# Patient Record
Sex: FEMALE | Race: WHITE | NOT HISPANIC OR LATINO | Employment: OTHER | URBAN - METROPOLITAN AREA
[De-identification: names, ages, dates, MRNs, and addresses within clinical notes are randomized per-mention and may not be internally consistent; named-entity substitution may affect disease eponyms.]

---

## 2017-01-03 ENCOUNTER — GENERIC CONVERSION - ENCOUNTER (OUTPATIENT)
Dept: OTHER | Facility: OTHER | Age: 82
End: 2017-01-03

## 2017-01-04 ENCOUNTER — LAB (OUTPATIENT)
Dept: LAB | Facility: CLINIC | Age: 82
End: 2017-01-04
Payer: MEDICARE

## 2017-01-04 DIAGNOSIS — D50.9 IRON DEFICIENCY ANEMIA, UNSPECIFIED IRON DEFICIENCY ANEMIA TYPE: Primary | ICD-10-CM

## 2017-01-04 LAB
HCT VFR BLD AUTO: 29.4 % (ref 34.8–46.1)
HGB BLD-MCNC: 8.8 G/DL (ref 11.5–15.4)

## 2017-01-04 PROCEDURE — 85018 HEMOGLOBIN: CPT

## 2017-01-04 PROCEDURE — 36415 COLL VENOUS BLD VENIPUNCTURE: CPT

## 2017-01-04 PROCEDURE — 85014 HEMATOCRIT: CPT

## 2017-01-05 ENCOUNTER — GENERIC CONVERSION - ENCOUNTER (OUTPATIENT)
Dept: OTHER | Facility: OTHER | Age: 82
End: 2017-01-05

## 2017-01-10 ENCOUNTER — GENERIC CONVERSION - ENCOUNTER (OUTPATIENT)
Dept: OTHER | Facility: OTHER | Age: 82
End: 2017-01-10

## 2017-01-16 ENCOUNTER — ALLSCRIPTS OFFICE VISIT (OUTPATIENT)
Dept: OTHER | Facility: OTHER | Age: 82
End: 2017-01-16

## 2017-01-16 DIAGNOSIS — I49.5 SICK SINUS SYNDROME (HCC): ICD-10-CM

## 2017-01-16 DIAGNOSIS — R06.09 OTHER FORMS OF DYSPNEA: ICD-10-CM

## 2017-01-16 DIAGNOSIS — D64.9 ANEMIA: ICD-10-CM

## 2017-01-16 DIAGNOSIS — I48.92 ATRIAL FLUTTER (HCC): ICD-10-CM

## 2017-01-16 DIAGNOSIS — K92.2 GASTROINTESTINAL HEMORRHAGE: ICD-10-CM

## 2017-01-16 DIAGNOSIS — I50.32 CHRONIC DIASTOLIC HEART FAILURE (HCC): ICD-10-CM

## 2017-01-16 DIAGNOSIS — I10 ESSENTIAL (PRIMARY) HYPERTENSION: ICD-10-CM

## 2017-01-18 ENCOUNTER — TRANSCRIBE ORDERS (OUTPATIENT)
Dept: ADMINISTRATIVE | Facility: HOSPITAL | Age: 82
End: 2017-01-18

## 2017-01-18 ENCOUNTER — APPOINTMENT (OUTPATIENT)
Dept: LAB | Facility: HOSPITAL | Age: 82
End: 2017-01-18
Payer: MEDICARE

## 2017-01-18 DIAGNOSIS — I49.5 SICK SINUS SYNDROME (HCC): ICD-10-CM

## 2017-01-18 LAB
ALBUMIN SERPL BCP-MCNC: 3.1 G/DL (ref 3.5–5)
ALP SERPL-CCNC: 67 U/L (ref 46–116)
ALT SERPL W P-5'-P-CCNC: 23 U/L (ref 12–78)
ANION GAP SERPL CALCULATED.3IONS-SCNC: 11 MMOL/L (ref 4–13)
AST SERPL W P-5'-P-CCNC: 20 U/L (ref 5–45)
BASOPHILS # BLD AUTO: 0.04 THOUSANDS/ΜL (ref 0–0.1)
BASOPHILS NFR BLD AUTO: 1 % (ref 0–1)
BILIRUB SERPL-MCNC: 0.7 MG/DL (ref 0.2–1)
BUN SERPL-MCNC: 16 MG/DL (ref 5–25)
CALCIUM SERPL-MCNC: 8.5 MG/DL (ref 8.3–10.1)
CHLORIDE SERPL-SCNC: 105 MMOL/L (ref 100–108)
CO2 SERPL-SCNC: 25 MMOL/L (ref 21–32)
CREAT SERPL-MCNC: 1.37 MG/DL (ref 0.6–1.3)
EOSINOPHIL # BLD AUTO: 0.41 THOUSAND/ΜL (ref 0–0.61)
EOSINOPHIL NFR BLD AUTO: 6 % (ref 0–6)
ERYTHROCYTE [DISTWIDTH] IN BLOOD BY AUTOMATED COUNT: 16 % (ref 11.6–15.1)
GFR SERPL CREATININE-BSD FRML MDRD: 36.7 ML/MIN/1.73SQ M
GLUCOSE SERPL-MCNC: 134 MG/DL (ref 65–140)
HCT VFR BLD AUTO: 28.6 % (ref 34.8–46.1)
HGB BLD-MCNC: 8.1 G/DL (ref 11.5–15.4)
LYMPHOCYTES # BLD AUTO: 1.63 THOUSANDS/ΜL (ref 0.6–4.47)
LYMPHOCYTES NFR BLD AUTO: 23 % (ref 14–44)
MCH RBC QN AUTO: 23.5 PG (ref 26.8–34.3)
MCHC RBC AUTO-ENTMCNC: 28.3 G/DL (ref 31.4–37.4)
MCV RBC AUTO: 83 FL (ref 82–98)
MONOCYTES # BLD AUTO: 0.6 THOUSAND/ΜL (ref 0.17–1.22)
MONOCYTES NFR BLD AUTO: 8 % (ref 4–12)
NEUTROPHILS # BLD AUTO: 4.53 THOUSANDS/ΜL (ref 1.85–7.62)
NEUTS SEG NFR BLD AUTO: 62 % (ref 43–75)
PLATELET # BLD AUTO: 295 THOUSANDS/UL (ref 149–390)
PMV BLD AUTO: 9.7 FL (ref 8.9–12.7)
POTASSIUM SERPL-SCNC: 3.9 MMOL/L (ref 3.5–5.3)
PROT SERPL-MCNC: 6.2 G/DL (ref 6.4–8.2)
RBC # BLD AUTO: 3.44 MILLION/UL (ref 3.81–5.12)
SODIUM SERPL-SCNC: 141 MMOL/L (ref 136–145)
WBC # BLD AUTO: 7.21 THOUSAND/UL (ref 4.31–10.16)

## 2017-01-18 PROCEDURE — 36415 COLL VENOUS BLD VENIPUNCTURE: CPT

## 2017-01-18 PROCEDURE — 85025 COMPLETE CBC W/AUTO DIFF WBC: CPT

## 2017-01-18 PROCEDURE — 80053 COMPREHEN METABOLIC PANEL: CPT

## 2017-01-23 ENCOUNTER — HOSPITAL ENCOUNTER (OUTPATIENT)
Dept: NON INVASIVE DIAGNOSTICS | Facility: HOSPITAL | Age: 82
Discharge: HOME | End: 2017-01-23
Payer: MEDICARE

## 2017-01-23 ENCOUNTER — TRANSCRIBE ORDERS (OUTPATIENT)
Dept: ADMINISTRATIVE | Facility: HOSPITAL | Age: 82
End: 2017-01-23

## 2017-01-23 ENCOUNTER — APPOINTMENT (OUTPATIENT)
Dept: LAB | Facility: HOSPITAL | Age: 82
End: 2017-01-23
Payer: MEDICARE

## 2017-01-23 DIAGNOSIS — D64.9 ANEMIA: ICD-10-CM

## 2017-01-23 DIAGNOSIS — I49.5 SICK SINUS SYNDROME (HCC): ICD-10-CM

## 2017-01-23 LAB
ERYTHROCYTE [DISTWIDTH] IN BLOOD BY AUTOMATED COUNT: 16.5 % (ref 11.6–15.1)
HCT VFR BLD AUTO: 30.1 % (ref 34.8–46.1)
HGB BLD-MCNC: 8.5 G/DL (ref 11.5–15.4)
MCH RBC QN AUTO: 22.8 PG (ref 26.8–34.3)
MCHC RBC AUTO-ENTMCNC: 28.2 G/DL (ref 31.4–37.4)
MCV RBC AUTO: 81 FL (ref 82–98)
PLATELET # BLD AUTO: 280 THOUSANDS/UL (ref 149–390)
PMV BLD AUTO: 8.8 FL (ref 8.9–12.7)
RBC # BLD AUTO: 3.72 MILLION/UL (ref 3.81–5.12)
WBC # BLD AUTO: 6.49 THOUSAND/UL (ref 4.31–10.16)

## 2017-01-23 PROCEDURE — 93306 TTE W/DOPPLER COMPLETE: CPT

## 2017-01-23 PROCEDURE — 85027 COMPLETE CBC AUTOMATED: CPT

## 2017-01-23 PROCEDURE — 36415 COLL VENOUS BLD VENIPUNCTURE: CPT

## 2017-01-30 ENCOUNTER — ALLSCRIPTS OFFICE VISIT (OUTPATIENT)
Dept: OTHER | Facility: OTHER | Age: 82
End: 2017-01-30

## 2017-01-31 ENCOUNTER — TRANSCRIBE ORDERS (OUTPATIENT)
Dept: ADMINISTRATIVE | Facility: HOSPITAL | Age: 82
End: 2017-01-31

## 2017-01-31 ENCOUNTER — APPOINTMENT (OUTPATIENT)
Dept: LAB | Facility: HOSPITAL | Age: 82
End: 2017-01-31
Payer: MEDICARE

## 2017-01-31 DIAGNOSIS — D64.9 ANEMIA: ICD-10-CM

## 2017-01-31 DIAGNOSIS — I10 ESSENTIAL (PRIMARY) HYPERTENSION: ICD-10-CM

## 2017-01-31 DIAGNOSIS — R06.09 OTHER FORMS OF DYSPNEA: ICD-10-CM

## 2017-01-31 DIAGNOSIS — I48.92 ATRIAL FLUTTER (HCC): ICD-10-CM

## 2017-01-31 DIAGNOSIS — K92.2 GASTROINTESTINAL HEMORRHAGE: ICD-10-CM

## 2017-01-31 LAB
BASOPHILS # BLD AUTO: 0.07 THOUSANDS/ΜL (ref 0–0.1)
BASOPHILS NFR BLD AUTO: 1 % (ref 0–1)
EOSINOPHIL # BLD AUTO: 0.45 THOUSAND/ΜL (ref 0–0.61)
EOSINOPHIL NFR BLD AUTO: 5 % (ref 0–6)
ERYTHROCYTE [DISTWIDTH] IN BLOOD BY AUTOMATED COUNT: 17.2 % (ref 11.6–15.1)
FERRITIN SERPL-MCNC: 15 NG/ML (ref 8–388)
HCT VFR BLD AUTO: 31.1 % (ref 34.8–46.1)
HGB BLD-MCNC: 8.8 G/DL (ref 11.5–15.4)
IRON SATN MFR SERPL: 4 %
IRON SERPL-MCNC: 20 UG/DL (ref 50–170)
LYMPHOCYTES # BLD AUTO: 2.11 THOUSANDS/ΜL (ref 0.6–4.47)
LYMPHOCYTES NFR BLD AUTO: 24 % (ref 14–44)
MCH RBC QN AUTO: 22.2 PG (ref 26.8–34.3)
MCHC RBC AUTO-ENTMCNC: 28.3 G/DL (ref 31.4–37.4)
MCV RBC AUTO: 79 FL (ref 82–98)
MONOCYTES # BLD AUTO: 0.7 THOUSAND/ΜL (ref 0.17–1.22)
MONOCYTES NFR BLD AUTO: 8 % (ref 4–12)
NEUTROPHILS # BLD AUTO: 5.37 THOUSANDS/ΜL (ref 1.85–7.62)
NEUTS SEG NFR BLD AUTO: 62 % (ref 43–75)
NT-PROBNP SERPL-MCNC: 2695 PG/ML
PLATELET # BLD AUTO: 281 THOUSANDS/UL (ref 149–390)
PMV BLD AUTO: 9.3 FL (ref 8.9–12.7)
RBC # BLD AUTO: 3.96 MILLION/UL (ref 3.81–5.12)
TIBC SERPL-MCNC: 454 UG/DL (ref 250–450)
WBC # BLD AUTO: 8.7 THOUSAND/UL (ref 4.31–10.16)

## 2017-01-31 PROCEDURE — 36415 COLL VENOUS BLD VENIPUNCTURE: CPT

## 2017-01-31 PROCEDURE — 85025 COMPLETE CBC W/AUTO DIFF WBC: CPT

## 2017-01-31 PROCEDURE — 82728 ASSAY OF FERRITIN: CPT

## 2017-01-31 PROCEDURE — 83540 ASSAY OF IRON: CPT

## 2017-01-31 PROCEDURE — 83550 IRON BINDING TEST: CPT

## 2017-01-31 PROCEDURE — 83880 ASSAY OF NATRIURETIC PEPTIDE: CPT

## 2017-02-02 ENCOUNTER — GENERIC CONVERSION - ENCOUNTER (OUTPATIENT)
Dept: OTHER | Facility: OTHER | Age: 82
End: 2017-02-02

## 2017-02-06 ENCOUNTER — TRANSCRIBE ORDERS (OUTPATIENT)
Dept: ADMISSIONS | Facility: HOSPITAL | Age: 82
End: 2017-02-06

## 2017-02-06 ENCOUNTER — APPOINTMENT (OUTPATIENT)
Dept: LAB | Facility: HOSPITAL | Age: 82
End: 2017-02-06
Payer: MEDICARE

## 2017-02-06 DIAGNOSIS — D64.9 ANEMIA: ICD-10-CM

## 2017-02-06 DIAGNOSIS — I50.32 CHRONIC DIASTOLIC HEART FAILURE (HCC): ICD-10-CM

## 2017-02-06 DIAGNOSIS — I10 ESSENTIAL (PRIMARY) HYPERTENSION: ICD-10-CM

## 2017-02-06 LAB
ANION GAP SERPL CALCULATED.3IONS-SCNC: 11 MMOL/L (ref 4–13)
BASOPHILS # BLD AUTO: 0.05 THOUSANDS/ΜL (ref 0–0.1)
BASOPHILS NFR BLD AUTO: 1 % (ref 0–1)
BUN SERPL-MCNC: 13 MG/DL (ref 5–25)
CALCIUM SERPL-MCNC: 8.6 MG/DL (ref 8.3–10.1)
CHLORIDE SERPL-SCNC: 105 MMOL/L (ref 100–108)
CO2 SERPL-SCNC: 28 MMOL/L (ref 21–32)
CREAT SERPL-MCNC: 1.35 MG/DL (ref 0.6–1.3)
EOSINOPHIL # BLD AUTO: 0.35 THOUSAND/ΜL (ref 0–0.61)
EOSINOPHIL NFR BLD AUTO: 5 % (ref 0–6)
ERYTHROCYTE [DISTWIDTH] IN BLOOD BY AUTOMATED COUNT: 20.9 % (ref 11.6–15.1)
GFR SERPL CREATININE-BSD FRML MDRD: 37.4 ML/MIN/1.73SQ M
GLUCOSE SERPL-MCNC: 136 MG/DL (ref 65–140)
HCT VFR BLD AUTO: 32.7 % (ref 34.8–46.1)
HGB BLD-MCNC: 9 G/DL (ref 11.5–15.4)
LYMPHOCYTES # BLD AUTO: 1.46 THOUSANDS/ΜL (ref 0.6–4.47)
LYMPHOCYTES NFR BLD AUTO: 20 % (ref 14–44)
MCH RBC QN AUTO: 22.4 PG (ref 26.8–34.3)
MCHC RBC AUTO-ENTMCNC: 27.5 G/DL (ref 31.4–37.4)
MCV RBC AUTO: 81 FL (ref 82–98)
MONOCYTES # BLD AUTO: 0.55 THOUSAND/ΜL (ref 0.17–1.22)
MONOCYTES NFR BLD AUTO: 8 % (ref 4–12)
NEUTROPHILS # BLD AUTO: 4.94 THOUSANDS/ΜL (ref 1.85–7.62)
NEUTS SEG NFR BLD AUTO: 66 % (ref 43–75)
PLATELET # BLD AUTO: 244 THOUSANDS/UL (ref 149–390)
PMV BLD AUTO: 9.7 FL (ref 8.9–12.7)
POTASSIUM SERPL-SCNC: 3.9 MMOL/L (ref 3.5–5.3)
RBC # BLD AUTO: 4.02 MILLION/UL (ref 3.81–5.12)
SODIUM SERPL-SCNC: 144 MMOL/L (ref 136–145)
WBC # BLD AUTO: 7.35 THOUSAND/UL (ref 4.31–10.16)

## 2017-02-06 PROCEDURE — 85025 COMPLETE CBC W/AUTO DIFF WBC: CPT

## 2017-02-06 PROCEDURE — 36415 COLL VENOUS BLD VENIPUNCTURE: CPT

## 2017-02-06 PROCEDURE — 80048 BASIC METABOLIC PNL TOTAL CA: CPT

## 2017-02-08 ENCOUNTER — ALLSCRIPTS OFFICE VISIT (OUTPATIENT)
Dept: OTHER | Facility: OTHER | Age: 82
End: 2017-02-08

## 2017-02-15 ENCOUNTER — ALLSCRIPTS OFFICE VISIT (OUTPATIENT)
Dept: OTHER | Facility: OTHER | Age: 82
End: 2017-02-15

## 2017-02-16 ENCOUNTER — HOSPITAL ENCOUNTER (INPATIENT)
Facility: HOSPITAL | Age: 82
LOS: 1 days | Discharge: HOME | DRG: 682 | End: 2017-02-18
Attending: EMERGENCY MEDICINE | Admitting: FAMILY MEDICINE
Payer: MEDICARE

## 2017-02-16 ENCOUNTER — ALLSCRIPTS OFFICE VISIT (OUTPATIENT)
Dept: OTHER | Facility: OTHER | Age: 82
End: 2017-02-16

## 2017-02-16 ENCOUNTER — APPOINTMENT (EMERGENCY)
Dept: RADIOLOGY | Facility: HOSPITAL | Age: 82
DRG: 682 | End: 2017-02-16
Payer: MEDICARE

## 2017-02-16 DIAGNOSIS — I50.9 ACUTE EXACERBATION OF CHF (CONGESTIVE HEART FAILURE) (HCC): Primary | ICD-10-CM

## 2017-02-16 DIAGNOSIS — E78.5 HYPERLIPIDEMIA: ICD-10-CM

## 2017-02-16 DIAGNOSIS — N17.9 AKI (ACUTE KIDNEY INJURY) (HCC): ICD-10-CM

## 2017-02-16 DIAGNOSIS — I10 ESSENTIAL (PRIMARY) HYPERTENSION: ICD-10-CM

## 2017-02-16 DIAGNOSIS — E55.9 VITAMIN D DEFICIENCY: ICD-10-CM

## 2017-02-16 DIAGNOSIS — R06.09 OTHER FORMS OF DYSPNEA: ICD-10-CM

## 2017-02-16 DIAGNOSIS — J90 PLEURAL EFFUSION: ICD-10-CM

## 2017-02-16 DIAGNOSIS — R53.83 OTHER FATIGUE: ICD-10-CM

## 2017-02-16 DIAGNOSIS — I50.32 CHRONIC DIASTOLIC HEART FAILURE (HCC): ICD-10-CM

## 2017-02-16 DIAGNOSIS — R09.02 HYPOXIA: ICD-10-CM

## 2017-02-16 DIAGNOSIS — D64.9 ANEMIA: ICD-10-CM

## 2017-02-16 DIAGNOSIS — E05.90 THYROTOXICOSIS WITHOUT THYROID STORM: ICD-10-CM

## 2017-02-16 LAB
ALBUMIN SERPL BCP-MCNC: 3.4 G/DL (ref 3.5–5)
ALP SERPL-CCNC: 68 U/L (ref 46–116)
ALT SERPL W P-5'-P-CCNC: 20 U/L (ref 12–78)
ANION GAP SERPL CALCULATED.3IONS-SCNC: 10 MMOL/L (ref 4–13)
AST SERPL W P-5'-P-CCNC: 20 U/L (ref 5–45)
ATRIAL RATE: 60 BPM
BASOPHILS # BLD AUTO: 0.05 THOUSANDS/ΜL (ref 0–0.1)
BASOPHILS NFR BLD AUTO: 1 % (ref 0–1)
BILIRUB SERPL-MCNC: 0.9 MG/DL (ref 0.2–1)
BUN SERPL-MCNC: 12 MG/DL (ref 5–25)
CALCIUM SERPL-MCNC: 8.8 MG/DL (ref 8.3–10.1)
CHLORIDE SERPL-SCNC: 107 MMOL/L (ref 100–108)
CO2 SERPL-SCNC: 28 MMOL/L (ref 21–32)
CREAT SERPL-MCNC: 1.42 MG/DL (ref 0.6–1.3)
EOSINOPHIL # BLD AUTO: 0.14 THOUSAND/ΜL (ref 0–0.61)
EOSINOPHIL NFR BLD AUTO: 2 % (ref 0–6)
ERYTHROCYTE [DISTWIDTH] IN BLOOD BY AUTOMATED COUNT: 21.6 % (ref 11.6–15.1)
GFR SERPL CREATININE-BSD FRML MDRD: 35.2 ML/MIN/1.73SQ M
GLUCOSE SERPL-MCNC: 137 MG/DL (ref 65–140)
HCT VFR BLD AUTO: 36.8 % (ref 34.8–46.1)
HGB BLD-MCNC: 10.2 G/DL (ref 11.5–15.4)
LYMPHOCYTES # BLD AUTO: 1.17 THOUSANDS/ΜL (ref 0.6–4.47)
LYMPHOCYTES NFR BLD AUTO: 16 % (ref 14–44)
MCH RBC QN AUTO: 22.3 PG (ref 26.8–34.3)
MCHC RBC AUTO-ENTMCNC: 27.7 G/DL (ref 31.4–37.4)
MCV RBC AUTO: 80 FL (ref 82–98)
MONOCYTES # BLD AUTO: 0.76 THOUSAND/ΜL (ref 0.17–1.22)
MONOCYTES NFR BLD AUTO: 10 % (ref 4–12)
NEUTROPHILS # BLD AUTO: 5.36 THOUSANDS/ΜL (ref 1.85–7.62)
NEUTS SEG NFR BLD AUTO: 71 % (ref 43–75)
NT-PROBNP SERPL-MCNC: 4244 PG/ML
P AXIS: 102 DEGREES
PLATELET # BLD AUTO: 270 THOUSANDS/UL (ref 149–390)
PMV BLD AUTO: 9.5 FL (ref 8.9–12.7)
POTASSIUM SERPL-SCNC: 3.9 MMOL/L (ref 3.5–5.3)
PR INTERVAL: 196 MS
PROT SERPL-MCNC: 6.7 G/DL (ref 6.4–8.2)
QRS AXIS: 51 DEGREES
QRSD INTERVAL: 78 MS
QT INTERVAL: 434 MS
QTC INTERVAL: 434 MS
RBC # BLD AUTO: 4.58 MILLION/UL (ref 3.81–5.12)
SODIUM SERPL-SCNC: 145 MMOL/L (ref 136–145)
T WAVE AXIS: 58 DEGREES
TROPONIN I SERPL-MCNC: 0.02 NG/ML
TROPONIN I SERPL-MCNC: 0.02 NG/ML
TROPONIN I SERPL-MCNC: 0.03 NG/ML
VENTRICULAR RATE: 60 BPM
WBC # BLD AUTO: 7.48 THOUSAND/UL (ref 4.31–10.16)

## 2017-02-16 PROCEDURE — 85025 COMPLETE CBC W/AUTO DIFF WBC: CPT | Performed by: PHYSICIAN ASSISTANT

## 2017-02-16 PROCEDURE — 36415 COLL VENOUS BLD VENIPUNCTURE: CPT | Performed by: PHYSICIAN ASSISTANT

## 2017-02-16 PROCEDURE — 99285 EMERGENCY DEPT VISIT HI MDM: CPT

## 2017-02-16 PROCEDURE — 93005 ELECTROCARDIOGRAM TRACING: CPT | Performed by: PHYSICIAN ASSISTANT

## 2017-02-16 PROCEDURE — 84484 ASSAY OF TROPONIN QUANT: CPT | Performed by: PHYSICIAN ASSISTANT

## 2017-02-16 PROCEDURE — 84484 ASSAY OF TROPONIN QUANT: CPT | Performed by: FAMILY MEDICINE

## 2017-02-16 PROCEDURE — 71020 HB CHEST X-RAY 2VW FRONTAL&LATL: CPT

## 2017-02-16 PROCEDURE — 80053 COMPREHEN METABOLIC PANEL: CPT | Performed by: PHYSICIAN ASSISTANT

## 2017-02-16 PROCEDURE — 83880 ASSAY OF NATRIURETIC PEPTIDE: CPT | Performed by: PHYSICIAN ASSISTANT

## 2017-02-16 RX ORDER — ATORVASTATIN CALCIUM 40 MG/1
40 TABLET, FILM COATED ORAL
Status: DISCONTINUED | OUTPATIENT
Start: 2017-02-16 | End: 2017-02-18 | Stop reason: HOSPADM

## 2017-02-16 RX ORDER — PANTOPRAZOLE SODIUM 40 MG/1
40 TABLET, DELAYED RELEASE ORAL
Status: DISCONTINUED | OUTPATIENT
Start: 2017-02-16 | End: 2017-02-18 | Stop reason: HOSPADM

## 2017-02-16 RX ORDER — BRIMONIDINE TARTRATE, TIMOLOL MALEATE 2; 5 MG/ML; MG/ML
1 SOLUTION/ DROPS OPHTHALMIC EVERY 12 HOURS SCHEDULED
Status: DISCONTINUED | OUTPATIENT
Start: 2017-02-16 | End: 2017-02-16

## 2017-02-16 RX ORDER — BRIMONIDINE TARTRATE, TIMOLOL MALEATE 2; 5 MG/ML; MG/ML
1 SOLUTION/ DROPS OPHTHALMIC EVERY 12 HOURS SCHEDULED
Status: DISCONTINUED | OUTPATIENT
Start: 2017-02-17 | End: 2017-02-18 | Stop reason: HOSPADM

## 2017-02-16 RX ORDER — FUROSEMIDE 10 MG/ML
40 INJECTION INTRAMUSCULAR; INTRAVENOUS
Status: DISCONTINUED | OUTPATIENT
Start: 2017-02-16 | End: 2017-02-18 | Stop reason: HOSPADM

## 2017-02-16 RX ORDER — METHIMAZOLE 5 MG/1
5 TABLET ORAL 3 TIMES WEEKLY
Status: DISCONTINUED | OUTPATIENT
Start: 2017-02-17 | End: 2017-02-18 | Stop reason: HOSPADM

## 2017-02-16 RX ORDER — METOPROLOL TARTRATE 50 MG/1
50 TABLET, FILM COATED ORAL EVERY 12 HOURS SCHEDULED
Status: DISCONTINUED | OUTPATIENT
Start: 2017-02-16 | End: 2017-02-18 | Stop reason: HOSPADM

## 2017-02-16 RX ORDER — DILTIAZEM HYDROCHLORIDE 180 MG/1
180 CAPSULE, COATED, EXTENDED RELEASE ORAL DAILY
Status: DISCONTINUED | OUTPATIENT
Start: 2017-02-16 | End: 2017-02-18 | Stop reason: HOSPADM

## 2017-02-16 RX ORDER — HEPARIN SODIUM 5000 [USP'U]/ML
5000 INJECTION, SOLUTION INTRAVENOUS; SUBCUTANEOUS EVERY 8 HOURS SCHEDULED
Status: DISCONTINUED | OUTPATIENT
Start: 2017-02-16 | End: 2017-02-18 | Stop reason: HOSPADM

## 2017-02-16 RX ORDER — FUROSEMIDE 10 MG/ML
40 INJECTION INTRAMUSCULAR; INTRAVENOUS ONCE
Status: COMPLETED | OUTPATIENT
Start: 2017-02-16 | End: 2017-02-16

## 2017-02-16 RX ORDER — OLOPATADINE HYDROCHLORIDE 1 MG/ML
1 SOLUTION/ DROPS OPHTHALMIC 2 TIMES DAILY
Status: DISCONTINUED | OUTPATIENT
Start: 2017-02-16 | End: 2017-02-18 | Stop reason: HOSPADM

## 2017-02-16 RX ORDER — FUROSEMIDE 20 MG/1
20 TABLET ORAL EVERY OTHER DAY
Status: ON HOLD | COMMUNITY
End: 2017-02-18

## 2017-02-16 RX ADMIN — OLOPATADINE HYDROCHLORIDE 1 DROP: 1 SOLUTION/ DROPS OPHTHALMIC at 17:20

## 2017-02-16 RX ADMIN — ATORVASTATIN CALCIUM 40 MG: 40 TABLET, FILM COATED ORAL at 16:02

## 2017-02-16 RX ADMIN — FUROSEMIDE 40 MG: 10 INJECTION, SOLUTION INTRAMUSCULAR; INTRAVENOUS at 12:56

## 2017-02-16 RX ADMIN — METOPROLOL TARTRATE 50 MG: 50 TABLET ORAL at 21:43

## 2017-02-16 RX ADMIN — PANTOPRAZOLE SODIUM 40 MG: 40 TABLET, DELAYED RELEASE ORAL at 16:02

## 2017-02-16 RX ADMIN — FUROSEMIDE 40 MG: 10 INJECTION, SOLUTION INTRAMUSCULAR; INTRAVENOUS at 17:20

## 2017-02-17 ENCOUNTER — APPOINTMENT (INPATIENT)
Dept: ULTRASOUND IMAGING | Facility: HOSPITAL | Age: 82
DRG: 682 | End: 2017-02-17
Payer: MEDICARE

## 2017-02-17 ENCOUNTER — APPOINTMENT (OUTPATIENT)
Dept: RADIOLOGY | Facility: HOSPITAL | Age: 82
DRG: 682 | End: 2017-02-17
Payer: MEDICARE

## 2017-02-17 PROBLEM — I27.20 PULMONARY HYPERTENSION (HCC): Status: ACTIVE | Noted: 2017-02-17

## 2017-02-17 PROBLEM — E87.6 HYPOKALEMIA: Status: ACTIVE | Noted: 2017-02-17

## 2017-02-17 PROBLEM — N17.9 ACUTE KIDNEY INJURY (HCC): Status: ACTIVE | Noted: 2017-02-17

## 2017-02-17 LAB
ANION GAP SERPL CALCULATED.3IONS-SCNC: 9 MMOL/L (ref 4–13)
BUN SERPL-MCNC: 13 MG/DL (ref 5–25)
CALCIUM SERPL-MCNC: 8.5 MG/DL (ref 8.3–10.1)
CHLORIDE SERPL-SCNC: 105 MMOL/L (ref 100–108)
CO2 SERPL-SCNC: 31 MMOL/L (ref 21–32)
CREAT SERPL-MCNC: 1.43 MG/DL (ref 0.6–1.3)
CREAT UR-MCNC: 44.7 MG/DL
ERYTHROCYTE [DISTWIDTH] IN BLOOD BY AUTOMATED COUNT: 21.3 % (ref 11.6–15.1)
GFR SERPL CREATININE-BSD FRML MDRD: 35 ML/MIN/1.73SQ M
GLUCOSE SERPL-MCNC: 95 MG/DL (ref 65–140)
HCT VFR BLD AUTO: 33 % (ref 34.8–46.1)
HGB BLD-MCNC: 9.2 G/DL (ref 11.5–15.4)
MAGNESIUM SERPL-MCNC: 2 MG/DL (ref 1.6–2.6)
MCH RBC QN AUTO: 22.2 PG (ref 26.8–34.3)
MCHC RBC AUTO-ENTMCNC: 27.9 G/DL (ref 31.4–37.4)
MCV RBC AUTO: 80 FL (ref 82–98)
PLATELET # BLD AUTO: 228 THOUSANDS/UL (ref 149–390)
PMV BLD AUTO: 9.7 FL (ref 8.9–12.7)
POTASSIUM SERPL-SCNC: 3.1 MMOL/L (ref 3.5–5.3)
PROT UR-MCNC: 6 MG/DL
PROT/CREAT UR: 0.13 MG/G{CREAT} (ref 0–0.1)
RBC # BLD AUTO: 4.15 MILLION/UL (ref 3.81–5.12)
SODIUM 24H UR-SCNC: 108 MOL/L
SODIUM SERPL-SCNC: 145 MMOL/L (ref 136–145)
WBC # BLD AUTO: 6.59 THOUSAND/UL (ref 4.31–10.16)

## 2017-02-17 PROCEDURE — 97163 PT EVAL HIGH COMPLEX 45 MIN: CPT | Performed by: PHYSICAL THERAPIST

## 2017-02-17 PROCEDURE — 84156 ASSAY OF PROTEIN URINE: CPT | Performed by: INTERNAL MEDICINE

## 2017-02-17 PROCEDURE — 85027 COMPLETE CBC AUTOMATED: CPT | Performed by: FAMILY MEDICINE

## 2017-02-17 PROCEDURE — 97530 THERAPEUTIC ACTIVITIES: CPT

## 2017-02-17 PROCEDURE — G8979 MOBILITY GOAL STATUS: HCPCS | Performed by: PHYSICAL THERAPIST

## 2017-02-17 PROCEDURE — G8978 MOBILITY CURRENT STATUS: HCPCS | Performed by: PHYSICAL THERAPIST

## 2017-02-17 PROCEDURE — G8987 SELF CARE CURRENT STATUS: HCPCS

## 2017-02-17 PROCEDURE — 97166 OT EVAL MOD COMPLEX 45 MIN: CPT

## 2017-02-17 PROCEDURE — 83735 ASSAY OF MAGNESIUM: CPT | Performed by: FAMILY MEDICINE

## 2017-02-17 PROCEDURE — 93970 EXTREMITY STUDY: CPT

## 2017-02-17 PROCEDURE — 76770 US EXAM ABDO BACK WALL COMP: CPT

## 2017-02-17 PROCEDURE — 84300 ASSAY OF URINE SODIUM: CPT | Performed by: INTERNAL MEDICINE

## 2017-02-17 PROCEDURE — G8988 SELF CARE GOAL STATUS: HCPCS

## 2017-02-17 PROCEDURE — 80048 BASIC METABOLIC PNL TOTAL CA: CPT | Performed by: FAMILY MEDICINE

## 2017-02-17 PROCEDURE — 82570 ASSAY OF URINE CREATININE: CPT | Performed by: INTERNAL MEDICINE

## 2017-02-17 PROCEDURE — 71010 HB CHEST X-RAY 1 VIEW FRONTAL (PORTABLE): CPT

## 2017-02-17 PROCEDURE — 97116 GAIT TRAINING THERAPY: CPT | Performed by: PHYSICAL THERAPIST

## 2017-02-17 RX ORDER — POTASSIUM CHLORIDE 20 MEQ/1
40 TABLET, EXTENDED RELEASE ORAL 2 TIMES DAILY
Status: COMPLETED | OUTPATIENT
Start: 2017-02-17 | End: 2017-02-17

## 2017-02-17 RX ADMIN — FUROSEMIDE 40 MG: 10 INJECTION, SOLUTION INTRAMUSCULAR; INTRAVENOUS at 09:14

## 2017-02-17 RX ADMIN — PANTOPRAZOLE SODIUM 40 MG: 40 TABLET, DELAYED RELEASE ORAL at 09:16

## 2017-02-17 RX ADMIN — METOPROLOL TARTRATE 50 MG: 50 TABLET ORAL at 09:14

## 2017-02-17 RX ADMIN — POTASSIUM CHLORIDE 40 MEQ: 1500 TABLET, EXTENDED RELEASE ORAL at 17:14

## 2017-02-17 RX ADMIN — HEPARIN SODIUM 5000 UNITS: 5000 INJECTION, SOLUTION INTRAVENOUS; SUBCUTANEOUS at 14:33

## 2017-02-17 RX ADMIN — FUROSEMIDE 40 MG: 10 INJECTION, SOLUTION INTRAMUSCULAR; INTRAVENOUS at 17:14

## 2017-02-17 RX ADMIN — BRIMONIDINE TARTRATE, TIMOLOL MALEATE 1 DROP: 2; 5 SOLUTION/ DROPS OPHTHALMIC at 12:22

## 2017-02-17 RX ADMIN — PANTOPRAZOLE SODIUM 40 MG: 40 TABLET, DELAYED RELEASE ORAL at 17:14

## 2017-02-17 RX ADMIN — METHIMAZOLE 5 MG: 5 TABLET ORAL at 09:14

## 2017-02-17 RX ADMIN — BRIMONIDINE TARTRATE, TIMOLOL MALEATE 1 DROP: 2; 5 SOLUTION/ DROPS OPHTHALMIC at 21:39

## 2017-02-17 RX ADMIN — HEPARIN SODIUM 5000 UNITS: 5000 INJECTION, SOLUTION INTRAVENOUS; SUBCUTANEOUS at 21:37

## 2017-02-17 RX ADMIN — METOPROLOL TARTRATE 50 MG: 50 TABLET ORAL at 21:37

## 2017-02-17 RX ADMIN — OLOPATADINE HYDROCHLORIDE 1 DROP: 1 SOLUTION/ DROPS OPHTHALMIC at 17:14

## 2017-02-17 RX ADMIN — POTASSIUM CHLORIDE 40 MEQ: 1500 TABLET, EXTENDED RELEASE ORAL at 13:27

## 2017-02-17 RX ADMIN — SERTRALINE HYDROCHLORIDE 50 MG: 50 TABLET ORAL at 09:15

## 2017-02-17 RX ADMIN — DILTIAZEM HYDROCHLORIDE 180 MG: 180 CAPSULE, EXTENDED RELEASE ORAL at 09:15

## 2017-02-17 RX ADMIN — OLOPATADINE HYDROCHLORIDE 1 DROP: 1 SOLUTION/ DROPS OPHTHALMIC at 09:15

## 2017-02-17 RX ADMIN — ATORVASTATIN CALCIUM 40 MG: 40 TABLET, FILM COATED ORAL at 17:14

## 2017-02-18 VITALS
RESPIRATION RATE: 18 BRPM | HEIGHT: 66 IN | OXYGEN SATURATION: 96 % | SYSTOLIC BLOOD PRESSURE: 129 MMHG | BODY MASS INDEX: 30.15 KG/M2 | DIASTOLIC BLOOD PRESSURE: 62 MMHG | WEIGHT: 187.61 LBS | HEART RATE: 60 BPM | TEMPERATURE: 97.3 F

## 2017-02-18 PROBLEM — J90 PLEURAL EFFUSION: Status: ACTIVE | Noted: 2017-02-18

## 2017-02-18 PROBLEM — E83.39 HYPERPHOSPHATEMIA: Status: ACTIVE | Noted: 2017-02-18

## 2017-02-18 PROBLEM — N26.1 RENAL ATROPHY: Status: ACTIVE | Noted: 2017-02-18

## 2017-02-18 PROBLEM — D64.9 ANEMIA: Status: ACTIVE | Noted: 2017-02-18

## 2017-02-18 PROBLEM — E55.9 VITAMIN D INSUFFICIENCY: Status: ACTIVE | Noted: 2017-02-18

## 2017-02-18 LAB
25(OH)D3 SERPL-MCNC: 29.8 NG/ML (ref 30–100)
ALBUMIN SERPL BCP-MCNC: 2.9 G/DL (ref 3.5–5)
ALP SERPL-CCNC: 58 U/L (ref 46–116)
ALT SERPL W P-5'-P-CCNC: 16 U/L (ref 12–78)
ANION GAP SERPL CALCULATED.3IONS-SCNC: 5 MMOL/L (ref 4–13)
AST SERPL W P-5'-P-CCNC: 16 U/L (ref 5–45)
BASOPHILS # BLD AUTO: 0.05 THOUSANDS/ΜL (ref 0–0.1)
BASOPHILS NFR BLD AUTO: 1 % (ref 0–1)
BILIRUB SERPL-MCNC: 0.9 MG/DL (ref 0.2–1)
BUN SERPL-MCNC: 16 MG/DL (ref 5–25)
CALCIUM SERPL-MCNC: 8.6 MG/DL (ref 8.3–10.1)
CHLORIDE SERPL-SCNC: 106 MMOL/L (ref 100–108)
CO2 SERPL-SCNC: 33 MMOL/L (ref 21–32)
CREAT SERPL-MCNC: 1.43 MG/DL (ref 0.6–1.3)
EOSINOPHIL # BLD AUTO: 0.35 THOUSAND/ΜL (ref 0–0.61)
EOSINOPHIL NFR BLD AUTO: 5 % (ref 0–6)
ERYTHROCYTE [DISTWIDTH] IN BLOOD BY AUTOMATED COUNT: 21.4 % (ref 11.6–15.1)
GFR SERPL CREATININE-BSD FRML MDRD: 35 ML/MIN/1.73SQ M
GLUCOSE SERPL-MCNC: 102 MG/DL (ref 65–140)
HCT VFR BLD AUTO: 33.1 % (ref 34.8–46.1)
HGB BLD-MCNC: 9.3 G/DL (ref 11.5–15.4)
LACTATE SERPL-SCNC: 0.8 MMOL/L (ref 0.5–2)
LYMPHOCYTES # BLD AUTO: 1.71 THOUSANDS/ΜL (ref 0.6–4.47)
LYMPHOCYTES NFR BLD AUTO: 26 % (ref 14–44)
MAGNESIUM SERPL-MCNC: 1.9 MG/DL (ref 1.6–2.6)
MCH RBC QN AUTO: 22.4 PG (ref 26.8–34.3)
MCHC RBC AUTO-ENTMCNC: 28.1 G/DL (ref 31.4–37.4)
MCV RBC AUTO: 80 FL (ref 82–98)
MONOCYTES # BLD AUTO: 0.83 THOUSAND/ΜL (ref 0.17–1.22)
MONOCYTES NFR BLD AUTO: 13 % (ref 4–12)
NEUTROPHILS # BLD AUTO: 3.67 THOUSANDS/ΜL (ref 1.85–7.62)
NEUTS SEG NFR BLD AUTO: 55 % (ref 43–75)
PHOSPHATE SERPL-MCNC: 4.9 MG/DL (ref 2.3–4.1)
PLATELET # BLD AUTO: 216 THOUSANDS/UL (ref 149–390)
PMV BLD AUTO: 9.7 FL (ref 8.9–12.7)
POTASSIUM SERPL-SCNC: 3.8 MMOL/L (ref 3.5–5.3)
PROT SERPL-MCNC: 6 G/DL (ref 6.4–8.2)
RBC # BLD AUTO: 4.15 MILLION/UL (ref 3.81–5.12)
SODIUM SERPL-SCNC: 144 MMOL/L (ref 136–145)
TSH SERPL DL<=0.05 MIU/L-ACNC: 0.77 UIU/ML (ref 0.36–3.74)
WBC # BLD AUTO: 6.61 THOUSAND/UL (ref 4.31–10.16)

## 2017-02-18 PROCEDURE — 84100 ASSAY OF PHOSPHORUS: CPT | Performed by: INTERNAL MEDICINE

## 2017-02-18 PROCEDURE — 82306 VITAMIN D 25 HYDROXY: CPT | Performed by: INTERNAL MEDICINE

## 2017-02-18 PROCEDURE — 84443 ASSAY THYROID STIM HORMONE: CPT | Performed by: INTERNAL MEDICINE

## 2017-02-18 PROCEDURE — 83605 ASSAY OF LACTIC ACID: CPT | Performed by: INTERNAL MEDICINE

## 2017-02-18 PROCEDURE — 85025 COMPLETE CBC W/AUTO DIFF WBC: CPT | Performed by: INTERNAL MEDICINE

## 2017-02-18 PROCEDURE — 80053 COMPREHEN METABOLIC PANEL: CPT | Performed by: INTERNAL MEDICINE

## 2017-02-18 PROCEDURE — 83735 ASSAY OF MAGNESIUM: CPT | Performed by: INTERNAL MEDICINE

## 2017-02-18 RX ORDER — POTASSIUM CHLORIDE 20 MEQ/1
20 TABLET, EXTENDED RELEASE ORAL ONCE
Status: COMPLETED | OUTPATIENT
Start: 2017-02-18 | End: 2017-02-18

## 2017-02-18 RX ORDER — FUROSEMIDE 40 MG/1
40 TABLET ORAL DAILY
Qty: 30 TABLET | Refills: 0 | Status: SHIPPED | OUTPATIENT
Start: 2017-02-19 | End: 2017-09-18

## 2017-02-18 RX ORDER — ATORVASTATIN CALCIUM 20 MG/1
20 TABLET, FILM COATED ORAL
Qty: 30 TABLET | Refills: 0 | Status: SHIPPED | OUTPATIENT
Start: 2017-02-18 | End: 2018-01-05 | Stop reason: CLARIF

## 2017-02-18 RX ORDER — MELATONIN
1000 DAILY
Qty: 30 TABLET | Refills: 0 | Status: SHIPPED | OUTPATIENT
Start: 2017-02-18

## 2017-02-18 RX ADMIN — METOPROLOL TARTRATE 50 MG: 50 TABLET ORAL at 09:38

## 2017-02-18 RX ADMIN — DILTIAZEM HYDROCHLORIDE 180 MG: 180 CAPSULE, EXTENDED RELEASE ORAL at 09:38

## 2017-02-18 RX ADMIN — Medication 400 MG: at 09:40

## 2017-02-18 RX ADMIN — PANTOPRAZOLE SODIUM 40 MG: 40 TABLET, DELAYED RELEASE ORAL at 06:35

## 2017-02-18 RX ADMIN — FUROSEMIDE 40 MG: 10 INJECTION, SOLUTION INTRAMUSCULAR; INTRAVENOUS at 09:37

## 2017-02-18 RX ADMIN — HEPARIN SODIUM 5000 UNITS: 5000 INJECTION, SOLUTION INTRAVENOUS; SUBCUTANEOUS at 06:35

## 2017-02-18 RX ADMIN — POTASSIUM CHLORIDE 20 MEQ: 1500 TABLET, EXTENDED RELEASE ORAL at 09:38

## 2017-02-18 RX ADMIN — OLOPATADINE HYDROCHLORIDE 1 DROP: 1 SOLUTION/ DROPS OPHTHALMIC at 09:38

## 2017-02-18 RX ADMIN — BRIMONIDINE TARTRATE, TIMOLOL MALEATE 1 DROP: 2; 5 SOLUTION/ DROPS OPHTHALMIC at 09:40

## 2017-02-18 RX ADMIN — SERTRALINE HYDROCHLORIDE 50 MG: 50 TABLET ORAL at 09:38

## 2017-02-21 ENCOUNTER — APPOINTMENT (OUTPATIENT)
Dept: LAB | Facility: HOSPITAL | Age: 82
End: 2017-02-21
Attending: INTERNAL MEDICINE
Payer: MEDICARE

## 2017-02-21 ENCOUNTER — GENERIC CONVERSION - ENCOUNTER (OUTPATIENT)
Dept: OTHER | Facility: OTHER | Age: 82
End: 2017-02-21

## 2017-02-21 ENCOUNTER — HOSPITAL ENCOUNTER (OUTPATIENT)
Dept: RADIOLOGY | Facility: HOSPITAL | Age: 82
Discharge: HOME | End: 2017-02-21
Attending: INTERNAL MEDICINE
Payer: MEDICARE

## 2017-02-21 ENCOUNTER — TRANSCRIBE ORDERS (OUTPATIENT)
Dept: ADMINISTRATIVE | Facility: HOSPITAL | Age: 82
End: 2017-02-21

## 2017-02-21 DIAGNOSIS — E88.09 HYPOALBUMINEMIA: ICD-10-CM

## 2017-02-21 DIAGNOSIS — E83.39 HYPERPHOSPHATEMIA: ICD-10-CM

## 2017-02-21 DIAGNOSIS — E87.6 HYPOKALEMIA: ICD-10-CM

## 2017-02-21 DIAGNOSIS — D64.9 ANEMIA, UNSPECIFIED TYPE: ICD-10-CM

## 2017-02-21 DIAGNOSIS — I50.32 CHRONIC DIASTOLIC CONGESTIVE HEART FAILURE (HCC): ICD-10-CM

## 2017-02-21 DIAGNOSIS — J90 PLEURAL EFFUSION: ICD-10-CM

## 2017-02-21 DIAGNOSIS — N17.9 ACUTE KIDNEY INJURY (HCC): ICD-10-CM

## 2017-02-21 DIAGNOSIS — I50.32 CHRONIC DIASTOLIC CONGESTIVE HEART FAILURE (HCC): Primary | ICD-10-CM

## 2017-02-21 LAB
ALBUMIN SERPL BCP-MCNC: 3.6 G/DL (ref 3.5–5)
ALP SERPL-CCNC: 73 U/L (ref 46–116)
ALT SERPL W P-5'-P-CCNC: 28 U/L (ref 12–78)
ANION GAP SERPL CALCULATED.3IONS-SCNC: 8 MMOL/L (ref 4–13)
AST SERPL W P-5'-P-CCNC: 27 U/L (ref 5–45)
BASOPHILS # BLD AUTO: 0.07 THOUSANDS/ΜL (ref 0–0.1)
BASOPHILS NFR BLD AUTO: 1 % (ref 0–1)
BILIRUB SERPL-MCNC: 0.7 MG/DL (ref 0.2–1)
BUN SERPL-MCNC: 15 MG/DL (ref 5–25)
CALCIUM SERPL-MCNC: 9 MG/DL (ref 8.3–10.1)
CHLORIDE SERPL-SCNC: 104 MMOL/L (ref 100–108)
CO2 SERPL-SCNC: 31 MMOL/L (ref 21–32)
CREAT SERPL-MCNC: 1.31 MG/DL (ref 0.6–1.3)
EOSINOPHIL # BLD AUTO: 0.35 THOUSAND/ΜL (ref 0–0.61)
EOSINOPHIL NFR BLD AUTO: 5 % (ref 0–6)
ERYTHROCYTE [DISTWIDTH] IN BLOOD BY AUTOMATED COUNT: 21.3 % (ref 11.6–15.1)
GFR SERPL CREATININE-BSD FRML MDRD: 38.7 ML/MIN/1.73SQ M
GLUCOSE SERPL-MCNC: 132 MG/DL (ref 65–140)
HCT VFR BLD AUTO: 40 % (ref 34.8–46.1)
HGB BLD-MCNC: 11 G/DL (ref 11.5–15.4)
LYMPHOCYTES # BLD AUTO: 1.47 THOUSANDS/ΜL (ref 0.6–4.47)
LYMPHOCYTES NFR BLD AUTO: 22 % (ref 14–44)
MAGNESIUM SERPL-MCNC: 2.2 MG/DL (ref 1.6–2.6)
MCH RBC QN AUTO: 21.8 PG (ref 26.8–34.3)
MCHC RBC AUTO-ENTMCNC: 27.5 G/DL (ref 31.4–37.4)
MCV RBC AUTO: 79 FL (ref 82–98)
MONOCYTES # BLD AUTO: 0.55 THOUSAND/ΜL (ref 0.17–1.22)
MONOCYTES NFR BLD AUTO: 8 % (ref 4–12)
NEUTROPHILS # BLD AUTO: 4.21 THOUSANDS/ΜL (ref 1.85–7.62)
NEUTS SEG NFR BLD AUTO: 64 % (ref 43–75)
PHOSPHATE SERPL-MCNC: 3.7 MG/DL (ref 2.3–4.1)
PLATELET # BLD AUTO: 257 THOUSANDS/UL (ref 149–390)
PMV BLD AUTO: 9.7 FL (ref 8.9–12.7)
POTASSIUM SERPL-SCNC: 4.2 MMOL/L (ref 3.5–5.3)
PROT SERPL-MCNC: 6.9 G/DL (ref 6.4–8.2)
RBC # BLD AUTO: 5.04 MILLION/UL (ref 3.81–5.12)
SODIUM SERPL-SCNC: 143 MMOL/L (ref 136–145)
WBC # BLD AUTO: 6.65 THOUSAND/UL (ref 4.31–10.16)

## 2017-02-21 PROCEDURE — 85025 COMPLETE CBC W/AUTO DIFF WBC: CPT

## 2017-02-21 PROCEDURE — 84100 ASSAY OF PHOSPHORUS: CPT

## 2017-02-21 PROCEDURE — 71020 HB CHEST X-RAY 2VW FRONTAL&LATL: CPT

## 2017-02-21 PROCEDURE — 83735 ASSAY OF MAGNESIUM: CPT

## 2017-02-21 PROCEDURE — 80053 COMPREHEN METABOLIC PANEL: CPT

## 2017-02-21 PROCEDURE — 36415 COLL VENOUS BLD VENIPUNCTURE: CPT

## 2017-02-22 ENCOUNTER — GENERIC CONVERSION - ENCOUNTER (OUTPATIENT)
Dept: OTHER | Facility: OTHER | Age: 82
End: 2017-02-22

## 2017-03-08 ENCOUNTER — ALLSCRIPTS OFFICE VISIT (OUTPATIENT)
Dept: OTHER | Facility: OTHER | Age: 82
End: 2017-03-08

## 2017-03-09 ENCOUNTER — ALLSCRIPTS OFFICE VISIT (OUTPATIENT)
Dept: OTHER | Facility: OTHER | Age: 82
End: 2017-03-09

## 2017-04-04 ENCOUNTER — ALLSCRIPTS OFFICE VISIT (OUTPATIENT)
Dept: OTHER | Facility: OTHER | Age: 82
End: 2017-04-04

## 2017-04-23 ENCOUNTER — ANESTHESIA EVENT (OUTPATIENT)
Dept: GASTROENTEROLOGY | Facility: HOSPITAL | Age: 82
End: 2017-04-23
Payer: MEDICARE

## 2017-04-24 ENCOUNTER — HOSPITAL ENCOUNTER (OUTPATIENT)
Facility: HOSPITAL | Age: 82
Setting detail: OUTPATIENT SURGERY
Discharge: HOME | End: 2017-04-24
Attending: INTERNAL MEDICINE | Admitting: INTERNAL MEDICINE
Payer: MEDICARE

## 2017-04-24 ENCOUNTER — ANESTHESIA (OUTPATIENT)
Dept: GASTROENTEROLOGY | Facility: HOSPITAL | Age: 82
End: 2017-04-24
Payer: MEDICARE

## 2017-04-24 VITALS
OXYGEN SATURATION: 100 % | BODY MASS INDEX: 30.22 KG/M2 | WEIGHT: 188 LBS | TEMPERATURE: 97.7 F | HEIGHT: 66 IN | DIASTOLIC BLOOD PRESSURE: 58 MMHG | SYSTOLIC BLOOD PRESSURE: 127 MMHG | RESPIRATION RATE: 12 BRPM | HEART RATE: 60 BPM

## 2017-04-24 DIAGNOSIS — K92.2 GASTROINTESTINAL HEMORRHAGE: ICD-10-CM

## 2017-04-24 DIAGNOSIS — D64.9 ANEMIA: ICD-10-CM

## 2017-04-24 PROCEDURE — 88305 TISSUE EXAM BY PATHOLOGIST: CPT | Performed by: INTERNAL MEDICINE

## 2017-04-24 RX ORDER — SODIUM CHLORIDE 9 MG/ML
125 INJECTION, SOLUTION INTRAVENOUS CONTINUOUS
Status: DISCONTINUED | OUTPATIENT
Start: 2017-04-24 | End: 2017-04-24 | Stop reason: HOSPADM

## 2017-04-24 RX ORDER — PROPOFOL 10 MG/ML
INJECTION, EMULSION INTRAVENOUS AS NEEDED
Status: DISCONTINUED | OUTPATIENT
Start: 2017-04-24 | End: 2017-04-24 | Stop reason: SURG

## 2017-04-24 RX ADMIN — SODIUM CHLORIDE 125 ML/HR: 0.9 INJECTION, SOLUTION INTRAVENOUS at 10:00

## 2017-04-24 RX ADMIN — PROPOFOL 50 MG: 10 INJECTION, EMULSION INTRAVENOUS at 10:07

## 2017-04-24 RX ADMIN — PROPOFOL 20 MG: 10 INJECTION, EMULSION INTRAVENOUS at 10:17

## 2017-04-24 RX ADMIN — METOPROLOL TARTRATE 2 MG: 5 INJECTION, SOLUTION INTRAVENOUS at 10:06

## 2017-04-24 RX ADMIN — PROPOFOL 20 MG: 10 INJECTION, EMULSION INTRAVENOUS at 10:09

## 2017-04-24 RX ADMIN — PROPOFOL 20 MG: 10 INJECTION, EMULSION INTRAVENOUS at 10:19

## 2017-04-24 RX ADMIN — LIDOCAINE HYDROCHLORIDE 50 MG: 20 INJECTION, SOLUTION INTRAVENOUS at 10:07

## 2017-04-24 RX ADMIN — PROPOFOL 20 MG: 10 INJECTION, EMULSION INTRAVENOUS at 10:10

## 2017-04-24 RX ADMIN — PROPOFOL 20 MG: 10 INJECTION, EMULSION INTRAVENOUS at 10:14

## 2017-05-09 ENCOUNTER — GENERIC CONVERSION - ENCOUNTER (OUTPATIENT)
Dept: OTHER | Facility: OTHER | Age: 82
End: 2017-05-09

## 2017-05-12 ENCOUNTER — APPOINTMENT (OUTPATIENT)
Dept: LAB | Facility: HOSPITAL | Age: 82
End: 2017-05-12
Payer: MEDICARE

## 2017-05-12 ENCOUNTER — TRANSCRIBE ORDERS (OUTPATIENT)
Dept: ADMINISTRATIVE | Facility: HOSPITAL | Age: 82
End: 2017-05-12

## 2017-05-12 ENCOUNTER — ALLSCRIPTS OFFICE VISIT (OUTPATIENT)
Dept: OTHER | Facility: OTHER | Age: 82
End: 2017-05-12

## 2017-05-12 DIAGNOSIS — D64.9 ANEMIA, UNSPECIFIED: Primary | ICD-10-CM

## 2017-05-12 DIAGNOSIS — D64.9 ANEMIA, UNSPECIFIED: ICD-10-CM

## 2017-05-12 DIAGNOSIS — K92.2 HEMORRHAGE OF GASTROINTESTINAL TRACT, UNSPECIFIED: ICD-10-CM

## 2017-05-12 DIAGNOSIS — Z86.010 PERSONAL HISTORY OF COLONIC POLYPS: ICD-10-CM

## 2017-05-12 LAB
BASOPHILS # BLD AUTO: 0.03 THOUSANDS/ΜL (ref 0–0.1)
BASOPHILS NFR BLD AUTO: 0 % (ref 0–1)
EOSINOPHIL # BLD AUTO: 0.36 THOUSAND/ΜL (ref 0–0.61)
EOSINOPHIL NFR BLD AUTO: 5 % (ref 0–6)
ERYTHROCYTE [DISTWIDTH] IN BLOOD BY AUTOMATED COUNT: 22.1 % (ref 11.6–15.1)
FERRITIN SERPL-MCNC: 30 NG/ML (ref 8–388)
HCT VFR BLD AUTO: 43.2 % (ref 34.8–46.1)
HGB BLD-MCNC: 13.1 G/DL (ref 11.5–15.4)
IRON SATN MFR SERPL: 10 %
IRON SERPL-MCNC: 38 UG/DL (ref 50–170)
LYMPHOCYTES # BLD AUTO: 1.77 THOUSANDS/ΜL (ref 0.6–4.47)
LYMPHOCYTES NFR BLD AUTO: 26 % (ref 14–44)
MCH RBC QN AUTO: 25.2 PG (ref 26.8–34.3)
MCHC RBC AUTO-ENTMCNC: 30.3 G/DL (ref 31.4–37.4)
MCV RBC AUTO: 83 FL (ref 82–98)
MONOCYTES # BLD AUTO: 0.48 THOUSAND/ΜL (ref 0.17–1.22)
MONOCYTES NFR BLD AUTO: 7 % (ref 4–12)
NEUTROPHILS # BLD AUTO: 4.29 THOUSANDS/ΜL (ref 1.85–7.62)
NEUTS SEG NFR BLD AUTO: 62 % (ref 43–75)
PLATELET # BLD AUTO: 235 THOUSANDS/UL (ref 149–390)
PMV BLD AUTO: 9 FL (ref 8.9–12.7)
RBC # BLD AUTO: 5.2 MILLION/UL (ref 3.81–5.12)
TIBC SERPL-MCNC: 364 UG/DL (ref 250–450)
WBC # BLD AUTO: 6.93 THOUSAND/UL (ref 4.31–10.16)

## 2017-05-12 PROCEDURE — 85025 COMPLETE CBC W/AUTO DIFF WBC: CPT

## 2017-05-12 PROCEDURE — 36415 COLL VENOUS BLD VENIPUNCTURE: CPT

## 2017-05-12 PROCEDURE — 83550 IRON BINDING TEST: CPT

## 2017-05-12 PROCEDURE — 82728 ASSAY OF FERRITIN: CPT

## 2017-05-12 PROCEDURE — 83540 ASSAY OF IRON: CPT

## 2017-06-26 ENCOUNTER — APPOINTMENT (OUTPATIENT)
Dept: LAB | Facility: HOSPITAL | Age: 82
End: 2017-06-26
Payer: MEDICARE

## 2017-06-26 ENCOUNTER — TRANSCRIBE ORDERS (OUTPATIENT)
Dept: ADMINISTRATIVE | Facility: HOSPITAL | Age: 82
End: 2017-06-26

## 2017-06-26 DIAGNOSIS — E78.5 HYPERLIPIDEMIA: ICD-10-CM

## 2017-06-26 DIAGNOSIS — I50.32 CHRONIC DIASTOLIC HEART FAILURE (HCC): ICD-10-CM

## 2017-06-26 DIAGNOSIS — E55.9 VITAMIN D DEFICIENCY: ICD-10-CM

## 2017-06-26 DIAGNOSIS — I10 ESSENTIAL (PRIMARY) HYPERTENSION: ICD-10-CM

## 2017-06-26 DIAGNOSIS — E05.90 THYROTOXICOSIS WITHOUT THYROID STORM: ICD-10-CM

## 2017-06-26 DIAGNOSIS — D64.9 ANEMIA: ICD-10-CM

## 2017-06-26 LAB
25(OH)D3 SERPL-MCNC: 36.9 NG/ML (ref 30–100)
ALBUMIN SERPL BCP-MCNC: 3.6 G/DL (ref 3.5–5)
ALP SERPL-CCNC: 125 U/L (ref 46–116)
ALT SERPL W P-5'-P-CCNC: 28 U/L (ref 12–78)
ANION GAP SERPL CALCULATED.3IONS-SCNC: 12 MMOL/L (ref 4–13)
AST SERPL W P-5'-P-CCNC: 24 U/L (ref 5–45)
BASOPHILS # BLD AUTO: 0.03 THOUSANDS/ΜL (ref 0–0.1)
BASOPHILS NFR BLD AUTO: 0 % (ref 0–1)
BILIRUB SERPL-MCNC: 0.8 MG/DL (ref 0.2–1)
BUN SERPL-MCNC: 18 MG/DL (ref 5–25)
CALCIUM SERPL-MCNC: 8.8 MG/DL (ref 8.3–10.1)
CHLORIDE SERPL-SCNC: 104 MMOL/L (ref 100–108)
CHOLEST SERPL-MCNC: 144 MG/DL (ref 50–200)
CO2 SERPL-SCNC: 28 MMOL/L (ref 21–32)
CREAT SERPL-MCNC: 1.28 MG/DL (ref 0.6–1.3)
EOSINOPHIL # BLD AUTO: 0.36 THOUSAND/ΜL (ref 0–0.61)
EOSINOPHIL NFR BLD AUTO: 5 % (ref 0–6)
ERYTHROCYTE [DISTWIDTH] IN BLOOD BY AUTOMATED COUNT: 18.8 % (ref 11.6–15.1)
FERRITIN SERPL-MCNC: 30 NG/ML (ref 8–388)
GFR SERPL CREATININE-BSD FRML MDRD: 39.6 ML/MIN/1.73SQ M
GLUCOSE P FAST SERPL-MCNC: 102 MG/DL (ref 65–99)
HCT VFR BLD AUTO: 46.1 % (ref 34.8–46.1)
HDLC SERPL-MCNC: 69 MG/DL (ref 40–60)
HGB BLD-MCNC: 14.7 G/DL (ref 11.5–15.4)
IRON SATN MFR SERPL: 17 %
IRON SERPL-MCNC: 59 UG/DL (ref 50–170)
LDLC SERPL CALC-MCNC: 63 MG/DL (ref 0–100)
LYMPHOCYTES # BLD AUTO: 2.07 THOUSANDS/ΜL (ref 0.6–4.47)
LYMPHOCYTES NFR BLD AUTO: 27 % (ref 14–44)
MCH RBC QN AUTO: 27.2 PG (ref 26.8–34.3)
MCHC RBC AUTO-ENTMCNC: 31.9 G/DL (ref 31.4–37.4)
MCV RBC AUTO: 85 FL (ref 82–98)
MONOCYTES # BLD AUTO: 0.53 THOUSAND/ΜL (ref 0.17–1.22)
MONOCYTES NFR BLD AUTO: 7 % (ref 4–12)
NEUTROPHILS # BLD AUTO: 4.71 THOUSANDS/ΜL (ref 1.85–7.62)
NEUTS SEG NFR BLD AUTO: 61 % (ref 43–75)
PLATELET # BLD AUTO: 175 THOUSANDS/UL (ref 149–390)
PMV BLD AUTO: 9.4 FL (ref 8.9–12.7)
POTASSIUM SERPL-SCNC: 3.7 MMOL/L (ref 3.5–5.3)
PROT SERPL-MCNC: 7 G/DL (ref 6.4–8.2)
RBC # BLD AUTO: 5.41 MILLION/UL (ref 3.81–5.12)
SODIUM SERPL-SCNC: 144 MMOL/L (ref 136–145)
TIBC SERPL-MCNC: 342 UG/DL (ref 250–450)
TRIGL SERPL-MCNC: 62 MG/DL
TSH SERPL DL<=0.05 MIU/L-ACNC: 0.27 UIU/ML (ref 0.36–3.74)
WBC # BLD AUTO: 7.7 THOUSAND/UL (ref 4.31–10.16)

## 2017-06-26 PROCEDURE — 83540 ASSAY OF IRON: CPT

## 2017-06-26 PROCEDURE — 80061 LIPID PANEL: CPT

## 2017-06-26 PROCEDURE — 84443 ASSAY THYROID STIM HORMONE: CPT

## 2017-06-26 PROCEDURE — 80053 COMPREHEN METABOLIC PANEL: CPT

## 2017-06-26 PROCEDURE — 82728 ASSAY OF FERRITIN: CPT

## 2017-06-26 PROCEDURE — 83550 IRON BINDING TEST: CPT

## 2017-06-26 PROCEDURE — 82306 VITAMIN D 25 HYDROXY: CPT

## 2017-06-26 PROCEDURE — 36415 COLL VENOUS BLD VENIPUNCTURE: CPT

## 2017-06-26 PROCEDURE — 85025 COMPLETE CBC W/AUTO DIFF WBC: CPT

## 2017-06-27 ENCOUNTER — GENERIC CONVERSION - ENCOUNTER (OUTPATIENT)
Dept: OTHER | Facility: OTHER | Age: 82
End: 2017-06-27

## 2017-06-27 DIAGNOSIS — Z13.820 ENCOUNTER FOR SCREENING FOR OSTEOPOROSIS: ICD-10-CM

## 2017-06-27 DIAGNOSIS — Z12.31 ENCOUNTER FOR SCREENING MAMMOGRAM FOR MALIGNANT NEOPLASM OF BREAST: ICD-10-CM

## 2017-06-29 ENCOUNTER — ALLSCRIPTS OFFICE VISIT (OUTPATIENT)
Dept: OTHER | Facility: OTHER | Age: 82
End: 2017-06-29

## 2017-07-06 ENCOUNTER — ALLSCRIPTS OFFICE VISIT (OUTPATIENT)
Dept: OTHER | Facility: OTHER | Age: 82
End: 2017-07-06

## 2017-08-14 ENCOUNTER — ALLSCRIPTS OFFICE VISIT (OUTPATIENT)
Dept: OTHER | Facility: OTHER | Age: 82
End: 2017-08-14

## 2017-08-17 ENCOUNTER — APPOINTMENT (OUTPATIENT)
Dept: LAB | Facility: HOSPITAL | Age: 82
End: 2017-08-17
Payer: MEDICARE

## 2017-08-17 ENCOUNTER — TRANSCRIBE ORDERS (OUTPATIENT)
Dept: ADMINISTRATIVE | Facility: HOSPITAL | Age: 82
End: 2017-08-17

## 2017-08-17 DIAGNOSIS — I48.0 PAROXYSMAL ATRIAL FIBRILLATION (HCC): ICD-10-CM

## 2017-08-17 DIAGNOSIS — I10 ESSENTIAL (PRIMARY) HYPERTENSION: ICD-10-CM

## 2017-08-17 DIAGNOSIS — E78.5 HYPERLIPIDEMIA: ICD-10-CM

## 2017-08-17 DIAGNOSIS — E05.90 THYROTOXICOSIS WITHOUT THYROID STORM: ICD-10-CM

## 2017-08-17 DIAGNOSIS — I50.32 CHRONIC DIASTOLIC HEART FAILURE (HCC): ICD-10-CM

## 2017-08-17 LAB — TSH SERPL DL<=0.05 MIU/L-ACNC: 0.56 UIU/ML (ref 0.36–3.74)

## 2017-08-17 PROCEDURE — 36415 COLL VENOUS BLD VENIPUNCTURE: CPT

## 2017-08-17 PROCEDURE — 84443 ASSAY THYROID STIM HORMONE: CPT

## 2017-08-20 ENCOUNTER — GENERIC CONVERSION - ENCOUNTER (OUTPATIENT)
Dept: OTHER | Facility: OTHER | Age: 82
End: 2017-08-20

## 2017-09-18 ENCOUNTER — APPOINTMENT (EMERGENCY)
Dept: RADIOLOGY | Facility: HOSPITAL | Age: 82
End: 2017-09-18
Payer: MEDICARE

## 2017-09-18 ENCOUNTER — GENERIC CONVERSION - ENCOUNTER (OUTPATIENT)
Dept: OTHER | Facility: OTHER | Age: 82
End: 2017-09-18

## 2017-09-18 ENCOUNTER — HOSPITAL ENCOUNTER (EMERGENCY)
Facility: HOSPITAL | Age: 82
Discharge: HOME | End: 2017-09-18
Payer: MEDICARE

## 2017-09-18 VITALS
DIASTOLIC BLOOD PRESSURE: 72 MMHG | OXYGEN SATURATION: 93 % | TEMPERATURE: 99.6 F | HEIGHT: 67 IN | BODY MASS INDEX: 30.29 KG/M2 | WEIGHT: 193 LBS | HEART RATE: 78 BPM | SYSTOLIC BLOOD PRESSURE: 126 MMHG | RESPIRATION RATE: 18 BRPM

## 2017-09-18 DIAGNOSIS — R06.00 DYSPNEA ON EXERTION: Primary | ICD-10-CM

## 2017-09-18 LAB
ALBUMIN SERPL BCP-MCNC: 3.8 G/DL (ref 3.5–5)
ALP SERPL-CCNC: 99 U/L (ref 46–116)
ALT SERPL W P-5'-P-CCNC: 33 U/L (ref 12–78)
ANION GAP SERPL CALCULATED.3IONS-SCNC: 9 MMOL/L (ref 4–13)
AST SERPL W P-5'-P-CCNC: 39 U/L (ref 5–45)
BASOPHILS # BLD AUTO: 0.02 THOUSANDS/ΜL (ref 0–0.1)
BASOPHILS NFR BLD AUTO: 0 % (ref 0–1)
BILIRUB SERPL-MCNC: 1.1 MG/DL (ref 0.2–1)
BUN SERPL-MCNC: 18 MG/DL (ref 5–25)
CALCIUM SERPL-MCNC: 9.2 MG/DL (ref 8.3–10.1)
CHLORIDE SERPL-SCNC: 103 MMOL/L (ref 100–108)
CO2 SERPL-SCNC: 31 MMOL/L (ref 21–32)
CREAT SERPL-MCNC: 1.26 MG/DL (ref 0.6–1.3)
EOSINOPHIL # BLD AUTO: 0.27 THOUSAND/ΜL (ref 0–0.61)
EOSINOPHIL NFR BLD AUTO: 4 % (ref 0–6)
ERYTHROCYTE [DISTWIDTH] IN BLOOD BY AUTOMATED COUNT: 16 % (ref 11.6–15.1)
GFR SERPL CREATININE-BSD FRML MDRD: 39 ML/MIN/1.73SQ M
GLUCOSE SERPL-MCNC: 132 MG/DL (ref 65–140)
HCT VFR BLD AUTO: 43.6 % (ref 34.8–46.1)
HGB BLD-MCNC: 14.3 G/DL (ref 11.5–15.4)
HOLD SPECIMEN: YES
LYMPHOCYTES # BLD AUTO: 1.44 THOUSANDS/ΜL (ref 0.6–4.47)
LYMPHOCYTES NFR BLD AUTO: 21 % (ref 14–44)
MCH RBC QN AUTO: 29.8 PG (ref 26.8–34.3)
MCHC RBC AUTO-ENTMCNC: 32.8 G/DL (ref 31.4–37.4)
MCV RBC AUTO: 91 FL (ref 82–98)
MONOCYTES # BLD AUTO: 0.46 THOUSAND/ΜL (ref 0.17–1.22)
MONOCYTES NFR BLD AUTO: 7 % (ref 4–12)
NEUTROPHILS # BLD AUTO: 4.58 THOUSANDS/ΜL (ref 1.85–7.62)
NEUTS SEG NFR BLD AUTO: 68 % (ref 43–75)
NT-PROBNP SERPL-MCNC: 1771 PG/ML
PLATELET # BLD AUTO: 220 THOUSANDS/UL (ref 149–390)
PMV BLD AUTO: 9.7 FL (ref 8.9–12.7)
POTASSIUM SERPL-SCNC: 3.3 MMOL/L (ref 3.5–5.3)
PROT SERPL-MCNC: 7.7 G/DL (ref 6.4–8.2)
RBC # BLD AUTO: 4.8 MILLION/UL (ref 3.81–5.12)
SODIUM SERPL-SCNC: 143 MMOL/L (ref 136–145)
TROPONIN I SERPL-MCNC: <0.02 NG/ML
WBC # BLD AUTO: 6.77 THOUSAND/UL (ref 4.31–10.16)

## 2017-09-18 PROCEDURE — 36415 COLL VENOUS BLD VENIPUNCTURE: CPT | Performed by: EMERGENCY MEDICINE

## 2017-09-18 PROCEDURE — 83880 ASSAY OF NATRIURETIC PEPTIDE: CPT | Performed by: EMERGENCY MEDICINE

## 2017-09-18 PROCEDURE — 85025 COMPLETE CBC W/AUTO DIFF WBC: CPT | Performed by: EMERGENCY MEDICINE

## 2017-09-18 PROCEDURE — 71020 HB CHEST X-RAY 2VW FRONTAL&LATL: CPT

## 2017-09-18 PROCEDURE — 84484 ASSAY OF TROPONIN QUANT: CPT | Performed by: EMERGENCY MEDICINE

## 2017-09-18 PROCEDURE — 80053 COMPREHEN METABOLIC PANEL: CPT | Performed by: EMERGENCY MEDICINE

## 2017-09-18 PROCEDURE — 93005 ELECTROCARDIOGRAM TRACING: CPT | Performed by: EMERGENCY MEDICINE

## 2017-09-18 PROCEDURE — 99285 EMERGENCY DEPT VISIT HI MDM: CPT

## 2017-09-18 RX ORDER — POTASSIUM CHLORIDE 20 MEQ/1
20 TABLET, EXTENDED RELEASE ORAL ONCE
Status: COMPLETED | OUTPATIENT
Start: 2017-09-18 | End: 2017-09-18

## 2017-09-18 RX ORDER — FUROSEMIDE 40 MG/1
40 TABLET ORAL 2 TIMES DAILY
Qty: 14 TABLET | Refills: 0 | Status: SHIPPED | OUTPATIENT
Start: 2017-09-18 | End: 2018-08-22 | Stop reason: SDUPTHER

## 2017-09-18 RX ORDER — PANTOPRAZOLE SODIUM 40 MG/1
40 TABLET, DELAYED RELEASE ORAL DAILY
COMMUNITY
End: 2018-11-05 | Stop reason: CLARIF

## 2017-09-18 RX ORDER — FUROSEMIDE 40 MG/1
40 TABLET ORAL ONCE
Status: COMPLETED | OUTPATIENT
Start: 2017-09-18 | End: 2017-09-18

## 2017-09-18 RX ADMIN — POTASSIUM CHLORIDE 20 MEQ: 1500 TABLET, EXTENDED RELEASE ORAL at 15:44

## 2017-09-18 RX ADMIN — FUROSEMIDE 40 MG: 40 TABLET ORAL at 15:44

## 2017-09-19 LAB
ATRIAL RATE: 60 BPM
P AXIS: 97 DEGREES
PR INTERVAL: 188 MS
QRS AXIS: 24 DEGREES
QRSD INTERVAL: 82 MS
QT INTERVAL: 424 MS
QTC INTERVAL: 424 MS
T WAVE AXIS: 41 DEGREES
VENTRICULAR RATE: 60 BPM

## 2017-09-20 ENCOUNTER — ALLSCRIPTS OFFICE VISIT (OUTPATIENT)
Dept: OTHER | Facility: OTHER | Age: 82
End: 2017-09-20

## 2017-09-20 DIAGNOSIS — I50.32 CHRONIC DIASTOLIC HEART FAILURE (HCC): ICD-10-CM

## 2017-09-27 ENCOUNTER — TRANSCRIBE ORDERS (OUTPATIENT)
Dept: ADMINISTRATIVE | Facility: HOSPITAL | Age: 82
End: 2017-09-27

## 2017-09-27 ENCOUNTER — APPOINTMENT (OUTPATIENT)
Dept: LAB | Facility: HOSPITAL | Age: 82
End: 2017-09-27
Attending: INTERNAL MEDICINE
Payer: MEDICARE

## 2017-09-27 DIAGNOSIS — I50.32 CHRONIC DIASTOLIC HEART FAILURE (HCC): ICD-10-CM

## 2017-09-27 LAB
ANION GAP SERPL CALCULATED.3IONS-SCNC: 11 MMOL/L (ref 4–13)
BUN SERPL-MCNC: 19 MG/DL (ref 5–25)
CALCIUM SERPL-MCNC: 9.1 MG/DL (ref 8.3–10.1)
CHLORIDE SERPL-SCNC: 104 MMOL/L (ref 100–108)
CO2 SERPL-SCNC: 29 MMOL/L (ref 21–32)
CREAT SERPL-MCNC: 1.11 MG/DL (ref 0.6–1.3)
GFR SERPL CREATININE-BSD FRML MDRD: 45 ML/MIN/1.73SQ M
GLUCOSE P FAST SERPL-MCNC: 103 MG/DL (ref 65–99)
POTASSIUM SERPL-SCNC: 3.8 MMOL/L (ref 3.5–5.3)
SODIUM SERPL-SCNC: 144 MMOL/L (ref 136–145)

## 2017-09-27 PROCEDURE — 36415 COLL VENOUS BLD VENIPUNCTURE: CPT

## 2017-09-27 PROCEDURE — 80048 BASIC METABOLIC PNL TOTAL CA: CPT

## 2017-10-05 DIAGNOSIS — I50.32 CHRONIC DIASTOLIC HEART FAILURE (HCC): ICD-10-CM

## 2017-10-05 DIAGNOSIS — E55.9 VITAMIN D DEFICIENCY: ICD-10-CM

## 2017-10-05 DIAGNOSIS — I10 ESSENTIAL (PRIMARY) HYPERTENSION: ICD-10-CM

## 2017-10-05 DIAGNOSIS — E78.5 HYPERLIPIDEMIA: ICD-10-CM

## 2017-10-05 DIAGNOSIS — E05.90 THYROTOXICOSIS WITHOUT THYROID STORM: ICD-10-CM

## 2017-10-05 DIAGNOSIS — I48.0 PAROXYSMAL ATRIAL FIBRILLATION (HCC): ICD-10-CM

## 2017-11-06 ENCOUNTER — GENERIC CONVERSION - ENCOUNTER (OUTPATIENT)
Dept: OTHER | Facility: OTHER | Age: 82
End: 2017-11-06

## 2017-11-06 ENCOUNTER — TRANSCRIBE ORDERS (OUTPATIENT)
Dept: ADMINISTRATIVE | Facility: HOSPITAL | Age: 82
End: 2017-11-06

## 2017-11-06 ENCOUNTER — APPOINTMENT (OUTPATIENT)
Dept: LAB | Facility: HOSPITAL | Age: 82
End: 2017-11-06
Payer: MEDICARE

## 2017-11-06 DIAGNOSIS — E78.5 HYPERLIPIDEMIA: ICD-10-CM

## 2017-11-06 DIAGNOSIS — I48.0 PAROXYSMAL ATRIAL FIBRILLATION (HCC): ICD-10-CM

## 2017-11-06 DIAGNOSIS — E05.90 THYROTOXICOSIS WITHOUT THYROID STORM: ICD-10-CM

## 2017-11-06 DIAGNOSIS — E55.9 VITAMIN D DEFICIENCY: ICD-10-CM

## 2017-11-06 DIAGNOSIS — I10 ESSENTIAL (PRIMARY) HYPERTENSION: ICD-10-CM

## 2017-11-06 DIAGNOSIS — I50.32 CHRONIC DIASTOLIC HEART FAILURE (HCC): ICD-10-CM

## 2017-11-06 LAB
25(OH)D3 SERPL-MCNC: 33.8 NG/ML (ref 30–100)
ALBUMIN SERPL BCP-MCNC: 3.6 G/DL (ref 3.5–5)
ALP SERPL-CCNC: 104 U/L (ref 46–116)
ALT SERPL W P-5'-P-CCNC: 30 U/L (ref 12–78)
ANION GAP SERPL CALCULATED.3IONS-SCNC: 9 MMOL/L (ref 4–13)
AST SERPL W P-5'-P-CCNC: 28 U/L (ref 5–45)
BASOPHILS # BLD AUTO: 0.03 THOUSANDS/ΜL (ref 0–0.1)
BASOPHILS NFR BLD AUTO: 0 % (ref 0–1)
BILIRUB SERPL-MCNC: 0.8 MG/DL (ref 0.2–1)
BUN SERPL-MCNC: 15 MG/DL (ref 5–25)
CALCIUM SERPL-MCNC: 8.9 MG/DL (ref 8.3–10.1)
CHLORIDE SERPL-SCNC: 103 MMOL/L (ref 100–108)
CHOLEST SERPL-MCNC: 166 MG/DL (ref 50–200)
CO2 SERPL-SCNC: 30 MMOL/L (ref 21–32)
CREAT SERPL-MCNC: 1.2 MG/DL (ref 0.6–1.3)
EOSINOPHIL # BLD AUTO: 0.34 THOUSAND/ΜL (ref 0–0.61)
EOSINOPHIL NFR BLD AUTO: 4 % (ref 0–6)
ERYTHROCYTE [DISTWIDTH] IN BLOOD BY AUTOMATED COUNT: 15 % (ref 11.6–15.1)
GFR SERPL CREATININE-BSD FRML MDRD: 41 ML/MIN/1.73SQ M
GLUCOSE P FAST SERPL-MCNC: 105 MG/DL (ref 65–99)
HCT VFR BLD AUTO: 46.4 % (ref 34.8–46.1)
HDLC SERPL-MCNC: 75 MG/DL (ref 40–60)
HGB BLD-MCNC: 15.3 G/DL (ref 11.5–15.4)
LDLC SERPL CALC-MCNC: 72 MG/DL (ref 0–100)
LYMPHOCYTES # BLD AUTO: 1.99 THOUSANDS/ΜL (ref 0.6–4.47)
LYMPHOCYTES NFR BLD AUTO: 24 % (ref 14–44)
MCH RBC QN AUTO: 30.1 PG (ref 26.8–34.3)
MCHC RBC AUTO-ENTMCNC: 33 G/DL (ref 31.4–37.4)
MCV RBC AUTO: 91 FL (ref 82–98)
MONOCYTES # BLD AUTO: 0.51 THOUSAND/ΜL (ref 0.17–1.22)
MONOCYTES NFR BLD AUTO: 6 % (ref 4–12)
NEUTROPHILS # BLD AUTO: 5.32 THOUSANDS/ΜL (ref 1.85–7.62)
NEUTS SEG NFR BLD AUTO: 66 % (ref 43–75)
PLATELET # BLD AUTO: 197 THOUSANDS/UL (ref 149–390)
PMV BLD AUTO: 9.1 FL (ref 8.9–12.7)
POTASSIUM SERPL-SCNC: 3.7 MMOL/L (ref 3.5–5.3)
PROT SERPL-MCNC: 7 G/DL (ref 6.4–8.2)
RBC # BLD AUTO: 5.08 MILLION/UL (ref 3.81–5.12)
SODIUM SERPL-SCNC: 142 MMOL/L (ref 136–145)
TRIGL SERPL-MCNC: 97 MG/DL
WBC # BLD AUTO: 8.19 THOUSAND/UL (ref 4.31–10.16)

## 2017-11-06 PROCEDURE — 82306 VITAMIN D 25 HYDROXY: CPT

## 2017-11-06 PROCEDURE — 85025 COMPLETE CBC W/AUTO DIFF WBC: CPT

## 2017-11-06 PROCEDURE — 80061 LIPID PANEL: CPT

## 2017-11-06 PROCEDURE — 80053 COMPREHEN METABOLIC PANEL: CPT

## 2017-11-06 PROCEDURE — 36415 COLL VENOUS BLD VENIPUNCTURE: CPT

## 2017-11-09 ENCOUNTER — ALLSCRIPTS OFFICE VISIT (OUTPATIENT)
Dept: OTHER | Facility: OTHER | Age: 82
End: 2017-11-09

## 2017-11-09 DIAGNOSIS — E55.9 VITAMIN D DEFICIENCY: ICD-10-CM

## 2017-11-09 DIAGNOSIS — I50.32 CHRONIC DIASTOLIC HEART FAILURE (HCC): ICD-10-CM

## 2017-11-09 DIAGNOSIS — I10 ESSENTIAL (PRIMARY) HYPERTENSION: ICD-10-CM

## 2017-11-09 DIAGNOSIS — E78.5 HYPERLIPIDEMIA: ICD-10-CM

## 2017-11-09 DIAGNOSIS — E05.90 THYROTOXICOSIS WITHOUT THYROID STORM: ICD-10-CM

## 2017-11-13 NOTE — PROGRESS NOTES
Assessment    1  Hypertension (401 9) (I10)   2  Hyperthyroidism (242 90) (E05 90)   3  Dyslipidemia (272 4) (E78 5)   4  Chronic diastolic congestive heart failure (428 32,428 0) (I50 32)    Plan   Chronic diastolic congestive heart failure    · Furosemide 40 MG Oral Tablet; TAKE 1 TABLET TWICE DAILY ON MONDAY,WEDNESDAY, AND FRIDAY AND REMAINDER OF DAYS TAKE ONCE DAILY   · Continue with our present treatment plan ; Status:Complete;   Done: 30XSQ3946   · Restrict the salt in your diet by avoiding highly salted foods ; Status:Complete;   Done:09Nov2017   · Weigh yourself every day ; Status:Complete;   Done: 24OEN9017   · You may continue or resume your normal level of activity ; Status:Complete;   Done:09Nov2017   · Call [phone] if: The swelling and puffiness is getting worse ; Status:Complete;  Done: 23DKE8312   · Call [phone] if: You have a dry, hacking cough ; Status:Complete;   Done:09Nov2017   · Call [phone] if: You have swelling and puffiness of your lower leg or ankles  ;Status:Complete;   Done: 90ORW2966   · Call [phone] if: Your breathing is not better in 2 days ; Status:Complete;   Done:09Nov2017   · Call 911 if: You feel short of breath even while resting ; Status:Complete;   Done:09Nov2017   · Seek Immediate Medical Attention if: You have difficulty breathing while lying down andyou are comfortable only when sitting up ; Status:Complete;   Done: 16VYO7300   · Seek Immediate Medical Attention if: You or your family members notice any confusion ordifficulty with memory ; Status:Complete;   Done: 80OLI3928   · Seek Immediate Medical Attention if: Your shortness of breath is getting worse  ;Status:Complete;   Done: 88YYQ1339   · (1) BASIC METABOLIC PROFILE; Status:Active; Requested for:16Nov2017;   Chronic diastolic congestive heart failure, Dyslipidemia, Hypertension, Hyperthyroidism,Vitamin D deficiency    · (1) CBC/PLT/DIFF; Status:Active;  Requested YYA:27FPS9012;    · (1) COMPREHENSIVE METABOLIC PANEL; Status:Active; Requested ZRU:10TQY4808;    · (1) LIPID PANEL, FASTING; Status:Active; Requested for:09Nov2017;    · (1) TSH; Status:Active; Requested for:09Nov2017;    · (1) VITAMIN D 25-HYDROXY; Status:Active; Requested JLZ:55QGS0749;   Dyslipidemia    · Begin or continue regular aerobic exercise  Gradually work up to at least 3 sessions of30 minutes of exercise a week ; Status:Complete;   Done: 18NOQ4998   · Eat no more than 30 grams of fat per day ; Status:Complete;   Done: 84QPQ0116   · There are many exercise options for seniors ; Status:Complete;   Done: 32SXU4574   · Call [phone] if: You have muscle cramps ; Status:Complete;   Done: 07BNP5398   · Call [phone] if: You have pain in the stomach area ; Status:Complete;   Done:09Nov2017   · Call [phone] if: You start vomiting ; Status:Complete;   Done: 88ONU5957  Hypertension    · A diet low in sodium and high in potassium, magnesium, and calcium can help yourblood pressure ; Status:Complete;   Done: 32HJM9754   · Begin a limited exercise program ; Status:Complete;   Done: 69VWN0776   · Eat a low fat and low cholesterol diet ; Status:Complete;   Done: 74BXJ6538   · We encourage you to begin to make lifestyle changes to help control your bloodpressure  These may include losing weight, increasing your activity level, limiting salt inyour diet, decreasing alcohol intake, and eating a diet low in fat and rich in fruitsand vegetables ; Status:Complete;   Done: 56QXL1108   · Call [phone] if: You become dizzy or lightheaded, especially when you stand upafter sitting for a while ; Status:Complete;   Done: 99SEL1706   · Call [phone] if: You develop double vision (see two of everything)  ;Status:Complete;   Done: 87WWY6846   · Call 911 if: You experience a new kind of chest pain (angina) or pressure  ;Status:Complete;   Done: 55PDT8913   · Call 911 if: You have any symptoms of a stroke ; Status:Complete; Done: 81FSG2471   · Seek Immediate Medical Attention if: You have a severe headache that will not go away  ;Status:Complete;   Done: 62LEG6991   · Seek Immediate Medical Attention if: Your blood pressure is greater than 250/120 for 2consecutive readings ; Status:Complete;   Done: 49RAV1064  Influenza vaccine needed    · Stop: Fluzone High-Dose 0 5 ML Intramuscular Suspension PrefilledSyringe  Screening for osteoporosis    · * DXA BONE DENSITY SPINE HIP AND PELVIS; Status:Temporary Deferral - Pt refuses;   11/9/2018  *VB-Depression Screening; Status:Resulted - Requires Verification,Retrospective Authorization;   Done: 22XWE6508 12:00AM Due:60Atu3189; Last Updated Nanette Mistry; 11/9/2017 11:35:12 AM;Ordered; For:Screening for depression; Ordered By:Theresa Becker;    Discussion/Summary    Reviewed recent laboratory testing with her  Blood work has been generally stable  Cholesterol is well controlled  Continue the atorvastatin  Continue the same dose of the methimazole  Will continue to follow her TSH regularly  Blood pressure is well controlled  Continue current medications  Continue follow-up with Cardiology on a regular basis  Continue to watch for signs or symptoms of congestive heart failure  Watch for increasing shortness of breath her peripheral edema  Discussed with her increasing her Lasix slightly but advised her that she could hold on the additional dosages if she has no shortness of breath or peripheral edema  Will continue to follow her electrolytes and renal function  She declines mammogram, bone density, and colorectal cancer screening  Discussed with her being very careful to avoid falls  She declines immunizations  Will have her follow up in about 3-4 months with laboratory testing prior to that visit  Follow up sooner if needed  Possible side effects of new medications were reviewed with the patient/guardian today  The treatment plan was reviewed with the patient/guardian   The patient/guardian understands and agrees with the treatment plan      Chief Complaint  CC patient here today for routine visit  no refills at this time  no complaints  should patient up lasix  History of Present Illness  She presents for routine follow-up  Has generally been doing well  She is concerned because she has not been urinating very much well taking the Lasix  Has not had any recent significant shortness of breath or peripheral edema  Denies any chest pain or palpitations  She notices that on her current dose of Lasix she urinates a few times extra but nothing significant  Appetite has been generally good  Denies any nausea, vomiting, or diarrhea  Has been tolerating her blood pressure medication without difficulty  Denies any headaches or localized weakness  Tolerating her atorvastatin without difficulty  Denies any significant muscle aches or weakness  Denies any chest pain or palpitations  Appetite has been generally good  Denies any nausea, vomiting, or diarrhea  Dizziness has been relatively well controlled  Does notice she gets slightly dizzy at times if she moves quickly but it has not been severe  Denies any significant bruising or bleeding on the Eliquis  Bowel movements have remained normal  Denies any dark or tarry bowel movements  Has not had anything like last year  Energy level has remained relatively good  She will be going to stay with her daughter for some time  Likely through Thanksgiving  Tolerating her methimazole without difficulty  Denies any significant changes in her energy level  Hearing has worsened somewhat despite using her hearing aids  She is considering getting new hearing aids but has not pursued this yet  Mood has been generally good on the Zoloft  Denies any significant urinary symptoms  Denies any abdominal pain  Continues to take the Protonix  Vision has been      Review of Systems   Constitutional: not feeling poorly-- and-- not feeling tired    Eyes: no dryness of the eyes  ENT: hearing loss, but-- no sore throat-- and-- no hoarseness  Cardiovascular: no chest pain-- and-- no palpitations  Respiratory: as noted in HPI  Gastrointestinal: as noted in HPI-- and-- no abdominal pain  Genitourinary: dysuria  Musculoskeletal: no joint stiffness  Neurological: no headache  no feelings of weakness  Hematologic/Lymphatic: a tendency for easy bruising, but-- No complaints of swollen glands, no swollen glands in the neck, does not bleed easily, does not bruise easily-- and-- no tendency for easy bleeding  ROS reviewed  Active Problems  1  Anemia (285 9) (D64 9)   2  Atrial flutter (427 32) (I48 92)   3  AV block (426 10) (I44 30)   4  Cardiac pacemaker (V45 01) (Z95 0)   5  Chronic diastolic congestive heart failure (428 32,428 0) (I50 32)   6  Depression with anxiety (300 4) (F41 8)   7  Dyslipidemia (272 4) (E78 5)   8  Dyspnea (786 09) (R06 00)   9  Fatigue (780 79) (R53 83)   10  Glaucoma (365 9) (H40 9)   11  Hypertension (401 9) (I10)   12  Hyperthyroidism (242 90) (E05 90)   13  Osteoporosis (733 00) (M81 0)   14  Paroxysmal atrial fibrillation (427 31) (I48 0)   15  Psoriasis (696 1) (L40 9)   16  Pulmonary hypertension (416 8) (I27 20)   17  Sick sinus syndrome (427 81) (I49 5)   18  Vertigo (780 4) (R42)   19  Vitamin D deficiency (268 9) (E55 9)    Past Medical History  1  History of Acute gastrointestinal bleeding (578 9) (K92 2)   2  History of Dyspnea on exertion (786 09) (R06 09)   3  History of impaired glucose tolerance (V12 29) (Z87 898)   4  History of shortness of breath (V13 89) (A50 942)    The active problems and past medical history were reviewed and updated today  Surgical History  1  History of Cataract Surgery   2  History of Hysterectomy   3  History of Pacemaker Permanent Placement Dual-Chamber   4  History of Total Knee Arthroplasty    The surgical history was reviewed and updated today  Family History  Father    1   Family history of alcoholism (V17 0) (Z81 1)   2  Family history of diabetes mellitus (V18 0) (Z83 3)  Brother    3  Family history of alcoholism (V17 0) (Z81 1)    The family history was reviewed and updated today  Social History     · Former smoker (K67 10) (N21 413)   · Marital History -    · No alcohol use  The social history was reviewed and updated today  Current Meds   1  Alfalfa TABS; 3 Tabs Daily; Therapy: (Recorded:13Jan2017) to Recorded   2  Atorvastatin Calcium 20 MG Oral Tablet; TAKE 1 TABLET DAILY AS     DIRECTED  Requested for: 62KBW2422; Last Rx:19Oct2017 Ordered   3  B Complex Oral Tablet; TAKE 1 TABLET DAILY; Therapy: (Sherrie Bottom) to Recorded   4  Cholecalciferol 1000 UNIT TABS; TAKE 1 TABLET DAILY; Therapy: (Recorded:22Feb2017) to Recorded   5  [facility]-3 CAPS; 2 tabs daily; Therapy: (Recorded:13Jan2017) to Recorded   6  DilTIAZem HCl ER Coated Beads 180 MG Oral Capsule Extended Release 24 Hour; take 1 capsule daily; Therapy: 02UYH1187 to (Evaluate:16Jun2018)  Requested for: 21Jun2017; Last KN:28GAR1329 Ordered   7  Eliquis 5 MG Oral Tablet; Take 1 tablet twice daily; Therapy: 29AQU8587 to 798 660 361)  Requested for: 31Oct2017; Last Rx:30Oct2017 Ordered   8  Furosemide 40 MG Oral Tablet; take 1 tablet by mouth once daily  Requested for: 17Aug2017; Last Rx:20Xrz0656 Ordered   9  Lecithin CAPS; TAKE 1 CAPSULE DAILY; Therapy: (Recorded:13Jan2017) to Recorded   10  MethIMAzole 5 MG Oral Tablet; TAKE 1 TABLET ON MONDAY,   WEDNESDAY, AND FRIDAY; Therapy: 42PRQ8010 to (Evaluate:18Jan2018)  Requested for: 00FUT9655; Last  Rx:28Aid1662 Ordered   11  Metoprolol Tartrate 50 MG Oral Tablet; TAKE 1 TABLET EVERY 12 HOURS DAILY; Therapy: 38Kiw6141 to (Evaluate:18Jan2018)  Requested for: 62DBX4218; Last  Rx:23Jan2017 Ordered   12  Protonix 40 MG Oral Tablet Delayed Release; TAKE 1 TABLET DAILY; Therapy: (Sherrie Bottom) to Recorded   13   Sertraline HCl - 50 MG Oral Tablet; TAKE 1 TABLET DAILY AS     DIRECTED; Therapy: 27Bxe2377 to (Evaluate:27Cjo6258)  Requested for: 28Aug2017; Last  Rx:28Aug2017 Ordered   14  Zinc 100 MG Oral Tablet; TAKE 1 TABLET DAILY; Therapy: (Recorded:13Jan2017) to Recorded    The medication list was reviewed and updated today  Allergies  1  No Known Drug Allergies    Vitals  Vital Signs    Recorded: 85NGI9713 08:57AM   Temperature 99 6 F, Temporal   Heart Rate 65   Respiration 16   Systolic 663, LUE, Sitting   Diastolic 74, LUE, Sitting   Height 5 ft 5 in   Weight 193 lb    BMI Calculated 32 12   BSA Calculated 1 95   O2 Saturation 98       Physical Exam   Constitutional  General appearance: No acute distress, well appearing and well nourished  Eyes  Conjunctiva and lids: No swelling, erythema or discharge  Pupils and irises: Abnormal  -- postsurgical changes bilaterally  Ears, Nose, Mouth, and Throat  External inspection of ears and nose: Abnormal  -- hearing aids bilaterally  Otoscopic examination: Tympanic membranes translucent with normal light reflex  Canals patent without erythema  Oropharynx: Normal with no erythema, edema, exudate or lesions  Pulmonary  Auscultation of lungs: Clear to auscultation  Cardiovascular  Auscultation of heart: Normal rate and rhythm, normal S1 and S2, without murmurs  Examination of extremities for edema and/or varicosities: Abnormal  -- trace peripheral edema bilaterally  Lymphatic  Palpation of lymph nodes in neck: No lymphadenopathy  Musculoskeletal  Gait and station: Normal    Psychiatric  Mood and affect: Normal          Results/Data  (1) CBC/PLT/DIFF 50MLL6681 09:53AM Kvng Deshpande Order Number: [id number]     Test Name Result Flag Reference   WBC COUNT 8 19 Thousand/uL  4 31-10 16   RBC COUNT 5 08 Million/uL  3 81-5 12   HEMOGLOBIN 15 3 g/dL  11 5-15 4   HEMATOCRIT 46 4 % H 34 8-46  1   MCV 91 fL  82-98   MCH 30 1 pg  26 8-34 3   MCHC 33 0 g/dL  31 4-37 4   RDW 15 0 %  11 6-15 1   MPV 9 1 fL  8 9-12 7   PLATELET COUNT 449 Thousands/uL  149-390   NEUTROPHILS RELATIVE PERCENT 66 %  43-75   LYMPHOCYTES RELATIVE PERCENT 24 %  14-44   MONOCYTES RELATIVE PERCENT 6 %  4-12   EOSINOPHILS RELATIVE PERCENT 4 %  0-6   BASOPHILS RELATIVE PERCENT 0 %  0-1   NEUTROPHILS ABSOLUTE COUNT 5 32 Thousands/? ??L  1 85-7 62   LYMPHOCYTES ABSOLUTE COUNT 1 99 Thousands/? ??L  0 60-4 47   MONOCYTES ABSOLUTE COUNT 0 51 Thousand/? ??L  0 17-1 22   EOSINOPHILS ABSOLUTE COUNT 0 34 Thousand/? ??L  0 00-0 61   BASOPHILS ABSOLUTE COUNT 0 03 Thousands/? ??L  0 00-0 10     (1) COMPREHENSIVE METABOLIC PANEL 65CYM0451 41:97IY Saint Bali Order Number: [id number]     Test Name Result Flag Reference   SODIUM 142 mmol/L  136-145   POTASSIUM 3 7 mmol/L  3 5-5 3   CHLORIDE 103 mmol/L  100-108   CARBON DIOXIDE 30 mmol/L  21-32   ANION GAP (CALC) 9 mmol/L  4-13   BLOOD UREA NITROGEN 15 mg/dL  5-25   CREATININE 1 20 mg/dL  0 60-1 30   Standardized to IDMS reference method   CALCIUM 8 9 mg/dL  8 3-10 1   BILI, TOTAL 0 80 mg/dL  0 20-1 00   ALK PHOSPHATAS 104 U/L     ALT (SGPT) 30 U/L  12-78   Specimen collection should occur prior to Sulfasalazine administration due to the potential for falsely depressed results  AST(SGOT) 28 U/L  5-45   Specimen collection should occur prior to Sulfasalazine administration due to the potential for falsely depressed results  ALBUMIN 3 6 g/dL  3 5-5 0   TOTAL PROTEIN 7 0 g/dL  6 4-8 2   eGFR 41 ml/min/1 73sq m       [facility] Disease Education Program recommendations are as follows: GFR calculation is accurate only with a steady state creatinine Chronic Kidney disease less than 60 ml/min/1 73 sq  meters Kidney failure less than 15 ml/min/1 73 sq  meters  GLUCOSE FASTING 105 mg/dL H 65-99   Specimen collection should occur prior to Sulfasalazine administration due to the potential for falsely depressed results   Specimen collection should occur prior to Sulfapyridine administration due to the potential for falsely elevated results  (1) LIPID PANEL, FASTING 09RXK6472 09:53AM Jared Lujanleon Order Number: [id number]     Test Name Result Flag Reference   CHOLESTEROL 166 mg/dL     HDL,DIRECT 75 mg/dL H 40-60   Specimen collection should occur prior to Metamizole administration due to the potential for falsley depressed results  LDL CHOLESTEROL CALCULATED 72 mg/dL  0-100     Triglyceride:       Normal <150 mg/dl  Borderline High 150-199 mg/dl  High 200-499 mg/dl  Very High >499 mg/dl   Cholesterol:      Desirable <200 mg/dl   Borderline High 200-239 mg/dl   High >239 mg/dl   HDL Cholesterol:      High>59 mg/dL   Low <41 mg/dL   This screening LDL is a calculated result  It does not have the accuracy of the Direct Measured LDL in the monitoring of patients with hyperlipidemia and/or statin therapy  Direct Measure LDL (MPM347) must be ordered separately in these patients  TRIGLYCERIDES 97 mg/dL  <=150   Specimen collection should occur prior to N-Acetylcysteine or Metamizole administration due to the potential for falsely depressed results  (1) VITAMIN D 25-HYDROXY 56QOO0483 09:52AM Jared Lujanleon Order Number: [id number]     Test Name Result Flag Reference   VIT D 25-HYDROX 33 8 ng/mL  30 0-100 0     This assay is a certified procedure of the CDC Vitamin D Standardization Certification Program (VDSCP)   Deficiency <20ng/ml  Insufficiency 20-30ng/ml  Sufficient  ng/ml   *Patients undergoing fluorescein dye angiography may retain small amounts of fluorescein in the body for 48-72 hours post procedure  Samples containing fluorescein can produce falsely elevated Vitamin D values  If the patient had this procedure, a specimen should be resubmitted post fluorescein clearance       Future Appointments    Date/Time Provider Specialty Site   11/14/2017 08:00 AM Cardiology, Device Remote  [address] 03/01/2018 08:00 AM MILLY Siddiqi   [address] [facility]       Signatures   Electronically signed by : MILLY Jacobs ; Nov 12 2017 10:28PM EST                       (Author)

## 2018-01-05 ENCOUNTER — HOSPITAL ENCOUNTER (EMERGENCY)
Facility: HOSPITAL | Age: 83
Discharge: HOME | End: 2018-01-05
Attending: EMERGENCY MEDICINE | Admitting: EMERGENCY MEDICINE
Payer: MEDICARE

## 2018-01-05 VITALS
DIASTOLIC BLOOD PRESSURE: 67 MMHG | OXYGEN SATURATION: 94 % | TEMPERATURE: 99.2 F | HEART RATE: 60 BPM | SYSTOLIC BLOOD PRESSURE: 155 MMHG | RESPIRATION RATE: 18 BRPM

## 2018-01-05 DIAGNOSIS — R04.0 RIGHT-SIDED EPISTAXIS: Primary | ICD-10-CM

## 2018-01-05 PROCEDURE — 99283 EMERGENCY DEPT VISIT LOW MDM: CPT

## 2018-01-05 RX ORDER — BACITRACIN, NEOMYCIN, POLYMYXIN B 400; 3.5; 5 [USP'U]/G; MG/G; [USP'U]/G
1 OINTMENT TOPICAL ONCE
Status: DISCONTINUED | OUTPATIENT
Start: 2018-01-05 | End: 2018-01-05 | Stop reason: HOSPADM

## 2018-01-05 RX ORDER — DORZOLAMIDE HCL 20 MG/ML
1 SOLUTION/ DROPS OPHTHALMIC 3 TIMES DAILY
COMMUNITY
End: 2018-03-15

## 2018-01-05 RX ORDER — OXYMETAZOLINE HYDROCHLORIDE 0.05 G/100ML
2 SPRAY NASAL ONCE
Status: COMPLETED | OUTPATIENT
Start: 2018-01-05 | End: 2018-01-05

## 2018-01-05 RX ORDER — OLOPATADINE HYDROCHLORIDE 1 MG/ML
1 SOLUTION/ DROPS OPHTHALMIC 2 TIMES DAILY
COMMUNITY
End: 2018-03-15

## 2018-01-05 RX ORDER — SIMVASTATIN 20 MG
20 TABLET ORAL
COMMUNITY
End: 2018-11-20 | Stop reason: ALTCHOICE

## 2018-01-05 RX ADMIN — OXYMETAZOLINE HYDROCHLORIDE 2 SPRAY: 5 SPRAY NASAL at 04:40

## 2018-01-05 NOTE — ED PROVIDER NOTES
History  Chief Complaint   Patient presents with    Nose Bleed     Pt who is on Eliquis c/o acute onset bleeding from right nares after blowing nose this AM     80-year-old female presents bleeding from the right nare  Patient states she woke this morning and felt like her nose was clogged   She blew her nose vigorously and it began to bleed from the right nare  History provided by:  Patient  Nose Bleed   Location:  R nare  Severity:  Mild  Timing:  Constant  Chronicity:  New  Context: anticoagulants    Relieved by:  Applying pressure  Worsened by: Movement  Associated symptoms: blood in oropharynx and congestion    Associated symptoms: no dizziness and no headaches        Prior to Admission Medications   Prescriptions Last Dose Informant Patient Reported? Taking?    ALFALFA PO 1/4/2018 at Unknown time  Yes Yes   Sig: Take 3 tablets by mouth     B Complex Vitamins (B COMPLEX 1 PO) 1/4/2018 at Unknown time  Yes Yes   Sig: Take by mouth   LECITHIN PO 1/4/2018 at Unknown time  Yes Yes   Sig: Take 1 tablet by mouth daily   Omega-3 Fatty Acids (OMEGA-3 PO) 1/4/2018 at Unknown time  Yes Yes   Sig: Take by mouth 2 (two) times a day     Zinc Sulfate (ZINC 15 PO) 1/4/2018 at Unknown time  Yes Yes   Sig: Take 100 mg by mouth     apixaban (ELIQUIS) 5 mg 1/4/2018 at Unknown time  Yes Yes   Sig: Take 5 mg by mouth 2 (two) times a day   cholecalciferol (VITAMIN D3) 1,000 units tablet 1/4/2018 at Unknown time  No Yes   Sig: Take 1 tablet by mouth daily   diltiazem (CARDIZEM CD) 180 mg 24 hr capsule 1/4/2018 at Unknown time  Yes Yes   Sig: Take 180 mg by mouth daily   dorzolamide (TRUSOPT) 2 % ophthalmic solution 1/4/2018 at Unknown time  Yes Yes   Sig: Administer 1 drop to both eyes 3 (three) times a day   furosemide (LASIX) 40 mg tablet 1/4/2018 at Unknown time  No Yes   Sig: Take 1 tablet by mouth 2 (two) times a day for 7 days   Patient taking differently: Take 20 mg by mouth daily     methimazole (TAPAZOLE) 5 mg tablet Past Week at Unknown time  No Yes   Sig: Take 1 tablet by mouth 3 (three) times a week for 30 days   Patient taking differently: Take 5 mg by mouth 3 (three) times a week     metoprolol tartrate (LOPRESSOR) 50 mg tablet 1/4/2018 at Unknown time  No Yes   Sig: Take 1 tablet by mouth every 12 (twelve) hours for 30 days   olopatadine (PATANOL) 0 1 % ophthalmic solution 1/4/2018 at Unknown time  Yes Yes   Sig: Administer 1 drop to both eyes 2 (two) times a day   pantoprazole (PROTONIX) 40 mg tablet 1/4/2018 at Unknown time  Yes Yes   Sig: Take 40 mg by mouth daily   sertraline (ZOLOFT) 50 mg tablet 1/4/2018 at Unknown time  Yes Yes   Sig: Take 50 mg by mouth daily   simvastatin (ZOCOR) 20 mg tablet 1/4/2018 at Unknown time  Yes Yes   Sig: Take 20 mg by mouth daily at bedtime      Facility-Administered Medications: None       Past Medical History:   Diagnosis Date    Anemia     Atrial fibrillation (HCC)     Atrial flutter (HCC)     Depression     Diastolic CHF ([facility] [address] )     Dyslipidemia     Glaucoma     Glaucoma     H/O: hysterectomy     Hypertension     Hyperthyroidism     Pacemaker     Psoriasis     S/P cataract surgery     S/P knee replacement     SSS (sick sinus syndrome) (HCC)     SSS (sick sinus syndrome) ([facility] [address] )        Past Surgical History:   Procedure Laterality Date    CARDIAC PACEMAKER PLACEMENT      CARDIAC PACEMAKER PLACEMENT      dual chamber    CATARACT EXTRACTION      ESOPHAGOGASTRODUODENOSCOPY N/A 12/30/2016    Procedure: ESOPHAGOGASTRODUODENOSCOPY (EGD); Surgeon: Alexandria Chavez MD;  Location: [facility]; Service:     EYE SURGERY      cataract    HYSTERECTOMY      JOINT REPLACEMENT      bilateral     VT COLONOSCOPY FLX DX W/COLLJ SPEC WHEN PFRMD N/A 4/24/2017    Procedure: COLONOSCOPY with polypectomy;  Surgeon: Christiane Alexis MD;  Location: [facility]; Service: Gastroenterology       History reviewed  No pertinent family history    I have reviewed and agree with the history as documented  Social History   Substance Use Topics    Smoking status: Former Smoker    Smokeless tobacco: Never Used    Alcohol use No        Review of Systems   Constitutional: Negative for activity change, appetite change and chills  HENT: Positive for congestion and nosebleeds  Eyes: Negative for pain, discharge and itching  Respiratory: Negative for apnea, choking and chest tightness  Cardiovascular: Negative for chest pain  Gastrointestinal: Negative for abdominal distention, abdominal pain and anal bleeding  Endocrine: Negative for cold intolerance and heat intolerance  Genitourinary: Negative for difficulty urinating and dysuria  Musculoskeletal: Negative for arthralgias, back pain and gait problem  Skin: Negative for color change, pallor and rash  Allergic/Immunologic: Negative for environmental allergies  Neurological: Negative for dizziness, facial asymmetry and headaches  Hematological: Negative for adenopathy  Psychiatric/Behavioral: Negative for agitation, behavioral problems and confusion  Physical Exam  ED Triage Vitals [01/05/18 0444]   Temperature Pulse Respirations Blood Pressure SpO2   99 2 °F (37 3 °C) 60 (!) 24 (!) 192/79 92 %      Temp Source Heart Rate Source Patient Position - Orthostatic VS BP Location FiO2 (%)   Temporal Monitor Lying Left arm --      Pain Score       --           Orthostatic Vital Signs  Vitals:    01/05/18 0444   BP: (!) 192/79   Pulse: 60   Patient Position - Orthostatic VS: Lying       Physical Exam   Constitutional: She appears well-developed and well-nourished  HENT:   Nose: No nasal septal hematoma  Epistaxis is observed  Anterior nose bleed identified a Kiesselbach's plexus on the right [place]if[place] Brighter   Eyes: Pupils are equal, round, and reactive to light  Neck: No JVD present  No tracheal deviation present  No thyromegaly present  Cardiovascular: Normal rate and regular rhythm      Pulmonary/Chest: Effort normal  No respiratory distress  She has no wheezes  She has no rales  Abdominal: Soft  She exhibits no distension  There is no tenderness  There is no guarding  Musculoskeletal: Normal range of motion  She exhibits no edema or deformity  Neurological: She is alert  No cranial nerve deficit  Coordination normal    Skin: Skin is warm  Capillary refill takes less than 2 seconds  Psychiatric: She has a normal mood and affect  Her behavior is normal  Thought content normal    Vitals reviewed  ED Medications  Medications   neomycin-bacitracin-polymyxin b (NEOSPORIN) ointment 1 small application (not administered)   oxymetazoline (AFRIN) 0 05 % nasal spray 2 spray (2 sprays Each Nare Given 1/5/18 0440)       Diagnostic Studies  Results Reviewed     None                 No orders to display              Procedures  Procedures       Phone Contacts  ED Phone Contact    ED Course  ED Course                                MDM  CritCare Time    Disposition  Final diagnoses:   Right-sided epistaxis     Time reflects when diagnosis was documented in both MDM as applicable and the Disposition within this note     Time User Action Codes Description Comment    1/5/2018  5:03 AM Gianni Yoon Add [R04 0] Right-sided epistaxis       ED Disposition     ED Disposition Condition Comment    Discharge  Gaston Crockett discharge to home/self care  Condition at discharge: Good        Follow-up Information    None       Patient's Medications   Discharge Prescriptions    No medications on file     No discharge procedures on file      ED Provider  Electronically Signed by           Rachel Diaz DO  01/05/18 3268

## 2018-01-05 NOTE — ED PROCEDURE NOTE
PROCEDURE  Epistaxis Mgmt  Date/Time: 1/5/2018 4:50 AM  Performed by: Anil Aguayo  Authorized by: Anil Aguayo     Patient location:  ED  Consent:     Consent obtained:  Emergent situation    Consent given by:  Patient    Risks discussed:  Bleeding and infection    Alternatives discussed:  No treatment  Universal protocol:     Patient identity confirmed:  Verbally with patient  Procedure details:     Treatment site:  R anterior    Repair method: Packed with cotton balls impregnated with Afrin  Approach:  External    Spec Headlamp used: No      Treatment complexity:  Simple    Treatment episode: initial    Post-procedure details:     Assessment:  Bleeding stopped    Patient tolerance of procedure: Tolerated well, no immediate complications    Complication (if applicable): No complications    Patient ultimately could not tolerate rapid rhino due to irritation and uncontrollable sneezing so hemostasis was achieved after manual pressure and Afrin soaked cotton balls

## 2018-01-05 NOTE — DISCHARGE INSTRUCTIONS
Epistaxis   WHAT YOU SHOULD KNOW:   Epistaxis is a nosebleed  A nosebleed occurs when the blood vessels near the surface of the nasal cavity are injured or damaged  AFTER YOU LEAVE:   Medicines:   · Nasal sprays:  Vasoconstrictor nasal spray is a medicine that helps make nasal blood vessels narrower  This limits the blood flow and stops the bleeding  This medicine also decreases the swelling inside your nose and helps you breathe easier  You may also be directed to use saline or other nasal sprays to add moisture to your nose  · Antibiotics: This medicine is given to help treat or prevent an infection caused by bacteria  · Take your medicine as directed  Call your healthcare provider if you think your medicine is not helping or if you have side effects  Tell him if you are allergic to any medicine  Keep a list of the medicines, vitamins, and herbs you take  Include the amounts, and when and why you take them  Bring the list or the pill bottles to follow-up visits  Carry your medicine list with you in case of an emergency  Follow up with your primary healthcare provider or otolaryngologist within 2 to 3 days or as directed: Any packing in your nose should be removed within 2 to 3 days  Write down your questions so you remember to ask them during your visits  First aid:   · Sit up and lean forward: This will help prevent you from swallowing blood  Spit blood and saliva into a bowl  · Apply pressure to your nose:  Use 2 fingers to pinch your nose shut for 10 minutes  This will help stop the bleeding  Breathe through your mouth  · Apply ice:  Use a cold pack or put crushed ice in a bag, cover with a towel, and place on the bridge of your nose  · Nasal packing:  Pack your nose with a cotton ball, tissue, tampon, or gauze bandage to stop the bleeding  Prevent epistaxis:  · Avoid nose picking and blowing your nose too hard: You can irritate or damage your nose if you pick it   Blowing your nose too hard may cause the bleeding to start again  · Avoid irritants:  Substances that can irritate your nose should be avoided  These include tobacco smoke and chemical sprays such as  that contain ammonia  · Use a cool mist humidifier in your home: This will add the moisture to the air and help keep your nose moist      · Put a small amount of petroleum jelly inside your nostrils: You may apply a small amount of petroleum jelly if you do not have a nasal packing  This will help keep your nose from drying out or getting irritated  Do not put anything else inside your nose unless your primary healthcare provider tells you to do so  Contact your primary healthcare provider or otolaryngologist if:   · You have a fever and are vomiting  · You have pain in and around your nose that is getting worse even after you take pain medicines  · Your nasal pack is loose  · You have questions or concerns about your condition or care  Seek care immediately if:   · Your nasal packing is soaked with blood  · Your nose is still bleeding after 20 minutes, even after you pinch it  · You have a foul-smelling discharge coming out of your nose  · You feel so weak and dizzy that you have trouble standing up  · You have trouble breathing or talking  © 2014 [address] is for End User's use only and may not be sold, redistributed or otherwise used for commercial purposes  All illustrations and images included in CareNotes® are the copyrighted property of A D [employer] M [employer] Inc  or [employer]  The above information is an  only  It is not intended as medical advice for individual conditions or treatments  Talk to your doctor, nurse or pharmacist before following any medical regimen to see if it is safe and effective for you

## 2018-01-10 NOTE — RESULT NOTES
Verified Results  (1) CBC/PLT/DIFF [id number] 09:35AM Yanely Bonner     Test Name Result Flag Reference   WBC COUNT 8 70 Thousand/uL  4 31-10 16   RBC COUNT 3 96 Million/uL  3 81-5 12   HEMOGLOBIN 8 8 g/dL L 11 5-15 4   HEMATOCRIT 31 1 % L 34 8-46  1   MCV 79 fL L 82-98   MCH 22 2 pg L 26 8-34 3   MCHC 28 3 g/dL L 31 4-37 4   RDW 17 2 % H 11 6-15 1   MPV 9 3 fL  8 9-12 7   PLATELET COUNT 044 Thousands/uL  149-390   NEUTROPHILS RELATIVE PERCENT 62 %  43-75   LYMPHOCYTES RELATIVE PERCENT 24 %  14-44   MONOCYTES RELATIVE PERCENT 8 %  4-12   EOSINOPHILS RELATIVE PERCENT 5 %  0-6   BASOPHILS RELATIVE PERCENT 1 %  0-1   NEUTROPHILS ABSOLUTE COUNT 5 37 Thousands/?L  1 85-7 62   LYMPHOCYTES ABSOLUTE COUNT 2 11 Thousands/?L  0 60-4 47   MONOCYTES ABSOLUTE COUNT 0 70 Thousand/?L  0 17-1 22   EOSINOPHILS ABSOLUTE COUNT 0 45 Thousand/?L  0 00-0 61   BASOPHILS ABSOLUTE COUNT 0 07 Thousands/?L  0 00-0 10   - Patient Instructions: This bloodwork is non-fasting  Please drink two glasses of water morning of bloodwork  - Patient Instructions: This bloodwork is non-fasting  Please drink two glasses of water morning of bloodwork       (1) NT- BNP (PRO BRAIN NATRIURETIC PEPTIDE) [id number] 09:35AM Yanely Bonner     Test Name Result Flag Reference   NT-PRO BNP 2695 pg/mL H <450

## 2018-01-12 VITALS
DIASTOLIC BLOOD PRESSURE: 64 MMHG | WEIGHT: 190 LBS | HEIGHT: 65 IN | BODY MASS INDEX: 31.65 KG/M2 | HEART RATE: 64 BPM | SYSTOLIC BLOOD PRESSURE: 144 MMHG

## 2018-01-12 NOTE — RESULT NOTES
Verified Results  (1) CBC/PLT/DIFF [id number] 08:50AM Po Million Order Number: [id number]   Order Number: [id number]     Test Name Result Flag Reference   WBC COUNT 6 76 Thousand/uL  4 31-10 16   RBC COUNT 4 91 Million/uL  3 81-5 12   HEMOGLOBIN 14 6 g/dL  11 5-15 4   HEMATOCRIT 44 2 %  34 8-46  1   MCV 90 fL  82-98   MCH 29 7 pg  26 8-34 3   MCHC 33 0 g/dL  31 4-37 4   RDW 14 6 %  11 6-15 1   MPV 9 6 fL  8 9-12 7   PLATELET COUNT 000 Thousands/uL  149-390   NEUTROPHILS RELATIVE PERCENT 58 %  43-75   LYMPHOCYTES RELATIVE PERCENT 28 %  14-44   MONOCYTES RELATIVE PERCENT 9 %  4-12   EOSINOPHILS RELATIVE PERCENT 4 %  0-6   BASOPHILS RELATIVE PERCENT 1 %  0-1   NEUTROPHILS ABSOLUTE COUNT 3 93 Thousands/?L  1 85-7 62   LYMPHOCYTES ABSOLUTE COUNT 1 91 Thousands/?L  0 60-4 47   MONOCYTES ABSOLUTE COUNT 0 58 Thousand/?L  0 17-1 22   EOSINOPHILS ABSOLUTE COUNT 0 30 Thousand/?L  0 00-0 61   BASOPHILS ABSOLUTE COUNT 0 04 Thousands/?L  0 00-0 10     (1) COMPREHENSIVE METABOLIC PANEL [id number] 24:56HW Po Million Order Number: [id number]   Order Number: [id number] Order Number: [id number] Order Number: [id number]     Test Name Result Flag Reference   GLUCOSE,RANDM 107 mg/dL     If the patient is fasting, the ADA then defines impaired fasting glucose as > 100 mg/dL and diabetes as > or equal to 123 mg/dL     SODIUM 142 mmol/L  136-145   POTASSIUM 4 2 mmol/L  3 5-5 3   CHLORIDE 103 mmol/L  100-108   CARBON DIOXIDE 31 mmol/L  21-32   ANION GAP (CALC) 8 mmol/L  4-13   BLOOD UREA NITROGEN 17 mg/dL  5-25   CREATININE 1 00 mg/dL  0 60-1 30   Standardized to IDMS reference method   CALCIUM 8 8 mg/dL  8 3-10 1   BILI, TOTAL 0 70 mg/dL  0 20-1 00   ALK PHOSPHATAS 79 U/L     ALT (SGPT) 34 U/L  12-78   AST(SGOT) 27 U/L  5-45   ALBUMIN 3 6 g/dL  3 5-5 0   TOTAL PROTEIN 6 7 g/dL  6 4-8 2   eGFR Non-African American 52 8 ml/min/1 73sq omayra Doming[employer] Disease Education Program recommendations are as follows:  GFR calculation is accurate only with a steady state creatinine  Chronic Kidney disease less than 60 ml/min/1 73 sq  meters  Kidney failure less than 15 ml/min/1 73 sq  meters  (1) LIPID PANEL, FASTING [id number] 08:50AM Dave Vasquez Order Number: [id number]   Order Number: [id number] Order Number: [id number] Order Number: [id number]     Test Name Result Flag Reference   CHOLESTEROL 168 mg/dL     HDL,DIRECT 71 mg/dL H 40-60   Specimen collection should occur prior to Metamizole administration due to the potential for falsely depressed results  LDL CHOLESTEROL CALCULATED 82 mg/dL  0-100   Triglyceride:         Normal              <150 mg/dl       Borderline High    150-199 mg/dl       High               200-499 mg/dl       Very High          >499 mg/dl  Cholesterol:         Desirable        <200 mg/dl      Borderline High  200-239 mg/dl      High             >239 mg/dl  HDL Cholesterol:        High    >59 mg/dL      Low     <41 mg/dL  LDL CALCULATED:    This screening LDL is a calculated result  It does not have the accuracy of the Direct Measured LDL in the monitoring of patients with hyperlipidemia and/or statin therapy  Direct Measure LDL (TSY355) must be ordered separately in these patients  TRIGLYCERIDES 76 mg/dL  <=150   Specimen collection should occur prior to N-Acetylcysteine or Metamizole administration due to the potential for falsely depressed results       (1) TSH [id number] 08:50AM Dave Vasquez Order Number: [id number]   Order Number: [id number] Order Number: [id number] Order Number: [id number]     Test Name Result Flag Reference   TSH 2 616 uIU/mL  0 358-3 740   The recommended reference ranges for TSH during pregnancy are as follows:  First trimester 0 1 to 2 5 uIU/mL  Second trimester  0 2 to 3 0 uIU/mL  Third trimester 0 3 to 3 0 uIU/m

## 2018-01-12 NOTE — MISCELLANEOUS
Plan  Chronic diastolic congestive heart failure    · Furosemide 40 MG Oral Tablet; take 1 tablet by mouth once daily   Rx By: Carley Michel; Dispense: 90 Days ; #:90 Tablet; Refill: 3; For: Chronic diastolic congestive heart failure; ROBERT = N; Verified Transmission to [place] [address]    History of Present Illness  TCM Communication [facility]: The patient is being contacted for follow-up after hospitalization  She was hospitalized at [facility]  The dates of hospitalization: 2/16/17-2/18/17, date of admission: 2/16/17, date of discharge: 2/18/17  Diagnosis: shortness of breath  She was discharged to home, Unable to schedule for LAURA  Pt was left several messages and we did not receive a response  She scheduled a follow up appointment  She did not schedule a follow up appointment  Communication performed and completed by Dalia Castano      Active Problems     1  Acute gastrointestinal bleeding (578 9) (K92 2)   2  Allergic conjunctivitis of both eyes (372 14) (H10 13)   3  Allergic rhinitis (477 9) (J30 9)   4  Atrial flutter (427 32) (I48 92)   5  AV block (426 10) (I44 30)   6  Chronic diastolic congestive heart failure (428 32,428 0) (I50 32)   7  Depression with anxiety (300 4) (F41 8)   8  Dyslipidemia (272 4) (E78 5)   9  Dyspnea on exertion (786 09) (R06 09)   10  Fatigue (780 79) (R53 83)   11  Glaucoma (365 9) (H40 9)   12  Hypertension (401 9) (I10)   13  Hyperthyroidism (242 90) (E05 90)   14  Medicare annual wellness visit, subsequent (V70 0) (Z00 00)   15  Need for DTaP vaccine (V06 1) (Z23)   16  Psoriasis (696 1) (L40 9)   17  Screening for colon cancer (V76 51) (Z12 11)   18  Screening for genitourinary condition (V81 6) (Z13 89)   19  Screening for osteoporosis (V82 81) (Z13 820)   20  Shortness of breath (786 05) (R06 02)   21  Sick sinus syndrome (427 81) (I49 5)   22  Vertigo (780 4) (R42)   23   Visit for screening mammogram (V76 12) (Z12 31)    Anemia (285 9) (D64 9)          Past Medical History    1  History of impaired glucose tolerance (V12 29) (B37 869)    Surgical History    1  History of Cataract Surgery   2  History of Hysterectomy   3  History of Permanent Pacemaker Type Dual-Chamber   4  History of Total Knee Arthroplasty    Family History  Father    1  Family history of alcoholism (V17 0) (Z81 1)   2  Family history of diabetes mellitus (V18 0) (Z83 3)  Brother    3  Family history of alcoholism (V17 0) (Z81 1)    Social History    · Former smoker (V15 82) (Z59 020)   · Marital History -    · No alcohol use    Current Meds   1  Alfalfa TABS; 3 Tabs Daily; Therapy: (Recorded:13Jan2017) to Recorded   2  Calcium-Magnesium 500-250 MG Oral Tablet; TAKE 1 TABLET DAILY; Therapy: (Recorded:20Nov2015) to Recorded   3  Combigan 0 2-0 5 % Ophthalmic Solution; INSTILL 1 DROP Every twelve hours OU; Therapy: (Shannon Cronin) to Recorded   4  [facility] [place]-3 CAPS; 2 tabs daily; Therapy: (Recorded:13Jan2017) to Recorded   5  DiltiaZEM HCl ER Coated Beads 180 MG Oral Capsule Extended Release 24 Hour; take   1 capsule daily; Therapy: 15MXI1798 to (Evaluate:17Jun2017)  Requested for: 28DLJ1520; Last   Rx:22Jun2016 Ordered   6  Furosemide 40 MG Oral Tablet; take 1 tablet by mouth once daily; Therapy: (Recorded:21Feb2017) to Recorded   7  Lecithin CAPS; TAKE 1 CAPSULE DAILY; Therapy: (Recorded:13Jan2017) to Recorded   8  MethIMAzole 5 MG Oral Tablet; TAKE 1 TABLET ON MONDAY,   WEDNESDAY, AND FRIDAY; Therapy: 79VUV6426 to (Evaluate:18Jan2018)  Requested for: 62RJP3980; Last   Rx:08Feb2017 Ordered   9  Metoprolol Tartrate 50 MG Oral Tablet; TAKE 1 TABLET EVERY 12 HOURS DAILY; Therapy: 36Eor7461 to (Evaluate:18Jan2018)  Requested for: 45ZAH2142; Last   Rx:23Jan2017 Ordered   10  Olopatadine HCl - 0 1 % Ophthalmic Solution; INSTILL 1 DROP IN AFFECTED EYES    TWICE DAILY AS NEEDED; Therapy: 80AOH6358 to (Last Rx:03Oct2016)  Requested for: 05UJF7278 Ordered   11   Pantoprazole Sodium 40 MG Oral Tablet Delayed Release; Take 1 tablet twice daily     Requested for: 32LUA5029; Last Rx:64Ckc1403 Ordered   12  Sertraline HCl - 50 MG Oral Tablet; TAKE 1 TABLET DAILY AS DIRECTED; Therapy: 23Vog9429 to (Evaluate:60Rha1907)  Requested for: 09OBY5740; Last    Rx:03Oct2016 Ordered   13  Simvastatin 20 MG Oral Tablet; TAKE 1 TABLET AT BEDTIME; Therapy: 14HNR9560 to (Evaluate:84Apj4195)  Requested for: 09WUX1205; Last    Rx:08Feb2017 Ordered   14  Vitamin B Complex CAPS; take 1 capsule daily; Therapy: (Recorded:13Jan2017) to Recorded   15  Vitamin E 400 UNIT Oral Capsule; take 1 capsule daily; Therapy: (Recorded:13Jan2017) to Recorded   16  Zinc 100 MG Oral Tablet; TAKE 1 TABLET DAILY; Therapy: (Recorded:13Jan2017) to Recorded    Allergies    1   No Known Drug Allergies    Future Appointments    Date/Time Provider Specialty Site   05/12/2017 01:30 PM Cardiology, Device Remote   [address]   08/14/2017 08:00 AM Cardiology, Device Remote   [address]   11/14/2017 08:00 AM Cardiology, Device Remote   [address]   05/16/2017 01:20 PM Yanelis Olson MD Gastroenterology Adult [facility] GASTROENTEROLOGY [place]   04/25/2017 09:30 AM Ritika Clemons MD Gastroenterology Adult [facility] OUTPATIENT   04/04/2017 02:20 PM Da Conner DO Cardiology [address]     Signatures   Electronically signed by : MILLY Jacob ; Mar 11 2017 10:07PM EST                       (Author)

## 2018-01-12 NOTE — PROGRESS NOTES
Assessment    1  Hypertension (401 9) (I10)   2  Hyperlipidemia (272 4) (E78 5)   3  Chronic diastolic congestive heart failure (428 32,428 0) (I50 32)   4  Hyperthyroidism (242 90) (E05 90)   5  Anemia (285 9) (D64 9)    Plan  Anemia, Chronic diastolic congestive heart failure, Hyperlipidemia, Hypertension,  Hyperthyroidism, Vitamin D deficiency    · (1) CBC/PLT/DIFF; Status:Active; Requested for:09Mar2017;    · (1) COMPREHENSIVE METABOLIC PANEL; Status:Active; Requested for:09Mar2017;    · (1) IRON PANEL; Status:Active; Requested for:09Mar2017;    · (1) LIPID PANEL, FASTING; Status:Active; Requested for:09Mar2017;    · (1) TSH; Status:Active; Requested for:09Mar2017;    · (1) VITAMIN D 25-HYDROXY; Status:Active; Requested for:09Mar2017;   Chronic diastolic congestive heart failure    · Call [phone] if: The swelling and puffiness is getting worse ; Status:Complete;    Done: 00EEB8833   · Call [phone] if: You become dizzy or lightheaded, especially when you stand up  after sitting for a while ; Status:Complete;   Done: 55XKP4956   · Call [phone] if: You have a dry, hacking cough ; Status:Complete;   Done:  48UQV6636   · Call [phone] if: You have swelling and puffiness of your lower leg or ankles ;  Status:Complete;   Done: 30SYN1955   · Call [phone] if: Your breathing is not better in 2 days ; Status:Complete;   Done:  13SFE7107   · Call 911 if: You experience a new kind of chest pain (angina) or pressure ;  Status:Complete;   Done: 68PSA4445   · Call 911 if:  You feel short of breath even while resting ; Status:Complete;   Done:  28YRH0120   · Seek Immediate Medical Attention if: You have difficulty breathing while lying down and  you are comfortable only when sitting up ; Status:Complete;   Done: 07CQD7034   · Seek Immediate Medical Attention if: You or your family members notice any confusion or  difficulty with memory ; Status:Complete;   Done: 38GJE5501   · Seek Immediate Medical Attention if: Your shortness of breath is getting worse ;  Status:Complete;   Done: 07ECQ6122   · Continue with our present treatment plan ; Status:Complete;   Done: 24EXE2223   · Restrict the salt in your diet by avoiding highly salted foods ; Status:Complete;   Done:  44GQS9588   · Weigh yourself every day ; Status:Complete;   Done: 20HOP1076   · You may continue or resume your normal level of activity ; Status:Complete;   Done:  03PEB7017  Hyperlipidemia    · Atorvastatin Calcium 20 MG Oral Tablet; TAKE 1 TABLET DAILY AS     DIRECTED   · Call [phone] if: You have muscle cramps ; Status:Complete;   Done: 86BIE1274   · Call [phone] if: You have pain in the stomach area ; Status:Complete;   Done:  05ESB0119   · Call [phone] if: You start vomiting ; Status:Complete;   Done: 68HBW6204   · Call 911 if: You have any symptoms of a stroke ; Status:Complete;   Done: 43IUL4535   · Begin or continue regular aerobic exercise  Gradually work up to at least 3 sessions of  30 minutes of exercise a week ; Status:Complete;   Done: 82KGV9623   · Eat no more than 30 grams of fat per day ; Status:Complete;   Done: 29RQK7288   · There are many exercise options for seniors ; Status:Complete;   Done: 68VXN8772  Hypertension    · A diet low in sodium and high in potassium, magnesium, and calcium can help your  blood pressure ; Status:Complete;   Done: 14XFC6075   · Begin a limited exercise program ; Status:Complete;   Done: 78ZEP2410   · Eat a low fat and low cholesterol diet ; Status:Complete;   Done: 77AOP6626   · We encourage you to begin to make lifestyle changes to help control your blood  pressure    These may include losing weight, increasing your activity level, limiting salt in  your diet, decreasing alcohol intake, and eating a diet low in fat and rich in fruits  and vegetables ; Status:Complete;   Done: 66FYP6391   · Call [phone] if: You develop double vision (see two of everything) ;  Status:Complete;   Done: 91MEJ9587   · Seek Immediate Medical Attention if: You have a severe headache that will not go away ;  Status:Complete;   Done: 47RKX4091   · Seek Immediate Medical Attention if: Your blood pressure is greater than 250/120 for 2  consecutive readings ; Status:Complete;   Done: 69JDJ2510  Osteoporosis, PMH: Screening for osteoporosis    · * DXA BONE DENSITY SPINE HIP AND PELVIS; Status:Temporary Deferral - Pt refuses;     3/9/2018    Discussion/Summary    Reviewed hospital records with her  Explained to her that the simvastatin was changed to be atorvastatin because of her being on the calcium channel blocker  Prescription for the atorvastatin was sent in and advised her that when she completes her current simvastatin she should switch to the atorvastatin  Continue with the blood pressure medications as previously  Advised her to continue with the Lasix as previously  Discussed with her watching for any increasing shortness of breath or any other cardiac symptoms  He need to increase her Lasix intermittently in the future  Follow-up with cardiology as scheduled  Follow-up with GI as scheduled for colonoscopy  Advised her to watch for any changes in her bowel movements  Continue blood pressure medications as previously  Continue the methimazole as previously  We'll have her follow-up in about 3 months or sooner if needed  Slip for laboratory testing given for prior to that visit  The patient has the current Goals: Prevention of recurrence of the congestive heart failure  Completion of investigation of anemia/GI bleed  The patent has the current Barriers: None  Chief Complaint  CC patient here today for follow up from hospital  feeling much better  History of Present Illness  She presents for follow-up  Has been feeling significantly better  Has felt that her breathing is the best that it's been in months  Does not feel at all short of breath at present   Is back to doing her normal activities without any limitations  Denies any chest pain or palpitations  Responded well during her hospitalization to diuresis  Hemoglobin had remained stable  She did follow-up with GI locally and will be going for a colonoscopy for further investigation of the GI bleed  Bowel movements have remained normal recently  Denies any dark stools  Denies any abdominal pain  She was concerned why they changed her simvastatin to atorvastatin  She has not yet started it  She did not even take the prescription to the pharmacy  She wanted to understand why the change was taking place prior to starting it  Other medications have remained stable  She has now been taking the Lasix on a daily basis  Continues with the methimazole as previously  Denies any signs or symptoms of hyperthyroidism  Eyes any recent illnesses  Appetite is been generally good  Review of Systems    Constitutional: no fever, no chills and not feeling tired  Eyes: no eyesight problems  Cardiovascular: as noted in HPI  Respiratory: as noted in HPI  Gastrointestinal: no nausea and no diarrhea  Neurological: no headache and no dizziness  Psychiatric: as noted in HPI  Hematologic/Lymphatic: as noted in HPI  ROS reviewed  Active Problems    1  Allergic conjunctivitis of both eyes (372 14) (H10 13)   2  Allergic rhinitis (477 9) (J30 9)   3  Anemia (285 9) (D64 9)   4  Atrial fibrillation, chronic (427 31) (I48 2)   5  Atrial flutter (427 32) (I48 92)   6  AV block (426 10) (I44 30)   7  Cardiac pacemaker (V45 01) (Z95 0)   8  Chronic diastolic congestive heart failure (428 32,428 0) (I50 32)   9  Depression with anxiety (300 4) (F41 8)   10  Dyslipidemia (272 4) (E78 5)   11  Dyspnea on exertion (786 09) (R06 09)   12  Fatigue (780 79) (R53 83)   13  Glaucoma (365 9) (H40 9)   14  Hyperlipidemia (272 4) (E78 5)   15  Hypertension (401 9) (I10)   16  Hyperthyroidism (242 90) (E05 90)   17   Psoriasis (696 1) (L40 9) 18  Pulmonary hypertension (416 8) (I27 2)   19  Screening for colon cancer (V76 51) (Z12 11)   20  Sick sinus syndrome (427 81) (I49 5)   21  Vertigo (780 4) (R42)   22  Vitamin D deficiency (268 9) (E55 9)    Past Medical History    1  History of Acute gastrointestinal bleeding (578 9) (K92 2)   2  History of impaired glucose tolerance (V12 29) (Z87 898)   3  History of shortness of breath (V13 89) (E68 496)    The active problems and past medical history were reviewed and updated today  Surgical History    1  History of Cataract Surgery   2  History of Hysterectomy   3  History of Permanent Pacemaker Type Dual-Chamber   4  History of Total Knee Arthroplasty    The surgical history was reviewed and updated today  Family History  Father    1  Family history of alcoholism (V17 0) (Z81 1)   2  Family history of diabetes mellitus (V18 0) (Z83 3)  Brother    3  Family history of alcoholism (V17 0) (Z81 1)    The family history was reviewed and updated today  Social History    · Former smoker (D81 84) (J32 416)   · Marital History -    · No alcohol use  The social history was reviewed and updated today  Current Meds   1  Alfalfa TABS; 3 Tabs Daily; Therapy: (Recorded:13Jan2017) to Recorded   2  Atorvastatin Calcium 20 MG Oral Tablet; TAKE 1 TABLET DAILY AS DIRECTED; Therapy: (Recorded:78Myz8365) to Recorded   3  Cholecalciferol 1000 UNIT TABS; TAKE 1 TABLET DAILY; Therapy: (Recorded:22Feb2017) to Recorded   4  Combigan 0 2-0 5 % Ophthalmic Solution; INSTILL 1 DROP Every twelve hours OU; Therapy: (Alivia Rahman) to Recorded   5  [facility] [place]-3 CAPS; 2 tabs daily; Therapy: (Recorded:13Jan2017) to Recorded   6  DiltiaZEM HCl ER Coated Beads 180 MG Oral Capsule Extended Release 24 Hour; take   1 capsule daily; Therapy: 00ENI6660 to (Evaluate:17Jun2017)  Requested for: 94FLW9533; Last   Rx:22Jun2016 Ordered   7   Furosemide 40 MG Oral Tablet; take 1 tablet by mouth once daily Requested for:   69Lok3746; Last Rx:90Ozr3574 Ordered   8  Lecithin CAPS; TAKE 1 CAPSULE DAILY; Therapy: (Recorded:13Jan2017) to Recorded   9  MethIMAzole 5 MG Oral Tablet; TAKE 1 TABLET ON MONDAY,   WEDNESDAY, AND FRIDAY; Therapy: 41DTO9517 to (Evaluate:18Jan2018)  Requested for: 34BKT3657; Last   Rx:91Lgh7839 Ordered   10  Metoprolol Tartrate 50 MG Oral Tablet; TAKE 1 TABLET EVERY 12 HOURS DAILY; Therapy: 75Guk4231 to (Evaluate:18Jan2018)  Requested for: 68AIR4070; Last    Rx:23Jan2017 Ordered   11  Olopatadine HCl - 0 1 % Ophthalmic Solution; INSTILL 1 DROP IN AFFECTED EYES    TWICE DAILY AS NEEDED; Therapy: 10OGG5007 to (Last Rx:03Oct2016)  Requested for: 52WOA2031 Ordered   12  Omega 3 CAPS; Therapy: (Recorded:22Feb2017) to Recorded   13  Sertraline HCl - 50 MG Oral Tablet; TAKE 1 TABLET DAILY AS DIRECTED; Therapy: 57Gjq1232 to (Evaluate:28Sep2017)  Requested for: 13FOM9254; Last    Rx:03Oct2016 Ordered   14  Vitamin B Complex CAPS; take 1 capsule daily; Therapy: (Recorded:13Jan2017) to Recorded   15  Zinc 100 MG Oral Tablet; TAKE 1 TABLET DAILY; Therapy: (Recorded:13Jan2017) to Recorded    The medication list was reviewed and updated today  Allergies    1  No Known Drug Allergies    Vitals  Vital Signs    Recorded: 56ALQ4458 02:37PM   Temperature 97 3 F, Tympanic   Heart Rate 75   Respiration 16   Systolic 868, LUE, Sitting   Diastolic 98, LUE, Sitting   BP CUFF SIZE Large   Height 5 ft 5 in   Weight 188 lb    BMI Calculated 31 29   BSA Calculated 1 93   O2 Saturation 96     Physical Exam    Constitutional   General appearance: No acute distress, well appearing and well nourished  Eyes   Conjunctiva and lids: No swelling, erythema or discharge  Pupils and irises: Abnormal   Postsurgical changes bilaterally  Ears, Nose, Mouth, and Throat   External inspection of ears and nose: Abnormal   Hearing aids bilaterally     Oropharynx: Normal with no erythema, edema, exudate or lesions  Pulmonary   Auscultation of lungs: Clear to auscultation  No rales, rhonchi, or wheezes  Cardiovascular   Auscultation of heart: Normal rate and rhythm, normal S1 and S2, without murmurs  Currently sounds regular  Lymphatic   Palpation of lymph nodes in neck: No lymphadenopathy  Musculoskeletal   Gait and station: Normal     Skin   Skin and subcutaneous tissue: Abnormal   Slightly pale-appearing  Psychiatric   Mood and affect: Normal          Results/Data  PHQ-9 Adult Depression Screening 21HRE1557 02:38PM User, "[employer]"     Test Name Result Flag Reference   PHQ-9 Adult Depression Score 0     Over the last two weeks, how often have you been bothered by any of the following problems? Little interest or pleasure in doing things: Not at all - 0  Feeling down, depressed, or hopeless: Not at all - 0  Trouble falling or staying asleep, or sleeping too much: Not at all - 0  Feeling tired or having little energy: Not at all - 0  Poor appetite or over eating: Not at all - 0  Feeling bad about yourself - or that you are a failure or have let yourself or your family down: Not at all - 0  Trouble concentrating on things, such as reading the newspaper or watching television: Not at all - 0  Moving or speaking so slowly that other people could have noticed  Or the opposite -  being so fidgety or restless that you have been moving around a lot more than usual: Not at all - 0  Thoughts that you would be better off dead, or of hurting yourself in some way: Not at all - 0   PHQ-9 Adult Depression Screening Negative     PHQ-9 Difficulty Level Not difficult at all     PHQ-9 Severity No Depression       PHQ-9 Adult Depression Screening 95NOQ5056 02:38PM User, "[employer]"     Test Name Result Flag Reference   PHQ-9 Adult Depression Score 0     Over the last two weeks, how often have you been bothered by any of the following problems?   Little interest or pleasure in doing things: Not at all - 0  Feeling down, depressed, or hopeless: Not at all - 0  Trouble falling or staying asleep, or sleeping too much: Not at all - 0  Feeling tired or having little energy: Not at all - 0  Poor appetite or over eating: Not at all - 0  Feeling bad about yourself - or that you are a failure or have let yourself or your family down: Not at all - 0  Trouble concentrating on things, such as reading the newspaper or watching television: Not at all - 0  Moving or speaking so slowly that other people could have noticed  Or the opposite -  being so fidgety or restless that you have been moving around a lot more than usual: Not at all - 0  Thoughts that you would be better off dead, or of hurting yourself in some way: Not at all - 0   PHQ-9 Adult Depression Screening Negative     PHQ-9 Difficulty Level Not difficult at all     PHQ-9 Severity No Depression       (1) CBC/PLT/DIFF 65WHI3198 09:01AM [facility], Provider   Test ordered by: [employer]     Test Name Result Flag Reference   WBC COUNT 6 65 Thousand/uL  4 31-10 16   RBC COUNT 5 04 Million/uL  3 81-5 12   HEMOGLOBIN 11 0 g/dL L 11 5-15 4   HEMATOCRIT 40 0 %  34 8-46  1   MCV 79 fL L 82-98   MCH 21 8 pg L 26 8-34 3   MCHC 27 5 g/dL L 31 4-37 4   RDW 21 3 % H 11 6-15 1   MPV 9 7 fL  8 9-12 7   PLATELET COUNT 689 Thousands/uL  149-390   NEUTROPHILS RELATIVE PERCENT 64 %  43-75   LYMPHOCYTES RELATIVE PERCENT 22 %  14-44   MONOCYTES RELATIVE PERCENT 8 %  4-12   EOSINOPHILS RELATIVE PERCENT 5 %  0-6   BASOPHILS RELATIVE PERCENT 1 %  0-1   NEUTROPHILS ABSOLUTE COUNT 4 21 Thousands/? ??L  1 85-7 62   LYMPHOCYTES ABSOLUTE COUNT 1 47 Thousands/? ??L  0 60-4 47   MONOCYTES ABSOLUTE COUNT 0 55 Thousand/? ??L  0 17-1 22   EOSINOPHILS ABSOLUTE COUNT 0 35 Thousand/? ??L  0 00-0 61   BASOPHILS ABSOLUTE COUNT 0 07 Thousands/? ??L  0 00-0 10     (1) MAGNESIUM 90Vwz7589 09:01AM [facility], Provider   Test ordered by: [employer]     Test Name Result Flag Reference   MAGNESIUM 2 2 mg/dL  1 6-2 6     (1) PHOSPHORUS 84Uep9533 09: 01AM [facility], Provider   Test ordered by: Theresa Young     Test Name Result Flag Reference   PHOSPHORUS 3 7 mg/dL  2 3-4 1     (1) COMPREHENSIVE METABOLIC PANEL 18TWJ2471 56:67ES [facility], Provider   Test ordered by: Theresa Young     Test Name Result Flag Reference   GLUCOSE,RANDM 132 mg/dL     If the patient is fasting, the ADA then defines impaired fasting glucose as > 100 mg/dL and diabetes as > or equal to 123 mg/dL  SODIUM 143 mmol/L  136-145   POTASSIUM 4 2 mmol/L  3 5-5 3   CHLORIDE 104 mmol/L  100-108   CARBON DIOXIDE 31 mmol/L  21-32   ANION GAP (CALC) 8 mmol/L  4-13   BLOOD UREA NITROGEN 15 mg/dL  5-25   CREATININE 1 31 mg/dL H 0 60-1 30   Standardized to IDMS reference method   CALCIUM 9 0 mg/dL  8 3-10 1   BILI, TOTAL 0 70 mg/dL  0 20-1 00   ALK PHOSPHATAS 73 U/L     ALT (SGPT) 28 U/L  12-78   AST(SGOT) 27 U/L  5-45   ALBUMIN 3 6 g/dL  3 5-5 0   TOTAL PROTEIN 6 9 g/dL  6 4-8 2   eGFR Non-African American 38 7 ml/min/1 73sq [facility] Disease Education Program recommendations are as follows:  GFR calculation is accurate only with a steady state creatinine  Chronic Kidney disease less than 60 ml/min/1 73 sq  meters  Kidney failure less than 15 ml/min/1 73 sq  meters  * XR CHEST PA & LATERAL [id number] 08:58AM [facility], Provider   Test ordered by: Theresa Young     Test Name Result Flag Reference   XR CHEST PA & LATERAL (Report)     CHEST     INDICATION: Pleural effusion     COMPARISON: February 17, 2017     VIEWS: PA and lateral; 3 images     FINDINGS: The patient is slightly rotated to the left  The cardiac silhouette is enlarged (cardiothoracic ratio 17 6/30 1), similar to prior  Dual lead cardiac pacemaker overlies left chest wall, unchanged in position  Cephalization the pulmonary vasculature is noted  Minimal interstitial edema suggested at the lung bases  Resolved pleural effusions  No pneumothorax       Degenerative changes in dextroscoliosis, thoracic spine            IMPRESSION:     Mild congestive heart failure type changes with resolved pleural effusion  Cardiac pacemaker          Workstation performed: [id number]     Signed by:   Mariela Nelson MD   2/21/17     Future Appointments    Date/Time Provider Specialty Site   05/12/2017 01:30 PM Cardiology, Device Remote   [address]   08/14/2017 08:00 AM Cardiology, Device Remote   [address]   11/14/2017 08:00 AM Cardiology, Device Remote   [address]   05/16/2017 01:20 PM Emil Fernandes MD Gastroenterology Adult [facility] [place]   04/25/2017 09:30 AM William Burroughs MD Gastroenterology Adult [facility] [place] [facility]   04/04/2017 02:20 PM Rizwana Ford DO Cardiology [facility] CARDIOLOGY [place]   07/06/2017 09:20 AM MILLY Marshall   [address] [place] [address]     Signatures   Electronically signed by : MILLY Beck ; Mar 11 2017 10:07PM EST                       (Author)

## 2018-01-12 NOTE — RESULT NOTES
Verified Results  (1) TSH [id number] 10:07AM Carter Brand   Patients undergoing fluorescein dye angiography may retain small amounts of fluorescein in the body for 48-72 hours post procedure  Samples containing fluorescein can produce falsely depressed TSH values  If the patient had this procedure,a specimen should be resubmitted post fluorescein clearance  The recommended reference ranges for TSH during pregnancy are as follows:  First trimester 0 1 to 2 5 uIU/mL  Second trimester  0 2 to 3 0 uIU/mL  Third trimester 0 3 to 3 0 uIU/m     Test Name Result Flag Reference   TSH 2 410 uIU/mL  0 358-3 740     (1) CBC/PLT/DIFF [id number] 10:07AM Shawnee Brand     Test Name Result Flag Reference   WBC COUNT 8 75 Thousand/uL  4 31-10 16   RBC COUNT 4 87 Million/uL  3 81-5 12   HEMOGLOBIN 15 0 g/dL  11 5-15 4   HEMATOCRIT 45 1 %  34 8-46  1   MCV 92 6 fL  82 0-98 0   MCH 30 8 pg  26 8-34 3   MCHC 33 3 g/dL  31 4-37 4   RDW 14 5 %  11 6-15 1   MPV 9 9 fL  8 9-12 7   PLATELET COUNT 742 Thousands/uL  149-390   NEUTROPHILS RELATIVE PERCENT 69 %  43-75   LYMPHOCYTES RELATIVE PERCENT 18 %  14-44   MONOCYTES RELATIVE PERCENT 9 %  4-12   EOSINOPHILS RELATIVE PERCENT 3 %  0-6   BASOPHILS RELATIVE PERCENT 1 %  0-1   NEUTROPHILS ABSOLUTE COUNT 6 14 Thousands/µL  1 85-7 62   LYMPHOCYTES ABSOLUTE COUNT 1 55 Thousands/µL  0 60-4 47   MONOCYTES ABSOLUTE COUNT 0 74 Thousand/µL  0 17-1 22   EOSINOPHILS ABSOLUTE COUNT 0 28 Thousand/µL  0 00-0 61   BASOPHILS ABSOLUTE COUNT 0 04 Thousands/µL  0 00-0 10     (1) COMPREHENSIVE METABOLIC PANEL [id number] 02:88IL Carter Kahua   National Kidney Disease Education Program recommendations are as follows:  GFR calculation is accurate only with a steady state creatinine  Chronic Kidney disease less than 60 ml/min/1 73 sq  meters  Kidney failure less than 15 ml/min/1 73 sq  meters       Test Name Result Flag Reference   GLUCOSE,RANDM 109 mg/dL     If the patient is fasting, the ADA then defines impaired fasting glucose as > 100 mg/dL and diabetes as > or equal to 123 mg/dL  SODIUM 140 mmol/L  136-145   POTASSIUM 3 7 mmol/L  3 5-5 3   CHLORIDE 102 mmol/L  100-108   CARBON DIOXIDE 29 mmol/L  21-32   ANION GAP (CALC) 9 mmol/L  4-13   BLOOD UREA NITROGEN 13 mg/dL  5-25   CREATININE 1 07 mg/dL  0 60-1 30   Standardized to IDMS reference method   CALCIUM 8 6 mg/dL  8 3-10 1   BILI, TOTAL 0 83 mg/dL  0 20-1 00   ALK PHOSPHATAS 71 U/L     ALT (SGPT) 35 U/L  12-78   AST(SGOT) 24 U/L  5-45   ALBUMIN 3 6 g/dL  3 5-5 0   TOTAL PROTEIN 6 7 g/dL  6 4-8 2   eGFR Non-African American 49 0 ml/min/1 73sq m       (1) LIPID PANEL, FASTING [id number] 10:07AM Milagros Becker   Triglyceride:         Normal              <150 mg/dl       Borderline High    150-199 mg/dl       High               200-499 mg/dl       Very High          >499 mg/dl  Cholesterol:         Desirable        <200 mg/dl      Borderline High  200-239 mg/dl      High             >239 mg/dl  HDL Cholesterol:        High    >59 mg/dL      Low     <41 mg/dL  LDL CALCULATED:    This screening LDL is a calculated result  It does not have the accuracy of the Direct Measured LDL in the monitoring of patients with hyperlipidemia and/or statin therapy  Direct Measure LDL (MIB111) must be ordered separately in these patients       Test Name Result Flag Reference   CHOLESTEROL 148 mg/dL     HDL,DIRECT 64 mg/dL H 40-60   LDL CHOLESTEROL CALCULATED 69 mg/dL  0-100   TRIGLYCERIDES 76 mg/dL  <=150

## 2018-01-12 NOTE — RESULT NOTES
Verified Results  (1) CBC/PLT/DIFF [id number] 08:17AM Silvia Multani Order Number: [id number]     Test Name Result Flag Reference   WBC COUNT 7 28 Thousand/uL  4 31-10 16   RBC COUNT 4 34 Million/uL  3 81-5 12   HEMOGLOBIN 11 5 g/dL  11 5-15 4   HEMATOCRIT 38 2 %  34 8-46  1   MCV 88 fL  82-98   MCH 26 5 pg L 26 8-34 3   MCHC 30 1 g/dL L 31 4-37 4   RDW 14 9 %  11 6-15 1   MPV 9 6 fL  8 9-12 7   PLATELET COUNT 347 Thousands/uL  149-390   NEUTROPHILS RELATIVE PERCENT 65 %  43-75   LYMPHOCYTES RELATIVE PERCENT 20 %  14-44   MONOCYTES RELATIVE PERCENT 9 %  4-12   EOSINOPHILS RELATIVE PERCENT 5 %  0-6   BASOPHILS RELATIVE PERCENT 1 %  0-1   NEUTROPHILS ABSOLUTE COUNT 4 70 Thousands/?L  1 85-7 62   LYMPHOCYTES ABSOLUTE COUNT 1 48 Thousands/?L  0 60-4 47   MONOCYTES ABSOLUTE COUNT 0 66 Thousand/?L  0 17-1 22   EOSINOPHILS ABSOLUTE COUNT 0 39 Thousand/?L  0 00-0 61   BASOPHILS ABSOLUTE COUNT 0 05 Thousands/?L  0 00-0 10   - Patient Instructions: This bloodwork is non-fasting  Please drink two glasses of water morning of bloodwork  - Patient Instructions: This bloodwork is non-fasting  Please drink two glasses of water morning of bloodwork  (1) COMPREHENSIVE METABOLIC PANEL [id number] 78:51WI Silvia Multani Order Number: [id number]     Test Name Result Flag Reference   GLUCOSE,RANDM 116 mg/dL     If the patient is fasting, the ADA then defines impaired fasting glucose as > 100 mg/dL and diabetes as > or equal to 123 mg/dL     SODIUM 143 mmol/L  136-145   POTASSIUM 3 9 mmol/L  3 5-5 3   CHLORIDE 106 mmol/L  100-108   CARBON DIOXIDE 27 mmol/L  21-32   ANION GAP (CALC) 10 mmol/L  4-13   BLOOD UREA NITROGEN 8 mg/dL  5-25   CREATININE 1 12 mg/dL  0 60-1 30   Standardized to IDMS reference method   CALCIUM 8 4 mg/dL  8 3-10 1   BILI, TOTAL 0 80 mg/dL  0 20-1 00   ALK PHOSPHATAS 73 U/L     ALT (SGPT) 25 U/L  12-78   AST(SGOT) 21 U/L  5-45   ALBUMIN 3 3 g/dL L 3 5-5 0   TOTAL PROTEIN 6 3 g/dL L 6 4-8 2   eGFR Non-African American 46 3 ml/min/1 73sq m     - Patient Instructions: This is a fasting blood test  Water,black tea or black  coffee only after 9:00pm the night before test Drink 2 glasses of water the morning of test - Patient Instructions: This bloodwork is non-fasting  Please drink two glasses of   water morning of bloodwork  National Kidney Disease Education Program recommendations are as follows:  GFR calculation is accurate only with a steady state creatinine  Chronic Kidney disease less than 60 ml/min/1 73 sq  meters  Kidney failure less than 15 ml/min/1 73 sq  meters  (1) LIPID PANEL, FASTING [id number] 08:17AM Milena Fish Order Number: [id number]     Test Name Result Flag Reference   CHOLESTEROL 127 mg/dL     HDL,DIRECT 63 mg/dL H 40-60   Specimen collection should occur prior to Metamizole administration due to the potential for falsely depressed results  LDL CHOLESTEROL CALCULATED 50 mg/dL  0-100   - Patient Instructions: This is a fasting blood test  Water,black tea or black  coffee only after 9:00pm the night before test   Drink 2 glasses of water the morning of test     - Patient Instructions: This is a fasting blood test  Water,black tea or black  coffee only after 9:00pm the night before test Drink 2 glasses of water the morning of test - Patient Instructions: This bloodwork is non-fasting  Please drink two glasses of   water morning of bloodwork  Triglyceride:         Normal              <150 mg/dl       Borderline High    150-199 mg/dl       High               200-499 mg/dl       Very High          >499 mg/dl  Cholesterol:         Desirable        <200 mg/dl      Borderline High  200-239 mg/dl      High             >239 mg/dl  HDL Cholesterol:        High    >59 mg/dL      Low     <41 mg/dL  LDL CALCULATED:    This screening LDL is a calculated result    It does not have the accuracy of the Direct Measured LDL in the monitoring of patients with hyperlipidemia and/or statin therapy  Direct Measure LDL (RFN218) must be ordered separately in these patients  TRIGLYCERIDES 72 mg/dL  <=150   Specimen collection should occur prior to N-Acetylcysteine or Metamizole administration due to the potential for falsely depressed results  (1) TSH [id number] 08:17AM [facility] Order Number: [id number]     Test Name Result Flag Reference   TSH 0 862 uIU/mL  0 358-3 740   - Patient Instructions: This bloodwork is non-fasting  Please drink two glasses of water morning of bloodwork  - Patient Instructions: This is a fasting blood test  Water,black tea or black  coffee only after 9:00pm the night before test Drink 2 glasses of water the morning of test - Patient Instructions: This bloodwork is non-fasting  Please drink two glasses of   water morning of bloodwork  Patients undergoing fluorescein dye angiography may retain small amounts of fluorescein in the body for 48-72 hours post procedure  Samples containing fluorescein can produce falsely depressed TSH values  If the patient had this procedure,a specimen should be resubmitted post fluorescein clearance            The recommended reference ranges for TSH during pregnancy are as follows:  First trimester 0 1 to 2 5 uIU/mL  Second trimester  0 2 to 3 0 uIU/mL  Third trimester 0 3 to 3 0 uIU/m

## 2018-01-13 VITALS
BODY MASS INDEX: 31.49 KG/M2 | SYSTOLIC BLOOD PRESSURE: 122 MMHG | HEART RATE: 72 BPM | WEIGHT: 189 LBS | HEIGHT: 65 IN | DIASTOLIC BLOOD PRESSURE: 64 MMHG

## 2018-01-13 VITALS
RESPIRATION RATE: 16 BRPM | SYSTOLIC BLOOD PRESSURE: 122 MMHG | DIASTOLIC BLOOD PRESSURE: 74 MMHG | BODY MASS INDEX: 32.15 KG/M2 | HEART RATE: 65 BPM | OXYGEN SATURATION: 98 % | TEMPERATURE: 99.6 F | WEIGHT: 193 LBS | HEIGHT: 65 IN

## 2018-01-13 VITALS
DIASTOLIC BLOOD PRESSURE: 62 MMHG | RESPIRATION RATE: 16 BRPM | HEART RATE: 59 BPM | OXYGEN SATURATION: 94 % | HEIGHT: 65 IN | SYSTOLIC BLOOD PRESSURE: 138 MMHG | WEIGHT: 196 LBS | TEMPERATURE: 96.9 F | BODY MASS INDEX: 32.65 KG/M2

## 2018-01-13 VITALS
DIASTOLIC BLOOD PRESSURE: 98 MMHG | HEIGHT: 65 IN | WEIGHT: 188 LBS | RESPIRATION RATE: 16 BRPM | SYSTOLIC BLOOD PRESSURE: 118 MMHG | TEMPERATURE: 97.3 F | OXYGEN SATURATION: 96 % | BODY MASS INDEX: 31.32 KG/M2 | HEART RATE: 75 BPM

## 2018-01-13 VITALS
HEART RATE: 60 BPM | TEMPERATURE: 98 F | HEIGHT: 65 IN | WEIGHT: 191 LBS | SYSTOLIC BLOOD PRESSURE: 122 MMHG | RESPIRATION RATE: 16 BRPM | OXYGEN SATURATION: 96 % | BODY MASS INDEX: 31.82 KG/M2 | DIASTOLIC BLOOD PRESSURE: 60 MMHG

## 2018-01-13 NOTE — RESULT NOTES
Verified Results  (1) TSH [id number] 07:53AM Jaison Tye     Test Name Result Flag Reference   TSH 0 563 uIU/mL  0 358-3 740   Patients undergoing fluorescein dye angiography may retain small amounts of fluorescein in the body for 48-72 hours post procedure  Samples containing fluorescein can produce falsely depressed TSH values  If the patient had this procedure,a specimen should be resubmitted post fluorescein clearance            The recommended reference ranges for TSH during pregnancy are as follows:  First trimester 0 1 to 2 5 uIU/mL  Second trimester  0 2 to 3 0 uIU/mL  Third trimester 0 3 to 3 0 uIU/m

## 2018-01-14 VITALS
SYSTOLIC BLOOD PRESSURE: 152 MMHG | HEART RATE: 60 BPM | HEIGHT: 65 IN | DIASTOLIC BLOOD PRESSURE: 76 MMHG | BODY MASS INDEX: 31.49 KG/M2 | WEIGHT: 189 LBS

## 2018-01-14 VITALS
HEART RATE: 62 BPM | WEIGHT: 190 LBS | DIASTOLIC BLOOD PRESSURE: 62 MMHG | HEIGHT: 65 IN | TEMPERATURE: 97.2 F | BODY MASS INDEX: 31.65 KG/M2 | SYSTOLIC BLOOD PRESSURE: 142 MMHG | OXYGEN SATURATION: 93 %

## 2018-01-14 VITALS
OXYGEN SATURATION: 93 % | BODY MASS INDEX: 32.18 KG/M2 | HEIGHT: 65 IN | DIASTOLIC BLOOD PRESSURE: 50 MMHG | HEART RATE: 62 BPM | WEIGHT: 193.13 LBS | SYSTOLIC BLOOD PRESSURE: 120 MMHG

## 2018-01-14 VITALS
DIASTOLIC BLOOD PRESSURE: 63 MMHG | HEIGHT: 65 IN | WEIGHT: 191 LBS | SYSTOLIC BLOOD PRESSURE: 140 MMHG | BODY MASS INDEX: 31.82 KG/M2 | HEART RATE: 60 BPM

## 2018-01-14 VITALS
TEMPERATURE: 99.1 F | OXYGEN SATURATION: 86 % | WEIGHT: 191 LBS | BODY MASS INDEX: 31.82 KG/M2 | HEART RATE: 60 BPM | DIASTOLIC BLOOD PRESSURE: 63 MMHG | HEIGHT: 65 IN | SYSTOLIC BLOOD PRESSURE: 151 MMHG

## 2018-01-14 NOTE — RESULT NOTES
Verified Results  (1) IRON PANEL [id number] 09:23AM Berto [place] Order Number: [id number]     Test Name Result Flag Reference   IRON 59 ug/dL     Slightly Hemolyzed; Results May be Affected  Patients treated with metal-binding drugs (ie  Deferoxamine) may have depressed iron values  FERRITIN 30 ng/mL  8-388   TOTAL IRON BINDING CAPACITY 342 ug/dL  250-450   IRON SATURATION 17 %       (1) CBC/PLT/DIFF 26Jun2017 09:22AM Berto [place] Order Number: [id number]     Test Name Result Flag Reference   WBC COUNT 7 70 Thousand/uL  4 31-10 16   RBC COUNT 5 41 Million/uL H 3 81-5 12   HEMOGLOBIN 14 7 g/dL  11 5-15 4   HEMATOCRIT 46 1 %  34 8-46  1   MCV 85 fL  82-98   MCH 27 2 pg  26 8-34 3   MCHC 31 9 g/dL  31 4-37 4   RDW 18 8 % H 11 6-15 1   MPV 9 4 fL  8 9-12 7   PLATELET COUNT 040 Thousands/uL  149-390   NEUTROPHILS RELATIVE PERCENT 61 %  43-75   LYMPHOCYTES RELATIVE PERCENT 27 %  14-44   MONOCYTES RELATIVE PERCENT 7 %  4-12   EOSINOPHILS RELATIVE PERCENT 5 %  0-6   BASOPHILS RELATIVE PERCENT 0 %  0-1   NEUTROPHILS ABSOLUTE COUNT 4 71 Thousands/? ??L  1 85-7 62   LYMPHOCYTES ABSOLUTE COUNT 2 07 Thousands/? ??L  0 60-4 47   MONOCYTES ABSOLUTE COUNT 0 53 Thousand/? ??L  0 17-1 22   EOSINOPHILS ABSOLUTE COUNT 0 36 Thousand/? ??L  0 00-0 61   BASOPHILS ABSOLUTE COUNT 0 03 Thousands/? ??L  0 00-0 10   - Patient Instructions: This bloodwork is non-fasting  Please drink two glasses of water morning of bloodwork       (1) COMPREHENSIVE METABOLIC PANEL [id number] 91:69TW Berto [place] Order Number: [id number]     Test Name Result Flag Reference   SODIUM 144 mmol/L  136-145   POTASSIUM 3 7 mmol/L  3 5-5 3   CHLORIDE 104 mmol/L  100-108   CARBON DIOXIDE 28 mmol/L  21-32   ANION GAP (CALC) 12 mmol/L  4-13   BLOOD UREA NITROGEN 18 mg/dL  5-25   CREATININE 1 28 mg/dL  0 60-1 30   Standardized to IDMS reference method   CALCIUM 8 8 mg/dL  8 3-10 1   BILI, TOTAL 0 80 mg/dL 0  20-1 00   ALK PHOSPHATAS 125 U/L H    ALT (SGPT) 28 U/L  12-78   AST(SGOT) 24 U/L  5-45   ALBUMIN 3 6 g/dL  3 5-5 0   TOTAL PROTEIN 7 0 g/dL  6 4-8 2   eGFR Non-African American 39 6 ml/min/1 73sq m     [facility] Disease Education Program recommendations are as follows:  GFR calculation is accurate only with a steady state creatinine  Chronic Kidney disease less than 60 ml/min/1 73 sq  meters  Kidney failure less than 15 ml/min/1 73 sq  meters  GLUCOSE FASTING 102 mg/dL H 65-99     (1) LIPID PANEL, FASTING [id number] 09:22AM Ebony Sánchez Order Number: [id number]     Test Name Result Flag Reference   CHOLESTEROL 144 mg/dL     HDL,DIRECT 69 mg/dL H 40-60   Specimen collection should occur prior to Metamizole administration due to the potential for falsely depressed results  LDL CHOLESTEROL CALCULATED 63 mg/dL  0-100   - Patient Instructions: This is a fasting blood test  Water,black tea or black  coffee only after 9:00pm the night before test   Drink 2 glasses of water the morning of test       Triglyceride:         Normal              <150 mg/dl       Borderline High    150-199 mg/dl       High               200-499 mg/dl       Very High          >499 mg/dl  Cholesterol:         Desirable        <200 mg/dl      Borderline High  200-239 mg/dl      High             >239 mg/dl  HDL Cholesterol:        High    >59 mg/dL      Low     <41 mg/dL  LDL CALCULATED:    This screening LDL is a calculated result  It does not have the accuracy of the Direct Measured LDL in the monitoring of patients with hyperlipidemia and/or statin therapy  Direct Measure LDL (QUX407) must be ordered separately in these patients  TRIGLYCERIDES 62 mg/dL  <=150   Specimen collection should occur prior to N-Acetylcysteine or Metamizole administration due to the potential for falsely depressed results       (1) TSH [id number] 09:22AM Ebony Sánchez Order Number: [id number]     Test Name Result Flag Reference   TSH 0 268 uIU/mL L 0 358-3 740   - Patient Instructions: This bloodwork is non-fasting  Please drink two glasses of water morning of bloodwork  Patients undergoing fluorescein dye angiography may retain small amounts of fluorescein in the body for 48-72 hours post procedure  Samples containing fluorescein can produce falsely depressed TSH values  If the patient had this procedure,a specimen should be resubmitted post fluorescein clearance  The recommended reference ranges for TSH during pregnancy are as follows:  First trimester 0 1 to 2 5 uIU/mL  Second trimester  0 2 to 3 0 uIU/mL  Third trimester 0 3 to 3 0 uIU/m     (1) VITAMIN D 25-HYDROXY [id number] 09:22AM [facility] Order Number: [id number]     Test Name Result Flag Reference   VIT D 25-HYDROX 36 9 ng/mL  30 0-100 0   This assay is a certified procedure of the CDC Vitamin D Standardization Certification Program (VDSCP)     Deficiency <20ng/ml   Insufficiency 20-30ng/ml   Sufficient  ng/ml     *Patients undergoing fluorescein dye angiography may retain small amounts of fluorescein in the body for 48-72 hours post procedure  Samples containing fluorescein can produce falsely elevated Vitamin D values  If the patient had this procedure, a specimen should be resubmitted post fluorescein clearance

## 2018-01-15 NOTE — RESULT NOTES
Verified Results  (1) CBC/PLT/DIFF [id number] 09:53AM Jennifer Soto Order Number: [id number]     Test Name Result Flag Reference   WBC COUNT 8 19 Thousand/uL  4 31-10 16   RBC COUNT 5 08 Million/uL  3 81-5 12   HEMOGLOBIN 15 3 g/dL  11 5-15 4   HEMATOCRIT 46 4 % H 34 8-46  1   MCV 91 fL  82-98   MCH 30 1 pg  26 8-34 3   MCHC 33 0 g/dL  31 4-37 4   RDW 15 0 %  11 6-15 1   MPV 9 1 fL  8 9-12 7   PLATELET COUNT 726 Thousands/uL  149-390   NEUTROPHILS RELATIVE PERCENT 66 %  43-75   LYMPHOCYTES RELATIVE PERCENT 24 %  14-44   MONOCYTES RELATIVE PERCENT 6 %  4-12   EOSINOPHILS RELATIVE PERCENT 4 %  0-6   BASOPHILS RELATIVE PERCENT 0 %  0-1   NEUTROPHILS ABSOLUTE COUNT 5 32 Thousands/? ??L  1 85-7 62   LYMPHOCYTES ABSOLUTE COUNT 1 99 Thousands/? ??L  0 60-4 47   MONOCYTES ABSOLUTE COUNT 0 51 Thousand/? ??L  0 17-1 22   EOSINOPHILS ABSOLUTE COUNT 0 34 Thousand/? ??L  0 00-0 61   BASOPHILS ABSOLUTE COUNT 0 03 Thousands/? ??L  0 00-0 10     (1) COMPREHENSIVE METABOLIC PANEL [id number] 52:99SL Jennifer Soto Order Number: [id number]     Test Name Result Flag Reference   SODIUM 142 mmol/L  136-145   POTASSIUM 3 7 mmol/L  3 5-5 3   CHLORIDE 103 mmol/L  100-108   CARBON DIOXIDE 30 mmol/L  21-32   ANION GAP (CALC) 9 mmol/L  4-13   BLOOD UREA NITROGEN 15 mg/dL  5-25   CREATININE 1 20 mg/dL  0 60-1 30   Standardized to IDMS reference method   CALCIUM 8 9 mg/dL  8 3-10 1   BILI, TOTAL 0 80 mg/dL  0 20-1 00   ALK PHOSPHATAS 104 U/L     ALT (SGPT) 30 U/L  12-78   Specimen collection should occur prior to Sulfasalazine administration due to the potential for falsely depressed results  AST(SGOT) 28 U/L  5-45   Specimen collection should occur prior to Sulfasalazine administration due to the potential for falsely depressed results     ALBUMIN 3 6 g/dL  3 5-5 0   TOTAL PROTEIN 7 0 g/dL  6 4-8 2   eGFR 41 ml/min/1 73sq m     National Kidney Disease Education Program recommendations are as follows:  GFR calculation is accurate only with a steady state creatinine  Chronic Kidney disease less than 60 ml/min/1 73 sq  meters  Kidney failure less than 15 ml/min/1 73 sq  meters  GLUCOSE FASTING 105 mg/dL H 65-99   Specimen collection should occur prior to Sulfasalazine administration due to the potential for falsely depressed results  Specimen collection should occur prior to Sulfapyridine administration due to the potential for falsely elevated results  (1) LIPID PANEL, FASTING [id number] 09:53AM Clem Santos Order Number: [id number]     Test Name Result Flag Reference   CHOLESTEROL 166 mg/dL     HDL,DIRECT 75 mg/dL H 40-60   Specimen collection should occur prior to Metamizole administration due to the potential for falsley depressed results  LDL CHOLESTEROL CALCULATED 72 mg/dL  0-100   Triglyceride:        Normal <150 mg/dl   Borderline High 150-199 mg/dl   High 200-499 mg/dl   Very High >499 mg/dl      Cholesterol:       Desirable <200 mg/dl    Borderline High 200-239 mg/dl    High >239 mg/dl      HDL Cholesterol:       High>59 mg/dL    Low <41 mg/dL      This screening LDL is a calculated result  It does not have the accuracy of the Direct Measured LDL in the monitoring of patients with hyperlipidemia and/or statin therapy  Direct Measure LDL (RON580) must be ordered separately in these patients  TRIGLYCERIDES 97 mg/dL  <=150   Specimen collection should occur prior to N-Acetylcysteine or Metamizole administration due to the potential for falsely depressed results

## 2018-01-15 NOTE — MISCELLANEOUS
History of Present Illness    The patient is being contacted for follow-up after hospitalization  This was not a readmission  The date of discharge was 2/18/17  Spoke with the patient  She was discharged to home  declined [facility], states her daughter is an RN  The patient's discharge weight was 188 at hosptial   The patient's weight today is 180 and stable  Patient has follow-up appointment(s) on:   needs to make, but wants to wait and see cardiologist after her colonoscopy on 3/8  Patient is experiencing the following symptoms:  feeling, "much better"  The patient is currently asymptomatic  Knowledge Assessment/Teachback Questions: the patient is following diet, foods to limit/avoid, the patient is obtaining daily weights, she knows the name of her medications/water pill, the patient understands the activity/exercise plan and the patient knows when to report symptoms   Good knowledge; seems engaged and compliant  Counseling was provided to the patient  Topics counseled included diet, need for daily weights, medications, importance of compliance with treatment, symptoms to report and encouraged to make a f/u appt  with PCP sooner   [facility] Additional Notes: Had labs and xray done yesterday and all seem stable  Current Meds   1  Alfalfa TABS; 3 Tabs Daily; Therapy: (Recorded:13Jan2017) to Recorded   2  Atorvastatin Calcium 20 MG Oral Tablet; TAKE 1 TABLET DAILY AS DIRECTED; Therapy: (Recorded:40Uup7640) to Recorded   3  Calcium-Magnesium 500-250 MG Oral Tablet; TAKE 1 TABLET DAILY; Therapy: (Recorded:20Nov2015) to Recorded   4  Cholecalciferol 1000 UNIT TABS; TAKE 1 TABLET DAILY; Therapy: (Recorded:78Jhp6418) to Recorded   5  Combigan 0 2-0 5 % Ophthalmic Solution; INSTILL 1 DROP Every twelve hours OU; Therapy: (Beltran Jalloh) to Recorded   6  [employer] [place]-3 CAPS; 2 tabs daily; Therapy: (Recorded:13Jan2017) to Recorded   7   DiltiaZEM HCl ER Coated Beads 180 MG Oral Capsule Extended Release 24 Hour; take   1 capsule daily; Therapy: 85FYM2727 to (Evaluate:17Jun2017)  Requested for: 56XSP3611; Last   Rx:22Jun2016 Ordered   8  Furosemide 40 MG Oral Tablet; take 1 tablet by mouth once daily  Requested for:   86Bgb4903; Last Rx:09Hep0899 Ordered   9  Lecithin CAPS; TAKE 1 CAPSULE DAILY; Therapy: (Recorded:13Jan2017) to Recorded   10  MethIMAzole 5 MG Oral Tablet; TAKE 1 TABLET ON MONDAY,   WEDNESDAY, AND FRIDAY; Therapy: 68AXQ5026 to (Evaluate:18Jan2018)  Requested for: 51RKG5121; Last    Rx:08Feb2017 Ordered   11  Metoprolol Tartrate 50 MG Oral Tablet; TAKE 1 TABLET EVERY 12 HOURS DAILY; Therapy: 06Mfq5159 to (Evaluate:18Jan2018)  Requested for: 84VVC2031; Last    Rx:23Jan2017 Ordered   12  Olopatadine HCl - 0 1 % Ophthalmic Solution; INSTILL 1 DROP IN AFFECTED EYES    TWICE DAILY AS NEEDED; Therapy: 26NQP9731 to (Last Rx:03Oct2016)  Requested for: 36JTO1230 Ordered   13  Omega 3 CAPS; Therapy: (Recorded:34Hej8109) to Recorded   14  Sertraline HCl - 50 MG Oral Tablet; TAKE 1 TABLET DAILY AS DIRECTED; Therapy: 82Hdl1038 to (Evaluate:86Opa7951)  Requested for: 25XFT0032; Last    Rx:03Oct2016 Ordered   15  Vitamin B Complex CAPS; take 1 capsule daily; Therapy: (Recorded:13Jan2017) to Recorded   16  Vitamin E 400 UNIT Oral Capsule; take 1 capsule daily; Therapy: (Recorded:13Jan2017) to Recorded   17  Zinc 100 MG Oral Tablet; TAKE 1 TABLET DAILY;     Therapy: (Recorded:13Jan2017) to Recorded    Future Appointments    Date/Time Provider Specialty Site   03/10/2017 11:00 AM MILLY Artis DO, [facility]   05/12/2017 01:30 PM Cardiology, Device Remote   [address]   08/14/2017 08:00 AM Cardiology, Device Remote   [address]   11/14/2017 08:00 AM Cardiology, [address] [place] [address]   03/08/2017 01:10 PM Suresh Morales MD Gastroenterology Adult [address] [facility] GASTROENTEROLOGY [place]     Allergies    1   No Known Drug Allergies    Signatures   Electronically signed by : Ronald Goldberg, ; Feb 22 2017  3:22PM EST                       (Author)

## 2018-01-17 NOTE — MISCELLANEOUS
History of Present Illness  TCM Communication [facility]: The patient is being contacted for follow-up after hospitalization  She was hospitalized at [facility]  The dates of hospitalization: 12/29/16-12/31/16, date of admission: 12/29/16, date of discharge: 12/31/16  Diagnosis: acute GI bleed  She was discharged to home  Medications reviewed and updated today  She did not schedule a follow up appointment  Follow-up appointments with other specialists: Our office attempted to contact patient numerous times and was not able to contact to set up appt  Communication performed and completed by Juwan Sanchez      Active Problems     1  Allergic conjunctivitis of both eyes (372 14) (H10 13)   2  Allergic rhinitis (477 9) (J30 9)   3  AV block (426 10) (I44 30)   4  Depression with anxiety (300 4) (F41 8)   5  Dyslipidemia (272 4) (E78 5)   6  Dyspnea on exertion (786 09) (R06 09)   7  Fatigue (780 79) (R53 83)   8  Glaucoma (365 9) (H40 9)   9  Hypertension (401 9) (I10)   10  Hyperthyroidism (242 90) (E05 90)   11  Need for DTaP vaccine (V06 1) (Z23)   12  Psoriasis (696 1) (L40 9)   13  Screening for colon cancer (V76 51) (Z12 11)   14  Screening for osteoporosis (V82 81) (Z13 820)   15  Sick sinus syndrome (427 81) (I49 5)   16  Vertigo (780 4) (R42)   17  Visit for screening mammogram (V76 12) (Z12 31)    Atrial flutter (427 32) (I48 92)       Chronic diastolic congestive heart failure (428 32) (I50 32)          Past Medical History    1  History of impaired glucose tolerance (V12 29) (J05 836)    Surgical History    1  History of Cataract Surgery   2  History of Hysterectomy   3  History of Permanent Pacemaker Type Dual-Chamber   4  History of Total Knee Arthroplasty    Family History  Father    1  Family history of alcoholism (V17 0) (Z81 1)   2  Family history of diabetes mellitus (V18 0) (Z83 3)  Brother    3   Family history of alcoholism (V17 0) (Z81 1)    Social History    · Former smoker (O38 87) (H43 396)   · Marital History -    · No alcohol use    Current Meds   1  Alfalfa TABS; 3 Tabs Daily; Therapy: (Recorded:20Nov2015) to Recorded   2  Calcium-Magnesium 500-250 MG Oral Tablet; TAKE 1 TABLET DAILY; Therapy: (Recorded:20Nov2015) to Recorded   3  Combigan 0 2-0 5 % Ophthalmic Solution; INSTILL 1 DROP Every twelve hours OU; Therapy: (Velvet Min) to Recorded   4  [employer] [place] CAPS; One daily; Therapy: (Recorded:20Nov2015) to Recorded   5  [employer] [place]-3 CAPS; 2 tabs daily; Therapy: (Recorded:20Nov2015) to Recorded   6  DiltiaZEM HCl ER Coated Beads 180 MG Oral Capsule Extended Release 24 Hour; take   1 capsule daily; Therapy: 93YCD8250 to (Evaluate:17Jun2017)  Requested for: 46REM8390; Last   Rx:22Jun2016 Ordered   7  Eliquis 5 MG Oral Tablet; take 1 tablet twice a day; Therapy: 56XZN7583 to (Evaluate:02Oct2017)  Requested for: 07Oct2016; Last   Rx:07Oct2016 Ordered   8  Fluticasone Propionate 50 MCG/ACT Nasal Suspension; USE 2 SPRAYS IN EACH   NOSTRIL ONCE DAILY; Therapy: 26MBW2260 to (Last Rx:03Oct2016)  Requested for: 49EKO7636 Ordered   9  Furosemide 20 MG Oral Tablet; TAKE ONE TABLET BY 3 TIMES A WEEK; Therapy: 72VEG2153 to (Evaluate:27Luc9820)  Requested for: 71KPO5353; Last   Rx:01Mar2016 Ordered   10  Lecithin CAPS; TAKE 1 CAPSULE DAILY; Therapy: (Recorded:20Nov2015) to Recorded   11  MethIMAzole 5 MG Oral Tablet; take 1 tablet on monday, wednesday and friday; Therapy: 62DGK7997 to (Evaluate:00Wmq2536)  Requested for: 33FER9027; Last    Rx:60Dpg6371 Ordered   12  Metoprolol Tartrate 100 MG Oral Tablet; TAKE 1 TABLET TWICE DAILY  Requested for:    02SKR5800; Last Rx:23Zmw0039 Ordered   13  Metoprolol Tartrate 50 MG Oral Tablet; take 1 tablet by mouth every 12 hours; Therapy: 15Apr2016 to (Evaluate:12Oct2016)  Requested for: 26Wiv9782; Last    Rx:52Anw1380 Ordered   14   Olopatadine HCl - 0 1 % Ophthalmic Solution; INSTILL 1 DROP IN AFFECTED EYES    TWICE DAILY AS NEEDED; Therapy: 21OUL1495 to (Last Rx:03Oct2016)  Requested for: 13NID2706 Ordered   15  Sertraline HCl - 50 MG Oral Tablet; TAKE 1 TABLET DAILY AS DIRECTED; Therapy: 15Ywc5130 to (Evaluate:67Boz7567)  Requested for: 41XIQ4391; Last    Rx:03Oct2016 Ordered   16  Simvastatin 20 MG Oral Tablet; TAKE 1 TABLET DAILY AT BEDTIME; Therapy: 05GNQ9379 to (Evaluate:09Feb2017)  Requested for: 41BCS6446; Last    Rx:15Feb2016 Ordered   17  Systane 0 4-0 3 % Ophthalmic Gel; APPLY STRIP; Therapy: (Wilbert Bank) to Recorded   18  Valsartan 160 MG Oral Tablet; TAKE 1 TABLET DAILY FOR BLOOD PRESSURE; Therapy: 43ODS8085 to (Evaluate:09Feb2017)  Requested for: 14ALM7331; Last    Rx:15Feb2016 Ordered   19  Vitamin B Complex CAPS; Therapy: (Wilbert Bank) to Recorded   20  Vitamin E 400 UNIT Oral Capsule; Therapy: (Wilbert Bank) to Recorded   21  Zinc 100 MG Oral Tablet; Therapy: (Recorded:02Jan2015) to Recorded    Allergies    1   No Known Drug Allergies    Future Appointments    Date/Time Provider Specialty Site   02/09/2017 11:00 AM Cardiology, [address] [place] [address]   05/12/2017 01:30 PM Cardiology, Device Remote   [facility]   02/08/2017 02:20 PM Rozina Jimenez DO Cardiology [address]     Signatures   Electronically signed by : MILLY Arita ; Jan 17 2017  7:49AM EST                       (Author)

## 2018-01-19 ENCOUNTER — ALLSCRIPTS OFFICE VISIT (OUTPATIENT)
Dept: OTHER | Facility: OTHER | Age: 83
End: 2018-01-19

## 2018-01-19 ENCOUNTER — GENERIC CONVERSION - ENCOUNTER (OUTPATIENT)
Dept: OTHER | Facility: OTHER | Age: 83
End: 2018-01-19

## 2018-01-20 NOTE — PROGRESS NOTES
Assessment   Assessed    1  Chronic diastolic congestive heart failure (428 32,428 0) (I50 32)   2  Paroxysmal atrial fibrillation (427 31) (I48 0)   3  Atrial flutter (427 32) (I48 92)   4  Sick sinus syndrome (427 81) (I49 5)   5  History of Pacemaker Permanent Placement Dual-Chamber   6  Hypertension (401 9) (I10)   7  Dyslipidemia (272 4) (E78 5)    Plan   Atrial flutter, Paroxysmal atrial fibrillation    · Eliquis 5 MG Oral Tablet; Take 1 tablet twice daily   Rx By: Chastity Fraser; Dispense: 90 Days ; #:180 Tablet; Refill: 3;For: Atrial flutter, Paroxysmal atrial fibrillation; ROBERT = N; Verified Transmission to [facility]; Last Updated By: System, SureScripts; 1/19/2018 12:22:39 PM  Chronic diastolic congestive heart failure, Dyslipidemia, Hypertension    · Follow-up visit in 4 Months Evaluation and Treatment  Follow-up  Status: Hold For -    Scheduling  Requested for: 02SXS4907   Ordered; For: Chronic diastolic congestive heart failure, Dyslipidemia, Hypertension; Ordered By: Chastity Fraser Performed:  Due: 77SXM2415  PMH: Acute gastrointestinal bleeding    · Pantoprazole Sodium 40 MG Oral Tablet Delayed Release (Protonix); TAKE 1    TABLET DAILY   Rx By: Chastity Fraser; Dispense: 90 Days ; #:90 Tablet Delayed Release; Refill: 3;For: PMH: Acute gastrointestinal bleeding; ROBERT = N; Verified Transmission to [facility]; Last Updated By: System, SureScripts; 1/19/2018 12:22:39 PM    Discussion/Summary   Discussion Summary:    Mrs Karen Flores is a pleasant 71-year-old female who presents to the office today for routine follow-up    terms of her atrial fibrillation, she continues to maintain normal sinus rhythm  She remains anticoagulated with Eliquis without about any major bleeding consequences except a nose bleed which required packing  blood pressure is well-controlled in the office today  Her most recent lipids from November were reviewed and are acceptable on current therapy     appears euvolemic on exam on her current diuretic regimen on which she will remain  is due for a remote device check and I will have our office contact her  follow-up in the office in four months or sooner if deemed necessary  History of Present Illness   Cardiology HPI Free Text Note Form [facility]: Mrs Les Hunt is a pleasant 80-year-old female who presents to the office today for routine followup    early January she was seen in the ED due to a nosebleed  Her nose was packed  She has not had a recurrence or any other major bleeding consequences  she has been feeling well  She denies any exertional chest pain or exertional shortness of breath  She denies signs or symptoms of heart failure including increasing lower extremity edema, paroxysmal nocturnal dyspnea or orthopnea  She is weighing herself daily at about 185-189 pounds on her home scale  She denies any progressive lower extremity edema  She denies signs or symptoms of recurrent atrial fibrillation  She denies symptoms of claudication  Review of Systems   Cardiology Female ROS:         Cardiac: as noted in HPI  Skin: No complaints of nonhealing sores or skin rash  Genitourinary: No complaints of recurrent urinary tract infections, frequent urination at night, difficult urination, blood in urine, kidney stones, loss of bladder control, kidney problems, denies any birth control or hormone replacement, is not post menopausal, not currently pregnant  Psychological: No complaints of feeling depressed, anxiety, panic attacks, or difficulty concentrating  General: No complaints of trouble sleeping, lack of energy, fatigue, appetite changes, weight changes, fever, frequent infections, or night sweats  Respiratory: as noted in HPI  HEENT: No complaints of serious problems, hearing problems, nose problems, throat problems, or snoring        Gastrointestinal: No complaints of liver problems, nausea, vomiting, heartburn, constipation, bloody stools, diarrhea, problems swallowing, adbominal pain, or rectal bleeding  Hematologic: No complaints of bleeding disorders, anemia, blood clots, or excessive brusing  Neurological: No complaints of numbness, tingling, dizziness, weakness, seizures, headaches, syncope or fainting, AM fatigue, daytime sleepiness, no witnessed apnea episodes  Musculoskeletal: No complaints of arthritis, back pain, or painfull swelling  Active Problems   Problems    1  Anemia (285 9) (D64 9)   2  Atrial flutter (427 32) (I48 92)   3  AV block (426 10) (I44 30)   4  Cardiac pacemaker (V45 01) (Z95 0)   5  Chronic diastolic congestive heart failure (428 32,428 0) (I50 32)   6  Depression with anxiety (300 4) (F41 8)   7  Dyslipidemia (272 4) (E78 5)   8  Dyspnea (786 09) (R06 00)   9  Fatigue (780 79) (R53 83)   10  Glaucoma (365 9) (H40 9)   11  Hypertension (401 9) (I10)   12  Hyperthyroidism (242 90) (E05 90)   13  Influenza vaccine needed (V04 81) (Z23)   14  Osteoporosis (733 00) (M81 0)   15  Paroxysmal atrial fibrillation (427 31) (I48 0)   16  Psoriasis (696 1) (L40 9)   17  Pulmonary hypertension (416 8) (I27 20)   18  Screening for depression (V79 0) (Z13 89)   19  Screening for osteoporosis (V82 81) (Z13 820)   20  Sick sinus syndrome (427 81) (I49 5)   21  Vertigo (780 4) (R42)   22  Vitamin D deficiency (268 9) (E55 9)    Past Medical History   Problems    1  History of Acute gastrointestinal bleeding (578 9) (K92 2)   2  History of Dyspnea on exertion (786 09) (R06 09)   3  History of impaired glucose tolerance (V12 29) (Z87 898)   4  History of shortness of breath (V13 89) (L82 844)  Active Problems And Past Medical History Reviewed: The active problems and past medical history were reviewed and updated today  Surgical History   Problems    1  History of Cataract Surgery   2  History of Hysterectomy   3  History of Pacemaker Permanent Placement Dual-Chamber   4   History of Total Knee Arthroplasty  Surgical History Reviewed: The surgical history was reviewed and updated today  Family History   Father    1  Family history of alcoholism (V17 0) (Z81 1)   2  Family history of diabetes mellitus (V18 0) (Z83 3)  Brother    3  Family history of alcoholism (V17 0) (Z81 1)  Family History Reviewed: The family history was reviewed and updated today  Social History   Problems    · Former smoker (G47 27) (G08 221)   · Marital History -    · No alcohol use  Social History Reviewed: The social history was reviewed and updated today  Current Meds    1  Alfalfa TABS; 3 Tabs Daily; Therapy: (Recorded:13Jan2017) to Recorded   2  Atorvastatin Calcium 20 MG Oral Tablet; TAKE 1 TABLET DAILY AS     DIRECTED      Requested for: 98HDQ3148; Last Rx:19Oct2017 Ordered   3  B Complex Oral Tablet; TAKE 1 TABLET DAILY; Therapy: (Vitaliy Velazquez) to Recorded   4  Cholecalciferol 1000 UNIT TABS; TAKE 1 TABLET DAILY; Therapy: (Recorded:22Feb2017) to Recorded   5  [facility] [place]-3 CAPS; 2 tabs daily; Therapy: (Recorded:13Jan2017) to Recorded   6  DilTIAZem HCl ER Coated Beads 180 MG Oral Capsule Extended Release 24 Hour;     take 1 capsule daily; Therapy: 82PKQ9908 to (Evaluate:16Jun2018)  Requested for: 21Jun2017; Last     YS:53HMQ9349 Ordered   7  Eliquis 5 MG Oral Tablet; Take 1 tablet twice daily; Therapy: 16IZO0049 to (Mariely Hidalgo)  Requested for: 61DMY2998; Last     Rx:93Dme0862 Ordered   8  Furosemide 40 MG Oral Tablet; TAKE 1 TABLET TWICE DAILY ON MONDAY,     WEDNESDAY, AND FRIDAY AND REMAINDER OF DAYS TAKE ONCE DAILY  Requested     for: 12KNQ9965; Last Rx:09Nov2017 Ordered   9  Lecithin CAPS; TAKE 1 CAPSULE DAILY; Therapy: (Recorded:13Jan2017) to Recorded   10  MethIMAzole 5 MG Oral Tablet; TAKE 1 TABLET ON MONDAY,   WEDNESDAY, AND FRIDAY; Therapy: 57DMK3311 to (Evaluate:18Jan2018)  Requested for: 59JJJ9374; Last      Rx:85Shq9389 Ordered   11  Metoprolol Tartrate 50 MG Oral Tablet; TAKE 1 TABLET EVERY 12 HOURS DAILY; Therapy: 61Tdp4892 to (Evaluate:18Jan2018)  Requested for: 38DHG8678; Last      Rx:23Jan2017 Ordered   12  Protonix 40 MG Oral Tablet Delayed Release; TAKE 1 TABLET DAILY; Therapy: (Mansi Byers) to Recorded   13  Sertraline HCl - 50 MG Oral Tablet; TAKE 1 TABLET DAILY AS     DIRECTED; Therapy: 65Fyh7533 to (Evaluate:23Aug2018)  Requested for: 50Sdu8465; Last      Rx:28Aug2017 Ordered   14  Zinc 100 MG Oral Tablet; TAKE 1 TABLET DAILY; Therapy: (Recorded:13Jan2017) to Recorded  Medication List Reviewed: The medication list was reviewed and updated today  Medication List Reviewed: The medication list was reviewed and updated today  Allergies   Medication    1  No Known Drug Allergies    Physical Exam        Constitutional      General appearance: No acute distress, well appearing and well nourished  Eyes      Conjunctiva and Sclera examination: Conjunctiva pink, sclera anicteric  Ears, Nose, Mouth, and Throat - Oropharynx: Clear, nares are clear, mucous membranes are moist       Neck      Neck and thyroid: Normal, supple, trachea midline, no thyromegaly  Pulmonary      Respiratory effort: No increased work of breathing or signs of respiratory distress  Auscultation of lungs: Clear to auscultation, no rales, no rhonchi, no wheezing, good air movement  Cardiovascular      Auscultation of heart: Normal rate and rhythm, normal S1 and S2, no murmurs  The heart rate was normal  The rhythm was regular with premature beats  Heart sounds: normal S1,-- normal S2,-- no S3-- and-- no S4  no murmurs were heard  Carotid pulses: Normal, 2+ bilaterally  Peripheral vascular exam: Normal pulses throughout, no tenderness, erythema or swelling  Pedal pulses: Normal, 2+ bilaterally         Examination of extremities for edema and/or varicosities: Normal        Abdomen Abdomen: Non-tender and no distention  Liver and spleen: No hepatomegaly or splenomegaly  Musculoskeletal Gait and station: Normal gait  -- Digits and nails: Normal without clubbing or cyanosis  -- Inspection/palpation of joints, bones, and muscles: Normal, ROM normal        Skin - Skin and subcutaneous tissue: Normal without rashes or lesions  Skin is warm and well perfused, normal turgor  Neurologic - Cranial nerves: II - XII intact  -- Speech: Normal        Psychiatric - Orientation to person, place, and time: Normal -- Mood and affect: Normal       Results/Data   ECG Report:      Rhythm and rate: Atrial paced  Abnormal Rhythym: Pacemaker rhythm: with normal sensing-- and-- with normal capture  QRS: poor R-wave progresion      Future Appointments      Date/Time Provider Specialty Site   03/01/2018 08:00 AM MILLY Cook   [address] [facility] PRIMARY CARE [place] [address]     Signatures    Electronically signed by : Dickson Allred DO; Jan 19 2018 12:44PM EST                       (Author)

## 2018-01-22 VITALS
DIASTOLIC BLOOD PRESSURE: 72 MMHG | HEIGHT: 65 IN | SYSTOLIC BLOOD PRESSURE: 128 MMHG | HEART RATE: 60 BPM | BODY MASS INDEX: 32.7 KG/M2 | WEIGHT: 196.25 LBS

## 2018-01-23 NOTE — CONSULTS
I had the pleasure of evaluating your patient, Peg Doddridge  My full evaluation follows:      History of Present Illness  Mrs Laisha Dominique is a pleasant 54-year-old female who presents to the office today for routine followup  In early January she was seen in the ED due to a nosebleed  Her nose was packed  She has not had a recurrence or any other major bleeding consequences  Otherwise she has been feeling well  She denies any exertional chest pain or exertional shortness of breath  She denies signs or symptoms of heart failure including increasing lower extremity edema, paroxysmal nocturnal dyspnea or orthopnea  She is weighing herself daily at about 185-189 pounds on her home scale  She denies any progressive lower extremity edema  She denies signs or symptoms of recurrent atrial fibrillation  She denies symptoms of claudication  Review of Systems      Cardiac: as noted in HPI  Skin: No complaints of nonhealing sores or skin rash  Genitourinary: No complaints of recurrent urinary tract infections, frequent urination at night, difficult urination, blood in urine, kidney stones, loss of bladder control, kidney problems, denies any birth control or hormone replacement, is not post menopausal, not currently pregnant  Psychological: No complaints of feeling depressed, anxiety, panic attacks, or difficulty concentrating  General: No complaints of trouble sleeping, lack of energy, fatigue, appetite changes, weight changes, fever, frequent infections, or night sweats  Respiratory: as noted in HPI  HEENT: No complaints of serious problems, hearing problems, nose problems, throat problems, or snoring  Gastrointestinal: No complaints of liver problems, nausea, vomiting, heartburn, constipation, bloody stools, diarrhea, problems swallowing, adbominal pain, or rectal bleeding  Hematologic: No complaints of bleeding disorders, anemia, blood clots, or excessive brusing     Neurological: No complaints of numbness, tingling, dizziness, weakness, seizures, headaches, syncope or fainting, AM fatigue, daytime sleepiness, no witnessed apnea episodes  Musculoskeletal: No complaints of arthritis, back pain, or painfull swelling  Active Problems    1  Anemia (285 9) (D64 9)   2  Atrial flutter (427 32) (I48 92)   3  AV block (426 10) (I44 30)   4  Cardiac pacemaker (V45 01) (Z95 0)   5  Chronic diastolic congestive heart failure (428 32,428 0) (I50 32)   6  Depression with anxiety (300 4) (F41 8)   7  Dyslipidemia (272 4) (E78 5)   8  Dyspnea (786 09) (R06 00)   9  Fatigue (780 79) (R53 83)   10  Glaucoma (365 9) (H40 9)   11  Hypertension (401 9) (I10)   12  Hyperthyroidism (242 90) (E05 90)   13  Influenza vaccine needed (V04 81) (Z23)   14  Osteoporosis (733 00) (M81 0)   15  Paroxysmal atrial fibrillation (427 31) (I48 0)   16  Psoriasis (696 1) (L40 9)   17  Pulmonary hypertension (416 8) (I27 20)   18  Screening for depression (V79 0) (Z13 89)   19  Screening for osteoporosis (V82 81) (Z13 820)   20  Sick sinus syndrome (427 81) (I49 5)   21  Vertigo (780 4) (R42)   22  Vitamin D deficiency (268 9) (E55 9)    Past Medical History    · History of Acute gastrointestinal bleeding (578 9) (K92 2)   · History of Dyspnea on exertion (786 09) (R06 09)   · History of impaired glucose tolerance (V12 29) (L73 507)   · History of shortness of breath (V13 89) (F91 480)    The active problems and past medical history were reviewed and updated today  Surgical History    · History of Cataract Surgery   · History of Hysterectomy   · History of Pacemaker Permanent Placement Dual-Chamber   · History of Total Knee Arthroplasty    The surgical history was reviewed and updated today  Family History    · Family history of alcoholism (V17 0) (Z81 1)   · Family history of diabetes mellitus (V18 0) (Z83 3)    · Family history of alcoholism (V17 0) (Z81 1)    The family history was reviewed and updated today         Social History    · Former smoker (Q33 02) (D12 028)   · Marital History -    · No alcohol use  The social history was reviewed and updated today  Current Meds   1  Alfalfa TABS; 3 Tabs Daily; Therapy: (Recorded:13Jan2017) to Recorded   2  Atorvastatin Calcium 20 MG Oral Tablet; TAKE 1 TABLET DAILY AS     DIRECTED    Requested for: 11FZV4571; Last Rx:19Oct2017 Ordered   3  B Complex Oral Tablet; TAKE 1 TABLET DAILY; Therapy: (Ester Cherry) to Recorded   4  Cholecalciferol 1000 UNIT TABS; TAKE 1 TABLET DAILY; Therapy: (Recorded:22Feb2017) to Recorded   5  [facility] [place]-[facility] CAPS; 2 tabs daily; Therapy: (Recorded:13Jan2017) to Recorded   6  DilTIAZem HCl ER Coated Beads 180 MG Oral Capsule Extended Release 24 Hour;   take 1 capsule daily; Therapy: 21BGB5586 to (Evaluate:16Jun2018)  Requested for: 21Jun2017; Last   NQ:88LIO6066 Ordered   7  Eliquis 5 MG Oral Tablet; Take 1 tablet twice daily; Therapy: 61BQX2573 to (Brittney Conti)  Requested for: 08UII0240; Last   Rx:43Vyr6720 Ordered   8  Furosemide 40 MG Oral Tablet; TAKE 1 TABLET TWICE DAILY ON MONDAY,   WEDNESDAY, AND FRIDAY AND REMAINDER OF DAYS TAKE ONCE DAILY  Requested   for: 68SAA5173; Last Rx:09Nov2017 Ordered   9  Lecithin CAPS; TAKE 1 CAPSULE DAILY; Therapy: (Recorded:13Jan2017) to Recorded   10  MethIMAzole 5 MG Oral Tablet; TAKE 1 TABLET ON MONDAY,   WEDNESDAY, AND FRIDAY; Therapy: 33JWM8815 to (Evaluate:18Jan2018)  Requested for: 93SAI5743; Last    Rx:79Lqu9359 Ordered   11  Metoprolol Tartrate 50 MG Oral Tablet; TAKE 1 TABLET EVERY 12 HOURS DAILY; Therapy: 15Apr2016 to (Evaluate:18Jan2018)  Requested for: 03NVP4264; Last    Rx:23Jan2017 Ordered   12  Protonix 40 MG Oral Tablet Delayed Release; TAKE 1 TABLET DAILY; Therapy: (Ester Cherry) to Recorded   13  Sertraline HCl - 50 MG Oral Tablet; TAKE 1 TABLET DAILY AS     DIRECTED;     Therapy: 74Sqz8719 to (Evaluate:74Eki3231)  Requested for: 21Sqg9188; Last Rx:03Nyc6152 Ordered   14  Zinc 100 MG Oral Tablet; TAKE 1 TABLET DAILY; Therapy: (Recorded:13Jan2017) to Recorded    The medication list was reviewed and updated today  The medication list was reviewed and updated today  Allergies    1  No Known Drug Allergies    Physical Exam    Constitutional   General appearance: No acute distress, well appearing and well nourished  Eyes   Conjunctiva and Sclera examination: Conjunctiva pink, sclera anicteric  Ears, Nose, Mouth, and Throat - Oropharynx: Clear, nares are clear, mucous membranes are moist    Neck   Neck and thyroid: Normal, supple, trachea midline, no thyromegaly  Pulmonary   Respiratory effort: No increased work of breathing or signs of respiratory distress  Auscultation of lungs: Clear to auscultation, no rales, no rhonchi, no wheezing, good air movement  Cardiovascular   Auscultation of heart: Normal rate and rhythm, normal S1 and S2, no murmurs  The heart rate was normal  The rhythm was regular with premature beats  Heart sounds: normal S1, normal S2, no S3 and no S4  no murmurs were heard  Carotid pulses: Normal, 2+ bilaterally  Peripheral vascular exam: Normal pulses throughout, no tenderness, erythema or swelling  Pedal pulses: Normal, 2+ bilaterally  Examination of extremities for edema and/or varicosities: Normal     Abdomen   Abdomen: Non-tender and no distention  Liver and spleen: No hepatomegaly or splenomegaly  Musculoskeletal Gait and station: Normal gait  Digits and nails: Normal without clubbing or cyanosis  Inspection/palpation of joints, bones, and muscles: Normal, ROM normal     Skin - Skin and subcutaneous tissue: Normal without rashes or lesions  Skin is warm and well perfused, normal turgor  Neurologic - Cranial nerves: II - XII intact  Speech: Normal     Psychiatric - Orientation to person, place, and time: Normal  Mood and affect: Normal       Results/Data    Rhythm and rate: Atrial paced  Abnormal Rhythym: Pacemaker rhythm: with normal sensing and with normal capture  QRS: poor R-wave progresion      Assessment    1  Chronic diastolic congestive heart failure (428 32,428 0) (I50 32)   2  Paroxysmal atrial fibrillation (427 31) (I48 0)   3  Atrial flutter (427 32) (I48 92)   · Typical right-sided isthmus dependent - counterclockwise - CHADS2 = 2    4  Sick sinus syndrome (427 81) (I49 5)   5  History of Pacemaker Permanent Placement Dual-Chamber   6  Hypertension (401 9) (I10)   7  Dyslipidemia (272 4) (E78 5)    Plan  Atrial flutter, Paroxysmal atrial fibrillation    · Eliquis 5 MG Oral Tablet; Take 1 tablet twice daily   Rx By: Edilberto Ward; Dispense: 90 Days ; #:180 Tablet; Refill: 3; For: Atrial flutter, Paroxysmal atrial fibrillation; ROBERT = N; Verified Transmission to [facility]; Last Updated By: System, SureScripts; 1/19/2018 12:22:39 PM  Chronic diastolic congestive heart failure, Dyslipidemia, Hypertension    · Follow-up visit in 4 Months Evaluation and Treatment  Follow-up  Status: Hold For -  Scheduling  Requested for: 65GAY3611   Ordered; For: Chronic diastolic congestive heart failure, Dyslipidemia, Hypertension; Ordered By: Edilberto Ward Performed:  Due: 59ZVI6768  PMH: Acute gastrointestinal bleeding    · Pantoprazole Sodium 40 MG Oral Tablet Delayed Release (Protonix); TAKE 1  TABLET DAILY   Rx By: Aralcona Sylvia; Dispense: 90 Days ; #:90 Tablet Delayed Release; Refill: 3; For: PMH: Acute gastrointestinal bleeding; ROBERT = N; Verified Transmission to [employer] [facility]; Last Updated By: System, SureScripts; 1/19/2018 12:22:39 PM    Discussion/Summary    Mrs Darene Gilford is a pleasant 80-year-old female who presents to the office today for routine follow-up  In terms of her atrial fibrillation, she continues to maintain normal sinus rhythm  She remains anticoagulated with Eliquis without about any major bleeding consequences except a nose bleed which required packing      Her blood pressure is well-controlled in the office today  Her most recent lipids from November were reviewed and are acceptable on current therapy  She appears euvolemic on exam on her current diuretic regimen on which she will remain  She is due for a remote device check and I will have our office contact her  She'll follow-up in the office in four months or sooner if deemed necessary  Thank you very much for allowing me to participate in the care of this patient  If you have any questions, please do not hesitate to contact me        Future Appointments    Signatures   Electronically signed by : Mariana Montero DO; Jan 19 2018 12:44PM EST                       (Author)

## 2018-02-08 ENCOUNTER — CLINICAL SUPPORT (OUTPATIENT)
Dept: CARDIOLOGY CLINIC | Facility: CLINIC | Age: 83
End: 2018-02-08
Payer: MEDICARE

## 2018-02-08 DIAGNOSIS — I49.5 SICK SINUS SYNDROME (HCC): Primary | ICD-10-CM

## 2018-02-08 DIAGNOSIS — I50.32 CHRONIC DIASTOLIC CONGESTIVE HEART FAILURE (HCC): ICD-10-CM

## 2018-02-08 DIAGNOSIS — Z95.0 CARDIAC PACEMAKER IN SITU: ICD-10-CM

## 2018-02-08 PROCEDURE — 93294 REM INTERROG EVL PM/LDLS PM: CPT | Performed by: INTERNAL MEDICINE

## 2018-02-08 PROCEDURE — 93296 REM INTERROG EVL PM/IDS: CPT | Performed by: INTERNAL MEDICINE

## 2018-02-08 NOTE — PROGRESS NOTES
[facility] TRANSMISSION: BATTERY VOLTAGE ADEQUATE (10 2 YRS)  AP - 98%,  - <1%  ALL AVAILABLE LEAD PARAMETERS APPEAR WITHIN NORMAL LIMITS WITH  CHRONIC LOW P WAVE SENSING AMPLITUDE (w/o 2:1 SAFETY MARGIN); 1 AMS EPISODE WITH DURATION @ 8 SECS FOR PAT (NO AF)  NO OTHER SIGNIFICANT HIGH RATE EPISODES   NORMAL DEVICE FUNCTION   eb      Current Outpatient Prescriptions:     ALFALFA PO, Take 3 tablets by mouth  , Disp: , Rfl:     apixaban (ELIQUIS) 5 mg, Take 5 mg by mouth 2 (two) times a day, Disp: , Rfl:     B Complex Vitamins (B COMPLEX 1 PO), Take by mouth, Disp: , Rfl:     cholecalciferol (VITAMIN D3) 1,000 units tablet, Take 1 tablet by mouth daily, Disp: 30 tablet, Rfl: 0    diltiazem (CARDIZEM CD) 180 mg 24 hr capsule, Take 180 mg by mouth daily, Disp: , Rfl:     dorzolamide (TRUSOPT) 2 % ophthalmic solution, Administer 1 drop to both eyes 3 (three) times a day, Disp: , Rfl:     furosemide (LASIX) 40 mg tablet, Take 1 tablet by mouth 2 (two) times a day for 7 days (Patient taking differently: Take 20 mg by mouth daily  ), Disp: 14 tablet, Rfl: 0    LECITHIN PO, Take 1 tablet by mouth daily, Disp: , Rfl:     methimazole (TAPAZOLE) 5 mg tablet, Take 1 tablet by mouth 3 (three) times a week for 30 days (Patient taking differently: Take 5 mg by mouth 3 (three) times a week  ), Disp: 12 tablet, Rfl: 0    metoprolol tartrate (LOPRESSOR) 50 mg tablet, Take 1 tablet by mouth every 12 (twelve) hours for 30 days, Disp: 60 tablet, Rfl: 0    olopatadine (PATANOL) 0 1 % ophthalmic solution, Administer 1 drop to both eyes 2 (two) times a day, Disp: , Rfl:     Omega-3 Fatty Acids (OMEGA-3 PO), Take by mouth 2 (two) times a day  , Disp: , Rfl:     pantoprazole (PROTONIX) 40 mg tablet, Take 40 mg by mouth daily, Disp: , Rfl:     sertraline (ZOLOFT) 50 mg tablet, Take 50 mg by mouth daily, Disp: , Rfl:     simvastatin (ZOCOR) 20 mg tablet, Take 20 mg by mouth daily at bedtime, Disp: , Rfl:     Zinc Sulfate (ZINC 15 PO), Take 100 mg by mouth  , Disp: , Rfl:

## 2018-02-26 ENCOUNTER — TRANSCRIBE ORDERS (OUTPATIENT)
Dept: ADMINISTRATIVE | Facility: HOSPITAL | Age: 83
End: 2018-02-26

## 2018-02-26 ENCOUNTER — APPOINTMENT (OUTPATIENT)
Dept: LAB | Facility: HOSPITAL | Age: 83
End: 2018-02-26
Payer: MEDICARE

## 2018-02-26 DIAGNOSIS — I50.32 CHRONIC DIASTOLIC HEART FAILURE (HCC): ICD-10-CM

## 2018-02-26 DIAGNOSIS — E78.5 HYPERLIPIDEMIA: ICD-10-CM

## 2018-02-26 DIAGNOSIS — E55.9 VITAMIN D DEFICIENCY: ICD-10-CM

## 2018-02-26 DIAGNOSIS — I10 ESSENTIAL (PRIMARY) HYPERTENSION: ICD-10-CM

## 2018-02-26 DIAGNOSIS — E05.90 THYROTOXICOSIS WITHOUT THYROID STORM: ICD-10-CM

## 2018-02-26 LAB
25(OH)D3 SERPL-MCNC: 25.7 NG/ML (ref 30–100)
ALBUMIN SERPL BCP-MCNC: 3.5 G/DL (ref 3.5–5)
ALP SERPL-CCNC: 123 U/L (ref 46–116)
ALT SERPL W P-5'-P-CCNC: 29 U/L (ref 12–78)
ANION GAP SERPL CALCULATED.3IONS-SCNC: 8 MMOL/L (ref 4–13)
AST SERPL W P-5'-P-CCNC: 28 U/L (ref 5–45)
BASOPHILS # BLD AUTO: 0.04 THOUSANDS/ΜL (ref 0–0.1)
BASOPHILS NFR BLD AUTO: 1 % (ref 0–1)
BILIRUB SERPL-MCNC: 0.7 MG/DL (ref 0.2–1)
BUN SERPL-MCNC: 20 MG/DL (ref 5–25)
CALCIUM SERPL-MCNC: 8.7 MG/DL (ref 8.3–10.1)
CHLORIDE SERPL-SCNC: 104 MMOL/L (ref 100–108)
CHOLEST SERPL-MCNC: 180 MG/DL (ref 50–200)
CO2 SERPL-SCNC: 29 MMOL/L (ref 21–32)
CREAT SERPL-MCNC: 1.21 MG/DL (ref 0.6–1.3)
EOSINOPHIL # BLD AUTO: 0.37 THOUSAND/ΜL (ref 0–0.61)
EOSINOPHIL NFR BLD AUTO: 5 % (ref 0–6)
ERYTHROCYTE [DISTWIDTH] IN BLOOD BY AUTOMATED COUNT: 14.7 % (ref 11.6–15.1)
GFR SERPL CREATININE-BSD FRML MDRD: 41 ML/MIN/1.73SQ M
GLUCOSE P FAST SERPL-MCNC: 106 MG/DL (ref 65–99)
HCT VFR BLD AUTO: 42.6 % (ref 34.8–46.1)
HDLC SERPL-MCNC: 75 MG/DL (ref 40–60)
HGB BLD-MCNC: 13.9 G/DL (ref 11.5–15.4)
LDLC SERPL CALC-MCNC: 89 MG/DL (ref 0–100)
LYMPHOCYTES # BLD AUTO: 2.14 THOUSANDS/ΜL (ref 0.6–4.47)
LYMPHOCYTES NFR BLD AUTO: 29 % (ref 14–44)
MCH RBC QN AUTO: 30.2 PG (ref 26.8–34.3)
MCHC RBC AUTO-ENTMCNC: 32.6 G/DL (ref 31.4–37.4)
MCV RBC AUTO: 92 FL (ref 82–98)
MONOCYTES # BLD AUTO: 0.54 THOUSAND/ΜL (ref 0.17–1.22)
MONOCYTES NFR BLD AUTO: 7 % (ref 4–12)
NEUTROPHILS # BLD AUTO: 4.42 THOUSANDS/ΜL (ref 1.85–7.62)
NEUTS SEG NFR BLD AUTO: 58 % (ref 43–75)
PLATELET # BLD AUTO: 224 THOUSANDS/UL (ref 149–390)
PMV BLD AUTO: 9.6 FL (ref 8.9–12.7)
POTASSIUM SERPL-SCNC: 4.5 MMOL/L (ref 3.5–5.3)
PROT SERPL-MCNC: 6.9 G/DL (ref 6.4–8.2)
RBC # BLD AUTO: 4.61 MILLION/UL (ref 3.81–5.12)
SODIUM SERPL-SCNC: 141 MMOL/L (ref 136–145)
TRIGL SERPL-MCNC: 81 MG/DL
TSH SERPL DL<=0.05 MIU/L-ACNC: 4.13 UIU/ML (ref 0.36–3.74)
WBC # BLD AUTO: 7.51 THOUSAND/UL (ref 4.31–10.16)

## 2018-02-26 PROCEDURE — 80061 LIPID PANEL: CPT

## 2018-02-26 PROCEDURE — 85025 COMPLETE CBC W/AUTO DIFF WBC: CPT

## 2018-02-26 PROCEDURE — 84443 ASSAY THYROID STIM HORMONE: CPT

## 2018-02-26 PROCEDURE — 36415 COLL VENOUS BLD VENIPUNCTURE: CPT

## 2018-02-26 PROCEDURE — 82306 VITAMIN D 25 HYDROXY: CPT

## 2018-02-26 PROCEDURE — 80053 COMPREHEN METABOLIC PANEL: CPT

## 2018-03-07 NOTE — PROGRESS NOTES
"  Discussion/Summary  Normal device function      Results/Data  Cardiac Device Remote [id number] 06:00AM Karina Car     Test Name Result Flag Reference   MISCELLANEOUS COMMENT      [facility] TRANSMISSION: BATTERY VOLTAGE ADEQUATE (10 3-10 7 YRS)  AP-97%, <1%  NO SIGNIFICANT HIGH RATE EPISODES  ALL AVAILABLE LEAD PARAMETERS WITHIN NORMAL LIMITS  NORMAL DEVICE FUNCTION  GV   Cardiac Electrophysiology Report      [id number]  pdf   DEVICE TYPE Pacemaker       Cardiac Electrophysiology Report [id number] 06:00AM Karina Car     Test Name Result Flag Reference   Cardiac Electrophysiology Report      [id number]  pdf     Signatures   Electronically signed by : Aguila Coto RN; Aug 25 2016  2:55PM EST                       (Author)    Electronically signed by : Ely Mancilla DO; Sep  4 2016  5:39PM EST                       (Author)    "

## 2018-03-07 NOTE — PROGRESS NOTES
"  Discussion/Summary  Normal device function      Results/Data  Results   Cardiac Device In Clinic [id number] 08:05PM Roy June     Test Name Result Flag Reference   MISCELLANEOUS COMMENT (Report)     DEVICE INTERROGATED IN THE [facility]: BATTERY VOLTAGE ADEQUATE (10 3 YRS)  AP 97%  <1%  ALL AVAILABLE LEAD PARAMETERS WITHIN NORMAL LIMITS  [address]  Jael LAST EPISODE 12/24/15  HX AFIB/FL  PT TAKES ELIQUIS  NO OTHER SIGNIFICANT HIGH RATE EPISODES  PROG A SENS FROM 0 3-0 5MV AND PVAB FROM 150-60MS FOR ATRIAL OVERSENSING AND BLANKING PERIOD UNDERSENSING FROM PREVIOUS EGRAMS  PROG HVR STORAGE LESS AGRESSIVE (MULTIPLE ALERTS/AF BURDEN <1%)  PACEMAKER FUNCTIONING APPROPRIATELY  CP   Cardiac Electrophysiology Report      [id number]  pdf   DEVICE TYPE Pacemaker       Cardiac Electrophysiology Report [id number] 08:05PM Roy June     Test Name Result Flag Reference   Cardiac Electrophysiology Report      [id number]  pdf     Signatures   Electronically signed by : Luh Rangel, ; Feb 16 2016  3:39PM EST                       (Author)    Electronically signed by : MILLY Reyes ; Feb 16 2016  4:10PM EST                       (Author)    "

## 2018-03-07 NOTE — PROGRESS NOTES
"  Discussion/Summary  Normal device function      Results/Data  Cardiac Device In Clinic [id number] 03:35PM Erica Albert     Test Name Result Flag Reference   MISCELLANEOUS COMMENT (Report)     DEVICE INTERROGATED IN THE [facility]: BATTERY VOLTAGE ADEQUATE (10 1 YRS); AP = 98%,  = <1%; NO SIGNIFICANT HIGH RATE EPISODES; ALL LEAD PARAMETERS TEST WITHIN NORMAL LIMITS/STABLE; NO PROGRAMMING CHANGES MADE TO DEVICE PARAMETERS; PACEMAKER FUNCTIONING APPROPRIATELY  eb   Cardiac Electrophysiology Report      [id number]  pdf   DEVICE TYPE Pacemaker       Cardiac Electrophysiology Report [id number] 03:35PM Erica Albert     Test Name Result Flag Reference   Cardiac Electrophysiology Report      [id number]  pdf     Signatures   Electronically signed by : Isai Cotton, ; Feb 15 2017 10:45AM EST                       (Author)    Electronically signed by : Francesca Wolf DO; Feb 19 2017  9:11AM EST                       (Author)    "

## 2018-03-07 NOTE — PROGRESS NOTES
"  Discussion/Summary  Normal device function      Results/Data  Cardiac Device Remote [id number] 01:19PM Benji Estrin     Test Name Result Flag Reference   MISCELLANEOUS COMMENT      [facility] TRANSMISSION: BATTERY STATUS "OK"  AP 99%  0%  ALL AVAILABLE LEAD PARAMETERS WITHIN NORMAL LIMITS  NO SIGNIFICANT HIGH RATE EPISODES  NORMAL DEVICE FUNCTION  NC   Cardiac Electrophysiology Report      [id number]  pdf   DEVICE TYPE Pacemaker       Cardiac Electrophysiology Report [id number] 01:19PM Benji Estrin     Test Name Result Flag Reference   Cardiac Electrophysiology Report      [id number]  pdf     Signatures   Electronically signed by : Gypsy Navarrete, ; Aug 15 2017  2:41PM EST                       (Author)    Electronically signed by : MILLY Merida ; Aug 15 2017  5:47PM EST                       (Author)    "

## 2018-03-07 NOTE — PROGRESS NOTES
"  Discussion/Summary  Normal device function     Low P wave sensing  Results/Data  Cardiac Device Remote [id number] 06:41AM Shellie Reza     Test Name Result Flag Reference   MISCELLANEOUS COMMENT (Report)     [facility] TRANSMISSION: A1FHFLSSAX VOLTAGE ADEQUATE (10 4 YRS)  AP = >99%,  = <1%  ALL AVAILABLE LEAD PARAMETERS APPEAR WITHIN NORMAL LIMITS; WITH CHRONIC LOW P WAVE SENSING AMPLITUDE w/o 2:1 SAFETY MARGIN; NO SIGNIFICANT HIGH RATE EPISODES  PACEMAKER FUNCTIONING APPROPRIATELY  eb   Cardiac Electrophysiology Report      [id number]  merlin  5 12 17 pdf   DEVICE TYPE Pacemaker       Cardiac Electrophysiology Report [id number] 06:41AM Shellie Reza     Test Name Result Flag Reference   Cardiac Electrophysiology Report      [id number] pdf     Signatures   Electronically signed by : Celsa Torres, ; May 15 2017 12:53PM EST                       (Author)    Electronically signed by : MILLY Ott ; May 16 2017 12:06AM EST                       (Author)    "

## 2018-03-07 NOTE — PROGRESS NOTES
"  Discussion/Summary  Normal device function      Results/Data  Cardiac Device Remote [id number] 01:28PM Linda Victoria     Test Name Result Flag Reference   MISCELLANEOUS COMMENT      [facility] TRANSMISSION: BATTERY VOLTAGE ADEQUATE (10 2 YRS); AP = 97%,  = 1%; NO SIGNIFICANT HIGH RATE EPISODES; ALL AVAILABLE LEAD PARAMETERS APPEAR WITHIN NORMAL LIMITS; LOW P WAVE SENSING w/o 2:1 SAFETY MARGIN; PACEMAKER FUNCTIONING APPROPRIATELY  eb   Cardiac Electrophysiology Report      [id number]  merlin  12 5  16 pdf   DEVICE TYPE Pacemaker       Cardiac Electrophysiology Report [id number] 01:28PM Linda Victoria     Test Name Result Flag Reference   Cardiac Electrophysiology Report      [id number]  pdf     Signatures   Electronically signed by : Bud Apodaca, ; Dec  5 2016 12:27PM EST                       (Author)    Electronically signed by : Monique Carrillo DO; Dec  6 2016  5:41PM EST                       (Author)    "

## 2018-03-07 NOTE — PROGRESS NOTES
Dear Sera Lo,  My name is [facility] and I am a Registered Nurse Care Coordinator for [address]  I am contacting you  because you were recently in the hospital   If you are interested, I am here to assist you as part of a Medicare program called [address]  I have enclosed information about this program for your review and my contact information  This is free of charge to you, as part of the program   I would call you periodically throughout the next 90 days to assist with any questions you might have, discuss any symptoms you may be having,  or get you in to see your doctor if needed  We  will discuss and review your medications and goals you may have for yourself  Please let me know if you agree to this by contacting me at [phone]      Sincerely,      Nannette Dillon RN  [facility]        Electronically signed by:America Theodore RN  Abhilash 10 2017  2:33PM EST

## 2018-03-07 NOTE — PROGRESS NOTES
"  Discussion/Summary  Normal device function      Results/Data  Results   Cardiac Device Remote [id number] 04:04AM Ash Cordova     Test Name Result Flag Reference   MISCELLANEOUS COMMENT      [facility] TRANSMISSION: BATTERY VOLTAGE ADEQUATE (9 1 YRS)  AP>99%, <1%  NO SIGNIFICANT HIGH RATE EPISODES  ALL AVAILABLE LEAD PARAMETERS WITHIN NORMAL LIMITS  NORMAL DEVICE FUNCTION  GV   Cardiac Electrophysiology Report      [id number]  pdf   DEVICE TYPE Pacemaker       Cardiac Electrophysiology Report [id number] 04:04AM Ash Cordova     Test Name Result Flag Reference   Cardiac Electrophysiology Report      [id number] pdf     Signatures   Electronically signed by : Luis Angel Welch RN; May 23 2016  2:12PM EST                       (Author)    Electronically signed by : MILLY Rudd ; May 23 2016  5:13PM EST                       (Author)    "

## 2018-03-15 ENCOUNTER — OFFICE VISIT (OUTPATIENT)
Dept: INTERNAL MEDICINE CLINIC | Facility: CLINIC | Age: 83
End: 2018-03-15
Payer: MEDICARE

## 2018-03-15 VITALS
DIASTOLIC BLOOD PRESSURE: 60 MMHG | BODY MASS INDEX: 31.55 KG/M2 | OXYGEN SATURATION: 96 % | HEIGHT: 67 IN | HEART RATE: 65 BPM | SYSTOLIC BLOOD PRESSURE: 112 MMHG | WEIGHT: 201 LBS | RESPIRATION RATE: 16 BRPM | TEMPERATURE: 98.6 F

## 2018-03-15 DIAGNOSIS — E05.90 HYPERTHYROIDISM: Primary | ICD-10-CM

## 2018-03-15 DIAGNOSIS — I10 ESSENTIAL HYPERTENSION: ICD-10-CM

## 2018-03-15 DIAGNOSIS — M81.0 OSTEOPOROSIS, UNSPECIFIED OSTEOPOROSIS TYPE, UNSPECIFIED PATHOLOGICAL FRACTURE PRESENCE: ICD-10-CM

## 2018-03-15 DIAGNOSIS — I48.0 PAROXYSMAL ATRIAL FIBRILLATION (HCC): ICD-10-CM

## 2018-03-15 DIAGNOSIS — E78.5 DYSLIPIDEMIA: ICD-10-CM

## 2018-03-15 DIAGNOSIS — E78.2 MIXED HYPERLIPIDEMIA: ICD-10-CM

## 2018-03-15 PROBLEM — R06.00 DYSPNEA: Status: ACTIVE | Noted: 2017-09-20

## 2018-03-15 PROBLEM — N17.9 ACUTE KIDNEY INJURY (HCC): Status: RESOLVED | Noted: 2017-02-17 | Resolved: 2018-03-15

## 2018-03-15 PROBLEM — J90 PLEURAL EFFUSION: Status: RESOLVED | Noted: 2017-02-18 | Resolved: 2018-03-15

## 2018-03-15 PROCEDURE — 99214 OFFICE O/P EST MOD 30 MIN: CPT | Performed by: FAMILY MEDICINE

## 2018-03-15 NOTE — PATIENT INSTRUCTIONS

## 2018-03-16 NOTE — ASSESSMENT & PLAN NOTE
TSH is slightly higher than it had been  Will continue to follow this with her routine laboratory testing  She is only taking the methimazole 3 days a week at present  Will adjust dosage if this is needed with her next laboratory testing

## 2018-03-16 NOTE — PROGRESS NOTES
Assessment/Plan:    Hyperthyroidism  TSH is slightly higher than it had been  Will continue to follow this with her routine laboratory testing  She is only taking the methimazole 3 days a week at present  Will adjust dosage if this is needed with her next laboratory testing  Essential hypertension  Blood pressure controlled  Continue the diltiazem and the metoprolol as previously  Paroxysmal atrial fibrillation (HCC)  She is currently regular on exam   Continue with the diltiazem  Continue with the Eliquis for anticoagulation  Advised her to watch for any bleeding  Especially any rectal bleeding or dark stools since she has had this in the past   Continue to follow up with Cardiology  Watch for any symptoms of congestive heart failure  Osteoporosis  She declines bone density  She declines treatment for the osteoporosis  Continue with the calcium and vitamin-D  Be very careful to avoid falls  Dyslipidemia  Recent lipid panel was controlled  Continue with the atorvastatin as previously  Will continue to follow laboratory testing prior to her next visit  Diagnoses and all orders for this visit:    Hyperthyroidism  -     CBC and differential; Future  -     TSH, 3rd generation; Future    Paroxysmal atrial fibrillation (HCC)  -     Comprehensive metabolic panel; Future    Essential hypertension  -     CBC and differential; Future    Dyslipidemia  -     Comprehensive metabolic panel; Future    Mixed hyperlipidemia  -     Comprehensive metabolic panel; Future  -     Lipid panel; Future    Osteoporosis, unspecified osteoporosis type, unspecified pathological fracture presence  -     Vitamin D 25 hydroxy; Future        Laboratory testing reviewed with her  Continue same medications  She declines screening testing  She declines immunizations  Will have her follow up in about 3-4 months with laboratory testing prior to that visit  Follow up sooner if needed        Subjective:      Patient ID: Alex Suárez is a 80 y o  female  She presents for routine follow-up  Has generally been doing well  She did have a nose bleed that required her going to the emergency room since her last visit  Has not had any recurrence of this since  Appetite has been generally good  Denies any nausea, vomiting, or diarrhea  Denies any chest pain or palpitations  Denies any significant dizziness  Continues to follow-up for her pacemaker checks  Denies any significant peripheral edema  Breathing has been generally good  Denies any significant shortness of breath  She remains very active  She has been staying with her daughter quite a lot during the winter months  Tolerating her atorvastatin without difficulty  Denies any significant muscle aches or weakness  Joints have been generally good  Denies any recent falls  Continues with the Eliquis for anticoagulation without difficulty  Denies any black stools or any blood in her bowel movements like she had had in the past   Energy level remains good  Tolerating her methimazole well  Tolerating her blood pressure medications without difficulty  Denies any headaches or localized weakness  Mood has been generally stable  Does feel overall better on the Zoloft  The following portions of the patient's history were reviewed and updated as appropriate:   She  has a past medical history of Anemia; Atrial fibrillation ([address] ); Atrial flutter ([address] ); Depression; Diastolic CHF ([address] ); Dyslipidemia; Glaucoma; Glaucoma; H/O: hysterectomy; Hypertension; Hyperthyroidism; Impaired glucose tolerance; Pacemaker; Psoriasis; S/P cataract surgery; S/P knee replacement; SSS (sick sinus syndrome) ([address] ); and SSS (sick sinus syndrome) ([address] )    She   Patient Active Problem List    Diagnosis Date Noted    Dyspnea 09/20/2017    Osteoporosis 03/09/2017    Paroxysmal atrial fibrillation ([address] ) 02/22/2017    Anemia 02/18/2017    Renal atrophy 02/18/2017    Hyperphosphatemia 02/18/2017    Vitamin D deficiency 02/18/2017    Hypokalemia 02/17/2017    Pulmonary hypertension 02/17/2017    Pacemaker     H/O: hysterectomy     Chronic atrial fibrillation ([facility] )     Essential hypertension     Hyperlipidemia     Hyperthyroidism     Glaucoma     Chronic diastolic congestive heart failure (HCC)     SSS (sick sinus syndrome) ([facility] )     Depression     Depression with anxiety 08/12/2015    Fatigue 11/18/2014    Vertigo 11/18/2014    Atrial flutter ([facility] ) 10/01/2014    AV block 10/01/2014    Dyslipidemia 09/12/2012    Psoriasis 09/12/2012     She  has a past surgical history that includes Cardiac pacemaker placement; Eye surgery; Hysterectomy; Cardiac pacemaker placement; Joint replacement; Esophagogastroduodenoscopy (N/A, 12/30/2016); Cataract extraction; pr colonoscopy flx dx w/collj spec when pfrmd (N/A, 4/24/2017); and Replacement total knee (Bilateral)  Her family history includes Alcohol abuse in her brother and father; Diabetes in her father  She  reports that she has quit smoking  She has never used smokeless tobacco  She reports that she does not drink alcohol or use drugs    Current Outpatient Prescriptions   Medication Sig Dispense Refill    ALFALFA PO Take 3 tablets by mouth        apixaban (ELIQUIS) 5 mg Take 5 mg by mouth 2 (two) times a day      B Complex Vitamins (B COMPLEX 1 PO) Take by mouth      cholecalciferol (VITAMIN D3) 1,000 units tablet Take 1 tablet by mouth daily 30 tablet 0    diltiazem (CARDIZEM CD) 180 mg 24 hr capsule Take 180 mg by mouth daily      LECITHIN PO Take 1 tablet by mouth daily      Omega-3 Fatty Acids (OMEGA-3 PO) Take by mouth 2 (two) times a day        pantoprazole (PROTONIX) 40 mg tablet Take 40 mg by mouth daily      sertraline (ZOLOFT) 50 mg tablet Take 50 mg by mouth daily      simvastatin (ZOCOR) 20 mg tablet Take 20 mg by mouth daily at bedtime      Zinc Sulfate (ZINC 15 PO) Take 100 mg by mouth        furosemide (LASIX) 40 mg tablet Take 1 tablet by mouth 2 (two) times a day for 7 days (Patient taking differently: Take 20 mg by mouth daily  ) 14 tablet 0    methimazole (TAPAZOLE) 5 mg tablet Take 1 tablet by mouth 3 (three) times a week for 30 days (Patient taking differently: Take 5 mg by mouth 3 (three) times a week  ) 12 tablet 0    metoprolol tartrate (LOPRESSOR) 50 mg tablet Take 1 tablet by mouth every 12 (twelve) hours for 30 days 60 tablet 0     No current facility-administered medications for this visit  Current Outpatient Prescriptions on File Prior to Visit   Medication Sig    ALFALFA PO Take 3 tablets by mouth      apixaban (ELIQUIS) 5 mg Take 5 mg by mouth 2 (two) times a day    B Complex Vitamins (B COMPLEX 1 PO) Take by mouth    cholecalciferol (VITAMIN D3) 1,000 units tablet Take 1 tablet by mouth daily    diltiazem (CARDIZEM CD) 180 mg 24 hr capsule Take 180 mg by mouth daily    LECITHIN PO Take 1 tablet by mouth daily    Omega-3 Fatty Acids (OMEGA-3 PO) Take by mouth 2 (two) times a day      pantoprazole (PROTONIX) 40 mg tablet Take 40 mg by mouth daily    sertraline (ZOLOFT) 50 mg tablet Take 50 mg by mouth daily    simvastatin (ZOCOR) 20 mg tablet Take 20 mg by mouth daily at bedtime    Zinc Sulfate (ZINC 15 PO) Take 100 mg by mouth      furosemide (LASIX) 40 mg tablet Take 1 tablet by mouth 2 (two) times a day for 7 days (Patient taking differently: Take 20 mg by mouth daily  )    methimazole (TAPAZOLE) 5 mg tablet Take 1 tablet by mouth 3 (three) times a week for 30 days (Patient taking differently: Take 5 mg by mouth 3 (three) times a week  )    metoprolol tartrate (LOPRESSOR) 50 mg tablet Take 1 tablet by mouth every 12 (twelve) hours for 30 days     No current facility-administered medications on file prior to visit  She has No Known Allergies       Review of Systems   Constitutional: Negative for activity change, appetite change, chills and fever  HENT: Positive for hearing loss  Negative for congestion and rhinorrhea  Eyes: Negative for visual disturbance  Respiratory: Negative for cough, chest tightness and shortness of breath  Cardiovascular: Negative for chest pain and palpitations  Gastrointestinal: Negative for abdominal pain, blood in stool, diarrhea, nausea and vomiting  Endocrine: Negative for polydipsia, polyphagia and polyuria  Genitourinary: Negative for dysuria, frequency and urgency  Musculoskeletal: Negative for gait problem  Skin: Negative for color change  Neurological: Negative for dizziness and headaches  Hematological: Does not bruise/bleed easily  Psychiatric/Behavioral: Negative for confusion and sleep disturbance  The patient is not nervous/anxious  Objective:      /60 (BP Location: Left arm, Patient Position: Sitting, Cuff Size: Large)   Pulse 65   Temp 98 6 °F (37 °C) (Temporal)   Resp 16   Ht 5' 7" (1 702 m)   Wt 91 2 kg (201 lb)   LMP  (LMP Unknown)   SpO2 96%   BMI 31 48 kg/m²          Physical Exam   Constitutional: She is oriented to person, place, and time  She is cooperative  No distress  HENT:   Head: Normocephalic and atraumatic  Mouth/Throat: Oropharynx is clear and moist    Hearing aids bilaterally; moist mucous membranes   Eyes: Conjunctivae are normal  Pupils are equal, round, and reactive to light  Right eye exhibits no discharge  Left eye exhibits no discharge  Cardiovascular: Normal rate, regular rhythm and normal heart sounds  Exam reveals no gallop and no friction rub  No murmur heard  Currently regular   Pulmonary/Chest: No respiratory distress  She has no wheezes  She has no rales  Abdominal: Bowel sounds are normal  She exhibits no distension  There is no tenderness  Musculoskeletal: She exhibits no edema  Lymphadenopathy:     She has no cervical adenopathy  Neurological: She is alert and oriented to person, place, and time  Skin: Skin is warm and dry     Psychiatric: She has a normal mood and affect  Her behavior is normal    Nursing note and vitals reviewed  Below is the patient's most recent value for Albumin, ALT, AST, BUN, Calcium, Chloride, Cholesterol, CO2, Creatinine, GFR, Glucose, HDL, Hematocrit, Hemoglobin, Hemoglobin A1C, LDL, Magnesium, Phosphorus, Platelets, Potassium, PSA, Sodium, Triglycerides, and WBC  Lab Results   Component Value Date    ALT 29 02/26/2018    AST 28 02/26/2018    BUN 20 02/26/2018    CALCIUM 8 7 02/26/2018     02/26/2018    CHOL 180 02/26/2018    CO2 29 02/26/2018    CREATININE 1 21 02/26/2018    HDL 75 (H) 02/26/2018    HCT 42 6 02/26/2018    HGB 13 9 02/26/2018    HGBA1C 5 5 01/25/2016    MG 2 2 02/21/2017    PHOS 3 7 02/21/2017     02/26/2018    K 4 5 02/26/2018     02/26/2018    TRIG 81 02/26/2018    WBC 7 51 02/26/2018     Note: for a comprehensive list of the patient's lab results, access the Results Review activity

## 2018-03-16 NOTE — ASSESSMENT & PLAN NOTE
She declines bone density  She declines treatment for the osteoporosis  Continue with the calcium and vitamin-D  Be very careful to avoid falls

## 2018-03-16 NOTE — ASSESSMENT & PLAN NOTE
Recent lipid panel was controlled  Continue with the atorvastatin as previously  Will continue to follow laboratory testing prior to her next visit

## 2018-03-16 NOTE — ASSESSMENT & PLAN NOTE
She is currently regular on exam   Continue with the diltiazem  Continue with the Eliquis for anticoagulation  Advised her to watch for any bleeding  Especially any rectal bleeding or dark stools since she has had this in the past   Continue to follow up with Cardiology  Watch for any symptoms of congestive heart failure

## 2018-04-16 ENCOUNTER — CLINICAL SUPPORT (OUTPATIENT)
Dept: CARDIOLOGY CLINIC | Facility: CLINIC | Age: 83
End: 2018-04-16
Payer: MEDICARE

## 2018-04-16 DIAGNOSIS — Z95.0 CARDIAC PACEMAKER IN SITU: ICD-10-CM

## 2018-04-16 DIAGNOSIS — I49.5 SSS (SICK SINUS SYNDROME) (HCC): ICD-10-CM

## 2018-04-16 DIAGNOSIS — E78.2 MIXED HYPERLIPIDEMIA: Primary | ICD-10-CM

## 2018-04-16 DIAGNOSIS — I48.0 PAROXYSMAL ATRIAL FIBRILLATION (HCC): Primary | ICD-10-CM

## 2018-04-16 PROCEDURE — 93280 PM DEVICE PROGR EVAL DUAL: CPT | Performed by: INTERNAL MEDICINE

## 2018-04-16 RX ORDER — ATORVASTATIN CALCIUM 20 MG/1
TABLET, FILM COATED ORAL
Qty: 90 TABLET | Refills: 3 | Status: SHIPPED | OUTPATIENT
Start: 2018-04-16 | End: 2018-08-22

## 2018-04-16 NOTE — PROGRESS NOTES
DEVICE INTERROGATED IN THE [place] OFFICE: BATTERY VOLTAGE ADEQUATE  %  0%  ALL LEAD PARAMETERS & TRENDS WITHIN NORMAL LIMITS  NO SIGNIFICANT HIGH RATE EPISODES  NO PROGRAMMING CHANGES MADE TO DEVICE PARAMETERS  NORMAL DEVICE FUNCTION   NC

## 2018-05-09 RX ORDER — FUROSEMIDE 40 MG/1
TABLET ORAL
COMMUNITY
Start: 2018-03-19 | End: 2018-08-22

## 2018-05-14 ENCOUNTER — OFFICE VISIT (OUTPATIENT)
Dept: CARDIOLOGY CLINIC | Facility: HOSPITAL | Age: 83
End: 2018-05-14
Payer: MEDICARE

## 2018-05-14 VITALS
BODY MASS INDEX: 32.47 KG/M2 | SYSTOLIC BLOOD PRESSURE: 142 MMHG | WEIGHT: 202 LBS | HEART RATE: 100 BPM | DIASTOLIC BLOOD PRESSURE: 60 MMHG | HEIGHT: 66 IN

## 2018-05-14 DIAGNOSIS — I10 BENIGN ESSENTIAL HYPERTENSION: ICD-10-CM

## 2018-05-14 DIAGNOSIS — I48.92 ATRIAL FLUTTER, UNSPECIFIED TYPE (HCC): ICD-10-CM

## 2018-05-14 DIAGNOSIS — E78.5 DYSLIPIDEMIA: ICD-10-CM

## 2018-05-14 DIAGNOSIS — Z95.0 PRESENCE OF PERMANENT CARDIAC PACEMAKER: ICD-10-CM

## 2018-05-14 DIAGNOSIS — I49.5 SSS (SICK SINUS SYNDROME) (HCC): ICD-10-CM

## 2018-05-14 DIAGNOSIS — I50.32 CHRONIC DIASTOLIC CONGESTIVE HEART FAILURE (HCC): Primary | ICD-10-CM

## 2018-05-14 DIAGNOSIS — I48.0 PAROXYSMAL ATRIAL FIBRILLATION (HCC): ICD-10-CM

## 2018-05-14 PROCEDURE — 99214 OFFICE O/P EST MOD 30 MIN: CPT | Performed by: INTERNAL MEDICINE

## 2018-05-14 RX ORDER — DILTIAZEM HYDROCHLORIDE 180 MG/1
180 CAPSULE, COATED, EXTENDED RELEASE ORAL DAILY
Qty: 90 CAPSULE | Refills: 3 | Status: SHIPPED | OUTPATIENT
Start: 2018-05-14 | End: 2018-06-05 | Stop reason: SDUPTHER

## 2018-05-14 NOTE — PROGRESS NOTES
Cardiology Follow Up    Shantal Duong  4/6/1932  [id number]  [address] [id number]    1  Chronic diastolic congestive heart failure (HCC)     2  Paroxysmal atrial fibrillation (HCC)  diltiazem (CARDIZEM CD) 180 mg 24 hr capsule   3  Atrial flutter, unspecified type ([address] )     4  SSS (sick sinus syndrome) ([facility] [address] )     5  Presence of permanent cardiac pacemaker     6  Benign essential hypertension     7  Dyslipidemia         Discussion/Summary:  Mrs Chichi Case is a pleasant 40-year-old female who presents to the office today for routine follow-up  Regarding her atrial fibrillation, she continues to maintain normal sinus rhythm  She remains anticoagulated with Eliquis without about any major bleeding consequences or recurrent nose bleeds  Her blood pressure is well-controlled in the office today  Her most recent lipids from February were reviewed and are acceptable on current therapy  She appears euvolemic on exam on her current diuretic regimen on which she will remain  Her pacemaker was recently interrogated  It is functioning appropriately without any recurrent PAF  I will see her back in the office in six months or sooner if deemed necessary  Interval History:   Mrs Chichi Case is a pleasant 40-year-old female who presents to the office today for routine followup  Otherwise she has been feeling well  She denies any exertional chest pain or exertional shortness of breath  She denies signs or symptoms of heart failure including increasing lower extremity edema, paroxysmal nocturnal dyspnea or orthopnea  She has not been weighing herself daily  She denies any progressive lower extremity edema  She denies signs or symptoms of recurrent atrial fibrillation  She denies symptoms of claudication       She has not had any recurrent nose bleeds since her last visit     Problem List     Pacemaker    H/O: hysterectomy    Chronic atrial fibrillation (HCC)    Essential hypertension    Hyperlipidemia    Hyperthyroidism    Glaucoma    Chronic diastolic congestive heart failure (HCC)    SSS (sick sinus syndrome) ([facility])    Depression    Hypokalemia    Pulmonary hypertension (HCC)    Anemia    Renal atrophy    Hyperphosphatemia    Vitamin D deficiency    Atrial flutter ([facility] [address] )    Overview Signed 3/15/2018 11:25 AM by Yany Meneses MD     Description: Typical right-sided isthmus dependent - counterclockwise - CHADS2 = 2  AV block    Depression with anxiety    Dyslipidemia    Dyspnea    Fatigue    Osteoporosis    Paroxysmal atrial fibrillation (HCC)    Psoriasis    Vertigo        Past Medical History:   Diagnosis Date    Anemia     Atrial fibrillation (HCC)     Atrial flutter (HCC)     Depression     Diastolic CHF ([facility] [address] )     Dyslipidemia     Glaucoma     Glaucoma     H/O: hysterectomy     Hypertension     Hyperthyroidism     Impaired glucose tolerance     RESOLVED 2/3/2016    Pacemaker     Psoriasis     S/P cataract surgery     S/P knee replacement     SSS (sick sinus syndrome) (HCC)     SSS (sick sinus syndrome) ([address] )      Social History     Social History    Marital status:      Spouse name: N/A    Number of children: N/A    Years of education: N/A     Occupational History    Not on file       Social History Main Topics    Smoking status: Former Smoker    Smokeless tobacco: Never Used    Alcohol use No    Drug use: No    Sexual activity: Not on file     Other Topics Concern    Not on file     Social History Narrative    No narrative on file      Family History   Problem Relation Age of Onset    Alcohol abuse Father     Diabetes Father     Alcohol abuse Brother      Past Surgical History:   Procedure Laterality Date    CARDIAC PACEMAKER PLACEMENT      CARDIAC PACEMAKER PLACEMENT      dual chamber    CATARACT EXTRACTION      ESOPHAGOGASTRODUODENOSCOPY N/A 12/30/2016    Procedure: ESOPHAGOGASTRODUODENOSCOPY (EGD); Surgeon: Kristy Orantes MD;  Location: [facility]; Service:     EYE SURGERY      cataract    HYSTERECTOMY      JOINT REPLACEMENT      bilateral     OH COLONOSCOPY FLX DX W/COLLJ SPEC WHEN PFRMD N/A 4/24/2017    Procedure: COLONOSCOPY with polypectomy;  Surgeon: Ronita Rubinstein, MD;  Location: [facility];   Service: Gastroenterology    REPLACEMENT TOTAL KNEE Bilateral        Current Outpatient Prescriptions:     ALFALFA PO, Take 3 tablets by mouth  , Disp: , Rfl:     apixaban (ELIQUIS) 5 mg, Take 5 mg by mouth 2 (two) times a day, Disp: , Rfl:     atorvastatin (LIPITOR) 20 mg tablet, TAKE 1 TABLET DAILY AS     DIRECTED, Disp: 90 tablet, Rfl: 3    B Complex Vitamins (B COMPLEX 1 PO), Take by mouth, Disp: , Rfl:     cholecalciferol (VITAMIN D3) 1,000 units tablet, Take 1 tablet by mouth daily, Disp: 30 tablet, Rfl: 0    diltiazem (CARDIZEM CD) 180 mg 24 hr capsule, Take 1 capsule (180 mg total) by mouth daily, Disp: 90 capsule, Rfl: 3    furosemide (LASIX) 40 mg tablet, , Disp: , Rfl:     LECITHIN PO, Take 1 tablet by mouth daily, Disp: , Rfl:     methimazole (TAPAZOLE) 5 mg tablet, Take 1 tablet by mouth 3 (three) times a week for 30 days (Patient taking differently: Take 5 mg by mouth 3 (three) times a week  ), Disp: 12 tablet, Rfl: 0    metoprolol tartrate (LOPRESSOR) 50 mg tablet, Take 1 tablet by mouth every 12 (twelve) hours for 30 days, Disp: 60 tablet, Rfl: 0    Omega-3 Fatty Acids (OMEGA-3 PO), Take by mouth 2 (two) times a day  , Disp: , Rfl:     pantoprazole (PROTONIX) 40 mg tablet, Take 40 mg by mouth daily, Disp: , Rfl:     sertraline (ZOLOFT) 50 mg tablet, Take 50 mg by mouth daily, Disp: , Rfl:     simvastatin (ZOCOR) 20 mg tablet, Take 20 mg by mouth daily at bedtime, Disp: , Rfl:     Zinc Sulfate (ZINC 15 PO), Take 100 mg by mouth  , Disp: , Rfl:     furosemide (LASIX) 40 mg tablet, Take 1 tablet by mouth 2 (two) times a day for 7 days (Patient taking differently: Take 20 mg by mouth daily  ), Disp: 14 tablet, Rfl: 0  No Known Allergies    Labs:     Chemistry        Component Value Date/Time     02/26/2018 0932     11/18/2015 1318    K 4 5 02/26/2018 0932    K 3 9 11/18/2015 1318     02/26/2018 0932     11/18/2015 1318    CO2 29 02/26/2018 0932    CO2 29 2 11/18/2015 1318    BUN 20 02/26/2018 0932    BUN 13 11/18/2015 1318    CREATININE 1 21 02/26/2018 0932    CREATININE 1 13 11/18/2015 1318        Component Value Date/Time    CALCIUM 8 7 02/26/2018 0932    CALCIUM 9 1 11/18/2015 1318    ALKPHOS 123 (H) 02/26/2018 0932    ALKPHOS 71 10/26/2015 1012    AST 28 02/26/2018 0932    AST 28 10/26/2015 1012    ALT 29 02/26/2018 0932    ALT 29 10/26/2015 1012    BILITOT 0 70 02/26/2018 0932    BILITOT 0 94 10/26/2015 1012            Lab Results   Component Value Date    CHOL 180 02/26/2018    CHOL 166 11/06/2017    CHOL 144 06/26/2017     Lab Results   Component Value Date    HDL 75 (H) 02/26/2018    HDL 75 (H) 11/06/2017    HDL 69 (H) 06/26/2017     Lab Results   Component Value Date    LDLCALC 89 02/26/2018    LDLCALC 72 11/06/2017    LDLCALC 63 06/26/2017     Lab Results   Component Value Date    TRIG 81 02/26/2018    TRIG 97 11/06/2017    TRIG 62 06/26/2017     No components found for: CHOLHDL    Imaging: No results found  Review of Systems   Cardiovascular: Negative for chest pain, claudication, cyanosis, dyspnea on exertion, irregular heartbeat, leg swelling, near-syncope, orthopnea, palpitations, paroxysmal nocturnal dyspnea and syncope  All other systems reviewed and are negative  Vitals:    05/14/18 1344   BP: 142/60   Pulse: 100     Vitals:    05/14/18 1344   Weight: 91 6 kg (202 lb)     Height: 5' 6" (167 6 cm)   Body mass index is 32 6 kg/m²      Physical Exam:   General appearance:  Appears stated age, alert, well appearing and in no distress  HEENT:  ALOK KATE, no scleral icterus, no conjunctival pallor  NECK:  Supple, No elevated JVP, no thyromegaly, no carotid bruits  HEART:  Regular rate and rhythm, normal S1/S2, no S3/S4, no murmur or rub  LUNGS:  Clear to auscultation bilaterally  ABDOMEN:  Soft, non-tender, positive bowel sounds, no rebound or guarding, no organomegaly   EXTREMITIES:  No edema  VASCULAR:  Normal pedal pulses   SKIN: No lesions or rashes on exposed skin  NEURO:  CN II-XII intact, no focal deficits

## 2018-06-05 DIAGNOSIS — I48.0 PAROXYSMAL ATRIAL FIBRILLATION (HCC): ICD-10-CM

## 2018-06-05 RX ORDER — DILTIAZEM HYDROCHLORIDE 180 MG/1
CAPSULE, COATED, EXTENDED RELEASE ORAL
Qty: 90 CAPSULE | Refills: 3 | Status: SHIPPED | OUTPATIENT
Start: 2018-06-05 | End: 2019-09-03 | Stop reason: SDUPTHER

## 2018-07-30 DIAGNOSIS — I50.32 CHRONIC DIASTOLIC CONGESTIVE HEART FAILURE (HCC): Primary | ICD-10-CM

## 2018-07-30 RX ORDER — FUROSEMIDE 40 MG/1
TABLET ORAL
Qty: 90 TABLET | Refills: 3 | Status: SHIPPED | OUTPATIENT
Start: 2018-07-30 | End: 2018-08-22

## 2018-08-22 ENCOUNTER — OFFICE VISIT (OUTPATIENT)
Dept: INTERNAL MEDICINE CLINIC | Facility: CLINIC | Age: 83
End: 2018-08-22
Payer: MEDICARE

## 2018-08-22 VITALS
HEIGHT: 66 IN | BODY MASS INDEX: 32.47 KG/M2 | SYSTOLIC BLOOD PRESSURE: 118 MMHG | DIASTOLIC BLOOD PRESSURE: 70 MMHG | WEIGHT: 202 LBS | HEART RATE: 61 BPM | OXYGEN SATURATION: 95 % | TEMPERATURE: 98.6 F

## 2018-08-22 DIAGNOSIS — R53.83 FATIGUE, UNSPECIFIED TYPE: ICD-10-CM

## 2018-08-22 DIAGNOSIS — I10 BENIGN ESSENTIAL HYPERTENSION: Primary | ICD-10-CM

## 2018-08-22 DIAGNOSIS — E05.90 HYPERTHYROIDISM: ICD-10-CM

## 2018-08-22 DIAGNOSIS — E55.9 VITAMIN D DEFICIENCY: ICD-10-CM

## 2018-08-22 DIAGNOSIS — F41.8 DEPRESSION WITH ANXIETY: ICD-10-CM

## 2018-08-22 DIAGNOSIS — E78.5 DYSLIPIDEMIA: ICD-10-CM

## 2018-08-22 DIAGNOSIS — I50.32 CHRONIC DIASTOLIC CONGESTIVE HEART FAILURE (HCC): ICD-10-CM

## 2018-08-22 PROCEDURE — 99214 OFFICE O/P EST MOD 30 MIN: CPT | Performed by: FAMILY MEDICINE

## 2018-08-22 RX ORDER — FUROSEMIDE 40 MG/1
40 TABLET ORAL 2 TIMES DAILY
Qty: 180 TABLET | Refills: 1 | Status: SHIPPED | OUTPATIENT
Start: 2018-08-22 | End: 2018-11-06 | Stop reason: SDUPTHER

## 2018-08-22 RX ORDER — SERTRALINE HYDROCHLORIDE 100 MG/1
100 TABLET, FILM COATED ORAL DAILY
Qty: 90 TABLET | Refills: 1 | Status: SHIPPED | OUTPATIENT
Start: 2018-08-22 | End: 2019-02-18 | Stop reason: SDUPTHER

## 2018-08-22 NOTE — PATIENT INSTRUCTIONS
Heart Failure, Ambulatory Care   GENERAL INFORMATION:   Heart failure  happens when your heart has become too weak to pump enough blood to your organs and tissues  Heart failure is often the result of damage or injury to your heart caused by other heart problems and high blood pressure  Heart failure is a long-term condition that tends to get worse over time  Common symptoms include the following:   · Shortness of breath     · Fatigue or lack of energy (often worsened by physical activity)     · Swelling in your ankles, legs, or abdomen    · Coughing or wheezing     · Recent weight gain or weight loss     · Confusion or poor ability to concentrate  Seek immediate care for the following symptoms:   · Squeezing, pressure, or pain in your chest that lasts longer than 5 minutes or returns    · Discomfort or pain in your back, neck, jaw, stomach, or arm     · Trouble breathing    · Nausea or vomiting    · Lightheadedness or a sudden cold sweat, especially with chest pain or trouble breathing    · Gaining 3 or more pounds (1 4 kg) in a day (or more than your healthcare provider says you should)    · Heartbeat that is fast, slow, or uneven all the time  Treatment for heart failure  Treatment for heart failure may include any of the following:  · Heart medicines  help regulate your heart rhythm, lower your blood pressure, and get rid of extra fluids  · Antiplatelets , such as aspirin, help prevent blood clots  Take your antiplatelet medicine exactly as directed  These medicines make it more likely for you to bleed or bruise  If you are told to take aspirin, do not take acetaminophen or ibuprofen instead  · Anticoagulants    are a type of blood thinner medicine that helps prevent clots  Clots can cause strokes, heart attacks, and death  These medicines may cause you to bleed or bruise more easily  ¨ Watch for bleeding from your gums or nose  Watch for blood in your urine and bowel movements   Use a soft washcloth and a soft toothbrush  If you shave, use an electric razor  Avoid activities that can cause bruising or bleeding  ¨ Tell your healthcare provider about all medicines you take because many medicines cannot be used with anticoagulants  Do not start or stop any medicines unless your healthcare provider tells you to  Tell your dentist and other healthcare providers that you take anticoagulants  Wear a bracelet or necklace that says you take this medicine  ¨ You will need regular blood tests so your healthcare provider can decide how much medicine you need  Take anticoagulants exactly as directed  Tell your healthcare provider right away if you forget to take the medicine, or if you take too much  ¨ If you take warfarin, some foods can change how your blood clots  Do not make major changes to your diet while you take warfarin  Warfarin works best when you eat about the same amount of vitamin K every day  Vitamin K is found in green leafy vegetables, broccoli, grapes, and other foods  Ask for more information about what to eat when you take warfarin  · Cardiac rehab  is a program run by specialists who will help you safely strengthen your heart  The program includes exercise, relaxation, stress management, and heart-healthy nutrition  · Oxygen  may help you breathe easier if your oxygen level is lower than normal  A CPAP machine may be used to keep your airway open while you sleep  · Surgery  can be done to implant a pacemaker in your chest to regulate your heart rhythm  Other types of surgery can open blocked heart vessels, replace a damaged heart valve, or remove scar tissue  Manage heart failure:   · Weigh yourself every morning  using the same scale, in the same spot  Do this after you use the bathroom, but before you eat or drink anything  Wear the same type of clothing  Do not wear shoes  Record your weight each day so you will notice any sudden weight gain   Swelling and weight gain are signs of fluid retention  If you are overweight, ask your healthcare provider how to lose weight safely  · Check your blood pressure and heart rate every day  Ask for more information about how to measure your blood pressure and heart rate correctly  Ask what these numbers should be for you  Check your blood sugar as directed if you have diabetes  You will have fewer symptoms if you manage other health conditions such as high blood pressure, COPD, or diabetes  · Get a flu shot every year  You should also get a pneumonia vaccine  Vaccines protect you from these infections, which can be severe for those with heart failure  · Limit the amount of liquids you drink if you retain fluid  Ask your healthcare provider how much liquid to drink each day and which liquids are best for you  Follow up with your healthcare provider as directed:  Write down your questions so you remember to ask them during your visits  CARE AGREEMENT:   You have the right to help plan your care  Learn about your health condition and how it may be treated  Discuss treatment options with your caregivers to decide what care you want to receive  You always have the right to refuse treatment  The above information is an  only  It is not intended as medical advice for individual conditions or treatments  Talk to your doctor, nurse or pharmacist before following any medical regimen to see if it is safe and effective for you  © 2014 [address] is for End User's use only and may not be sold, redistributed or otherwise used for commercial purposes  All illustrations and images included in CareNotes® are the copyrighted property of [employer]  or [employer]

## 2018-08-22 NOTE — PROGRESS NOTES
Assessment/Plan:    Hyperthyroidism  Continue with the methimazole 3 times a week  Will check laboratory testing  Some of her increasing fatigue and not feeling well could possibly related to her thyroid  Will adjust dose if needed  Benign essential hypertension  Blood pressure controlled  Continue the diltiazem and the metoprolol as previously  Chronic diastolic congestive heart failure ([address] )  Discussed with her taking the Lasix twice a day on a daily basis especially since she feels that she is not urinating as much on the Lasix  Continue with potassium supplementation  Will check laboratory testing as noted below  Continue follow-up with Cardiology  Watch for any worsening shortness of breath, changes in weight, or increased peripheral edema  Vitamin D deficiency  Continue with vitamin-D supplementation  Will continue follow this with her routine laboratory testing  Depression with anxiety  Depression and anxiety symptoms have worsened somewhat  Her daughter has noticed this  Will increase her Zoloft from 50 mg on a daily basis to 100 mg on a daily basis  Potential side effects discussed  Dyslipidemia  Currently on the simvastatin  Will continue current dosage  Will check laboratory testing as noted below  Will make adjustments if needed  Fatigue  She has had worsening fatigue and overall malaise for the last 3-4 months  She does have a history of significant GI bleed in the past but denies any changes in bowel movements at present  This could possibly related to her depression symptoms  Possibly related to her thyroid issues  Will check laboratory testing as noted below  Diagnoses and all orders for this visit:    Benign essential hypertension  -     CBC and differential; Future  -     Comprehensive metabolic panel; Future  -     Lipid panel; Future    Hyperthyroidism  -     TSH, 3rd generation; Future  -     Lipid panel;  Future    Chronic diastolic congestive heart failure (HCC)  -     Comprehensive metabolic panel; Future  -     furosemide (LASIX) 40 mg tablet; Take 1 tablet (40 mg total) by mouth 2 (two) times a day for 90 days    Fatigue, unspecified type    Depression with anxiety  -     sertraline (ZOLOFT) 100 mg tablet; Take 1 tablet (100 mg total) by mouth daily    Dyslipidemia  -     Comprehensive metabolic panel; Future  -     Lipid panel; Future    Vitamin D deficiency  -     Vitamin D 25 hydroxy; Future        Laboratory testing reviewed with her  With her recent issues of not feeling well, will get laboratory testing now as noted above  Watch for recurrent issues of CHF  Will increase her Zoloft with some worsening mood  Potential side effects reviewed with her and her daughter  She declines screening testing  She declines immunizations  Will have her follow up in about 3-4 months with laboratory testing prior to that visit  Follow up sooner if needed  Subjective:      Patient ID: Beckie Ervin is a 80 y o  female  She presents with her daughter for routine visit  Did not have her laboratory testing done prior to this visit  Has been feeling somewhat more tired and overall malaise over the last 3-4 months  Denies any chest pain or palpitations with this  Denies any significant shortness of breath  Energy level has been definitely decreased  Denies any medication changes  Denies any changes in bowel movements  Denies any blood in her bowel movements  Denies any dark or black bowel movements  This does not feel the same as when she had the GI bleed in the past   Sleeping generally well  Daughter has noticed that she seems to be more down at times and has some increased anxiety as well  Daughter feels that she could benefit from an increase in the Zoloft  Has been taking the Lasix but does not feel that she is urinating as much with them as she has had in the past   Denies any dysuria  Denies any abdominal pain   Tolerating metoprolol and the diltiazem without difficulty  Dosage is have remained the same  Continues on the methimazole 3 days a week  Denies any nausea, vomiting, or diarrhea  Continues on the Protonix  Is on the Eliquis chronically for the paroxysmal atrial fibrillation  Vision has been stable  She declines screening testing as well as immunizations  Denies any recent fevers or chills  The following portions of the patient's history were reviewed and updated as appropriate:   She  has a past medical history of Anemia; Atrial fibrillation ([facility] ); Atrial flutter ([facility] ); Depression; Diastolic CHF ([facility] ); Dyslipidemia; Glaucoma; Glaucoma; H/O: hysterectomy; Hypertension; Hyperthyroidism; Impaired glucose tolerance; Pacemaker; Psoriasis; S/P cataract surgery; S/P knee replacement; SSS (sick sinus syndrome) ([facility] ); and SSS (sick sinus syndrome) ([facility] )  She   Patient Active Problem List    Diagnosis Date Noted    Dyspnea 09/20/2017    Osteoporosis 03/09/2017    Paroxysmal atrial fibrillation ([facility] ) 02/22/2017    Anemia 02/18/2017    Renal atrophy 02/18/2017    Hyperphosphatemia 02/18/2017    Vitamin D deficiency 02/18/2017    Hypokalemia 02/17/2017    Pulmonary hypertension ([facility] ) 02/17/2017    Presence of permanent cardiac pacemaker     H/O: hysterectomy     Chronic atrial fibrillation (HCC)     Benign essential hypertension     Hyperlipidemia     Hyperthyroidism     Glaucoma     Chronic diastolic congestive heart failure (HCC)     SSS (sick sinus syndrome) ([facility] )     Depression     Depression with anxiety 08/12/2015    Fatigue 11/18/2014    Vertigo 11/18/2014    Atrial flutter ([facility] ) 10/01/2014    AV block 10/01/2014    Dyslipidemia 09/12/2012    Psoriasis 09/12/2012     She  has a past surgical history that includes Cardiac pacemaker placement; Eye surgery; Hysterectomy; Cardiac pacemaker placement; Joint replacement; Esophagogastroduodenoscopy (N/A, 12/30/2016);  Cataract extraction; pr colonoscopy flx dx w/collj spec when pfrmd (N/A, 4/24/2017); and Replacement total knee (Bilateral)  Her family history includes Alcohol abuse in her brother and father; Diabetes in her father  She  reports that she has quit smoking  She has never used smokeless tobacco  She reports that she does not drink alcohol or use drugs  Current Outpatient Prescriptions   Medication Sig Dispense Refill    ALFALFA PO Take 3 tablets by mouth        apixaban (ELIQUIS) 5 mg Take 5 mg by mouth 2 (two) times a day      B Complex Vitamins (B COMPLEX 1 PO) Take by mouth      cholecalciferol (VITAMIN D3) 1,000 units tablet Take 1 tablet by mouth daily 30 tablet 0    diltiazem (CARDIZEM CD) 180 mg 24 hr capsule TAKE 1 CAPSULE DAILY 90 capsule 3    LECITHIN PO Take 1 tablet by mouth daily      Omega-3 Fatty Acids (OMEGA-3 PO) Take by mouth 2 (two) times a day        pantoprazole (PROTONIX) 40 mg tablet Take 40 mg by mouth daily      sertraline (ZOLOFT) 100 mg tablet Take 1 tablet (100 mg total) by mouth daily 90 tablet 1    simvastatin (ZOCOR) 20 mg tablet Take 20 mg by mouth daily at bedtime      Zinc Sulfate (ZINC 15 PO) Take 100 mg by mouth        furosemide (LASIX) 40 mg tablet Take 1 tablet (40 mg total) by mouth 2 (two) times a day for 90 days 180 tablet 1    methimazole (TAPAZOLE) 5 mg tablet Take 1 tablet by mouth 3 (three) times a week for 30 days (Patient taking differently: Take 5 mg by mouth 3 (three) times a week  ) 12 tablet 0    metoprolol tartrate (LOPRESSOR) 50 mg tablet Take 1 tablet by mouth every 12 (twelve) hours for 30 days 60 tablet 0     No current facility-administered medications for this visit        Current Outpatient Prescriptions on File Prior to Visit   Medication Sig    ALFALFA PO Take 3 tablets by mouth      apixaban (ELIQUIS) 5 mg Take 5 mg by mouth 2 (two) times a day    B Complex Vitamins (B COMPLEX 1 PO) Take by mouth    cholecalciferol (VITAMIN D3) 1,000 units tablet Take 1 tablet by mouth daily  diltiazem (CARDIZEM CD) 180 mg 24 hr capsule TAKE 1 CAPSULE DAILY    LECITHIN PO Take 1 tablet by mouth daily    Omega-3 Fatty Acids (OMEGA-3 PO) Take by mouth 2 (two) times a day      pantoprazole (PROTONIX) 40 mg tablet Take 40 mg by mouth daily    simvastatin (ZOCOR) 20 mg tablet Take 20 mg by mouth daily at bedtime    Zinc Sulfate (ZINC 15 PO) Take 100 mg by mouth      methimazole (TAPAZOLE) 5 mg tablet Take 1 tablet by mouth 3 (three) times a week for 30 days (Patient taking differently: Take 5 mg by mouth 3 (three) times a week  )    metoprolol tartrate (LOPRESSOR) 50 mg tablet Take 1 tablet by mouth every 12 (twelve) hours for 30 days     No current facility-administered medications on file prior to visit  She has No Known Allergies       Review of Systems   Constitutional: Positive for fatigue  Negative for activity change, appetite change, chills and fever  HENT: Positive for hearing loss  Negative for congestion and rhinorrhea  Eyes: Negative for visual disturbance  Respiratory: Negative for cough, chest tightness, shortness of breath and wheezing  Cardiovascular: Negative for chest pain and palpitations  Gastrointestinal: Negative for abdominal pain, blood in stool, diarrhea, nausea and vomiting  Endocrine: Negative for polydipsia, polyphagia and polyuria  Genitourinary: Positive for decreased urine volume  Negative for dysuria, frequency and urgency  Musculoskeletal: Negative for gait problem  Skin: Negative for color change  Neurological: Negative for dizziness and headaches  Hematological: Does not bruise/bleed easily  Psychiatric/Behavioral: Positive for dysphoric mood  Negative for confusion and sleep disturbance  The patient is nervous/anxious            Objective:      /70 (BP Location: Left arm, Patient Position: Sitting, Cuff Size: Large)   Pulse 61   Temp 98 6 °F (37 °C) (Temporal)   Ht 5' 6" (1 676 m)   Wt 91 6 kg (202 lb)   LMP  (LMP Unknown) SpO2 95%   BMI 32 60 kg/m²          Physical Exam   Constitutional: She is oriented to person, place, and time  She is cooperative  No distress  Tired appearing   HENT:   Head: Normocephalic and atraumatic  Mouth/Throat: Oropharynx is clear and moist    Hearing aids bilaterally; moist mucous membranes   Eyes: Conjunctivae are normal  Pupils are equal, round, and reactive to light  Right eye exhibits no discharge  Left eye exhibits no discharge  Neck: No JVD present  Carotid bruit is not present  Cardiovascular: Normal rate, regular rhythm and normal heart sounds  Exam reveals no gallop and no friction rub  No murmur heard  Currently regular   Pulmonary/Chest: No respiratory distress  She has no decreased breath sounds  She has no wheezes  She has no rhonchi  She has no rales  Abdominal: Bowel sounds are normal  She exhibits no distension  There is no tenderness  Musculoskeletal: She exhibits no edema  Lymphadenopathy:     She has no cervical adenopathy  Neurological: She is alert and oriented to person, place, and time  Skin: Skin is warm and dry  Psychiatric: She has a normal mood and affect  Her behavior is normal    Slightly flat affect   Nursing note and vitals reviewed  Below is the patient's most recent value for Albumin, ALT, AST, BUN, Calcium, Chloride, Cholesterol, CO2, Creatinine, GFR, Glucose, HDL, Hematocrit, Hemoglobin, Hemoglobin A1C, LDL, Magnesium, Phosphorus, Platelets, Potassium, PSA, Sodium, Triglycerides, and WBC     Lab Results   Component Value Date    ALT 21 08/28/2018    AST 24 08/28/2018    BUN 13 08/28/2018    CALCIUM 8 9 08/28/2018     08/28/2018    CHOL 153 10/24/2015    CO2 30 08/28/2018    CREATININE 1 21 08/28/2018    HDL 63 (H) 08/28/2018    HCT 43 0 08/28/2018    HGB 12 6 08/28/2018    HGBA1C 5 5 01/25/2016    MG 2 2 02/21/2017    PHOS 3 7 02/21/2017     08/28/2018    K 3 9 08/28/2018     08/28/2018    TRIG 84 08/28/2018    WBC 6 60 08/28/2018     Note: for a comprehensive list of the patient's lab results, access the Results Review activity

## 2018-08-28 ENCOUNTER — APPOINTMENT (OUTPATIENT)
Dept: LAB | Facility: HOSPITAL | Age: 83
End: 2018-08-28
Payer: MEDICARE

## 2018-08-28 DIAGNOSIS — I50.32 CHRONIC DIASTOLIC CONGESTIVE HEART FAILURE (HCC): ICD-10-CM

## 2018-08-28 DIAGNOSIS — E55.9 VITAMIN D DEFICIENCY: ICD-10-CM

## 2018-08-28 DIAGNOSIS — E05.90 HYPERTHYROIDISM: ICD-10-CM

## 2018-08-28 DIAGNOSIS — I10 BENIGN ESSENTIAL HYPERTENSION: ICD-10-CM

## 2018-08-28 DIAGNOSIS — E78.5 DYSLIPIDEMIA: ICD-10-CM

## 2018-08-28 LAB
25(OH)D3 SERPL-MCNC: 28.4 NG/ML (ref 30–100)
ALBUMIN SERPL BCP-MCNC: 3.3 G/DL (ref 3.5–5)
ALP SERPL-CCNC: 112 U/L (ref 46–116)
ALT SERPL W P-5'-P-CCNC: 21 U/L (ref 12–78)
ANION GAP SERPL CALCULATED.3IONS-SCNC: 8 MMOL/L (ref 4–13)
AST SERPL W P-5'-P-CCNC: 24 U/L (ref 5–45)
BASOPHILS # BLD AUTO: 0.04 THOUSANDS/ΜL (ref 0–0.1)
BASOPHILS NFR BLD AUTO: 1 % (ref 0–1)
BILIRUB SERPL-MCNC: 0.77 MG/DL (ref 0.2–1)
BUN SERPL-MCNC: 13 MG/DL (ref 5–25)
CALCIUM SERPL-MCNC: 8.9 MG/DL (ref 8.3–10.1)
CHLORIDE SERPL-SCNC: 104 MMOL/L (ref 100–108)
CHOLEST SERPL-MCNC: 153 MG/DL (ref 50–200)
CO2 SERPL-SCNC: 30 MMOL/L (ref 21–32)
CREAT SERPL-MCNC: 1.21 MG/DL (ref 0.6–1.3)
EOSINOPHIL # BLD AUTO: 0.34 THOUSAND/ΜL (ref 0–0.61)
EOSINOPHIL NFR BLD AUTO: 5 % (ref 0–6)
ERYTHROCYTE [DISTWIDTH] IN BLOOD BY AUTOMATED COUNT: 17.5 % (ref 11.6–15.1)
GFR SERPL CREATININE-BSD FRML MDRD: 41 ML/MIN/1.73SQ M
GLUCOSE P FAST SERPL-MCNC: 104 MG/DL (ref 65–99)
HCT VFR BLD AUTO: 43 % (ref 34.8–46.1)
HDLC SERPL-MCNC: 63 MG/DL (ref 40–60)
HGB BLD-MCNC: 12.6 G/DL (ref 11.5–15.4)
IMM GRANULOCYTES # BLD AUTO: 0.02 THOUSAND/UL (ref 0–0.2)
IMM GRANULOCYTES NFR BLD AUTO: 0 % (ref 0–2)
LDLC SERPL CALC-MCNC: 73 MG/DL (ref 0–100)
LYMPHOCYTES # BLD AUTO: 1.72 THOUSANDS/ΜL (ref 0.6–4.47)
LYMPHOCYTES NFR BLD AUTO: 26 % (ref 14–44)
MCH RBC QN AUTO: 27.4 PG (ref 26.8–34.3)
MCHC RBC AUTO-ENTMCNC: 29.3 G/DL (ref 31.4–37.4)
MCV RBC AUTO: 94 FL (ref 82–98)
MONOCYTES # BLD AUTO: 0.5 THOUSAND/ΜL (ref 0.17–1.22)
MONOCYTES NFR BLD AUTO: 8 % (ref 4–12)
NEUTROPHILS # BLD AUTO: 3.98 THOUSANDS/ΜL (ref 1.85–7.62)
NEUTS SEG NFR BLD AUTO: 60 % (ref 43–75)
NONHDLC SERPL-MCNC: 90 MG/DL
NRBC BLD AUTO-RTO: 0 /100 WBCS
PLATELET # BLD AUTO: 214 THOUSANDS/UL (ref 149–390)
PMV BLD AUTO: 9.5 FL (ref 8.9–12.7)
POTASSIUM SERPL-SCNC: 3.9 MMOL/L (ref 3.5–5.3)
PROT SERPL-MCNC: 7.3 G/DL (ref 6.4–8.2)
RBC # BLD AUTO: 4.6 MILLION/UL (ref 3.81–5.12)
SODIUM SERPL-SCNC: 142 MMOL/L (ref 136–145)
TRIGL SERPL-MCNC: 84 MG/DL
TSH SERPL DL<=0.05 MIU/L-ACNC: 2 UIU/ML (ref 0.36–3.74)
WBC # BLD AUTO: 6.6 THOUSAND/UL (ref 4.31–10.16)

## 2018-08-28 PROCEDURE — 80053 COMPREHEN METABOLIC PANEL: CPT

## 2018-08-28 PROCEDURE — 82306 VITAMIN D 25 HYDROXY: CPT

## 2018-08-28 PROCEDURE — 36415 COLL VENOUS BLD VENIPUNCTURE: CPT

## 2018-08-28 PROCEDURE — 80061 LIPID PANEL: CPT

## 2018-08-28 PROCEDURE — 85025 COMPLETE CBC W/AUTO DIFF WBC: CPT

## 2018-08-28 PROCEDURE — 84443 ASSAY THYROID STIM HORMONE: CPT

## 2018-09-04 ENCOUNTER — TELEPHONE (OUTPATIENT)
Dept: INTERNAL MEDICINE CLINIC | Facility: CLINIC | Age: 83
End: 2018-09-04

## 2018-09-04 NOTE — TELEPHONE ENCOUNTER
Patients daughter, Grisel Bonilla was calling to check on the results of her mothers bloodwork  Can you advise?

## 2018-09-09 NOTE — ASSESSMENT & PLAN NOTE
Discussed with her taking the Lasix twice a day on a daily basis especially since she feels that she is not urinating as much on the Lasix  Continue with potassium supplementation  Will check laboratory testing as noted below  Continue follow-up with Cardiology  Watch for any worsening shortness of breath, changes in weight, or increased peripheral edema

## 2018-09-09 NOTE — ASSESSMENT & PLAN NOTE
Continue with the methimazole 3 times a week  Will check laboratory testing  Some of her increasing fatigue and not feeling well could possibly related to her thyroid  Will adjust dose if needed

## 2018-09-09 NOTE — ASSESSMENT & PLAN NOTE
Continue with vitamin-D supplementation  Will continue follow this with her routine laboratory testing

## 2018-09-09 NOTE — ASSESSMENT & PLAN NOTE
Depression and anxiety symptoms have worsened somewhat  Her daughter has noticed this  Will increase her Zoloft from 50 mg on a daily basis to 100 mg on a daily basis  Potential side effects discussed

## 2018-09-09 NOTE — ASSESSMENT & PLAN NOTE
Currently on the simvastatin  Will continue current dosage  Will check laboratory testing as noted below  Will make adjustments if needed

## 2018-09-09 NOTE — ASSESSMENT & PLAN NOTE
She has had worsening fatigue and overall malaise for the last 3-4 months  She does have a history of significant GI bleed in the past but denies any changes in bowel movements at present  This could possibly related to her depression symptoms  Possibly related to her thyroid issues  Will check laboratory testing as noted below

## 2018-11-05 ENCOUNTER — OFFICE VISIT (OUTPATIENT)
Dept: CARDIOLOGY CLINIC | Facility: CLINIC | Age: 83
End: 2018-11-05
Payer: MEDICARE

## 2018-11-05 VITALS
HEIGHT: 66 IN | OXYGEN SATURATION: 96 % | BODY MASS INDEX: 31.31 KG/M2 | DIASTOLIC BLOOD PRESSURE: 54 MMHG | WEIGHT: 194.8 LBS | HEART RATE: 60 BPM | SYSTOLIC BLOOD PRESSURE: 118 MMHG

## 2018-11-05 DIAGNOSIS — I49.5 SSS (SICK SINUS SYNDROME) (HCC): ICD-10-CM

## 2018-11-05 DIAGNOSIS — I50.32 CHRONIC DIASTOLIC HEART FAILURE (HCC): Primary | ICD-10-CM

## 2018-11-05 DIAGNOSIS — I10 BENIGN ESSENTIAL HYPERTENSION: ICD-10-CM

## 2018-11-05 DIAGNOSIS — I48.0 PAROXYSMAL ATRIAL FIBRILLATION (HCC): ICD-10-CM

## 2018-11-05 PROCEDURE — 99215 OFFICE O/P EST HI 40 MIN: CPT | Performed by: NURSE PRACTITIONER

## 2018-11-05 NOTE — PATIENT INSTRUCTIONS
Maintain a 2 gram daily sodium diet and 1500 ml daily fluid restriction  Check daily weights  If you gain 3 pounds in one day, 5 pounds in one week, or experience worsening shortness of breath or increasing lower leg swelling  Please call the heart failure office at [phone]    Please bring a  list of your current medications and daily weights to the office visit

## 2018-11-05 NOTE — PROGRESS NOTES
Heart Failure Office Visit   Margo Mishra 80 y o  female MRN: [id number]    \[facility]\""  Ms Margo Mishra is an 80year old female with a known past medical history   Patient Active Problem List   Diagnosis    Presence of permanent cardiac pacemaker    H/O: hysterectomy    Chronic atrial fibrillation (HCC)    Benign essential hypertension    Hyperlipidemia    Hyperthyroidism    Glaucoma    Chronic diastolic congestive heart failure (HCC)    SSS (sick sinus syndrome) (HCC)    Depression    Hypokalemia    Pulmonary hypertension ([address] )    Anemia    Renal atrophy    Hyperphosphatemia    Vitamin D deficiency    Atrial flutter (HCC)    AV block    Depression with anxiety    Dyslipidemia    Dyspnea    Fatigue    Osteoporosis    Paroxysmal atrial fibrillation (HCC)    Psoriasis    Vertigo     1/23/17  TTE Showed LVEF 95%, grade 2 diastolic dysfunction  RV size normal, systolic function normal   LA mod dilated, Mild MR, mild TR, PAP 54 mmHg  Ms Caleb Miller presents to our office today for an acute visit  She is accompanied by her daughter  Lexie Weaver noted dark stools 3 weeks ago and stopped her Eliquis  Lexie Weaver did not inform a provider she stopped her Eliquis  Lexie Meg began to experience shortness of breath with exertion around the same time  Lexie Weaver Denies CP, palpations,lightheadedness or dizziness  Her weight in the office today is down from previous visit of 202 pounds to 194 pounds  She denies dietary indiscretion  No recent lab studies  Lexie Meg states her stools are no longer dark but she remained off Eliquis  Review of Systems   Respiratory: Positive for shortness of breath  Objective:   Vitals:   Vitals:    11/05/18 1627   BP: 118/54   BP Location: Right arm   Patient Position: Sitting   Cuff Size: Large   Pulse: 60   SpO2: 96%   Weight: 88 4 kg (194 lb 12 8 oz)   Height: 5' 6" (1 676 m)     Body mass index is 31 44 kg/m²      Wt Readings from Last 3 Encounters:   11/05/18 88 4 kg (194 lb 12 8 oz)   08/22/18 91 6 kg (202 lb)   05/14/18 91 6 kg (202 lb)         Physical Exam:  GEN: Margo Mishra appears well, alert and oriented x 3, pleasant and cooperative   HEENT: pupils equal, round, and reactive to light; extraocular muscles intact  NECK: supple, no carotid bruits   HEART: regular rhythm, ectopic beats, normal S1 and S2, no murmurs, clicks, gallops or rubs, JVP is  flat  LUNGS: clear to auscultation bilaterally; no wheezes, rales, or rhonchi   ABDOMEN: normal bowel sounds, soft, no tenderness, no distention  EXTREMITIES: peripheral pulses normal; no clubbing, cyanosis, or edema  NEURO: no focal findings   SKIN: normal without suspicious lesions on exposed skin      Current Outpatient Prescriptions:     ALFALFA PO, Take 3 tablets by mouth  , Disp: , Rfl:     B Complex Vitamins (B COMPLEX 1 PO), Take by mouth, Disp: , Rfl:     cholecalciferol (VITAMIN D3) 1,000 units tablet, Take 1 tablet by mouth daily, Disp: 30 tablet, Rfl: 0    diltiazem (CARDIZEM CD) 180 mg 24 hr capsule, TAKE 1 CAPSULE DAILY, Disp: 90 capsule, Rfl: 3    furosemide (LASIX) 40 mg tablet, Take 1 tablet (40 mg total) by mouth 2 (two) times a day for 90 days, Disp: 180 tablet, Rfl: 1    LECITHIN PO, Take 1 tablet by mouth daily, Disp: , Rfl:     methimazole (TAPAZOLE) 5 mg tablet, Take 1 tablet by mouth 3 (three) times a week for 30 days (Patient taking differently: Take 5 mg by mouth 3 (three) times a week  ), Disp: 12 tablet, Rfl: 0    metoprolol tartrate (LOPRESSOR) 50 mg tablet, Take 1 tablet by mouth every 12 (twelve) hours for 30 days, Disp: 60 tablet, Rfl: 0    Omega-3 Fatty Acids (OMEGA-3 PO), Take by mouth 2 (two) times a day  , Disp: , Rfl:     sertraline (ZOLOFT) 100 mg tablet, Take 1 tablet (100 mg total) by mouth daily, Disp: 90 tablet, Rfl: 1    simvastatin (ZOCOR) 20 mg tablet, Take 20 mg by mouth daily at bedtime, Disp: , Rfl:     Zinc Sulfate (ZINC 15 PO), Take 100 mg by mouth  , Disp: , Rfl:     apixaban (ELIQUIS) 5 mg, Take 5 mg by mouth 2 (two) times a day, Disp: , Rfl:       Labs & Results:                  Assessment/Plan:   1  Chronic diastolic heart failure NYHA class III stage C- On PE appears eu volemic,  Weight in the office is 194 pounds  BP and HR controlled  2 D echocardiogram to evaluate valvular function and wall function  Continue on Cardizem CD 180mg daily, Lasix 40mg BID, Metoprolol tartrate 50mg Q12 hours  BMP, NT pro BNP in am  2  Paroxysmal atrial fibrillation- RRR with ectopic beats, instructed to transmit device for interrogation of HR and Rhythm, took self off Eliquis due to dark tarry stool, CBC evaluate H and H  No longer experiencing dark tarry stool  If H and H stable restart Eliquis, continue on Metoprolol tartrate 50mg Q12 hours and Cardizem CD 180mg daily  3  SSS sp PPM  4   HTN- controlled    JAIMIE Loya  11/5/2018  4:36 PM

## 2018-11-06 ENCOUNTER — APPOINTMENT (OUTPATIENT)
Dept: LAB | Facility: HOSPITAL | Age: 83
End: 2018-11-06
Payer: MEDICARE

## 2018-11-06 DIAGNOSIS — I50.32 CHRONIC DIASTOLIC CONGESTIVE HEART FAILURE (HCC): ICD-10-CM

## 2018-11-06 DIAGNOSIS — I50.32 CHRONIC DIASTOLIC HEART FAILURE (HCC): ICD-10-CM

## 2018-11-06 LAB
ANION GAP SERPL CALCULATED.3IONS-SCNC: 4 MMOL/L (ref 4–13)
BASOPHILS # BLD AUTO: 0.04 THOUSANDS/ΜL (ref 0–0.1)
BASOPHILS NFR BLD AUTO: 1 % (ref 0–1)
BUN SERPL-MCNC: 18 MG/DL (ref 5–25)
CALCIUM SERPL-MCNC: 9.1 MG/DL (ref 8.3–10.1)
CHLORIDE SERPL-SCNC: 103 MMOL/L (ref 100–108)
CO2 SERPL-SCNC: 35 MMOL/L (ref 21–32)
CREAT SERPL-MCNC: 1.33 MG/DL (ref 0.6–1.3)
EOSINOPHIL # BLD AUTO: 0.29 THOUSAND/ΜL (ref 0–0.61)
EOSINOPHIL NFR BLD AUTO: 4 % (ref 0–6)
ERYTHROCYTE [DISTWIDTH] IN BLOOD BY AUTOMATED COUNT: 15.9 % (ref 11.6–15.1)
GFR SERPL CREATININE-BSD FRML MDRD: 36 ML/MIN/1.73SQ M
GLUCOSE P FAST SERPL-MCNC: 100 MG/DL (ref 65–99)
HCT VFR BLD AUTO: 41.9 % (ref 34.8–46.1)
HGB BLD-MCNC: 12.7 G/DL (ref 11.5–15.4)
IMM GRANULOCYTES # BLD AUTO: 0.03 THOUSAND/UL (ref 0–0.2)
IMM GRANULOCYTES NFR BLD AUTO: 0 % (ref 0–2)
LYMPHOCYTES # BLD AUTO: 1.68 THOUSANDS/ΜL (ref 0.6–4.47)
LYMPHOCYTES NFR BLD AUTO: 21 % (ref 14–44)
MCH RBC QN AUTO: 25.9 PG (ref 26.8–34.3)
MCHC RBC AUTO-ENTMCNC: 30.3 G/DL (ref 31.4–37.4)
MCV RBC AUTO: 86 FL (ref 82–98)
MONOCYTES # BLD AUTO: 0.66 THOUSAND/ΜL (ref 0.17–1.22)
MONOCYTES NFR BLD AUTO: 8 % (ref 4–12)
NEUTROPHILS # BLD AUTO: 5.35 THOUSANDS/ΜL (ref 1.85–7.62)
NEUTS SEG NFR BLD AUTO: 66 % (ref 43–75)
NRBC BLD AUTO-RTO: 0 /100 WBCS
NT-PROBNP SERPL-MCNC: 824 PG/ML
PLATELET # BLD AUTO: 213 THOUSANDS/UL (ref 149–390)
PMV BLD AUTO: 9.8 FL (ref 8.9–12.7)
POTASSIUM SERPL-SCNC: 3.3 MMOL/L (ref 3.5–5.3)
RBC # BLD AUTO: 4.9 MILLION/UL (ref 3.81–5.12)
SODIUM SERPL-SCNC: 142 MMOL/L (ref 136–145)
WBC # BLD AUTO: 8.05 THOUSAND/UL (ref 4.31–10.16)

## 2018-11-06 PROCEDURE — 83880 ASSAY OF NATRIURETIC PEPTIDE: CPT

## 2018-11-06 PROCEDURE — 85025 COMPLETE CBC W/AUTO DIFF WBC: CPT | Performed by: NURSE PRACTITIONER

## 2018-11-06 PROCEDURE — 80048 BASIC METABOLIC PNL TOTAL CA: CPT | Performed by: NURSE PRACTITIONER

## 2018-11-06 PROCEDURE — 36415 COLL VENOUS BLD VENIPUNCTURE: CPT | Performed by: NURSE PRACTITIONER

## 2018-11-06 RX ORDER — POTASSIUM CHLORIDE 20 MEQ/1
20 TABLET, EXTENDED RELEASE ORAL DAILY
Qty: 30 TABLET | Refills: 3 | Status: SHIPPED | OUTPATIENT
Start: 2018-11-06 | End: 2018-11-13 | Stop reason: SDUPTHER

## 2018-11-06 RX ORDER — FUROSEMIDE 40 MG/1
40 TABLET ORAL 2 TIMES DAILY
Qty: 180 TABLET | Refills: 0
Start: 2018-11-06 | End: 2019-05-22

## 2018-11-06 NOTE — PROGRESS NOTES
Labs reviewed Creat up to 1 33, K+ 3 3, decrease Lasix from 40mg BID to 40mg in am and 20mg in pm    Start K Dur 20meq daily, BMP in one week

## 2018-11-13 DIAGNOSIS — I50.32 CHRONIC DIASTOLIC CONGESTIVE HEART FAILURE (HCC): ICD-10-CM

## 2018-11-20 ENCOUNTER — OFFICE VISIT (OUTPATIENT)
Dept: INTERNAL MEDICINE CLINIC | Facility: CLINIC | Age: 83
End: 2018-11-20
Payer: MEDICARE

## 2018-11-20 VITALS
HEART RATE: 60 BPM | HEIGHT: 66 IN | SYSTOLIC BLOOD PRESSURE: 142 MMHG | WEIGHT: 195.5 LBS | DIASTOLIC BLOOD PRESSURE: 68 MMHG | BODY MASS INDEX: 31.42 KG/M2 | TEMPERATURE: 98.6 F | OXYGEN SATURATION: 96 %

## 2018-11-20 DIAGNOSIS — F32.A DEPRESSION, UNSPECIFIED DEPRESSION TYPE: ICD-10-CM

## 2018-11-20 DIAGNOSIS — I48.0 PAROXYSMAL ATRIAL FIBRILLATION (HCC): ICD-10-CM

## 2018-11-20 DIAGNOSIS — E87.6 HYPOKALEMIA: ICD-10-CM

## 2018-11-20 DIAGNOSIS — I10 BENIGN ESSENTIAL HYPERTENSION: ICD-10-CM

## 2018-11-20 DIAGNOSIS — I50.32 CHRONIC DIASTOLIC CONGESTIVE HEART FAILURE (HCC): Primary | ICD-10-CM

## 2018-11-20 DIAGNOSIS — E05.90 HYPERTHYROIDISM: ICD-10-CM

## 2018-11-20 DIAGNOSIS — D64.9 ANEMIA, UNSPECIFIED TYPE: ICD-10-CM

## 2018-11-20 DIAGNOSIS — E55.9 VITAMIN D DEFICIENCY: ICD-10-CM

## 2018-11-20 PROCEDURE — 99214 OFFICE O/P EST MOD 30 MIN: CPT | Performed by: FAMILY MEDICINE

## 2018-11-20 RX ORDER — ASPIRIN 81 MG/1
81 TABLET ORAL DAILY
COMMUNITY

## 2018-11-20 NOTE — PROGRESS NOTES
Assessment/Plan:    Hyperthyroidism  She did not have her laboratory testing done prior to this visit  Slip given for this  It is possible that her thyroid is affecting her energy level although it is more likely related to other causes  Continue the current dose of the methimazole for now and we will contact her with her laboratory testing  Benign essential hypertension  Blood pressure controlled  Continue with the diltiazem and the metoprolol as previously  Continue follow-up with Cardiology  Watch salt intake  Chronic diastolic congestive heart failure ([facility] [address] )  She did have a recent worsening of her chronic diastolic congestive heart failure  Stressed her the importance of staying on the Lasix  Watch salt intake  Watch for increasing shortness of breath  Some of her recent symptoms may have been related to recent GI bleed as noted in HPI  Watch for any worsening  Weigh self daily  Watch for weight increase of more than 2-3 lb in 24 hours or more than 5 lb in a week  Paroxysmal atrial fibrillation (HCC)  Currently she sounds regular  She declines going back on any anticoagulation after the recent black stools  They plan on discussing this further with Cardiology at upcoming appointment  Watch for any chest pain or palpitations  Risks of not being anticoagulated with paroxysmal atrial fibrillation discussed  Depression  Depression symptoms have been better controlled with the higher dose of the Zoloft  She is tolerating this without difficulty  Continue same dosage  Watch for any worsening of symptoms  Watch for any potential side effects  Hypokalemia  Blood work that was done for Cardiology recently did show low potassium  Discussed with her the importance of taking the potassium supplementation along with the Lasix  Foods higher in potassium discussed    Recommended getting repeat laboratory testing as per cardiology's recommendation along with our laboratory testing  Anemia  Hemoglobin has dropped somewhat over the last 6 months or so  She may have a low-grade GI bleed which was worsened with the anticoagulation  Discussed with her following the hemoglobin closely especially since she had a severe GI bleed last year  Laboratory testing as noted below  Vitamin D deficiency  Continue with vitamin D supplementation  Will continue to follow this with laboratory testing 2 to 3 times a year  Diagnoses and all orders for this visit:    Chronic diastolic congestive heart failure ([address] )  -     Comprehensive metabolic panel; Future  -     Lipid panel; Future    Benign essential hypertension  -     Comprehensive metabolic panel; Future  -     Lipid panel; Future    Paroxysmal atrial fibrillation (HCC)  -     Lipid panel; Future    Depression, unspecified depression type    Hypokalemia    Anemia, unspecified type  -     CBC and differential; Future  -     Iron Panel; Future    Vitamin D deficiency  -     Vitamin D 25 hydroxy; Future    Hyperthyroidism  -     TSH, 3rd generation; Future    Other orders  -     aspirin (ECOTRIN LOW STRENGTH) 81 mg EC tablet; Take 81 mg by mouth daily        Orders recommendations as noted above  Recent laboratory testing ordered by Cardiology reviewed with them  She declines screening testing  She declines immunizations  Will have her follow up in about 3 months with laboratory testing prior to that visit  Follow up sooner if needed especially if has any further changes in bowel movements or persistent symptoms  Subjective:      Patient ID: Ben Jaime is a 80 y o  female  She presents with her daughter for routine visit  She has had some issues since the last visit  She began to have dark bowel movements again  She did not initially tell her daughter  She had begun to feel more short of breath and increasingly tired  She took herself off of the Eliquis and the darker bowel movements have since stopped    She has continued on the aspirin 81 mg  Still feels more tired than her baseline but improved over how she had felt about 5 weeks ago  Shortness of breath has improved somewhat  Cardiology did see her and ordered laboratory testing  BNP was somewhat elevated and potassium was somewhat low  Hemoglobin was somewhat lower than it had been 6 months ago but not significantly low  She is to get repeat laboratory testing for them to recheck her potassium  She does have follow-up with them over the next month or so  Denies any chest pain or palpitations  Has been tolerating her medications otherwise well  Tolerating the diltiazem and metoprolol without difficulty  Denies any headaches or localized weakness  Denies any significant peripheral edema  Has had the increased shortness of breath but that has improved slightly  Mood has been relatively stable  She does notice a difference on the higher dose of the Zoloft  Denies any side effects from this  Continues with the methimazole  Only takes this about 3 days a week  Denies any symptoms of hyperthyroidism  Usually sleeps relatively well  Denies any significant joint pain at present  Continues to take the vitamin-D supplementation  Denies any nausea, vomiting, or diarrhea  Black bowel movements have now resolved  Denies any dysuria  Vision has been stable  The following portions of the patient's history were reviewed and updated as appropriate:   She  has a past medical history of Anemia; Atrial fibrillation ([address] ); Atrial flutter ([address] ); Depression; Diastolic CHF ([address] ); Dyslipidemia; Glaucoma; Glaucoma; H/O: hysterectomy; Hypertension; Hyperthyroidism; Impaired glucose tolerance; Pacemaker; Psoriasis; S/P cataract surgery; S/P knee replacement; SSS (sick sinus syndrome) ([address] ); and SSS (sick sinus syndrome) ([address] )    She   Patient Active Problem List    Diagnosis Date Noted    Dyspnea 09/20/2017    Osteoporosis 03/09/2017    Paroxysmal atrial fibrillation ([place] [address] ) 02/22/2017    Anemia 02/18/2017    Renal atrophy 02/18/2017    Hyperphosphatemia 02/18/2017    Vitamin D deficiency 02/18/2017    Hypokalemia 02/17/2017    Pulmonary hypertension ([place] [address] ) 02/17/2017    Presence of permanent cardiac pacemaker     H/O: hysterectomy     Chronic atrial fibrillation (HCC)     Benign essential hypertension     Hyperlipidemia     Hyperthyroidism     Glaucoma     Chronic diastolic congestive heart failure (HCC)     SSS (sick sinus syndrome) ([place] [address] )     Depression     Depression with anxiety 08/12/2015    Fatigue 11/18/2014    Vertigo 11/18/2014    Atrial flutter ([place] [address] ) 10/01/2014    AV block 10/01/2014    Dyslipidemia 09/12/2012    Psoriasis 09/12/2012     She  has a past surgical history that includes Cardiac pacemaker placement; Eye surgery; Hysterectomy; Cardiac pacemaker placement; Joint replacement; Esophagogastroduodenoscopy (N/A, 12/30/2016); Cataract extraction; pr colonoscopy flx dx w/collj spec when pfrmd (N/A, 4/24/2017); and Replacement total knee (Bilateral)  Her family history includes Alcohol abuse in her brother and father; Diabetes in her father  She  reports that she has quit smoking  She has never used smokeless tobacco  She reports that she does not drink alcohol or use drugs    Current Outpatient Prescriptions   Medication Sig Dispense Refill    ALFALFA PO Take 3 tablets by mouth        aspirin (ECOTRIN LOW STRENGTH) 81 mg EC tablet Take 81 mg by mouth daily      B Complex Vitamins (B COMPLEX 1 PO) Take by mouth      cholecalciferol (VITAMIN D3) 1,000 units tablet Take 1 tablet by mouth daily 30 tablet 0    diltiazem (CARDIZEM CD) 180 mg 24 hr capsule TAKE 1 CAPSULE DAILY 90 capsule 3    furosemide (LASIX) 40 mg tablet Take 1 tablet (40 mg total) by mouth 2 (two) times a day for 90 days Lasix 40mg in am and 20mg in pm 180 tablet 0    LECITHIN PO Take 1 tablet by mouth daily      methimazole (TAPAZOLE) 5 mg tablet Take 1 tablet by mouth 3 (three) times a week for 30 days (Patient taking differently: Take 5 mg by mouth 3 (three) times a week  ) 12 tablet 0    metoprolol tartrate (LOPRESSOR) 50 mg tablet Take 1 tablet by mouth every 12 (twelve) hours for 30 days 60 tablet 0    Omega-3 Fatty Acids (OMEGA-3 PO) Take by mouth 2 (two) times a day        sertraline (ZOLOFT) 100 mg tablet Take 1 tablet (100 mg total) by mouth daily 90 tablet 1    Zinc Sulfate (ZINC 15 PO) Take 100 mg by mouth        potassium chloride (K-DUR,KLOR-CON) 20 mEq tablet Take 1 tablet (20 mEq total) by mouth daily 30 tablet 6     No current facility-administered medications for this visit  Current Outpatient Prescriptions on File Prior to Visit   Medication Sig    ALFALFA PO Take 3 tablets by mouth      B Complex Vitamins (B COMPLEX 1 PO) Take by mouth    cholecalciferol (VITAMIN D3) 1,000 units tablet Take 1 tablet by mouth daily    diltiazem (CARDIZEM CD) 180 mg 24 hr capsule TAKE 1 CAPSULE DAILY    furosemide (LASIX) 40 mg tablet Take 1 tablet (40 mg total) by mouth 2 (two) times a day for 90 days Lasix 40mg in am and 20mg in pm    LECITHIN PO Take 1 tablet by mouth daily    methimazole (TAPAZOLE) 5 mg tablet Take 1 tablet by mouth 3 (three) times a week for 30 days (Patient taking differently: Take 5 mg by mouth 3 (three) times a week  )    metoprolol tartrate (LOPRESSOR) 50 mg tablet Take 1 tablet by mouth every 12 (twelve) hours for 30 days    Omega-3 Fatty Acids (OMEGA-3 PO) Take by mouth 2 (two) times a day      sertraline (ZOLOFT) 100 mg tablet Take 1 tablet (100 mg total) by mouth daily    Zinc Sulfate (ZINC 15 PO) Take 100 mg by mouth      potassium chloride (K-DUR,KLOR-CON) 20 mEq tablet Take 1 tablet (20 mEq total) by mouth daily     No current facility-administered medications on file prior to visit  She has No Known Allergies       Review of Systems   Constitutional: Positive for fatigue   Negative for activity change, appetite change, chills and fever  HENT: Positive for hearing loss  Negative for congestion and rhinorrhea  Eyes: Negative for visual disturbance  Respiratory: Positive for shortness of breath  Negative for cough, chest tightness and wheezing  As per HPI   Cardiovascular: Negative for chest pain and palpitations  As per HPI   Gastrointestinal: Positive for blood in stool  Negative for abdominal pain, diarrhea, nausea and vomiting  As per HPI   Endocrine: Negative for polydipsia, polyphagia and polyuria  Genitourinary: Negative for dysuria, frequency and urgency  Musculoskeletal: Negative for gait problem  Skin: Negative for color change  Neurological: Negative for dizziness and headaches  Hematological: Does not bruise/bleed easily  Psychiatric/Behavioral: Positive for dysphoric mood  Negative for confusion and sleep disturbance  The patient is nervous/anxious  Objective:      /68 (BP Location: Left arm, Patient Position: Sitting, Cuff Size: Standard)   Pulse 60   Temp 98 6 °F (37 °C) (Temporal)   Ht 5' 6" (1 676 m)   Wt 88 7 kg (195 lb 8 oz)   LMP  (LMP Unknown)   SpO2 96%   BMI 31 55 kg/m²          Physical Exam   Constitutional: She is oriented to person, place, and time  She is cooperative  No distress  Tired appearing   HENT:   Head: Normocephalic and atraumatic  Mouth/Throat: Oropharynx is clear and moist    Hearing aids bilaterally; moist mucous membranes   Eyes: Pupils are equal, round, and reactive to light  Conjunctivae are normal  Right eye exhibits no discharge  Left eye exhibits no discharge  Neck: No JVD present  Carotid bruit is not present  No thyromegaly present  Cardiovascular: Normal rate, regular rhythm and normal heart sounds  Exam reveals no gallop and no friction rub  No murmur heard  Currently regular   Pulmonary/Chest: No respiratory distress  She has no decreased breath sounds  She has no wheezes   She has no rhonchi  She has no rales  Abdominal: Bowel sounds are normal  She exhibits no distension  There is no tenderness  Musculoskeletal: She exhibits no edema  Lymphadenopathy:     She has no cervical adenopathy  Neurological: She is alert and oriented to person, place, and time  Skin: Skin is warm and dry  There is pallor  Psychiatric: She has a normal mood and affect  Her behavior is normal    Nursing note and vitals reviewed  Below is the patient's most recent value for Albumin, ALT, AST, BUN, Calcium, Chloride, Cholesterol, CO2, Creatinine, GFR, Glucose, HDL, Hematocrit, Hemoglobin, Hemoglobin A1C, LDL, Magnesium, Phosphorus, Platelets, Potassium, PSA, Sodium, Triglycerides, and WBC  Lab Results   Component Value Date    ALT 21 08/28/2018    AST 24 08/28/2018    BUN 18 11/06/2018    CALCIUM 9 1 11/06/2018     11/06/2018    CHOL 153 10/24/2015    CO2 35 (H) 11/06/2018    CREATININE 1 33 (H) 11/06/2018    HDL 63 (H) 08/28/2018    HCT 41 9 11/06/2018    HGB 12 7 11/06/2018    HGBA1C 5 5 01/25/2016    MG 2 2 02/21/2017    PHOS 3 7 02/21/2017     11/06/2018    K 3 3 (L) 11/06/2018     11/18/2015    TRIG 84 08/28/2018    WBC 8 05 11/06/2018     Note: for a comprehensive list of the patient's lab results, access the Results Review activity

## 2018-11-27 RX ORDER — POTASSIUM CHLORIDE 20 MEQ/1
20 TABLET, EXTENDED RELEASE ORAL DAILY
Qty: 30 TABLET | Refills: 6 | Status: SHIPPED | OUTPATIENT
Start: 2018-11-27 | End: 2018-12-06 | Stop reason: SDUPTHER

## 2018-11-27 NOTE — ASSESSMENT & PLAN NOTE
Depression symptoms have been better controlled with the higher dose of the Zoloft  She is tolerating this without difficulty  Continue same dosage  Watch for any worsening of symptoms  Watch for any potential side effects

## 2018-11-27 NOTE — ASSESSMENT & PLAN NOTE
Continue with vitamin D supplementation  Will continue to follow this with laboratory testing 2 to 3 times a year

## 2018-11-27 NOTE — ASSESSMENT & PLAN NOTE
She did have a recent worsening of her chronic diastolic congestive heart failure  Stressed her the importance of staying on the Lasix  Watch salt intake  Watch for increasing shortness of breath  Some of her recent symptoms may have been related to recent GI bleed as noted in HPI  Watch for any worsening  Weigh self daily  Watch for weight increase of more than 2-3 lb in 24 hours or more than 5 lb in a week

## 2018-11-27 NOTE — ASSESSMENT & PLAN NOTE
Currently she sounds regular  She declines going back on any anticoagulation after the recent black stools  They plan on discussing this further with Cardiology at upcoming appointment  Watch for any chest pain or palpitations  Risks of not being anticoagulated with paroxysmal atrial fibrillation discussed

## 2018-11-27 NOTE — ASSESSMENT & PLAN NOTE
Hemoglobin has dropped somewhat over the last 6 months or so  She may have a low-grade GI bleed which was worsened with the anticoagulation  Discussed with her following the hemoglobin closely especially since she had a severe GI bleed last year  Laboratory testing as noted below

## 2018-11-27 NOTE — ASSESSMENT & PLAN NOTE
She did not have her laboratory testing done prior to this visit  Slip given for this  It is possible that her thyroid is affecting her energy level although it is more likely related to other causes  Continue the current dose of the methimazole for now and we will contact her with her laboratory testing

## 2018-11-27 NOTE — ASSESSMENT & PLAN NOTE
Blood pressure controlled  Continue with the diltiazem and the metoprolol as previously  Continue follow-up with Cardiology  Watch salt intake

## 2018-11-27 NOTE — ASSESSMENT & PLAN NOTE
Blood work that was done for Cardiology recently did show low potassium  Discussed with her the importance of taking the potassium supplementation along with the Lasix  Foods higher in potassium discussed  Recommended getting repeat laboratory testing as per cardiology's recommendation along with our laboratory testing

## 2018-12-06 DIAGNOSIS — I50.32 CHRONIC DIASTOLIC CONGESTIVE HEART FAILURE (HCC): ICD-10-CM

## 2018-12-06 RX ORDER — POTASSIUM CHLORIDE 20 MEQ/1
20 TABLET, EXTENDED RELEASE ORAL DAILY
Qty: 90 TABLET | Refills: 3 | Status: SHIPPED | OUTPATIENT
Start: 2018-12-06 | End: 2019-02-20 | Stop reason: SDUPTHER

## 2018-12-11 ENCOUNTER — APPOINTMENT (OUTPATIENT)
Dept: LAB | Facility: HOSPITAL | Age: 83
End: 2018-12-11
Payer: MEDICARE

## 2018-12-11 DIAGNOSIS — I48.0 PAROXYSMAL ATRIAL FIBRILLATION (HCC): ICD-10-CM

## 2018-12-11 DIAGNOSIS — E55.9 VITAMIN D DEFICIENCY: ICD-10-CM

## 2018-12-11 DIAGNOSIS — E78.2 MIXED HYPERLIPIDEMIA: ICD-10-CM

## 2018-12-11 DIAGNOSIS — I50.32 CHRONIC DIASTOLIC CONGESTIVE HEART FAILURE (HCC): ICD-10-CM

## 2018-12-11 DIAGNOSIS — I10 BENIGN ESSENTIAL HYPERTENSION: ICD-10-CM

## 2018-12-11 DIAGNOSIS — E05.90 HYPERTHYROIDISM: ICD-10-CM

## 2018-12-11 DIAGNOSIS — I10 ESSENTIAL HYPERTENSION: ICD-10-CM

## 2018-12-11 DIAGNOSIS — D64.9 ANEMIA, UNSPECIFIED TYPE: ICD-10-CM

## 2018-12-11 DIAGNOSIS — E78.5 DYSLIPIDEMIA: ICD-10-CM

## 2018-12-11 DIAGNOSIS — M81.0 OSTEOPOROSIS, UNSPECIFIED OSTEOPOROSIS TYPE, UNSPECIFIED PATHOLOGICAL FRACTURE PRESENCE: ICD-10-CM

## 2018-12-11 LAB
25(OH)D3 SERPL-MCNC: 30.3 NG/ML (ref 30–100)
ALBUMIN SERPL BCP-MCNC: 3.5 G/DL (ref 3.5–5)
ALP SERPL-CCNC: 99 U/L (ref 46–116)
ALT SERPL W P-5'-P-CCNC: 22 U/L (ref 12–78)
ANION GAP SERPL CALCULATED.3IONS-SCNC: 11 MMOL/L (ref 4–13)
AST SERPL W P-5'-P-CCNC: 26 U/L (ref 5–45)
BASOPHILS # BLD AUTO: 0.06 THOUSANDS/ΜL (ref 0–0.1)
BASOPHILS NFR BLD AUTO: 1 % (ref 0–1)
BILIRUB SERPL-MCNC: 0.77 MG/DL (ref 0.2–1)
BUN SERPL-MCNC: 19 MG/DL (ref 5–25)
CALCIUM SERPL-MCNC: 9 MG/DL (ref 8.3–10.1)
CHLORIDE SERPL-SCNC: 103 MMOL/L (ref 100–108)
CHOLEST SERPL-MCNC: 166 MG/DL (ref 50–200)
CO2 SERPL-SCNC: 28 MMOL/L (ref 21–32)
CREAT SERPL-MCNC: 1.72 MG/DL (ref 0.6–1.3)
EOSINOPHIL # BLD AUTO: 0.34 THOUSAND/ΜL (ref 0–0.61)
EOSINOPHIL NFR BLD AUTO: 5 % (ref 0–6)
ERYTHROCYTE [DISTWIDTH] IN BLOOD BY AUTOMATED COUNT: 17.9 % (ref 11.6–15.1)
FERRITIN SERPL-MCNC: 16 NG/ML (ref 8–388)
GFR SERPL CREATININE-BSD FRML MDRD: 27 ML/MIN/1.73SQ M
GLUCOSE P FAST SERPL-MCNC: 148 MG/DL (ref 65–99)
HCT VFR BLD AUTO: 44 % (ref 34.8–46.1)
HDLC SERPL-MCNC: 71 MG/DL (ref 40–60)
HGB BLD-MCNC: 13.1 G/DL (ref 11.5–15.4)
IMM GRANULOCYTES # BLD AUTO: 0.01 THOUSAND/UL (ref 0–0.2)
IMM GRANULOCYTES NFR BLD AUTO: 0 % (ref 0–2)
IRON SATN MFR SERPL: 16 %
IRON SERPL-MCNC: 62 UG/DL (ref 50–170)
LDLC SERPL CALC-MCNC: 75 MG/DL (ref 0–100)
LYMPHOCYTES # BLD AUTO: 1.94 THOUSANDS/ΜL (ref 0.6–4.47)
LYMPHOCYTES NFR BLD AUTO: 27 % (ref 14–44)
MCH RBC QN AUTO: 25.8 PG (ref 26.8–34.3)
MCHC RBC AUTO-ENTMCNC: 29.8 G/DL (ref 31.4–37.4)
MCV RBC AUTO: 87 FL (ref 82–98)
MONOCYTES # BLD AUTO: 0.52 THOUSAND/ΜL (ref 0.17–1.22)
MONOCYTES NFR BLD AUTO: 7 % (ref 4–12)
NEUTROPHILS # BLD AUTO: 4.35 THOUSANDS/ΜL (ref 1.85–7.62)
NEUTS SEG NFR BLD AUTO: 60 % (ref 43–75)
NONHDLC SERPL-MCNC: 95 MG/DL
NRBC BLD AUTO-RTO: 0 /100 WBCS
PLATELET # BLD AUTO: 198 THOUSANDS/UL (ref 149–390)
PMV BLD AUTO: 9.8 FL (ref 8.9–12.7)
POTASSIUM SERPL-SCNC: 3.6 MMOL/L (ref 3.5–5.3)
PROT SERPL-MCNC: 7 G/DL (ref 6.4–8.2)
RBC # BLD AUTO: 5.07 MILLION/UL (ref 3.81–5.12)
SODIUM SERPL-SCNC: 142 MMOL/L (ref 136–145)
TIBC SERPL-MCNC: 392 UG/DL (ref 250–450)
TRIGL SERPL-MCNC: 102 MG/DL
TSH SERPL DL<=0.05 MIU/L-ACNC: 1.58 UIU/ML (ref 0.36–3.74)
WBC # BLD AUTO: 7.22 THOUSAND/UL (ref 4.31–10.16)

## 2018-12-11 PROCEDURE — 36415 COLL VENOUS BLD VENIPUNCTURE: CPT

## 2018-12-11 PROCEDURE — 80053 COMPREHEN METABOLIC PANEL: CPT

## 2018-12-11 PROCEDURE — 82728 ASSAY OF FERRITIN: CPT

## 2018-12-11 PROCEDURE — 83550 IRON BINDING TEST: CPT

## 2018-12-11 PROCEDURE — 83540 ASSAY OF IRON: CPT

## 2018-12-11 PROCEDURE — 80061 LIPID PANEL: CPT

## 2018-12-11 PROCEDURE — 85025 COMPLETE CBC W/AUTO DIFF WBC: CPT

## 2018-12-11 PROCEDURE — 82306 VITAMIN D 25 HYDROXY: CPT

## 2018-12-11 PROCEDURE — 84443 ASSAY THYROID STIM HORMONE: CPT

## 2018-12-12 ENCOUNTER — HOSPITAL ENCOUNTER (OUTPATIENT)
Dept: NON INVASIVE DIAGNOSTICS | Facility: HOSPITAL | Age: 83
Discharge: HOME | End: 2018-12-12
Payer: MEDICARE

## 2018-12-12 DIAGNOSIS — E78.2 MIXED HYPERLIPIDEMIA: Primary | ICD-10-CM

## 2018-12-12 DIAGNOSIS — I50.32 CHRONIC DIASTOLIC HEART FAILURE (HCC): ICD-10-CM

## 2018-12-12 PROCEDURE — 93306 TTE W/DOPPLER COMPLETE: CPT

## 2018-12-12 PROCEDURE — 93306 TTE W/DOPPLER COMPLETE: CPT | Performed by: INTERNAL MEDICINE

## 2018-12-12 RX ORDER — SIMVASTATIN 20 MG
20 TABLET ORAL
Qty: 90 TABLET | Refills: 3 | Status: SHIPPED | OUTPATIENT
Start: 2018-12-12 | End: 2019-02-18 | Stop reason: SDUPTHER

## 2018-12-17 ENCOUNTER — TELEPHONE (OUTPATIENT)
Dept: CARDIOLOGY CLINIC | Facility: CLINIC | Age: 83
End: 2018-12-17

## 2018-12-18 ENCOUNTER — TELEPHONE (OUTPATIENT)
Dept: CARDIOLOGY CLINIC | Facility: CLINIC | Age: 83
End: 2018-12-18

## 2018-12-18 NOTE — TELEPHONE ENCOUNTER
Daughter called tried to call back  [phone] sounds like it was not hung up  [phone] can not except calls  Will try again

## 2018-12-26 ENCOUNTER — DOCUMENTATION (OUTPATIENT)
Dept: NON INVASIVE DIAGNOSTICS | Facility: HOSPITAL | Age: 83
End: 2018-12-26

## 2018-12-26 DIAGNOSIS — E05.90 HYPERTHYROIDISM: Primary | ICD-10-CM

## 2018-12-26 RX ORDER — METHIMAZOLE 5 MG/1
TABLET ORAL
Qty: 36 TABLET | Refills: 3 | Status: SHIPPED | OUTPATIENT
Start: 2018-12-26 | End: 2019-09-26 | Stop reason: SDUPTHER

## 2018-12-31 ENCOUNTER — IN-CLINIC DEVICE VISIT (OUTPATIENT)
Dept: CARDIOLOGY CLINIC | Facility: HOSPITAL | Age: 83
End: 2018-12-31
Payer: MEDICARE

## 2018-12-31 ENCOUNTER — OFFICE VISIT (OUTPATIENT)
Dept: CARDIOLOGY CLINIC | Facility: HOSPITAL | Age: 83
End: 2018-12-31
Payer: MEDICARE

## 2018-12-31 VITALS
BODY MASS INDEX: 30.98 KG/M2 | DIASTOLIC BLOOD PRESSURE: 74 MMHG | HEIGHT: 66 IN | SYSTOLIC BLOOD PRESSURE: 142 MMHG | WEIGHT: 192.8 LBS | HEART RATE: 60 BPM

## 2018-12-31 DIAGNOSIS — I48.92 ATRIAL FLUTTER (HCC): ICD-10-CM

## 2018-12-31 DIAGNOSIS — E78.5 DYSLIPIDEMIA: ICD-10-CM

## 2018-12-31 DIAGNOSIS — Z95.0 PRESENCE OF CARDIAC PACEMAKER: ICD-10-CM

## 2018-12-31 DIAGNOSIS — I50.32 CHRONIC DIASTOLIC CONGESTIVE HEART FAILURE (HCC): ICD-10-CM

## 2018-12-31 DIAGNOSIS — I48.0 PAROXYSMAL ATRIAL FIBRILLATION (HCC): ICD-10-CM

## 2018-12-31 DIAGNOSIS — R06.00 DYSPNEA ON EXERTION: ICD-10-CM

## 2018-12-31 DIAGNOSIS — Z95.0 PRESENCE OF PERMANENT CARDIAC PACEMAKER: ICD-10-CM

## 2018-12-31 DIAGNOSIS — I10 BENIGN ESSENTIAL HYPERTENSION: ICD-10-CM

## 2018-12-31 DIAGNOSIS — I49.5 SSS (SICK SINUS SYNDROME) (HCC): ICD-10-CM

## 2018-12-31 DIAGNOSIS — I49.5 SSS (SICK SINUS SYNDROME) (HCC): Primary | ICD-10-CM

## 2018-12-31 DIAGNOSIS — K21.9 GASTROESOPHAGEAL REFLUX DISEASE WITHOUT ESOPHAGITIS: ICD-10-CM

## 2018-12-31 PROBLEM — R06.09 DYSPNEA ON EXERTION: Status: ACTIVE | Noted: 2017-09-20

## 2018-12-31 PROCEDURE — 93280 PM DEVICE PROGR EVAL DUAL: CPT | Performed by: INTERNAL MEDICINE

## 2018-12-31 PROCEDURE — 99214 OFFICE O/P EST MOD 30 MIN: CPT | Performed by: INTERNAL MEDICINE

## 2018-12-31 RX ORDER — PANTOPRAZOLE SODIUM 40 MG/1
40 TABLET, DELAYED RELEASE ORAL DAILY
Qty: 90 TABLET | Refills: 3 | Status: SHIPPED | OUTPATIENT
Start: 2018-12-31 | End: 2019-02-18 | Stop reason: SDUPTHER

## 2018-12-31 NOTE — PROGRESS NOTES
DEVICE INTERROGATED IN THE [facility]:  BATTERY VOLTAGE ADEQUATE (10 4 YRS)  AP: 99%   : <1%   ALL LEAD PARAMETERS WITHIN NORMAL LIMITS   7 AMS EPISODES W/ AVAIL EGRAMS SHOWING PAT, LONGEST DURATION 3 MINS   NO OTHER SIGNIFICANT HIGH RATE EPISODES   PT TAKES DILTIAZEM, METOPROLOL TART, ASA 81MG   EF: 60% (ECHO 12/12/18)   NO PROGRAMMING CHANGES MADE TO DEVICE PARAMETERS   PT SEEN BY DR Michelle Barry FUNCTIONING APPROPRIATELY  [facility]

## 2018-12-31 NOTE — PROGRESS NOTES
Cardiology Follow Up    [facility]  4/6/1932  [id number]  [address] [id number]    1  Dyspnea on exertion  NM myocardial perfusion spect (rx stress and/or rest)   2  Chronic diastolic congestive heart failure (HCC)     3  Paroxysmal atrial fibrillation ([address] )     4  Atrial flutter ([address] )     5  SSS (sick sinus syndrome) (HCC)     6  Presence of permanent cardiac pacemaker     7  Benign essential hypertension     8  Dyslipidemia     9  Gastroesophageal reflux disease without esophagitis  pantoprazole (PROTONIX) 40 mg tablet       Discussion/Summary:  Mrs Thelma Fisher is a pleasant 59-year-old female who presents to the office today for routine follow-up  Regarding her shortness of breath, she did undergo an echocardiogram which did not reveal any etiology  Her hemoglobin has been stable  We did discuss pursuing a repeat stress test   However if this is positive given her recent dark tarry stool I would opt for medical management  She is agreeable to undergoing the test and this will be scheduled sometime in the near future  Regarding her atrial fibrillation, she continues to maintain normal sinus rhythm  She has not had a recent device check which will be performed in the office today  She wishes to remain off Eliquis  We did speak regarding the potential of a Watchman device which she will contemplate  Her most recent lipids from earlier this month were reviewed and are acceptable on current therapy  She appears euvolemic on exam on her current diuretic regimen but given VAZQUEZ noted on recent blood work I will eliminate the 20 mg evening dose of Lasix  She can utilize this as needed for weight gain or symptoms  I will see her back in the office in three months or sooner if deemed necessary        Interval History:   Mrs Thelma Fisher is a pleasant 59-year-old female who presents to the office today for routine followup  She was seen by one of our advanced practitioners a few months ago due to shortness of breath with exertion  At that time she also had dark tarry stool and she self-discontinued Eliquis  She was sent for blood work along with an echocardiogram       Since that visit she continues with exertional shortness of breath with walking short distances in her home  She denies exertional chest pain  She denies signs or symptoms of heart failure including increasing lower extremity edema, paroxysmal nocturnal dyspnea or orthopnea  She weighs herself daily and her weight has been stable  She denies signs or symptoms of recurrent atrial fibrillation  She denies symptoms of claudication  She has not resumed Eliquis  She has not had any dark tarry stool in the recent past          Problem List     Pacemaker    H/O: hysterectomy    Chronic atrial fibrillation (HCC)    Essential hypertension    Hyperlipidemia    Hyperthyroidism    Glaucoma    Chronic diastolic congestive heart failure (HCC)    SSS (sick sinus syndrome) (HCC)    Depression    Hypokalemia    Pulmonary hypertension (HCC)    Anemia    Renal atrophy    Hyperphosphatemia    Vitamin D deficiency    Atrial flutter ([address] )    Overview Signed 3/15/2018 11:25 AM by Raya Medina MD     Description: Typical right-sided isthmus dependent - counterclockwise - CHADS2 = 2           AV block    Depression with anxiety    Dyslipidemia    Dyspnea    Fatigue    Osteoporosis    Paroxysmal atrial fibrillation (HCC)    Psoriasis    Vertigo        Past Medical History:   Diagnosis Date    Anemia     Atrial fibrillation (HCC)     Atrial flutter (HCC)     Depression     Diastolic CHF ([address] )     Dyslipidemia     Glaucoma     Glaucoma     H/O: hysterectomy     Hypertension     Hyperthyroidism     Impaired glucose tolerance     RESOLVED 2/3/2016    Pacemaker     Psoriasis     S/P cataract surgery     S/P knee replacement     [facility]     S[facility] (sick [facility]) [facility]      Social History     Social History    Marital status:      Spouse name: N/A    Number of children: N/A    Years of education: N/A     Occupational History    Not on file  Social History Main Topics    Smoking status: Former Smoker    Smokeless tobacco: Never Used    Alcohol use No    Drug use: No    Sexual activity: Not on file     Other Topics Concern    Not on file     Social History Narrative    No narrative on file      Family History   Problem Relation Age of Onset    Alcohol abuse Father     Diabetes Father     Alcohol abuse Brother      Past Surgical History:   Procedure Laterality Date    CARDIAC PACEMAKER PLACEMENT      CARDIAC PACEMAKER PLACEMENT      dual chamber    CATARACT EXTRACTION      ESOPHAGOGASTRODUODENOSCOPY N/A 12/30/2016    Procedure: ESOPHAGOGASTRODUODENOSCOPY (EGD); Surgeon: Chris Rojas MD;  Location: [facility]; Service:     EYE SURGERY      cataract    HYSTERECTOMY      JOINT REPLACEMENT      bilateral     CO COLONOSCOPY FLX DX W/COLLJ SPEC WHEN PFRMD N/A 4/24/2017    Procedure: COLONOSCOPY with polypectomy;  Surgeon: Jessica Dubon MD;  Location: [facility];   Service: Gastroenterology    REPLACEMENT TOTAL KNEE Bilateral        Current Outpatient Prescriptions:     ALFALFA PO, Take 3 tablets by mouth  , Disp: , Rfl:     aspirin (ECOTRIN LOW STRENGTH) 81 mg EC tablet, Take 81 mg by mouth daily, Disp: , Rfl:     B Complex Vitamins (B COMPLEX 1 PO), Take by mouth, Disp: , Rfl:     cholecalciferol (VITAMIN D3) 1,000 units tablet, Take 1 tablet by mouth daily, Disp: 30 tablet, Rfl: 0    diltiazem (CARDIZEM CD) 180 mg 24 hr capsule, TAKE 1 CAPSULE DAILY, Disp: 90 capsule, Rfl: 3    furosemide (LASIX) 40 mg tablet, Take 1 tablet (40 mg total) by mouth 2 (two) times a day for 90 days Lasix 40mg in am and 20mg in pm, Disp: 180 tablet, Rfl: 0    LECITHIN PO, Take 1 tablet by mouth daily, Disp: , Rfl:     methimazole (TAPAZOLE) 5 mg tablet, TAKE 1 TABLET ON MONDAY,   WEDNESDAY, AND FRIDAY, Disp: 36 tablet, Rfl: 3    metoprolol tartrate (LOPRESSOR) 50 mg tablet, Take 1 tablet by mouth every 12 (twelve) hours for 30 days, Disp: 60 tablet, Rfl: 0    Omega-3 Fatty Acids (OMEGA-3 PO), Take by mouth 2 (two) times a day  , Disp: , Rfl:     potassium chloride (K-DUR,KLOR-CON) 20 mEq tablet, Take 1 tablet (20 mEq total) by mouth daily, Disp: 90 tablet, Rfl: 3    sertraline (ZOLOFT) 100 mg tablet, Take 1 tablet (100 mg total) by mouth daily, Disp: 90 tablet, Rfl: 1    simvastatin (ZOCOR) 20 mg tablet, Take 1 tablet (20 mg total) by mouth daily at bedtime, Disp: 90 tablet, Rfl: 3    Zinc Sulfate (ZINC 15 PO), Take 100 mg by mouth  , Disp: , Rfl:     pantoprazole (PROTONIX) 40 mg tablet, Take 1 tablet (40 mg total) by mouth daily, Disp: 90 tablet, Rfl: 3  No Known Allergies    Labs:     Chemistry        Component Value Date/Time     11/18/2015 1318    K 3 6 12/11/2018 1006    K 3 9 11/18/2015 1318     12/11/2018 1006     11/18/2015 1318    CO2 28 12/11/2018 1006    CO2 29 2 11/18/2015 1318    BUN 19 12/11/2018 1006    BUN 13 11/18/2015 1318    CREATININE 1 72 (H) 12/11/2018 1006    CREATININE 1 13 11/18/2015 1318        Component Value Date/Time    CALCIUM 9 0 12/11/2018 1006    CALCIUM 9 1 11/18/2015 1318    ALKPHOS 99 12/11/2018 1006    ALKPHOS 71 10/26/2015 1012    AST 26 12/11/2018 1006    AST 28 10/26/2015 1012    ALT 22 12/11/2018 1006    ALT 29 10/26/2015 1012    BILITOT 0 94 10/26/2015 1012            Lab Results   Component Value Date    CHOL 153 10/24/2015    CHOL 157 09/08/2015    CHOL 193 05/13/2015     Lab Results   Component Value Date    HDL 71 (H) 12/11/2018    HDL 63 (H) 08/28/2018    HDL 75 (H) 02/26/2018     Lab Results   Component Value Date    LDLCALC 75 12/11/2018    LDLCALC 73 08/28/2018    LDLCALC 89 02/26/2018     Lab Results   Component Value Date    TRIG 102 12/11/2018    TRIG 84 08/28/2018    TRIG 81 02/26/2018     No results found for: CHOLHDL    Imaging: No results found  Review of Systems   Cardiovascular: Negative for chest pain, claudication, cyanosis, dyspnea on exertion, irregular heartbeat, leg swelling, near-syncope, orthopnea, palpitations, paroxysmal nocturnal dyspnea and syncope  All other systems reviewed and are negative  Vitals:    12/31/18 1242   BP: 142/74   Pulse: 60     Vitals:    12/31/18 1242   Weight: 87 5 kg (192 lb 12 8 oz)     Height: 5' 6" (167 6 cm)   Body mass index is 31 12 kg/m²      Physical Exam:   General appearance:  Appears stated age, alert, well appearing and in no distress  HEENT:  PERRLA, EOMI, no scleral icterus, no conjunctival pallor  NECK:  Supple, No elevated JVP, no thyromegaly, no carotid bruits  HEART:  Regular rate and rhythm, normal S1/S2, no S3/S4, no murmur or rub  LUNGS:  Clear to auscultation bilaterally  ABDOMEN:  Soft, non-tender, positive bowel sounds, no rebound or guarding, no organomegaly   EXTREMITIES:  No edema  VASCULAR:  Normal pedal pulses   SKIN: No lesions or rashes on exposed skin  NEURO:  CN II-XII intact, no focal deficits

## 2019-01-05 DIAGNOSIS — I50.32 CHRONIC DIASTOLIC HEART FAILURE (HCC): Primary | ICD-10-CM

## 2019-01-07 RX ORDER — METOPROLOL TARTRATE 50 MG/1
TABLET, FILM COATED ORAL
Qty: 180 TABLET | Refills: 3 | Status: SHIPPED | OUTPATIENT
Start: 2019-01-07 | End: 2019-02-18 | Stop reason: SDUPTHER

## 2019-02-18 ENCOUNTER — TELEPHONE (OUTPATIENT)
Dept: INTERNAL MEDICINE CLINIC | Facility: CLINIC | Age: 84
End: 2019-02-18

## 2019-02-18 ENCOUNTER — OFFICE VISIT (OUTPATIENT)
Dept: INTERNAL MEDICINE CLINIC | Facility: CLINIC | Age: 84
End: 2019-02-18
Payer: MEDICARE

## 2019-02-18 VITALS
SYSTOLIC BLOOD PRESSURE: 144 MMHG | BODY MASS INDEX: 31.18 KG/M2 | TEMPERATURE: 98.3 F | DIASTOLIC BLOOD PRESSURE: 60 MMHG | HEART RATE: 60 BPM | OXYGEN SATURATION: 94 % | HEIGHT: 66 IN | WEIGHT: 194 LBS

## 2019-02-18 DIAGNOSIS — E55.9 VITAMIN D DEFICIENCY: ICD-10-CM

## 2019-02-18 DIAGNOSIS — Z00.00 MEDICARE ANNUAL WELLNESS VISIT, SUBSEQUENT: ICD-10-CM

## 2019-02-18 DIAGNOSIS — F41.8 DEPRESSION WITH ANXIETY: ICD-10-CM

## 2019-02-18 DIAGNOSIS — I10 BENIGN ESSENTIAL HYPERTENSION: Primary | ICD-10-CM

## 2019-02-18 DIAGNOSIS — E78.2 MIXED HYPERLIPIDEMIA: ICD-10-CM

## 2019-02-18 DIAGNOSIS — I50.32 CHRONIC DIASTOLIC HEART FAILURE (HCC): ICD-10-CM

## 2019-02-18 DIAGNOSIS — Z91.81 RISK FOR FALLS: ICD-10-CM

## 2019-02-18 DIAGNOSIS — I48.92 ATRIAL FLUTTER, UNSPECIFIED TYPE (HCC): ICD-10-CM

## 2019-02-18 DIAGNOSIS — K21.9 GASTROESOPHAGEAL REFLUX DISEASE WITHOUT ESOPHAGITIS: ICD-10-CM

## 2019-02-18 DIAGNOSIS — E05.90 HYPERTHYROIDISM: ICD-10-CM

## 2019-02-18 DIAGNOSIS — I48.0 PAROXYSMAL ATRIAL FIBRILLATION (HCC): ICD-10-CM

## 2019-02-18 DIAGNOSIS — E78.5 DYSLIPIDEMIA: ICD-10-CM

## 2019-02-18 DIAGNOSIS — R06.00 DYSPNEA ON EXERTION: ICD-10-CM

## 2019-02-18 PROCEDURE — G0439 PPPS, SUBSEQ VISIT: HCPCS | Performed by: FAMILY MEDICINE

## 2019-02-18 PROCEDURE — 99214 OFFICE O/P EST MOD 30 MIN: CPT | Performed by: FAMILY MEDICINE

## 2019-02-18 RX ORDER — SIMVASTATIN 20 MG
20 TABLET ORAL
Qty: 90 TABLET | Refills: 3 | Status: ON HOLD | OUTPATIENT
Start: 2019-02-18 | End: 2020-01-08 | Stop reason: SDUPTHER

## 2019-02-18 RX ORDER — ISOSORBIDE MONONITRATE 30 MG/1
30 TABLET, EXTENDED RELEASE ORAL DAILY
Qty: 30 TABLET | Refills: 1 | Status: SHIPPED | OUTPATIENT
Start: 2019-02-18 | End: 2019-05-22 | Stop reason: SDUPTHER

## 2019-02-18 RX ORDER — SERTRALINE HYDROCHLORIDE 100 MG/1
100 TABLET, FILM COATED ORAL DAILY
Qty: 90 TABLET | Refills: 1 | Status: SHIPPED | OUTPATIENT
Start: 2019-02-18 | End: 2019-09-26 | Stop reason: SDUPTHER

## 2019-02-18 RX ORDER — PANTOPRAZOLE SODIUM 40 MG/1
40 TABLET, DELAYED RELEASE ORAL DAILY
Qty: 90 TABLET | Refills: 3 | Status: SHIPPED | OUTPATIENT
Start: 2019-02-18 | End: 2019-02-21 | Stop reason: SDUPTHER

## 2019-02-18 RX ORDER — ISOSORBIDE MONONITRATE 30 MG/1
30 TABLET, EXTENDED RELEASE ORAL DAILY
Qty: 90 TABLET | Refills: 1 | Status: SHIPPED | OUTPATIENT
Start: 2019-02-18 | End: 2019-02-18 | Stop reason: SDUPTHER

## 2019-02-18 RX ORDER — METOPROLOL TARTRATE 50 MG/1
50 TABLET, FILM COATED ORAL EVERY 12 HOURS
Qty: 180 TABLET | Refills: 3 | Status: SHIPPED | OUTPATIENT
Start: 2019-02-18 | End: 2019-09-26 | Stop reason: SDUPTHER

## 2019-02-18 NOTE — PATIENT INSTRUCTIONS
Dyspnea   AMBULATORY CARE:   What is dyspnea? Dyspnea is breathing difficulty or discomfort  You may have labored, painful, or shallow breathing  You may feel breathless or short of breath  Dyspnea can occur during rest or with activity  You may have dyspnea for a short time, or it might become chronic  Dyspnea is often a symptom of a disease or condition  An allergic reaction, anxiety, or travel to high altitudes can increase your risk for dyspnea  Your risk is also increased by a lung condition such as asthma, a heart condition such as heart failure, or a nerve condition  Being overweight or smoking cigarettes can also lead to dyspnea  Signs and symptoms that can occur with dyspnea:   · Chest tightness or pain    · Cough or a coarse or high-pitched noise when you breathe    · Pale and sweaty, cool skin    · Confusion and tiredness    · Bluish-gray lips or nails  Seek care immediately if:   · Your signs and symptoms are the same or worse within 24 hours of treatment  · You have shaking chills or a fever over 102°F      · You have new pain, pressure, or tightness in your chest      · You have a new or worse cough or wheezing, or you cough up blood  · You feel like you cannot get enough air  · The skin over your ribs or on your neck sinks in when you breathe  · You have a severe headache with vomiting and abdominal pain  · You feel confused or dizzy  Contact your healthcare provider if:   · You have questions or concerns about your condition or care  Treatment:  You will work with your healthcare provider to treat the condition causing your dyspnea  You may need the following to improve your symptoms:  · Oxygen therapy  may be used to help you breathe easier  You may need oxygen if your blood oxygen level is lower than it should be  · Medicines  may be used to treat the cause of your dyspnea   Medicines may reduce swelling in your airway or decrease extra fluid from around your heart or lungs  Other medicines may be used to decrease anxiety and help you feel calm and relaxed  · Pulmonary rehabilitation  is used to reduce your symptoms while keeping you active  You may learn breathing techniques, muscle strengthening, and how to pace yourself when you are active  Manage long-term dyspnea:   · Create an action plan  You and your healthcare provider can work together to create a plan for how to handle episodes of dyspnea  The plan can include daily activities, treatment changes, and what to do if you have severe breathing problems  · Lean forward on your elbows when you sit  This helps your lungs expand and may make it easier to breathe  · Use pursed-lip breathing any time you feel short of breath  Breathe in through your nose and then slowly breathe out through your mouth with your lips slightly puckered  It should take you twice as long to breathe out as it did to breathe in  · Do not smoke  Nicotine and other chemicals in cigarettes and cigars can cause lung damage and make it harder to breathe  Ask your healthcare provider for information if you currently smoke and need help to quit  E-cigarettes or smokeless tobacco still contain nicotine  Talk to your healthcare provider before you use these products  · Reach or maintain a healthy weight  Your healthcare provider can help you create a safe weight loss plan if you are overweight  · Exercise as directed  Exercise can help your lungs work more easily  Exercise can also help you lose weight if needed  Try to get at least 30 minutes of exercise most days of the week  Your healthcare provider can help you create an exercise plan that is safe for you  Follow up with your healthcare provider or specialist as directed:  Write down your questions so you remember to ask them during your visits    © 2017 [address] Information is for End User's use only and may not be sold, redistributed or otherwise used for commercial purposes  All illustrations and images included in CareNotes® are the copyrighted property of [employer]  or [employer]  The above information is an  only  It is not intended as medical advice for individual conditions or treatments  Talk to your doctor, nurse or pharmacist before following any medical regimen to see if it is safe and effective for you  Obesity   AMBULATORY CARE:   Obesity  is when your body mass index (BMI) is greater than 30  Your healthcare provider will use your height and weight to measure your BMI  The risks of obesity include  many health problems, such as injuries or physical disability  You may need tests to check for the following:  · Diabetes     · High blood pressure or high cholesterol     · Heart disease     · Gallbladder or liver disease     · Cancer of the colon, breast, prostate, liver, or kidney     · Sleep apnea     · Arthritis or gout  Seek care immediately if:   · You have a severe headache, confusion, or difficulty speaking  · You have weakness on one side of your body  · You have chest pain, sweating, or shortness of breath  Contact your healthcare provider if:   · You have symptoms of gallbladder or liver disease, such as pain in your upper abdomen  · You have knee or hip pain and discomfort while walking  · You have symptoms of diabetes, such as intense hunger and thirst, and frequent urination  · You have symptoms of sleep apnea, such as snoring or daytime sleepiness  · You have questions or concerns about your condition or care  Treatment for obesity  focuses on helping you lose weight to improve your health  Even a small decrease in BMI can reduce the risk for many health problems  Your healthcare provider will help you set a weight-loss goal   · Lifestyle changes  are the first step in treating obesity  These include making healthy food choices and getting regular physical activity   Your healthcare provider may suggest a weight-loss program that involves coaching, education, and therapy  · Medicine  may help you lose weight when it is used with a healthy diet and physical activity  · Surgery  can help you lose weight if you are very obese and have other health problems  There are several types of weight-loss surgery  Ask your healthcare provider for more information  Be successful losing weight:   · Set small, realistic goals  An example of a small goal is to walk for 20 minutes 5 days a week  Anther goal is to lose 5% of your body weight  · Tell friends, family members, and coworkers about your goals  and ask for their support  Ask a friend to lose weight with you, or join a weight-loss support group  · Identify foods or triggers that may cause you to overeat , and find ways to avoid them  Remove tempting high-calorie foods from your home and workplace  Place a bowl of fresh fruit on your kitchen counter  If stress causes you to eat, then find other ways to cope with stress  · Keep a diary to track what you eat and drink  Also write down how many minutes of physical activity you do each day  Weigh yourself once a week and record it in your diary  Eating changes: You will need to eat 500 to 1,000 fewer calories each day than you currently eat to lose 1 to 2 pounds a week  The following changes will help you cut calories:  · Eat smaller portions  Use small plates, no larger than 9 inches in diameter  Fill your plate half full of fruits and vegetables  Measure your food using measuring cups until you know what a serving size looks like  · Eat 3 meals and 1 or 2 snacks each day  Plan your meals in advance  Alma Kunal and eat at home most of the time  Eat slowly  · Eat fruits and vegetables at every meal   They are low in calories and high in fiber, which makes you feel full  Do not add butter, margarine, or cream sauce to vegetables  Use herbs to season steamed vegetables  · Eat less fat and fewer fried foods  Eat more baked or grilled chicken and fish  These protein sources are lower in calories and fat than red meat  Limit fast food  Dress your salads with olive oil and vinegar instead of bottled dressing  · Limit the amount of sugar you eat  Do not drink sugary beverages  Limit alcohol  Activity changes:  Physical activity is good for your body in many ways  It helps you burn calories and build strong muscles  It decreases stress and depression, and improves your mood  It can also help you sleep better  Talk to your healthcare provider before you begin an exercise program   · Exercise for at least 30 minutes 5 days a week  Start slowly  Set aside time each day for physical activity that you enjoy and that is convenient for you  It is best to do both weight training and an activity that increases your heart rate, such as walking, bicycling, or swimming  · Find ways to be more active  Do yard work and housecleaning  Walk up the stairs instead of using elevators  Spend your leisure time going to events that require walking, such as outdoor festivals or fairs  This extra physical activity can help you lose weight and keep it off  Follow up with your healthcare provider as directed: You may need to meet with a dietitian  Write down your questions so you remember to ask them during your visits  © 2017 [address] Information is for End User's use only and may not be sold, redistributed or otherwise used for commercial purposes  All illustrations and images included in CareNotes® are the copyrighted property of [employer]  or [employer]  The above information is an  only  It is not intended as medical advice for individual conditions or treatments  Talk to your doctor, nurse or pharmacist before following any medical regimen to see if it is safe and effective for you    Urinary Incontinence   WHAT YOU NEED TO KNOW:   What is urinary incontinence? Urinary incontinence (UI) is when you lose control of your bladder  What causes UI? UI occurs because your bladder cannot store or empty urine properly  The following are the most common types of UI:  · Stress incontinence  is when you leak urine due to increased bladder pressure  This may happen when you cough, sneeze, or exercise  · Urge incontinence  is when you feel the need to urinate right away and leak urine accidentally  · Mixed incontinence  is when you have both stress and urge UI  What are the signs and symptoms of UI?   · You feel like your bladder does not empty completely when you urinate  · You urinate often and need to urinate immediately  · You leak urine when you sleep, or you wake up with the urge to urinate  · You leak urine when you cough, sneeze, exercise, or laugh  How is UI diagnosed? Your healthcare provider will ask how often you leak urine and whether you have stress or urge symptoms  Tell him which medicines you take, how often you urinate, and how much liquid you drink each day  You may need any of the following tests:  · Urine tests  may show infection or kidney function  · A pelvic exam  may be done to check for blockages  A pelvic exam will also show if your bladder, uterus, or other organs have moved out of place  · An x-ray, ultrasound, or CT  may show problems with parts of your urinary system  You may be given contrast liquid to help your organs show up better in the pictures  Tell the healthcare provider if you have ever had an allergic reaction to contrast liquid  Do not enter the MRI room with anything metal  Metal can cause serious injury  Tell the healthcare provider if you have any metal in or on your body  · A bladder scan  will show how much urine is left in your bladder after you urinate   You will be asked to urinate and then healthcare providers will use a small ultrasound machine to check the urine left in your bladder  · Cystometry  is used to check the function of your urinary system  Your healthcare provider checks the pressure in your bladder while filling it with fluid  Your bladder pressure may also be tested when your bladder is full and while you urinate  How is UI treated? · Medicines  can help strengthen your bladder control  · Electrical stimulation  is used to send a small amount of electrical energy to your pelvic floor muscles  This helps control your bladder function  Electrodes may be placed outside your body or in your rectum  For women, the electrodes may be placed in the vagina  · A bulking agent  may be injected into the wall of your urethra to make it thicker  This helps keep your urethra closed and decreases urine leakage  · Devices  such as a clamp, pessary, or tampon may help stop urine leaks  Ask your healthcare provider for more information about these and other devices  · Surgery  may be needed if other treatments do not work  Several types of surgery can help improve your bladder control  Ask your healthcare provider for more information about the surgery you may need  How can I manage my symptoms? · Do pelvic muscle exercises often  Your pelvic muscles help you stop urinating  Squeeze these muscles tight for 5 seconds, then relax for 5 seconds  Gradually work up to squeezing for 10 seconds  Do 3 sets of 15 repetitions a day, or as directed  This will help strengthen your pelvic muscles and improve bladder control  · A catheter  may be used to help empty your bladder  A catheter is a tiny, plastic tube that is put into your bladder to drain your urine  Your healthcare provider may tell you to use a catheter to prevent your bladder from getting too full and leaking urine  · Keep a UI record  Write down how often you leak urine and how much you leak  Make a note of what you were doing when you leaked urine  · Train your bladder    Go to the bathroom at set times, such as every 2 hours, even if you do not feel the urge to go  You can also try to hold your urine when you feel the urge to go  For example, hold your urine for 5 minutes when you feel the urge to go  As that becomes easier, hold your urine for 10 minutes  · Drink liquids as directed  Ask your healthcare provider how much liquid to drink each day and which liquids are best for you  You may need to limit the amount of liquid you drink to help control your urine leakage  Limit or do not have drinks that contain caffeine or alcohol  Do not drink any liquid right before you go to bed  · Prevent constipation  Eat a variety of high-fiber foods  Good examples are high-fiber cereals, beans, vegetables, and whole-grain breads  Prune juice may help make your bowel movement softer  Walking is the best way to trigger your intestines to have a bowel movement  · Exercise regularly and maintain a healthy weight  Ask your healthcare provider how much you should weigh and about the best exercise plan for you  Weight loss and exercise will decrease pressure on your bladder and help you control your leakage  Ask him to help you create a weight loss plan if you are overweight  When should I seek immediate care? · You have severe pain  · You are confused or cannot think clearly  When should I contact my healthcare provider? · You have a fever  · You see blood in your urine  · You have pain when you urinate  · You have new or worse pain, even after treatment  · Your mouth feels dry or you have vision changes  · Your urine is cloudy or smells bad  · You have questions or concerns about your condition or care  CARE AGREEMENT:   You have the right to help plan your care  Learn about your health condition and how it may be treated  Discuss treatment options with your caregivers to decide what care you want to receive  You always have the right to refuse treatment   The above information is an  only  It is not intended as medical advice for individual conditions or treatments  Talk to your doctor, nurse or pharmacist before following any medical regimen to see if it is safe and effective for you  © 2017 [address] Information is for End User's use only and may not be sold, redistributed or otherwise used for commercial purposes  All illustrations and images included in CareNotes® are the copyrighted property of [employer]  or [employer]  Cigarette Smoking and Your Health   AMBULATORY CARE:   Risks to your health if you smoke:  Nicotine and other chemicals found in tobacco damage every cell in your body  Even if you are a light smoker, you have an increased risk for cancer, heart disease, and lung disease  If you are pregnant or have diabetes, smoking increases your risk for complications  Benefits to your health if you stop smoking:   · You decrease respiratory symptoms such as coughing, wheezing, and shortness of breath  · You reduce your risk for cancers of the lung, mouth, throat, kidney, bladder, pancreas, stomach, and cervix  If you already have cancer, you increase the benefits of chemotherapy  You also reduce your risk for cancer returning or a second cancer from developing  · You reduce your risk for heart disease, blood clots, heart attack, and stroke  · You reduce your risk for lung infections, and diseases such as pneumonia, asthma, chronic bronchitis, and emphysema  · Your circulation improves  More oxygen can be delivered to your body  If you have diabetes, you lower your risk for complications, such as kidney, artery, and eye diseases  You also lower your risk for nerve damage  Nerve damage can lead to amputations, poor vision, and blindness  · You improve your body's ability to heal and to fight infections    Benefits to the health of others if you stop smoking:  Tobacco is harmful to nonsmokers who breathe in your secondhand smoke  The following are ways the health of others around you may improve when you stop smoking:  · You lower the risks for lung cancer and heart disease in nonsmoking adults  · If you are pregnant, you lower the risk for miscarriage, early delivery, low birth weight, and stillbirth  You also lower your baby's risk for SIDS, obesity, developmental delay, and neurobehavioral problems, such as ADHD  · If you have children, you lower their risk for ear infections, colds, pneumonia, bronchitis, and asthma  For more information and support to stop smoking:   · [employer]  Phone: 8- 690 - 295-2750  Web Address: www [employer]  Follow up with your healthcare provider as directed:  Write down your questions so you remember to ask them during your visits  © 2017 [facility] Information is for End User's use only and may not be sold, redistributed or otherwise used for commercial purposes  All illustrations and images included in CareNotes® are the copyrighted property of [employer]  or [employer] [place]  The above information is an  only  It is not intended as medical advice for individual conditions or treatments  Talk to your doctor, nurse or pharmacist before following any medical regimen to see if it is safe and effective for you  Fall Prevention   AMBULATORY CARE:   Fall prevention  includes ways to make your home and other areas safer  It also includes ways you can move more carefully to prevent a fall  Health conditions that cause changes in your blood pressure, vision, or muscle strength and coordination may increase your risk for falls  Medicines may also increase your risk for falls if they make you dizzy, weak, or sleepy  Call 911 or have someone else call if:   · You have fallen and are unconscious  · You have fallen and cannot move part of your body    Contact your healthcare provider if:   · You have fallen and have pain or a headache  · You have questions or concerns about your condition or care  Fall prevention tips:   · Stand or sit up slowly  This may help you keep your balance and prevent falls  · Use assistive devices as directed  Your healthcare provider may suggest that you use a cane or walker to help you keep your balance  You may need to have grab bars put in your bathroom near the toilet or in the shower  · Wear shoes that fit well and have soles that   Wear shoes both inside and outside  Use slippers with good   Do not wear shoes with high heels  · Wear a personal alarm  This is a device that allows you to call 911 if you fall and need help  Ask your healthcare provider for more information  · Stay active  Exercise can help strengthen your muscles and improve your balance  Your healthcare provider may recommend water aerobics or walking  He or she may also recommend physical therapy to improve your coordination  Never start an exercise program without talking to your healthcare provider first      · Manage your medical conditions  Keep all appointments with your healthcare providers  Visit your eye doctor as directed  Home safety tips:   · Add items to prevent falls in the bathroom  Put nonslip strips on your bath or shower floor to prevent you from slipping  Use a bath mat if you do not have carpet in the bathroom  This will prevent you from falling when you step out of the bath or shower  Use a shower seat so you do not need to stand while you shower  Sit on the toilet or a chair in your bathroom to dry yourself and put on clothing  This will prevent you from losing your balance from drying or dressing yourself while you are standing  · Keep paths clear  Remove books, shoes, and other objects from walkways and stairs  Place cords for telephones and lamps out of the way so that you do not need to walk over them  Tape them down if you cannot move them  Remove small rugs   If you cannot remove a rug, secure it with double-sided tape  This will prevent you from tripping  · Install bright lights in your home  Use night lights to help light paths to the bathroom or kitchen  Always turn on the light before you start walking  · Keep items you use often on shelves within reach  Do not use a step stool to help you reach an item  · Paint or place reflective tape on the edges of your stairs  This will help you see the stairs better  Follow up with your healthcare provider as directed:  Write down your questions so you remember to ask them during your visits  © 2017 [address]  Information is for End User's use only and may not be sold, redistributed or otherwise used for commercial purposes  All illustrations and images included in CareNotes® are the copyrighted property of [employer]  or [employer]  The above information is an  only  It is not intended as medical advice for individual conditions or treatments  Talk to your doctor, nurse or pharmacist before following any medical regimen to see if it is safe and effective for you  Advance Directives   WHAT YOU NEED TO KNOW:   What are advance directives? Advance directives are legal documents that state your wishes and plans for medical care  These plans are made ahead of time in case you lose your ability to make decisions for yourself  Advance directives can apply to any medical decision, such as the treatments you want, and if you want to donate organs  What are the types of advance directives? There are many types of advance directives, and each state has rules about how to use them  You may choose a combination of any of the following:  · Living will: This is a written record of the treatment you want  You can also choose which treatments you do not want, which to limit, and which to stop at a certain time  This includes surgery, medicine, IV fluid, and tube feedings  · Durable power of  for healthcare [facility]): This is a written record that states who you want to make healthcare choices for you when you are unable to make them for yourself  This person, called a proxy, is usually a family member or a friend  You may choose more than 1 proxy  · Do not resuscitate (DNR) order:  A DNR order is used in case your heart stops beating or you stop breathing  It is a request not to have certain forms of treatment, such as CPR  A DNR order may be included in other types of advance directives  · Medical directive: This covers the care that you want if you are in a coma, near death, or unable to make decisions for yourself  You can list the treatments you want for each condition  Treatment may include pain medicine, surgery, blood transfusions, dialysis, IV or tube feedings, and a ventilator (breathing machine)  · Values history: This document has questions about your views, beliefs, and how you feel and think about life  This information can help others choose the care that you would choose  Why are advance directives important? An advance directive helps you control your care  Although spoken wishes may be used, it is better to have your wishes written down  Spoken wishes can be misunderstood, or not followed  Treatments may be given even if you do not want them  An advance directive may make it easier for your family to make difficult choices about your care  How do I decide what to put in my advance directives? · Make informed decisions:  Make sure you fully understand treatments or care you may receive  Think about the benefits and problems your decisions could cause for you or your family  Talk to healthcare providers if you have concerns or questions before you write down your wishes  You may also want to talk with your [employer] or , or a    Check your state laws to make sure that what you put in your advance directive is legal      · Sign all forms:  Sign and date your advance directive when you have finished  You may also need 2 witnesses to sign the forms  Witnesses cannot be your doctor or his staff, your spouse, heirs or beneficiaries, people you owe money to, or your chosen proxy  Talk to your family, proxy, and healthcare providers about your advance directive  Give each person a copy, and keep one for yourself in a place you can get to easily  Do not keep it hidden or locked away  · Review and revise your plans: You can revise your advance directive at any time, as long as you are able to make decisions  Review your plan every year, and when there are changes in your life, or your health  When you make changes, let your family, proxy, and healthcare providers know  Give each a new copy  Where can I find more information? · [employer]  [address] , [address]  Phone: [phone]  Phone: [phone]  Web Address: [email or web address]  [facility] org  · [address] [facility])  [address] [facility] [place], [address]  Phone: [phone]  Phone: [phone]  Web Address: Vannesa alan  [facility] AGREEMENT:   You have the right to help plan your care  To help with this plan, you must learn about your health condition and treatment options  You must also learn about advance directives and how they are used  Work with your healthcare providers to decide what care will be used to treat you  You always have the right to refuse treatment  The above information is an  only  It is not intended as medical advice for individual conditions or treatments  Talk to your doctor, nurse or pharmacist before following any medical regimen to see if it is safe and effective for you    © 2017 [employer] Information is for End User's use only and may not be sold, redistributed or otherwise used for commercial purposes  All illustrations and images included in CareNotes® are the copyrighted property of [employer]  or [place]y [place]  Thank you for enrolling in [facility]  Please follow the instructions below to securely access your online medical record  MyChart allows you to send messages to your doctor, view your test results, renew your prescriptions, schedule appointments, and more  [address] uses Single Sign on (SSO) Technology for you to log in and access our [facility], including [place]  No more remembering multiple user names and passwords! We are going to guide you through, step by step, to help you set up your [facility] account which will provide access to your MyChart account  How Do I Sign Up? 1  In your Internet browser, go to [email or web address]      2  Click on the [facility] patient account and then click Dont have an                 Account? Create one now      3  Enter your demographic information and chose a user name (email address) and password  Think of one that is secure and easy to remember  Enter a Referral code if you have one (this is not your MyChart code ) Accept the Terms and Conditions and the Privacy Policy  4  Select your security questions that you will use to reset your password should you forget it  Click Submit  5  Enter your [employer] Activation Code exactly as it appears below  You will not need to use this code after you have completed the sign-up process  If you do not sign up before the expiration date, you must request a new code  [employer] Activation Code: [email or web address]  Expires: 3/4/2019 12:06 PM    6  Confirm your email address  An email confirmation was sent to you  Please open that email and click Confirm your Email   You should then be redirected to our [employer] Single sign on page, where you will log on with the user name and password you created! Proceed to the [employer] Icon to view your personal health information  Additional Information  If you have questions, you can e-mail [email or web address] or call [phone] to talk to our customer support staff  Remember, [employer] is NOT to be used for urgent needs  For medical emergencies, dial 911

## 2019-02-18 NOTE — PROGRESS NOTES
Assessment/Plan:    Hyperthyroidism  TSH has been stable  Continue current dose of the methimazole  Discussed with her getting repeat laboratory testing prior to her next visit  Atrial flutter ([address] )  Currently regular  Did recently follow up with Cardiology over the last few months  Wishes to remain off of Eliquis  Continue other medications  Benign essential hypertension  Blood pressure relatively well controlled  Continue with the diltiazem and metoprolol as previously  Watch salt intake  Continue to follow up with Cardiology  Watch for any orthostatics symptoms  Stay hydrated  Paroxysmal atrial fibrillation (HCC)  Currently regular on exam   She wishes to remain off of the Eliquis  Continue with the aspirin  Continue with the diltiazem and the metoprolol  Continue follow-up with Cardiology on a regular basis  Watch for any recurrence symptoms  Depression with anxiety  Depression and anxiety symptoms are under good control  Continue the Zoloft which has worked very well for her  Dyslipidemia  Cholesterol controlled  Continue the simvastatin  Dyspnea on exertion  The dyspnea on exertion may be multifactorial but Cardiology had some suspicions that this may ischemic in origin  Echocardiogram did not show any definite cause  Symptoms have not improved with Lasix  Weight has remained stable  Symptoms are with exertion  Discussed options with her  She does not want to go through any further testing or procedures if it can be avoided  Will start her on Imdur at a low dose to see if this alleviates some of her symptoms  Potential side effects reviewed with her and her daughter  Vitamin D deficiency  Continue vitamin-D supplementation  Will continue follow vitamin-D level about 2 to 3 times a year  Diagnoses and all orders for this visit:    Benign essential hypertension  -     CBC and differential; Future  -     Comprehensive metabolic panel;  Future    Gastroesophageal reflux disease without esophagitis  -     pantoprazole (PROTONIX) 40 mg tablet; Take 1 tablet (40 mg total) by mouth daily    Chronic diastolic heart failure (HCC)  -     metoprolol tartrate (LOPRESSOR) 50 mg tablet; Take 1 tablet (50 mg total) by mouth every 12 (twelve) hours    Mixed hyperlipidemia  -     simvastatin (ZOCOR) 20 mg tablet; Take 1 tablet (20 mg total) by mouth daily at bedtime  -     Comprehensive metabolic panel; Future    Depression with anxiety  -     sertraline (ZOLOFT) 100 mg tablet; Take 1 tablet (100 mg total) by mouth daily    Atrial flutter, unspecified type ([facility] [address] )  -     Comprehensive metabolic panel; Future    Paroxysmal atrial fibrillation (HCC)  -     Comprehensive metabolic panel; Future  -     Lipid panel; Future    Dyspnea on exertion  -     NT-BNP PRO; Future  -     Discontinue: isosorbide mononitrate (IMDUR) 30 mg 24 hr tablet; Take 1 tablet (30 mg total) by mouth daily  -     isosorbide mononitrate (IMDUR) 30 mg 24 hr tablet; Take 1 tablet (30 mg total) by mouth daily    Dyslipidemia  -     Comprehensive metabolic panel; Future  -     Lipid panel; Future  -     TSH, 3rd generation; Future    Vitamin D deficiency  -     Vitamin D 25 hydroxy; Future    Hyperthyroidism        Orders and recommendations as noted above  She declines immunizations  She declines any screening testing which is a legitimate choice at her age  Continue with calcium and vitamin-D supplementation  Be very careful to avoid falls  Follow-up with Ophthalmology regularly  Will have her follow up in about 3-5 months with laboratory testing prior to that visit  She was given the 5 wishes form  She was also provided with the POLST which she in her daughter completed and was reviewed and signed by myself  See copy on chart  Subjective:      Patient ID: Darryle Hinds is a 80 y o  female  She presents for routine follow-up as well as Medicare wellness visit    Has generally been doing relatively well except for the persistent issue with dyspnea on exertion  This seems to be occurring with less exertion  Now seems to occur if she is walking from 1 end of the room to the other especially if she is moving more quickly  Denies any chest pain or palpitations with this  She was seen by Cardiology and they recommended stress testing but she would like to avoid any testing or anything invasive  They are asking if anything can be used from a medication standpoint that may alleviate her symptoms if it is ischemic but without causing significant side effects or risk  She does not feel that the Lasix has helped significantly but she does continue to take it  Denies any significant peripheral edema  Symptoms are not worse when she is laying down  Appetite has been generally stable  She has not had any further dark stools since she is off of the Eliquis  Continues with the aspirin  Denies any nausea, vomiting, or diarrhea  Usually sleeps relatively well  Appetite has been good  Vision has been stable  Hearing remains somewhat poor despite the hearing aids  Denies any difficulty with urination but does notice that she does not urinate quite as much as she had with the Lasix  Denies any headaches or localized weakness  Denies any symptoms of hyperthyroidism  Continues with the methimazole  Tolerating blood pressure medications without difficulty  Denies any headaches or localized weakness  Tolerating statin medication without difficulty  Denies muscle aches or weakness  Denies abdominal pain  Denies chest pain or palpitations        The following portions of the patient's history were reviewed and updated as appropriate:   She  has a past medical history of Anemia, Atrial fibrillation ([address] ), Atrial flutter ([address] ), Depression, Diastolic CHF ([address] ), Dyslipidemia, Glaucoma, Glaucoma, H/O: hysterectomy, Hypertension, Hyperthyroidism, Impaired glucose tolerance, Pacemaker, Psoriasis, S/P cataract surgery, S/P knee replacement, SSS (sick sinus syndrome) ([facility] ), and SSS (sick sinus syndrome) ([facility] )  She   Patient Active Problem List    Diagnosis Date Noted    Dyspnea on exertion 09/20/2017    Osteoporosis 03/09/2017    Paroxysmal atrial fibrillation ([facility] ) 02/22/2017    Anemia 02/18/2017    Renal atrophy 02/18/2017    Hyperphosphatemia 02/18/2017    Vitamin D deficiency 02/18/2017    Hypokalemia 02/17/2017    Pulmonary hypertension ([facility] ) 02/17/2017    Presence of permanent cardiac pacemaker     H/O: hysterectomy     Benign essential hypertension     Hyperthyroidism     Glaucoma     Chronic diastolic congestive heart failure (HCC)     SSS (sick sinus syndrome) ([place] [address] )     Depression     Depression with anxiety 08/12/2015    Fatigue 11/18/2014    Vertigo 11/18/2014    Atrial flutter ([place] [address] ) 10/01/2014    Dyslipidemia 09/12/2012    Psoriasis 09/12/2012     She  has a past surgical history that includes Cardiac pacemaker placement; Eye surgery; Hysterectomy; Cardiac pacemaker placement; Joint replacement; Esophagogastroduodenoscopy (N/A, 12/30/2016); Cataract extraction; pr colonoscopy flx dx w/collj spec when pfrmd (N/A, 4/24/2017); and Replacement total knee (Bilateral)  Her family history includes Alcohol abuse in her brother and father; Diabetes in her father  She  reports that she has quit smoking  She has never used smokeless tobacco  She reports that she does not drink alcohol or use drugs    Current Outpatient Medications   Medication Sig Dispense Refill    ALFALFA PO Take 3 tablets by mouth        aspirin (ECOTRIN LOW STRENGTH) 81 mg EC tablet Take 81 mg by mouth daily      B Complex Vitamins (B COMPLEX 1 PO) Take by mouth      cholecalciferol (VITAMIN D3) 1,000 units tablet Take 1 tablet by mouth daily 30 tablet 0    diltiazem (CARDIZEM CD) 180 mg 24 hr capsule TAKE 1 CAPSULE DAILY 90 capsule 3    LECITHIN PO Take 1 tablet by mouth daily      methimazole (TAPAZOLE) 5 mg tablet TAKE 1 TABLET ON MONDAY,   WEDNESDAY, AND FRIDAY 36 tablet 3    metoprolol tartrate (LOPRESSOR) 50 mg tablet Take 1 tablet (50 mg total) by mouth every 12 (twelve) hours 180 tablet 3    Omega-3 Fatty Acids (OMEGA-3 PO) Take by mouth 2 (two) times a day        pantoprazole (PROTONIX) 40 mg tablet Take 1 tablet (40 mg total) by mouth daily 90 tablet 3    potassium chloride (K-DUR,KLOR-CON) 20 mEq tablet Take 1 tablet (20 mEq total) by mouth daily 90 tablet 3    sertraline (ZOLOFT) 100 mg tablet Take 1 tablet (100 mg total) by mouth daily 90 tablet 1    simvastatin (ZOCOR) 20 mg tablet Take 1 tablet (20 mg total) by mouth daily at bedtime 90 tablet 3    Zinc Sulfate (ZINC 15 PO) Take 100 mg by mouth        furosemide (LASIX) 40 mg tablet Take 1 tablet (40 mg total) by mouth 2 (two) times a day for 90 days Lasix 40mg in am and 20mg in pm 180 tablet 0    isosorbide mononitrate (IMDUR) 30 mg 24 hr tablet Take 1 tablet (30 mg total) by mouth daily 30 tablet 1     No current facility-administered medications for this visit        Current Outpatient Medications on File Prior to Visit   Medication Sig    ALFALFA PO Take 3 tablets by mouth      aspirin (ECOTRIN LOW STRENGTH) 81 mg EC tablet Take 81 mg by mouth daily    B Complex Vitamins (B COMPLEX 1 PO) Take by mouth    cholecalciferol (VITAMIN D3) 1,000 units tablet Take 1 tablet by mouth daily    diltiazem (CARDIZEM CD) 180 mg 24 hr capsule TAKE 1 CAPSULE DAILY    LECITHIN PO Take 1 tablet by mouth daily    methimazole (TAPAZOLE) 5 mg tablet TAKE 1 TABLET ON MONDAY,   WEDNESDAY, AND FRIDAY    Omega-3 Fatty Acids (OMEGA-3 PO) Take by mouth 2 (two) times a day      potassium chloride (K-DUR,KLOR-CON) 20 mEq tablet Take 1 tablet (20 mEq total) by mouth daily    Zinc Sulfate (ZINC 15 PO) Take 100 mg by mouth      [DISCONTINUED] metoprolol tartrate (LOPRESSOR) 50 mg tablet TAKE 1 TABLET EVERY 12     HOURS    [DISCONTINUED] pantoprazole (PROTONIX) 40 mg tablet Take 1 tablet (40 mg total) by mouth daily    [DISCONTINUED] sertraline (ZOLOFT) 100 mg tablet Take 1 tablet (100 mg total) by mouth daily    [DISCONTINUED] simvastatin (ZOCOR) 20 mg tablet Take 1 tablet (20 mg total) by mouth daily at bedtime    furosemide (LASIX) 40 mg tablet Take 1 tablet (40 mg total) by mouth 2 (two) times a day for 90 days Lasix 40mg in am and 20mg in pm     No current facility-administered medications on file prior to visit  She has No Known Allergies       Review of Systems   Constitutional: Negative for activity change, appetite change, chills, fatigue and fever  HENT: Positive for hearing loss  Negative for congestion and rhinorrhea  Eyes: Negative for visual disturbance  Respiratory: Positive for shortness of breath  Negative for cough and chest tightness  Cardiovascular: Positive for leg swelling  Negative for chest pain and palpitations  Gastrointestinal: Negative for abdominal pain, blood in stool, diarrhea, nausea and vomiting  Endocrine: Negative for polydipsia, polyphagia and polyuria  Genitourinary: Negative for dysuria, frequency and urgency  Musculoskeletal: Positive for arthralgias  Negative for gait problem  Skin: Negative for color change  Neurological: Negative for dizziness, syncope, light-headedness and headaches  Hematological: Does not bruise/bleed easily  Psychiatric/Behavioral: Negative for confusion, dysphoric mood and sleep disturbance  The patient is not nervous/anxious  Objective:      /60 (BP Location: Right arm, Patient Position: Sitting, Cuff Size: Large)   Pulse 60   Temp 98 3 °F (36 8 °C) (Temporal)   Ht 5' 6" (1 676 m)   Wt 88 kg (194 lb)   LMP  (LMP Unknown)   SpO2 94%   BMI 31 31 kg/m²          Physical Exam   Constitutional: She is oriented to person, place, and time  She is cooperative  No distress  HENT:   Head: Normocephalic and atraumatic  Right Ear: Decreased hearing is noted     Left Ear: Decreased hearing is noted  Mouth/Throat: Oropharynx is clear and moist    Hearing aids bilaterally; slight cerumen right ear canal; moist mucous membranes   Eyes: Pupils are equal, round, and reactive to light  Conjunctivae are normal  Right eye exhibits no discharge  Left eye exhibits no discharge  Neck: No JVD present  Carotid bruit is not present  Cardiovascular: Normal rate, regular rhythm and normal heart sounds  Exam reveals no gallop and no friction rub  No murmur heard  Trace to 1+ peripheral edema bilaterally   Pulmonary/Chest: No respiratory distress  She has no decreased breath sounds  She has no wheezes  She has no rhonchi  She has no rales  Abdominal: Bowel sounds are normal  She exhibits no distension  There is no tenderness  Musculoskeletal: She exhibits no edema  Lymphadenopathy:     She has no cervical adenopathy  Neurological: She is alert and oriented to person, place, and time  Skin: Skin is warm and dry  Psychiatric: She has a normal mood and affect  Her behavior is normal  Judgment normal  Cognition and memory are normal    Nursing note and vitals reviewed

## 2019-02-18 NOTE — TELEPHONE ENCOUNTER
Patient was seen today for follow up appointment  Had sent medications to mail order  Patient's daughter had requested that we sent new medication to retail pharmacy to start her sooner it  Requested for 30 days of IMDUR to be sent to [facility] in [place]  Gave papercopy of script to the patient to take to pharmacy  Had made her aware that you most likely will have to pay out of pocket for it  They were okay with that response

## 2019-02-18 NOTE — ASSESSMENT & PLAN NOTE
Blood pressure relatively well controlled  Continue with the diltiazem and metoprolol as previously  Watch salt intake  Continue to follow up with Cardiology  Watch for any orthostatics symptoms  Stay hydrated

## 2019-02-18 NOTE — PROGRESS NOTES
Assessment and Plan:    Problem List Items Addressed This Visit        Endocrine    Hyperthyroidism     TSH has been stable  Continue current dose of the methimazole  Discussed with her getting repeat laboratory testing prior to her next visit  Relevant Medications    metoprolol tartrate (LOPRESSOR) 50 mg tablet       Cardiovascular and Mediastinum    Benign essential hypertension - Primary     Blood pressure relatively well controlled  Continue with the diltiazem and metoprolol as previously  Watch salt intake  Continue to follow up with Cardiology  Watch for any orthostatics symptoms  Stay hydrated  Relevant Medications    metoprolol tartrate (LOPRESSOR) 50 mg tablet    Other Relevant Orders    CBC and differential    Comprehensive metabolic panel    Atrial flutter ([address] )     Currently regular  Did recently follow up with Cardiology over the last few months  Wishes to remain off of Eliquis  Continue other medications  Relevant Medications    metoprolol tartrate (LOPRESSOR) 50 mg tablet    isosorbide mononitrate (IMDUR) 30 mg 24 hr tablet    Other Relevant Orders    Comprehensive metabolic panel    Paroxysmal atrial fibrillation (HCC)     Currently regular on exam   She wishes to remain off of the Eliquis  Continue with the aspirin  Continue with the diltiazem and the metoprolol  Continue follow-up with Cardiology on a regular basis  Watch for any recurrence symptoms  Relevant Medications    metoprolol tartrate (LOPRESSOR) 50 mg tablet    isosorbide mononitrate (IMDUR) 30 mg 24 hr tablet    Other Relevant Orders    Comprehensive metabolic panel    Lipid panel       Other    Vitamin D deficiency     Continue vitamin-D supplementation  Will continue follow vitamin-D level about 2 to 3 times a year  Relevant Orders    Vitamin D 25 hydroxy    Depression with anxiety     Depression and anxiety symptoms are under good control    Continue the Zoloft which has worked very well for her  Relevant Medications    sertraline (ZOLOFT) 100 mg tablet    Dyslipidemia     Cholesterol controlled  Continue the simvastatin  Relevant Orders    Comprehensive metabolic panel    Lipid panel    TSH, 3rd generation    Dyspnea on exertion     The dyspnea on exertion may be multifactorial but Cardiology had some suspicions that this may ischemic in origin  Echocardiogram did not show any definite cause  Symptoms have not improved with Lasix  Weight has remained stable  Symptoms are with exertion  Discussed options with her  She does not want to go through any further testing or procedures if it can be avoided  Will start her on Imdur at a low dose to see if this alleviates some of her symptoms  Potential side effects reviewed with her and her daughter  Relevant Medications    isosorbide mononitrate (IMDUR) 30 mg 24 hr tablet    Other Relevant Orders    NT-BNP PRO      Other Visit Diagnoses     Gastroesophageal reflux disease without esophagitis        Relevant Medications    pantoprazole (PROTONIX) 40 mg tablet    Chronic diastolic heart failure (HCC)        Relevant Medications    metoprolol tartrate (LOPRESSOR) 50 mg tablet    isosorbide mononitrate (IMDUR) 30 mg 24 hr tablet    Mixed hyperlipidemia        Relevant Medications    simvastatin (ZOCOR) 20 mg tablet    Other Relevant Orders    Comprehensive metabolic panel    Medicare annual wellness visit, subsequent        Risk for falls            Health Maintenance Due   Topic Date Due    Medicare Annual Wellness Visit (AWV)  04/06/1932    BMI: Followup Plan  04/06/1950    DTaP,Tdap,and Td Vaccines (1 - Tdap) 04/06/1953         HPI:  Gaston Crockett is a 80 y o  female here for her Subsequent Wellness Visit      Patient Active Problem List   Diagnosis    Presence of permanent cardiac pacemaker    H/O: hysterectomy    Benign essential hypertension    Hyperthyroidism    Glaucoma    Chronic diastolic congestive heart failure (HCC)    SSS (sick sinus syndrome) (HCC)    Depression    Hypokalemia    Pulmonary hypertension (HCC)    Anemia    Renal atrophy    Hyperphosphatemia    Vitamin D deficiency    Atrial flutter (HCC)    Depression with anxiety    Dyslipidemia    Dyspnea on exertion    Fatigue    Osteoporosis    Paroxysmal atrial fibrillation (HCC)    Psoriasis    Vertigo     Past Medical History:   Diagnosis Date    Anemia     Atrial fibrillation (HCC)     Atrial flutter (HCC)     Depression     Diastolic CHF (HCC)     Dyslipidemia     Glaucoma     Glaucoma     H/O: hysterectomy     Hypertension     Hyperthyroidism     Impaired glucose tolerance     RESOLVED 2/3/2016    Pacemaker     Psoriasis     S/P cataract surgery     S/P knee replacement     [facility]     SSS (sick [facility] syndrome) [address] [address]      Past Surgical History:   Procedure Laterality Date    CARDIAC PACEMAKER PLACEMENT      CARDIAC PACEMAKER PLACEMENT      dual chamber    CATARACT EXTRACTION      ESOPHAGOGASTRODUODENOSCOPY N/A 12/30/2016    Procedure: ESOPHAGOGASTRODUODENOSCOPY (EGD); Surgeon: Sera Warner MD;  Location: [facility]; Service:     EYE SURGERY      cataract    HYSTERECTOMY      JOINT REPLACEMENT      bilateral     ID COLONOSCOPY FLX DX W/COLLJ SPEC WHEN PFRMD N/A 4/24/2017    Procedure: COLONOSCOPY with polypectomy;  Surgeon: Azra Liao MD;  Location: [facility];   Service: Gastroenterology    REPLACEMENT TOTAL KNEE Bilateral      Family History   Problem Relation Age of Onset    Alcohol abuse Father     Diabetes Father     Alcohol abuse Brother      Social History     Tobacco Use   Smoking Status Former Smoker   Smokeless Tobacco Never Used     Social History     Substance and Sexual Activity   Alcohol Use No      Social History     Substance and Sexual Activity   Drug Use No       Current Outpatient Medications   Medication Sig Dispense Refill    ALFALFA PO Take 3 tablets by mouth        aspirin (ECOTRIN LOW STRENGTH) 81 mg EC tablet Take 81 mg by mouth daily      B Complex Vitamins (B COMPLEX 1 PO) Take by mouth      cholecalciferol (VITAMIN D3) 1,000 units tablet Take 1 tablet by mouth daily 30 tablet 0    diltiazem (CARDIZEM CD) 180 mg 24 hr capsule TAKE 1 CAPSULE DAILY 90 capsule 3    LECITHIN PO Take 1 tablet by mouth daily      methimazole (TAPAZOLE) 5 mg tablet TAKE 1 TABLET ON MONDAY,   WEDNESDAY, AND FRIDAY 36 tablet 3    metoprolol tartrate (LOPRESSOR) 50 mg tablet Take 1 tablet (50 mg total) by mouth every 12 (twelve) hours 180 tablet 3    Omega-3 Fatty Acids (OMEGA-3 PO) Take by mouth 2 (two) times a day        pantoprazole (PROTONIX) 40 mg tablet Take 1 tablet (40 mg total) by mouth daily 90 tablet 3    potassium chloride (K-DUR,KLOR-CON) 20 mEq tablet Take 1 tablet (20 mEq total) by mouth daily 90 tablet 3    sertraline (ZOLOFT) 100 mg tablet Take 1 tablet (100 mg total) by mouth daily 90 tablet 1    simvastatin (ZOCOR) 20 mg tablet Take 1 tablet (20 mg total) by mouth daily at bedtime 90 tablet 3    Zinc Sulfate (ZINC 15 PO) Take 100 mg by mouth        furosemide (LASIX) 40 mg tablet Take 1 tablet (40 mg total) by mouth 2 (two) times a day for 90 days Lasix 40mg in am and 20mg in pm 180 tablet 0    isosorbide mononitrate (IMDUR) 30 mg 24 hr tablet Take 1 tablet (30 mg total) by mouth daily 30 tablet 1     No current facility-administered medications for this visit  No Known Allergies  Immunization History   Administered Date(s) Administered    INFLUENZA 10/03/2016    Influenza Split High Dose Preservative Free IM 10/03/2016       Patient Care Team:  Janessa Grimes MD as PCP - General (Family Medicine)  Janessa Grimes MD as PCP - General  Nena Severe, DO Rosendo Rothman, MD Venetta Spurr, MD    Medicare Screening Tests and Risk Assessments:  Cheryl is here for her Subsequent Wellness visit    Last Medicare Wellness visit information reviewed, patient interviewed and updates made to the record today  Health Risk Assessment:  Patient rates overall health as good  Patient feels that their physical health rating is Same  Eyesight was rated as Same  Hearing was rated as Slightly worse  Patient feels that their emotional and mental health rating is Same  Pain experienced by patient in the last 7 days has been None  Patient states that she has experienced no weight loss or gain in last 6 months  Emotional/Mental Health:  Patient has been feeling nervous/anxious  PHQ-9 Depression Screening:    Frequency of the following problems over the past two weeks:      1  Little interest or pleasure in doing things: 0 - not at all      2  Feeling down, depressed, or hopeless: 0 - not at all  PHQ-2 Score: 0          Broken Bones/Falls: Fall Risk Assessment:    In the past year, patient has experienced: No history of falling in past year          Bladder/Bowel:  Patient has not leaked urine accidently in the last six months  Patient reports no loss of bowel control  Immunizations:  Patient has not had a flu vaccination within the last year  Patient has not received a pneumonia shot  Patient has not received a shingles shot  Patient has received tetanus/diphtheria shot  Home Safety:  Patient does not have trouble with stairs inside or outside of their home  Patient currently reports that there are no safety hazards present in home, working smoke alarms, working carbon monoxide detectors  Preventative Screenings:   Breast cancer screening performed, colon cancer screen completed, cholesterol screen completed, glaucoma eye exam completed,     Nutrition:  Current diet: Regular with servings of the following:    Medications:  Patient is currently taking over-the-counter supplements  List of OTC medications includes: multivitamins  Patient is able to manage medications      Lifestyle Choices:  Patient reports no tobacco use  Patient has not smoked or used tobacco in the past   Patient reports no alcohol use  Patient does not drive a vehicle  Patient wears seat belt  Current level of exercise of physical activity described by patient as: low  Activities of Daily Living:  Can get out of bed by his or her self, able to dress self, able to make own meals, able to do own shopping, able to bathe self, can do own laundry/housekeeping, can manage own money, pay bills and track expenses    Previous Hospitalizations:  No hospitalization or ED visit in past 12 months  Number of hospitalizations within the last year: 1-2        Advanced Directives:  Patient has decided on a power of   Patient has spoken to designated power of   Patient has completed advanced directive  Preventative Screening/Counseling:      Cardiovascular:      General: Screening Current      Counseling: Healthy Diet, Healthy Weight and Improve Exercise Tolerance          Diabetes:      General: Screening Current      Counseling: Healthy Diet, Healthy Weight and Improve Physical Activity          Colorectal Cancer:      General: Patient Declines          Breast Cancer:      General: Patient Declines          Cervical Cancer:      General: Patient Declines          Osteoporosis:      General: Patient Declines      Counseling: Calcium and Vitamin D Intake and Regular Weightbearing Exercise          AAA:      General: Screening Not Indicated          Glaucoma:      General: Screening Current          HIV:      General: Screening Not Indicated          Hepatitis C:      General: Screening Not Indicated        Advanced Directives:   Patient has living will for healthcare, has durable POA for healthcare, patient has an advanced directive  5 wishes given  Provider agrees with end of life decisions        Immunizations:      Influenza: Influenza Recommended Annually and Patient Declines      Pneumococcal: Patient Declines      Other Preventative Counseling (Non-Medicare):   Fall Prevention, Increase physical activity and Car/seat belt/driving safety reviewed

## 2019-02-18 NOTE — ASSESSMENT & PLAN NOTE
Currently regular on exam   She wishes to remain off of the Eliquis  Continue with the aspirin  Continue with the diltiazem and the metoprolol  Continue follow-up with Cardiology on a regular basis  Watch for any recurrence symptoms

## 2019-02-18 NOTE — ASSESSMENT & PLAN NOTE
Currently regular  Did recently follow up with Cardiology over the last few months  Wishes to remain off of Eliquis  Continue other medications

## 2019-02-18 NOTE — ASSESSMENT & PLAN NOTE
Depression and anxiety symptoms are under good control  Continue the Zoloft which has worked very well for her

## 2019-02-18 NOTE — ASSESSMENT & PLAN NOTE
The dyspnea on exertion may be multifactorial but Cardiology had some suspicions that this may ischemic in origin  Echocardiogram did not show any definite cause  Symptoms have not improved with Lasix  Weight has remained stable  Symptoms are with exertion  Discussed options with her  She does not want to go through any further testing or procedures if it can be avoided  Will start her on Imdur at a low dose to see if this alleviates some of her symptoms  Potential side effects reviewed with her and her daughter

## 2019-02-18 NOTE — ASSESSMENT & PLAN NOTE
TSH has been stable  Continue current dose of the methimazole  Discussed with her getting repeat laboratory testing prior to her next visit

## 2019-02-20 ENCOUNTER — TELEPHONE (OUTPATIENT)
Dept: CARDIOLOGY CLINIC | Facility: CLINIC | Age: 84
End: 2019-02-20

## 2019-02-20 DIAGNOSIS — I50.32 CHRONIC DIASTOLIC CONGESTIVE HEART FAILURE (HCC): ICD-10-CM

## 2019-02-20 RX ORDER — POTASSIUM CHLORIDE 20 MEQ/1
20 TABLET, EXTENDED RELEASE ORAL DAILY
Qty: 90 TABLET | Refills: 3 | Status: SHIPPED | OUTPATIENT
Start: 2019-02-20 | End: 2019-07-23 | Stop reason: SDUPTHER

## 2019-02-21 DIAGNOSIS — K21.9 GASTROESOPHAGEAL REFLUX DISEASE WITHOUT ESOPHAGITIS: ICD-10-CM

## 2019-02-21 RX ORDER — PANTOPRAZOLE SODIUM 40 MG/1
40 TABLET, DELAYED RELEASE ORAL DAILY
Qty: 90 TABLET | Refills: 3 | Status: SHIPPED | OUTPATIENT
Start: 2019-02-21 | End: 2019-05-23

## 2019-04-02 ENCOUNTER — REMOTE DEVICE CLINIC VISIT (OUTPATIENT)
Dept: CARDIOLOGY CLINIC | Facility: CLINIC | Age: 84
End: 2019-04-02
Payer: MEDICARE

## 2019-04-02 DIAGNOSIS — I48.0 PAROXYSMAL ATRIAL FIBRILLATION (HCC): ICD-10-CM

## 2019-04-02 DIAGNOSIS — Z95.0 CARDIAC PACEMAKER: ICD-10-CM

## 2019-04-02 DIAGNOSIS — I49.5 SICK SINUS SYNDROME (HCC): Primary | ICD-10-CM

## 2019-04-02 PROCEDURE — 93294 REM INTERROG EVL PM/LDLS PM: CPT | Performed by: INTERNAL MEDICINE

## 2019-04-02 PROCEDURE — 93296 REM INTERROG EVL PM/IDS: CPT | Performed by: INTERNAL MEDICINE

## 2019-04-11 DIAGNOSIS — R06.00 DYSPNEA ON EXERTION: Primary | ICD-10-CM

## 2019-04-22 ENCOUNTER — HOSPITAL ENCOUNTER (OUTPATIENT)
Dept: NON INVASIVE DIAGNOSTICS | Facility: CLINIC | Age: 84
Discharge: HOME | End: 2019-04-22
Payer: MEDICARE

## 2019-04-22 DIAGNOSIS — R06.00 DYSPNEA ON EXERTION: ICD-10-CM

## 2019-04-22 LAB
MAX DIASTOLIC BP: 82 MMHG
MAX HEART RATE: 68 BPM
MAX PREDICTED HEART RATE: 133 BPM
MAX. SYSTOLIC BP: 152 MMHG
PROTOCOL NAME: NORMAL
TARGET HR FORMULA: NORMAL
TEST INDICATION: NORMAL
TIME IN EXERCISE PHASE: NORMAL

## 2019-04-22 PROCEDURE — A9502 TC99M TETROFOSMIN: HCPCS

## 2019-04-22 PROCEDURE — 78452 HT MUSCLE IMAGE SPECT MULT: CPT | Performed by: INTERNAL MEDICINE

## 2019-04-22 PROCEDURE — 93017 CV STRESS TEST TRACING ONLY: CPT

## 2019-04-22 PROCEDURE — 93018 CV STRESS TEST I&R ONLY: CPT | Performed by: INTERNAL MEDICINE

## 2019-04-22 PROCEDURE — 78452 HT MUSCLE IMAGE SPECT MULT: CPT

## 2019-04-22 PROCEDURE — 93016 CV STRESS TEST SUPVJ ONLY: CPT | Performed by: INTERNAL MEDICINE

## 2019-04-22 RX ADMIN — REGADENOSON 0.4 MG: 0.08 INJECTION, SOLUTION INTRAVENOUS at 09:01

## 2019-05-22 ENCOUNTER — OFFICE VISIT (OUTPATIENT)
Dept: INTERNAL MEDICINE CLINIC | Facility: CLINIC | Age: 84
End: 2019-05-22
Payer: MEDICARE

## 2019-05-22 VITALS
TEMPERATURE: 98 F | DIASTOLIC BLOOD PRESSURE: 70 MMHG | SYSTOLIC BLOOD PRESSURE: 130 MMHG | BODY MASS INDEX: 31.82 KG/M2 | HEART RATE: 72 BPM | WEIGHT: 198 LBS | OXYGEN SATURATION: 90 % | HEIGHT: 66 IN

## 2019-05-22 DIAGNOSIS — I48.0 PAROXYSMAL ATRIAL FIBRILLATION (HCC): ICD-10-CM

## 2019-05-22 DIAGNOSIS — I10 BENIGN ESSENTIAL HYPERTENSION: Primary | ICD-10-CM

## 2019-05-22 DIAGNOSIS — E78.5 DYSLIPIDEMIA: ICD-10-CM

## 2019-05-22 DIAGNOSIS — I50.32 CHRONIC DIASTOLIC CONGESTIVE HEART FAILURE (HCC): ICD-10-CM

## 2019-05-22 DIAGNOSIS — E05.90 HYPERTHYROIDISM: ICD-10-CM

## 2019-05-22 DIAGNOSIS — R06.00 DYSPNEA ON EXERTION: ICD-10-CM

## 2019-05-22 PROCEDURE — 99214 OFFICE O/P EST MOD 30 MIN: CPT | Performed by: FAMILY MEDICINE

## 2019-05-22 RX ORDER — FUROSEMIDE 40 MG/1
TABLET ORAL
COMMUNITY
Start: 2019-02-19 | End: 2019-06-17 | Stop reason: SDUPTHER

## 2019-05-22 RX ORDER — ISOSORBIDE MONONITRATE 60 MG/1
60 TABLET, EXTENDED RELEASE ORAL DAILY
Qty: 90 TABLET | Refills: 1 | Status: SHIPPED | OUTPATIENT
Start: 2019-05-22 | End: 2019-09-09 | Stop reason: SDUPTHER

## 2019-05-28 ENCOUNTER — APPOINTMENT (OUTPATIENT)
Dept: LAB | Facility: HOSPITAL | Age: 84
End: 2019-05-28
Payer: MEDICARE

## 2019-05-28 DIAGNOSIS — R06.00 DYSPNEA ON EXERTION: ICD-10-CM

## 2019-05-28 LAB — NT-PROBNP SERPL-MCNC: 1732 PG/ML

## 2019-05-28 PROCEDURE — 36415 COLL VENOUS BLD VENIPUNCTURE: CPT

## 2019-05-28 PROCEDURE — 83880 ASSAY OF NATRIURETIC PEPTIDE: CPT

## 2019-06-17 DIAGNOSIS — I50.32 CHRONIC DIASTOLIC CONGESTIVE HEART FAILURE (HCC): Primary | ICD-10-CM

## 2019-06-17 RX ORDER — FUROSEMIDE 40 MG/1
40 TABLET ORAL 2 TIMES DAILY
Qty: 180 TABLET | Refills: 3 | Status: SHIPPED | OUTPATIENT
Start: 2019-06-17 | End: 2020-06-01

## 2019-07-03 ENCOUNTER — REMOTE DEVICE CLINIC VISIT (OUTPATIENT)
Dept: CARDIOLOGY CLINIC | Facility: CLINIC | Age: 84
End: 2019-07-03
Payer: MEDICARE

## 2019-07-03 DIAGNOSIS — Z95.0 CARDIAC PACEMAKER IN SITU: Primary | ICD-10-CM

## 2019-07-03 PROCEDURE — 93294 REM INTERROG EVL PM/LDLS PM: CPT | Performed by: INTERNAL MEDICINE

## 2019-07-03 PROCEDURE — 93296 REM INTERROG EVL PM/IDS: CPT | Performed by: INTERNAL MEDICINE

## 2019-07-03 NOTE — PROGRESS NOTES
Results for orders placed or performed in visit on 07/03/19   Cardiac EP device report    Narrative    SJM-DUAL-PM  MERLIN TRANSMISSION: BATTERY VOLTAGE ADEQUATE (9 8 YRS)  AP-99%, <1%  ATRIAL SENSITIVITY MARGIN <2:1  ALL OTHER AVAILABLE LEAD PARAMETERS WITHIN NORMAL LIMITS  2 AMS EPISODES MAX DURATION 10 SEC- PAT ON EGM'S  PT ON ASA 81MG, DILTIAZEM & METOPROLOL  NORMAL DEVICE FUNCTION   GV
,[email or web address]

## 2019-07-23 DIAGNOSIS — I50.32 CHRONIC DIASTOLIC CONGESTIVE HEART FAILURE (HCC): ICD-10-CM

## 2019-07-23 RX ORDER — POTASSIUM CHLORIDE 20 MEQ/1
20 TABLET, EXTENDED RELEASE ORAL DAILY
Qty: 90 TABLET | Refills: 3 | Status: SHIPPED | OUTPATIENT
Start: 2019-07-23 | End: 2020-06-29

## 2019-09-03 DIAGNOSIS — I48.0 PAROXYSMAL ATRIAL FIBRILLATION (HCC): ICD-10-CM

## 2019-09-03 RX ORDER — DILTIAZEM HYDROCHLORIDE 180 MG/1
CAPSULE, COATED, EXTENDED RELEASE ORAL
Qty: 90 CAPSULE | Refills: 3 | Status: SHIPPED | OUTPATIENT
Start: 2019-09-03 | End: 2019-12-27 | Stop reason: SDUPTHER

## 2019-09-09 DIAGNOSIS — R06.00 DYSPNEA ON EXERTION: ICD-10-CM

## 2019-09-09 RX ORDER — ISOSORBIDE MONONITRATE 60 MG/1
60 TABLET, EXTENDED RELEASE ORAL DAILY
Qty: 10 TABLET | Refills: 0 | Status: SHIPPED | OUTPATIENT
Start: 2019-09-09 | End: 2019-09-26 | Stop reason: SDUPTHER

## 2019-09-23 ENCOUNTER — APPOINTMENT (OUTPATIENT)
Dept: LAB | Facility: HOSPITAL | Age: 84
End: 2019-09-23
Payer: MEDICARE

## 2019-09-23 DIAGNOSIS — I50.32 CHRONIC DIASTOLIC CONGESTIVE HEART FAILURE (HCC): ICD-10-CM

## 2019-09-23 DIAGNOSIS — E05.90 HYPERTHYROIDISM: ICD-10-CM

## 2019-09-23 DIAGNOSIS — I10 BENIGN ESSENTIAL HYPERTENSION: ICD-10-CM

## 2019-09-23 DIAGNOSIS — I48.0 PAROXYSMAL ATRIAL FIBRILLATION (HCC): ICD-10-CM

## 2019-09-23 DIAGNOSIS — E55.9 VITAMIN D DEFICIENCY: ICD-10-CM

## 2019-09-23 DIAGNOSIS — E78.5 DYSLIPIDEMIA: ICD-10-CM

## 2019-09-23 LAB
25(OH)D3 SERPL-MCNC: 29 NG/ML (ref 30–100)
ALBUMIN SERPL BCP-MCNC: 3.2 G/DL (ref 3.5–5)
ALP SERPL-CCNC: 76 U/L (ref 46–116)
ALT SERPL W P-5'-P-CCNC: 18 U/L (ref 12–78)
ANION GAP SERPL CALCULATED.3IONS-SCNC: 7 MMOL/L (ref 4–13)
AST SERPL W P-5'-P-CCNC: 23 U/L (ref 5–45)
BASOPHILS # BLD AUTO: 0.04 THOUSANDS/ΜL (ref 0–0.1)
BASOPHILS NFR BLD AUTO: 1 % (ref 0–1)
BILIRUB SERPL-MCNC: 0.5 MG/DL (ref 0.2–1)
BUN SERPL-MCNC: 20 MG/DL (ref 5–25)
CALCIUM SERPL-MCNC: 8.7 MG/DL (ref 8.3–10.1)
CHLORIDE SERPL-SCNC: 104 MMOL/L (ref 100–108)
CHOLEST SERPL-MCNC: 174 MG/DL (ref 50–200)
CO2 SERPL-SCNC: 33 MMOL/L (ref 21–32)
CREAT SERPL-MCNC: 1.38 MG/DL (ref 0.6–1.3)
EOSINOPHIL # BLD AUTO: 0.29 THOUSAND/ΜL (ref 0–0.61)
EOSINOPHIL NFR BLD AUTO: 4 % (ref 0–6)
ERYTHROCYTE [DISTWIDTH] IN BLOOD BY AUTOMATED COUNT: 17.3 % (ref 11.6–15.1)
GFR SERPL CREATININE-BSD FRML MDRD: 34 ML/MIN/1.73SQ M
GLUCOSE P FAST SERPL-MCNC: 103 MG/DL (ref 65–99)
HCT VFR BLD AUTO: 41.5 % (ref 34.8–46.1)
HDLC SERPL-MCNC: 62 MG/DL (ref 40–60)
HGB BLD-MCNC: 12.7 G/DL (ref 11.5–15.4)
IMM GRANULOCYTES # BLD AUTO: 0.01 THOUSAND/UL (ref 0–0.2)
IMM GRANULOCYTES NFR BLD AUTO: 0 % (ref 0–2)
LDLC SERPL CALC-MCNC: 91 MG/DL (ref 0–100)
LYMPHOCYTES # BLD AUTO: 2.15 THOUSANDS/ΜL (ref 0.6–4.47)
LYMPHOCYTES NFR BLD AUTO: 32 % (ref 14–44)
MCH RBC QN AUTO: 27.3 PG (ref 26.8–34.3)
MCHC RBC AUTO-ENTMCNC: 30.6 G/DL (ref 31.4–37.4)
MCV RBC AUTO: 89 FL (ref 82–98)
MONOCYTES # BLD AUTO: 0.56 THOUSAND/ΜL (ref 0.17–1.22)
MONOCYTES NFR BLD AUTO: 8 % (ref 4–12)
NEUTROPHILS # BLD AUTO: 3.62 THOUSANDS/ΜL (ref 1.85–7.62)
NEUTS SEG NFR BLD AUTO: 55 % (ref 43–75)
NONHDLC SERPL-MCNC: 112 MG/DL
NRBC BLD AUTO-RTO: 0 /100 WBCS
PLATELET # BLD AUTO: 153 THOUSANDS/UL (ref 149–390)
PMV BLD AUTO: 9.5 FL (ref 8.9–12.7)
POTASSIUM SERPL-SCNC: 3.8 MMOL/L (ref 3.5–5.3)
PROT SERPL-MCNC: 6.7 G/DL (ref 6.4–8.2)
RBC # BLD AUTO: 4.65 MILLION/UL (ref 3.81–5.12)
SODIUM SERPL-SCNC: 144 MMOL/L (ref 136–145)
TRIGL SERPL-MCNC: 105 MG/DL
TSH SERPL DL<=0.05 MIU/L-ACNC: 2.84 UIU/ML (ref 0.36–3.74)
WBC # BLD AUTO: 6.67 THOUSAND/UL (ref 4.31–10.16)

## 2019-09-23 PROCEDURE — 84443 ASSAY THYROID STIM HORMONE: CPT

## 2019-09-23 PROCEDURE — 85025 COMPLETE CBC W/AUTO DIFF WBC: CPT

## 2019-09-23 PROCEDURE — 82306 VITAMIN D 25 HYDROXY: CPT

## 2019-09-23 PROCEDURE — 80053 COMPREHEN METABOLIC PANEL: CPT

## 2019-09-23 PROCEDURE — 36415 COLL VENOUS BLD VENIPUNCTURE: CPT

## 2019-09-23 PROCEDURE — 80061 LIPID PANEL: CPT

## 2019-09-26 ENCOUNTER — OFFICE VISIT (OUTPATIENT)
Dept: INTERNAL MEDICINE CLINIC | Facility: CLINIC | Age: 84
End: 2019-09-26
Payer: MEDICARE

## 2019-09-26 VITALS
SYSTOLIC BLOOD PRESSURE: 130 MMHG | OXYGEN SATURATION: 93 % | HEIGHT: 66 IN | WEIGHT: 187 LBS | HEART RATE: 62 BPM | BODY MASS INDEX: 30.05 KG/M2 | DIASTOLIC BLOOD PRESSURE: 70 MMHG | TEMPERATURE: 97.3 F

## 2019-09-26 DIAGNOSIS — R06.00 DYSPNEA ON EXERTION: ICD-10-CM

## 2019-09-26 DIAGNOSIS — E55.9 VITAMIN D DEFICIENCY: ICD-10-CM

## 2019-09-26 DIAGNOSIS — I50.32 CHRONIC DIASTOLIC HEART FAILURE (HCC): ICD-10-CM

## 2019-09-26 DIAGNOSIS — I50.32 CHRONIC DIASTOLIC CONGESTIVE HEART FAILURE (HCC): ICD-10-CM

## 2019-09-26 DIAGNOSIS — F41.8 DEPRESSION WITH ANXIETY: ICD-10-CM

## 2019-09-26 DIAGNOSIS — I10 BENIGN ESSENTIAL HYPERTENSION: Primary | ICD-10-CM

## 2019-09-26 DIAGNOSIS — L40.9 PSORIASIS: ICD-10-CM

## 2019-09-26 DIAGNOSIS — E05.90 HYPERTHYROIDISM: ICD-10-CM

## 2019-09-26 PROCEDURE — 99214 OFFICE O/P EST MOD 30 MIN: CPT | Performed by: FAMILY MEDICINE

## 2019-09-26 RX ORDER — METHIMAZOLE 5 MG/1
5 TABLET ORAL 3 TIMES WEEKLY
Qty: 36 TABLET | Refills: 3 | Status: SHIPPED | OUTPATIENT
Start: 2019-09-27 | End: 2020-08-16

## 2019-09-26 RX ORDER — SERTRALINE HYDROCHLORIDE 100 MG/1
100 TABLET, FILM COATED ORAL DAILY
Qty: 90 TABLET | Refills: 1 | Status: SHIPPED | OUTPATIENT
Start: 2019-09-26 | End: 2020-01-22

## 2019-09-26 RX ORDER — KETOCONAZOLE 20 MG/ML
1 SHAMPOO TOPICAL ONCE
Qty: 100 ML | Refills: 3 | Status: SHIPPED | OUTPATIENT
Start: 2019-09-26 | End: 2019-10-01 | Stop reason: SDUPTHER

## 2019-09-26 RX ORDER — FLUOCINONIDE TOPICAL SOLUTION USP, 0.05% 0.5 MG/ML
SOLUTION TOPICAL 2 TIMES DAILY PRN
Qty: 180 ML | Refills: 3 | Status: SHIPPED | OUTPATIENT
Start: 2019-09-26 | End: 2020-01-21 | Stop reason: SDUPTHER

## 2019-09-26 RX ORDER — METOPROLOL TARTRATE 50 MG/1
50 TABLET, FILM COATED ORAL EVERY 12 HOURS
Qty: 180 TABLET | Refills: 3 | Status: SHIPPED | OUTPATIENT
Start: 2019-09-26 | End: 2020-01-10 | Stop reason: HOSPADM

## 2019-09-26 RX ORDER — ISOSORBIDE MONONITRATE 60 MG/1
60 TABLET, EXTENDED RELEASE ORAL DAILY
Qty: 90 TABLET | Refills: 3 | Status: SHIPPED | OUTPATIENT
Start: 2019-09-26 | End: 2020-01-22

## 2019-09-26 NOTE — PATIENT INSTRUCTIONS
Psoriasis   WHAT YOU NEED TO KNOW:   What is psoriasis? Psoriasis is a long-term skin disease in which the skin cells grow faster than normal  This abnormal growth causes a buildup of cells on the surface of the skin  Red, raised patches that are covered with silver-colored scales form on your skin  What causes psoriasis? The exact cause of psoriasis is not known  A problem with the immune system sometimes causes your body to attack healthy skin cells  Psoriasis is more likely to occur if another family member also has psoriasis  Flare-ups of psoriasis come and go and are often caused by certain triggers  · Infections:  Germs, such as bacteria, viruses, or fungi, may trigger a flare-up  A flare-up of psoriasis usually follows a sore throat  · Medicines:  Certain medicines, such as those used to treat high blood pressure or depression, may trigger a psoriasis flare-up  · Skin damage:  Skin injuries, such as a cut or scrape, or a sunburn may increase your risk of a flare-up  · Smoking and alcohol:  Smoking and drinking alcohol may also increase your risk  · Stress: Both physical and emotional stress may lead to a flare-up of psoriasis  What are the signs and symptoms of psoriasis? The signs and symptoms usually depend on the type of psoriasis you have  · Plaque type: This is the most common and mildest type of psoriasis  Plaques are reddened patches covered with silver-colored scales  Your knees, elbows, scalp, stomach, and back are usually affected  You may also have nail changes, such as pitting, thickening, or lifting of the nails off the nail bed  · Guttate type: This type is the most common among children and young adults  It usually happens after a sore throat or other infections  This type looks like red, raised, pea-sized drops on your skin  · Inverse type: The plaques appear as smooth red patches and are often found in the moist areas of your body   It affects skin in the armpits, groin, under breasts, and around the genitals  · Erythrodermic type: This is a rare and severe type of psoriasis in which plaques cover large areas of the skin  These areas itch and are painful  · Pustular type:  Pustules (blisters with pus inside) or pimple-like lesions may appear on large red areas of the skin  Sometimes this type is limited to the palms of the hands and soles of the feet  · Psoriatic arthritis:  Some people who have psoriasis may also develop psoriatic arthritis  Psoriatic arthritis makes your joints swollen and painful  You may also have nail changes, such as pitting, thickening, or lifting of the nails off the nail bed  How is psoriasis diagnosed? Your healthcare provider will ask about your health history  He may also want to know if you have other family members who have psoriasis  Psoriasis is usually diagnosed after a careful examination of your skin, scalp, and nails  Blood tests and x-rays may also be needed  How is psoriasis treated? Treatment usually depends on the severity of the disease, size of the areas involved, and the type of psoriasis  · Topical medicine: These medicines are ointments, creams, and pastes that are applied on the skin  ¨ Moisturizers: These soothe your skin by keeping it moist and preventing dryness  ¨ Steroids: This medicine may be given to decrease inflammation  ¨ Vitamin D and retinoids: These are vitamin-based creams that are used to clear plaques  ¨ Anthralin:  This medicine decreases swelling and excess skin cells that form scales  ¨ Salicylic acid: This peeling agent helps decrease scaling of the skin and scalp  ¨ Tar preparations: These medicines decrease itching, scaling, and inflammation  They may be shampoos, creams, or bath oils  · Oral medicine: These medicines are used to treat serious types of psoriasis and are taken by mouth  They include steroids or retinoids   They may also include medicines that decrease the rate of growth of your skin cells or that affect your immune system  · Phototherapy:  You may need ultraviolet (UV) light treatments if your psoriasis is severe  Your skin is exposed to UV light for the period of time that your healthcare provider prescribes  What are the risks of psoriasis? Certain medicines used to treat psoriasis can cause burning, redness, and irritation of your skin  They can also cause drowsiness, high blood pressure, or birth defects  Without treatment, your signs and symptoms may worsen  Psoriasis may cause severe itching, swelling, and infection  You may also bleed more easily  How can I manage my psoriasis? · Take care of your skin:  Apply emollients, lubricants, or moisturizing creams to your skin regularly  Apply these while your skin is still damp when you bathe  Stop using them if they sting or irritate your skin  Use mild soaps and add bath oils to soothe your skin when you bathe  · Protect your skin from sun exposure:  When you get sun exposure for short periods of time, it can help your psoriasis  Too much sun exposure or a sunburn can make your psoriasis worse  Talk to your dermatologist or healthcare provider about how much sun exposure is right for you  · Manage stress:  Stress can trigger a flare-up  Find healthy ways to manage stress, such as deep breathing or meditation  · Watch for symptoms with new medicines or herbal supplements:  Some medicines, including herbal supplements, may trigger a psoriasis flare-up  Ask if any of the medicines you take may be making your psoriasis worse  Always check for skin changes when you take your medicines  · Do not smoke:  Smoking can trigger a flare-up of psoriasis  If you smoke, it is never too late to quit  Ask for information about how to stop  · Avoid triggers:  Injury to the skin, cold weather, and heavy alcohol use are other things that can trigger psoriasis flare-ups    When should I contact my healthcare provider? · You get pregnant  · You have a fever  · Your skin plaques are not getting better or are getting worse  · You cannot sleep because your skin itches  · Your skin plaques have pus draining from them or they have soft yellow scabs  · You have questions or concerns about your condition or care  When should I seek immediate care? · Psoriasis suddenly covers larger areas of your body and becomes more painful  CARE AGREEMENT:   You have the right to help plan your care  Learn about your health condition and how it may be treated  Discuss treatment options with your caregivers to decide what care you want to receive  You always have the right to refuse treatment  The above information is an  only  It is not intended as medical advice for individual conditions or treatments  Talk to your doctor, nurse or pharmacist before following any medical regimen to see if it is safe and effective for you  © 2017 [employer] Information is for End User's use only and may not be sold, redistributed or otherwise used for commercial purposes  All illustrations and images included in CareNotes® are the copyrighted property of [employer]  or [employer]

## 2019-09-26 NOTE — PROGRESS NOTES
Assessment/Plan:    Hyperthyroidism  TSH stable  Continue the methimazole  Will continue follow TSH with routine laboratory testing  Benign essential hypertension  Blood pressure well controlled  Continue with the diltiazem and the metoprolol as previously  Watch salt intake  Remain as active as possible  Watch for any orthostatics symptoms  Chronic diastolic congestive heart failure (HCC)  Wt Readings from Last 3 Encounters:   09/26/19 84 8 kg (187 lb)   05/22/19 89 8 kg (198 lb)   02/18/19 88 kg (194 lb)         Weight has decreased but she has been watching her diet somewhat more  Continue with the Lasix and potassium  Watch for sudden changes in weight  Discussed watching for weight increase of more than 2-3 lb in 24 hours or more than 5 lb in a week  Watch for any increased peripheral edema or increased shortness of breath  She has the chronic shortness of breath but advised her to watch for changes  Psoriasis  Some worsening of her scalp symptoms  Discussed options with her  Discussed trying Nizoral shampoo which may help  Advised her to use this about once to twice a week  Lidex solution to the scalp as needed  Instructed her on how to use this  Depression with anxiety  Mood is stable  Continue with the Zoloft  Watch for any worsening  Dyspnea on exertion  She has chronic shortness of breath on exertion  Advised to watch for worsening  Continue follow-up with Cardiology on a regular basis  Vitamin D deficiency  Continue vitamin-D supplementation  Will continue follow vitamin-D level with routine laboratory testing  Diagnoses and all orders for this visit:    Benign essential hypertension  -     CBC and differential; Future  -     Comprehensive metabolic panel; Future  -     Lipid panel; Future    Chronic diastolic heart failure (HCC)  -     CBC and differential; Future  -     Comprehensive metabolic panel;  Future  -     metoprolol tartrate (LOPRESSOR) 50 mg tablet; Take 1 tablet (50 mg total) by mouth every 12 (twelve) hours    Dyspnea on exertion  -     CBC and differential; Future  -     isosorbide mononitrate (IMDUR) 60 mg 24 hr tablet; Take 1 tablet (60 mg total) by mouth daily    Hyperthyroidism  -     TSH, 3rd generation; Future  -     methimazole (TAPAZOLE) 5 mg tablet; Take 1 tablet (5 mg total) by mouth 3 (three) times a week Monday Wednesday Friday    Depression with anxiety  -     sertraline (ZOLOFT) 100 mg tablet; Take 1 tablet (100 mg total) by mouth daily    Chronic diastolic congestive heart failure (HCC)  -     Lipid panel; Future    Psoriasis  -     fluocinonide (LIDEX) 0 05 % external solution; Apply topically 2 (two) times a day as needed for irritation  -     ketoconazole (NIZORAL) 2 % shampoo; Apply 1 application topically once for 1 dose    Vitamin D deficiency  -     Vitamin D 25 hydroxy; Future    Other orders  -     Cancel: Pneumococcal polysaccharide vaccine 23-valent greater than or equal to 3yo subcutaneous/IM        Orders recommendations as noted above  She declines any screening testing  Declines mammogram, bone density, or any colorectal cancer screening  This is a legitimate choice at her age  She declines immunizations  Will have her follow up in about 3-4 months or sooner if needed  Subjective:      Patient ID: Rachel Gordon is a 80 y o  female  She presents for routine follow-up  Has generally been doing relatively well  She has been relatively stable  She continues to have shortness of breath especially with exertion  This has been chronic and stable  She feels that this is likely chronic  Her daughter does not feel this has worsened significantly  Continues with the Lasix and the potassium  Continues with some dizziness and vertigo symptoms  This seems to happen intermittently  Denies any chest pain or palpitations  Appetite has been stable  Denies any nausea, vomiting, or diarrhea    She has been watching her diet somewhat more and she feels this is why she has lost weight  Denies any blood in her bowel movements  Denies any dark bowel movements like she had previously when she had the GI bleed  Usually sleeps relatively well  Denies any urinary symptoms  She has been having some increased scaling and psoriasis symptoms on her scalp recently  [facility] has not been working  Mood has been stable on the Zoloft  Denies any significant symptoms of anxiety or depression at present  Tolerating the methimazole well  Denies any symptoms of hyperthyroidism  Tolerating the simvastatin well  Denies any significant muscle aches or weakness with this  The following portions of the patient's history were reviewed and updated as appropriate:   She  has a past medical history of Anemia, Atrial fibrillation ([address] ), Atrial flutter ([address] ), Depression, Diastolic CHF ([address] ), Dyslipidemia, Glaucoma, Glaucoma, H/O: hysterectomy, Hypertension, Hyperthyroidism, Impaired glucose tolerance, Pacemaker, Psoriasis, S/P cataract surgery, S/P knee replacement, SSS (sick sinus syndrome) ([address] ), and SSS (sick sinus syndrome) ([address] )    She   Patient Active Problem List    Diagnosis Date Noted    Dyspnea on exertion 09/20/2017    Osteoporosis 03/09/2017    Paroxysmal atrial fibrillation ([address] ) 02/22/2017    Anemia 02/18/2017    Renal atrophy 02/18/2017    Hyperphosphatemia 02/18/2017    Vitamin D deficiency 02/18/2017    Hypokalemia 02/17/2017    Pulmonary hypertension ([address] ) 02/17/2017    Presence of permanent cardiac pacemaker     H/O: hysterectomy     Benign essential hypertension     Hyperthyroidism     Glaucoma     Chronic diastolic congestive heart failure (HCC)     SSS (sick sinus syndrome) ([address] )     Depression     Depression with anxiety 08/12/2015    Fatigue 11/18/2014    Vertigo 11/18/2014    Atrial flutter ([address] ) 10/01/2014    Dyslipidemia 09/12/2012    Psoriasis 09/12/2012     She  has a past surgical history that includes Cardiac pacemaker placement; Eye surgery; Hysterectomy; Cardiac pacemaker placement; Joint replacement; Esophagogastroduodenoscopy (N/A, 12/30/2016); Cataract extraction; pr colonoscopy flx dx w/collj spec when pfrmd (N/A, 4/24/2017); and Replacement total knee (Bilateral)  Her family history includes Alcohol abuse in her brother and father; Diabetes in her father  She  reports that she has quit smoking  She has never used smokeless tobacco  She reports that she does not drink alcohol or use drugs    Current Outpatient Medications   Medication Sig Dispense Refill    ALFALFA PO Take 3 tablets by mouth        aspirin (ECOTRIN LOW STRENGTH) 81 mg EC tablet Take 81 mg by mouth daily      B Complex Vitamins (B COMPLEX 1 PO) Take by mouth      cholecalciferol (VITAMIN D3) 1,000 units tablet Take 1 tablet by mouth daily 30 tablet 0    diltiazem (CARDIZEM CD) 180 mg 24 hr capsule TAKE 1 CAPSULE DAILY 90 capsule 3    furosemide (LASIX) 40 mg tablet Take 1 tablet (40 mg total) by mouth 2 (two) times a day 180 tablet 3    isosorbide mononitrate (IMDUR) 60 mg 24 hr tablet Take 1 tablet (60 mg total) by mouth daily 90 tablet 3    LECITHIN PO Take 1 tablet by mouth daily      methimazole (TAPAZOLE) 5 mg tablet Take 1 tablet (5 mg total) by mouth 3 (three) times a week Monday Wednesday Friday 36 tablet 3    metoprolol tartrate (LOPRESSOR) 50 mg tablet Take 1 tablet (50 mg total) by mouth every 12 (twelve) hours 180 tablet 3    Omega-3 Fatty Acids (OMEGA-3 PO) Take by mouth 2 (two) times a day        potassium chloride (K-DUR,KLOR-CON) 20 mEq tablet Take 1 tablet (20 mEq total) by mouth daily 90 tablet 3    sertraline (ZOLOFT) 100 mg tablet Take 1 tablet (100 mg total) by mouth daily 90 tablet 1    simvastatin (ZOCOR) 20 mg tablet Take 1 tablet (20 mg total) by mouth daily at bedtime 90 tablet 3    Zinc Sulfate (ZINC 15 PO) Take 100 mg by mouth        fluocinonide (LIDEX) 0 05 % external solution Apply topically 2 (two) times a day as needed for irritation 180 mL 3    ketoconazole (NIZORAL) 2 % shampoo Apply 1 application topically once for 1 dose 100 mL 3     No current facility-administered medications for this visit  Current Outpatient Medications on File Prior to Visit   Medication Sig    ALFALFA PO Take 3 tablets by mouth      aspirin (ECOTRIN LOW STRENGTH) 81 mg EC tablet Take 81 mg by mouth daily    B Complex Vitamins (B COMPLEX 1 PO) Take by mouth    cholecalciferol (VITAMIN D3) 1,000 units tablet Take 1 tablet by mouth daily    diltiazem (CARDIZEM CD) 180 mg 24 hr capsule TAKE 1 CAPSULE DAILY    furosemide (LASIX) 40 mg tablet Take 1 tablet (40 mg total) by mouth 2 (two) times a day    LECITHIN PO Take 1 tablet by mouth daily    Omega-3 Fatty Acids (OMEGA-3 PO) Take by mouth 2 (two) times a day      potassium chloride (K-DUR,KLOR-CON) 20 mEq tablet Take 1 tablet (20 mEq total) by mouth daily    simvastatin (ZOCOR) 20 mg tablet Take 1 tablet (20 mg total) by mouth daily at bedtime    Zinc Sulfate (ZINC 15 PO) Take 100 mg by mouth       No current facility-administered medications on file prior to visit  She has No Known Allergies       Review of Systems   Constitutional: Negative for activity change, appetite change, chills, fatigue and fever  HENT: Positive for hearing loss  Negative for congestion and rhinorrhea  Eyes: Negative for visual disturbance  Respiratory: Positive for shortness of breath  Negative for cough and chest tightness  Cardiovascular: Negative for chest pain and palpitations  Gastrointestinal: Negative for abdominal pain, blood in stool, diarrhea, nausea and vomiting  Endocrine: Negative for polydipsia, polyphagia and polyuria  Genitourinary: Negative for dysuria, frequency and urgency  Musculoskeletal: Negative for gait problem  Skin: Negative for color change  As per HPI   Neurological: Positive for dizziness   Negative for speech difficulty and headaches  Hematological: Does not bruise/bleed easily  Psychiatric/Behavioral: Negative for confusion and sleep disturbance  The patient is not nervous/anxious  Objective:      /70 (BP Location: Left arm, Patient Position: Sitting, Cuff Size: Standard)   Pulse 62   Temp (!) 97 3 °F (36 3 °C) (Temporal)   Ht 5' 6" (1 676 m)   Wt 84 8 kg (187 lb)   LMP  (LMP Unknown)   SpO2 93%   BMI 30 18 kg/m²          Physical Exam   Constitutional: She is oriented to person, place, and time  She is cooperative  No distress  HENT:   Head: Normocephalic and atraumatic  Mouth/Throat: Oropharynx is clear and moist    Hearing aids bilaterally   Eyes: Pupils are equal, round, and reactive to light  Conjunctivae are normal  Right eye exhibits no discharge  Left eye exhibits no discharge  Post surgical changes bilaterally   Cardiovascular: Normal rate, regular rhythm and normal heart sounds  Exam reveals no gallop and no friction rub  No murmur heard  Pulmonary/Chest: No respiratory distress  She has no wheezes  She has no rales  Abdominal: Bowel sounds are normal  She exhibits no distension  There is no tenderness  Musculoskeletal: She exhibits no edema  Lymphadenopathy:     She has no cervical adenopathy  Neurological: She is alert and oriented to person, place, and time  Skin: Skin is warm and dry  Scaling over the scalp area   Psychiatric: She has a normal mood and affect  Her behavior is normal    Nursing note and vitals reviewed

## 2019-09-27 NOTE — ASSESSMENT & PLAN NOTE
Continue vitamin-D supplementation  Will continue follow vitamin-D level with routine laboratory testing

## 2019-09-27 NOTE — ASSESSMENT & PLAN NOTE
Blood pressure well controlled  Continue with the diltiazem and the metoprolol as previously  Watch salt intake  Remain as active as possible  Watch for any orthostatics symptoms

## 2019-09-27 NOTE — ASSESSMENT & PLAN NOTE
She has chronic shortness of breath on exertion  Advised to watch for worsening  Continue follow-up with Cardiology on a regular basis

## 2019-09-27 NOTE — ASSESSMENT & PLAN NOTE
Some worsening of her scalp symptoms  Discussed options with her  Discussed trying Nizoral shampoo which may help  Advised her to use this about once to twice a week  Lidex solution to the scalp as needed  Instructed her on how to use this

## 2019-09-27 NOTE — ASSESSMENT & PLAN NOTE
Wt Readings from Last 3 Encounters:   09/26/19 84 8 kg (187 lb)   05/22/19 89 8 kg (198 lb)   02/18/19 88 kg (194 lb)         Weight has decreased but she has been watching her diet somewhat more  Continue with the Lasix and potassium  Watch for sudden changes in weight  Discussed watching for weight increase of more than 2-3 lb in 24 hours or more than 5 lb in a week  Watch for any increased peripheral edema or increased shortness of breath  She has the chronic shortness of breath but advised her to watch for changes

## 2019-10-01 DIAGNOSIS — L40.9 PSORIASIS: ICD-10-CM

## 2019-10-01 RX ORDER — KETOCONAZOLE 20 MG/ML
1 SHAMPOO TOPICAL ONCE
Qty: 100 ML | Refills: 3 | Status: SHIPPED | OUTPATIENT
Start: 2019-10-01 | End: 2020-01-21 | Stop reason: SDUPTHER

## 2019-10-03 ENCOUNTER — REMOTE DEVICE CLINIC VISIT (OUTPATIENT)
Dept: CARDIOLOGY CLINIC | Facility: CLINIC | Age: 84
End: 2019-10-03
Payer: MEDICARE

## 2019-10-03 DIAGNOSIS — Z95.0 CARDIAC PACEMAKER IN SITU: Primary | ICD-10-CM

## 2019-10-03 PROCEDURE — 93294 REM INTERROG EVL PM/LDLS PM: CPT | Performed by: INTERNAL MEDICINE

## 2019-10-03 PROCEDURE — 93296 REM INTERROG EVL PM/IDS: CPT | Performed by: INTERNAL MEDICINE

## 2019-10-03 NOTE — PROGRESS NOTES
Results for orders placed or performed in visit on 10/03/19   Cardiac EP device report    Narrative    SJM-DUAL-PM  MERLIN TRANSMISSION: BATTERY VOLTAGE ADEQUATE (9 8 YRS)  AP-99%, <1%  ATRIAL SENSITIVITY MARGIN <2:1  ALL OTHER AVAILABLE LEAD PARAMETERS WITHIN NORMAL LIMITS  NO SIGNIFICANT HIGH RATE EPISODES  NORMAL DEVICE FUNCTION   GV

## 2019-12-05 ENCOUNTER — TELEPHONE (OUTPATIENT)
Dept: INTERNAL MEDICINE CLINIC | Facility: CLINIC | Age: 84
End: 2019-12-05

## 2019-12-05 DIAGNOSIS — F32.A DEPRESSION, UNSPECIFIED DEPRESSION TYPE: Primary | ICD-10-CM

## 2019-12-05 RX ORDER — BUPROPION HYDROCHLORIDE 150 MG/1
150 TABLET, EXTENDED RELEASE ORAL DAILY
Qty: 90 TABLET | Refills: 1 | Status: SHIPPED | OUTPATIENT
Start: 2019-12-05 | End: 2020-03-23 | Stop reason: SDUPTHER

## 2019-12-05 NOTE — TELEPHONE ENCOUNTER
Patients daughter called  She stated her mom is still pretty depressed and the zoloft is not working  She was wondering if there is anything else we can try her mom on  She does realize that some of it may be age related, but is concerned       Ph  For daughter is- [phone]

## 2019-12-17 ENCOUNTER — IN-CLINIC DEVICE VISIT (OUTPATIENT)
Dept: CARDIOLOGY CLINIC | Facility: CLINIC | Age: 84
End: 2019-12-17
Payer: MEDICARE

## 2019-12-17 DIAGNOSIS — I48.0 PAROXYSMAL ATRIAL FIBRILLATION (HCC): Primary | ICD-10-CM

## 2019-12-17 DIAGNOSIS — Z95.0 CARDIAC PACEMAKER IN SITU: Primary | ICD-10-CM

## 2019-12-17 PROCEDURE — 93280 PM DEVICE PROGR EVAL DUAL: CPT | Performed by: INTERNAL MEDICINE

## 2019-12-17 NOTE — PROGRESS NOTES
Results for orders placed or performed in visit on 12/17/19   Cardiac EP device report    Narrative    SJM-DUAL-PM  DEVICE INTERROGATED IN THE [facility] OFFICE  BATTERY VOLTAGE ADEQUATE (7 8-9 8 YRS)  AP 98%  0 1%  ATRIAL SENSITIVITY SAFETY MARGIN <2:1  P WVS MEASURED 0 8-1 0 mV IN OFFICE  ATRIAL SENSITIVITY PROGRAMMED TO 0 3 mV  ALL OTHER LEAD PARAMETERS WITHIN NORMAL LIMITS  ALL OTHER TESTING WITHIN NORMAL LIMITS  [address] 12:27 A  M  THIS MORNING  AF BURDEN 1 2% BUT POSSIBLY HIGHER WITH ATRIAL UNDERSENSING  EF 60% (12/2018 ECHO)  PT ON ASA 81 (PT REFUSED AC), DILTIAZEM, METOPROLOL TART  REVIEWED WITH DR Dm Wong IN OFFICE TODAY  METOPROLOL TART DOSE INCREASED, WATCHMAN VS NOAC DISCUSSED, 1 WK REMOTE SCHEDULED TO EVAL RATES/EPISODES PER DR TYSON  NO OTHER PROGRAMMING CHANGES MADE TO DEVICE PARAMETERS  NORMAL DEVICE FUNCTION       EB

## 2019-12-23 ENCOUNTER — TELEPHONE (OUTPATIENT)
Dept: CARDIOLOGY CLINIC | Facility: CLINIC | Age: 84
End: 2019-12-23

## 2019-12-23 NOTE — TELEPHONE ENCOUNTER
Dtr Darlene Ortega called with an update on random vitals after increasing Metoprolol to 75mg on 12/17/19  (It was very difficult to get accurate times/dates with BP's)    12/19-am 146/86, 115             Pm 118/73, ? Sunday-am                pm-128/65, 82    Monday-am 118/73, ?     Stated pt is feeling better, but HR still irregular    Device check scheduled for 12/26/19

## 2019-12-26 ENCOUNTER — REMOTE DEVICE CLINIC VISIT (OUTPATIENT)
Dept: CARDIOLOGY CLINIC | Facility: CLINIC | Age: 84
End: 2019-12-26

## 2019-12-26 DIAGNOSIS — Z95.0 CARDIAC PACEMAKER: Primary | ICD-10-CM

## 2019-12-26 PROCEDURE — 99024 POSTOP FOLLOW-UP VISIT: CPT | Performed by: INTERNAL MEDICINE

## 2019-12-26 NOTE — PROGRESS NOTES
Results for orders placed or performed in visit on 12/26/19   Cardiac EP device report    Narrative    SJM-DUAL-PM  NON-BILLABLE MERLIN TRANSMISSION: 1 WK EPISODE/RATE CHECK PER LM  PT REMAINS IN PERSISTENT AFL SINCE 12/17/19 DEVICE CLINIC INTERROGATION  AF BURDEN 95%  NO OTHER SIGNIFICANT HIGH RATE EPISODES  VENTRICULAR RATE ON PRESENTING EGRM APPROX  108 BPM  EF 60% (12/2018 ECHO)  PT ON ASA 81 (PT REFUSED AC), DILTIAZEM, METOPROLOL TART  BATTERY VOLTAGE ADEQUATE  AP <1%  39%  ALL AVAILABLE LEAD PARAMETERS WITHIN NORMAL LIMITS  TIGER TEXT/TASK TO DR Merri Duverney  NORMAL DEVICE FUNCTION      EB

## 2019-12-27 DIAGNOSIS — I48.0 PAROXYSMAL ATRIAL FIBRILLATION (HCC): ICD-10-CM

## 2019-12-27 RX ORDER — DILTIAZEM HYDROCHLORIDE 240 MG/1
240 CAPSULE, COATED, EXTENDED RELEASE ORAL DAILY
Qty: 90 CAPSULE | Refills: 3 | Status: SHIPPED | OUTPATIENT
Start: 2019-12-27 | End: 2020-12-28 | Stop reason: SDUPTHER

## 2020-01-07 ENCOUNTER — HOSPITAL ENCOUNTER (INPATIENT)
Facility: HOSPITAL | Age: 85
LOS: 3 days | Discharge: HOME | DRG: 871 | End: 2020-01-10
Attending: EMERGENCY MEDICINE | Admitting: HOSPITALIST
Payer: MEDICARE

## 2020-01-07 ENCOUNTER — APPOINTMENT (EMERGENCY)
Dept: CT IMAGING | Facility: HOSPITAL | Age: 85
DRG: 871 | End: 2020-01-07
Payer: MEDICARE

## 2020-01-07 ENCOUNTER — APPOINTMENT (EMERGENCY)
Dept: RADIOLOGY | Facility: HOSPITAL | Age: 85
DRG: 871 | End: 2020-01-07
Payer: MEDICARE

## 2020-01-07 ENCOUNTER — TELEPHONE (OUTPATIENT)
Dept: INTERNAL MEDICINE CLINIC | Facility: CLINIC | Age: 85
End: 2020-01-07

## 2020-01-07 DIAGNOSIS — I50.9 CHF (CONGESTIVE HEART FAILURE) (HCC): ICD-10-CM

## 2020-01-07 DIAGNOSIS — A41.9 SEPSIS (HCC): ICD-10-CM

## 2020-01-07 DIAGNOSIS — J18.9 PNEUMONIA: ICD-10-CM

## 2020-01-07 DIAGNOSIS — J18.9 PNEUMONIA OF BOTH LUNGS DUE TO INFECTIOUS ORGANISM, UNSPECIFIED PART OF LUNG: Primary | ICD-10-CM

## 2020-01-07 LAB
ANION GAP SERPL CALCULATED.3IONS-SCNC: 11 MMOL/L (ref 4–13)
BASOPHILS # BLD AUTO: 0.05 THOUSANDS/ΜL (ref 0–0.1)
BASOPHILS NFR BLD AUTO: 0 % (ref 0–1)
BUN SERPL-MCNC: 26 MG/DL (ref 5–25)
CALCIUM SERPL-MCNC: 9 MG/DL (ref 8.3–10.1)
CHLORIDE SERPL-SCNC: 102 MMOL/L (ref 100–108)
CO2 SERPL-SCNC: 27 MMOL/L (ref 21–32)
CREAT SERPL-MCNC: 1.86 MG/DL (ref 0.6–1.3)
EOSINOPHIL # BLD AUTO: 0.06 THOUSAND/ΜL (ref 0–0.61)
EOSINOPHIL NFR BLD AUTO: 1 % (ref 0–6)
ERYTHROCYTE [DISTWIDTH] IN BLOOD BY AUTOMATED COUNT: 17 % (ref 11.6–15.1)
GFR SERPL CREATININE-BSD FRML MDRD: 24 ML/MIN/1.73SQ M
GLUCOSE SERPL-MCNC: 157 MG/DL (ref 65–140)
HCT VFR BLD AUTO: 43.8 % (ref 34.8–46.1)
HGB BLD-MCNC: 13.3 G/DL (ref 11.5–15.4)
IMM GRANULOCYTES # BLD AUTO: 0.08 THOUSAND/UL (ref 0–0.2)
IMM GRANULOCYTES NFR BLD AUTO: 1 % (ref 0–2)
LACTATE SERPL-SCNC: 2.1 MMOL/L (ref 0.5–2)
LACTATE SERPL-SCNC: 3.2 MMOL/L (ref 0.5–2)
LYMPHOCYTES # BLD AUTO: 1.16 THOUSANDS/ΜL (ref 0.6–4.47)
LYMPHOCYTES NFR BLD AUTO: 9 % (ref 14–44)
MCH RBC QN AUTO: 28.2 PG (ref 26.8–34.3)
MCHC RBC AUTO-ENTMCNC: 30.4 G/DL (ref 31.4–37.4)
MCV RBC AUTO: 93 FL (ref 82–98)
MONOCYTES # BLD AUTO: 0.91 THOUSAND/ΜL (ref 0.17–1.22)
MONOCYTES NFR BLD AUTO: 7 % (ref 4–12)
NEUTROPHILS # BLD AUTO: 10.18 THOUSANDS/ΜL (ref 1.85–7.62)
NEUTS SEG NFR BLD AUTO: 82 % (ref 43–75)
NRBC BLD AUTO-RTO: 0 /100 WBCS
NT-PROBNP SERPL-MCNC: 6272 PG/ML
PLATELET # BLD AUTO: 192 THOUSANDS/UL (ref 149–390)
PMV BLD AUTO: 9.6 FL (ref 8.9–12.7)
POTASSIUM SERPL-SCNC: 4.2 MMOL/L (ref 3.5–5.3)
RBC # BLD AUTO: 4.72 MILLION/UL (ref 3.81–5.12)
SODIUM SERPL-SCNC: 140 MMOL/L (ref 136–145)
TROPONIN I SERPL-MCNC: <0.02 NG/ML
WBC # BLD AUTO: 12.44 THOUSAND/UL (ref 4.31–10.16)

## 2020-01-07 PROCEDURE — 36415 COLL VENOUS BLD VENIPUNCTURE: CPT

## 2020-01-07 PROCEDURE — 84484 ASSAY OF TROPONIN QUANT: CPT | Performed by: EMERGENCY MEDICINE

## 2020-01-07 PROCEDURE — 99285 EMERGENCY DEPT VISIT HI MDM: CPT

## 2020-01-07 PROCEDURE — 1124F ACP DISCUSS-NO DSCNMKR DOCD: CPT | Performed by: INTERNAL MEDICINE

## 2020-01-07 PROCEDURE — 83880 ASSAY OF NATRIURETIC PEPTIDE: CPT | Performed by: EMERGENCY MEDICINE

## 2020-01-07 PROCEDURE — 85025 COMPLETE CBC W/AUTO DIFF WBC: CPT | Performed by: EMERGENCY MEDICINE

## 2020-01-07 PROCEDURE — 83605 ASSAY OF LACTIC ACID: CPT | Performed by: EMERGENCY MEDICINE

## 2020-01-07 PROCEDURE — 80048 BASIC METABOLIC PNL TOTAL CA: CPT | Performed by: EMERGENCY MEDICINE

## 2020-01-07 PROCEDURE — 87040 BLOOD CULTURE FOR BACTERIA: CPT | Performed by: EMERGENCY MEDICINE

## 2020-01-07 PROCEDURE — 99223 1ST HOSP IP/OBS HIGH 75: CPT | Performed by: PHYSICIAN ASSISTANT

## 2020-01-07 PROCEDURE — 93005 ELECTROCARDIOGRAM TRACING: CPT

## 2020-01-07 PROCEDURE — 71046 X-RAY EXAM CHEST 2 VIEWS: CPT

## 2020-01-07 PROCEDURE — 99291 CRITICAL CARE FIRST HOUR: CPT | Performed by: EMERGENCY MEDICINE

## 2020-01-07 PROCEDURE — 94640 AIRWAY INHALATION TREATMENT: CPT

## 2020-01-07 PROCEDURE — 71250 CT THORAX DX C-: CPT

## 2020-01-07 RX ORDER — BUPROPION HYDROCHLORIDE 150 MG/1
150 TABLET, EXTENDED RELEASE ORAL DAILY
Status: DISCONTINUED | OUTPATIENT
Start: 2020-01-08 | End: 2020-01-10 | Stop reason: HOSPADM

## 2020-01-07 RX ORDER — ACETAMINOPHEN 325 MG/1
650 TABLET ORAL EVERY 6 HOURS PRN
Status: DISCONTINUED | OUTPATIENT
Start: 2020-01-07 | End: 2020-01-10 | Stop reason: HOSPADM

## 2020-01-07 RX ORDER — SODIUM CHLORIDE 9 MG/ML
50 INJECTION, SOLUTION INTRAVENOUS CONTINUOUS
Status: DISCONTINUED | OUTPATIENT
Start: 2020-01-07 | End: 2020-01-08

## 2020-01-07 RX ORDER — POLYETHYLENE GLYCOL 3350 17 G/17G
17 POWDER, FOR SOLUTION ORAL DAILY PRN
Status: DISCONTINUED | OUTPATIENT
Start: 2020-01-07 | End: 2020-01-10 | Stop reason: HOSPADM

## 2020-01-07 RX ORDER — POTASSIUM CHLORIDE 20 MEQ/1
20 TABLET, EXTENDED RELEASE ORAL DAILY
Status: DISCONTINUED | OUTPATIENT
Start: 2020-01-08 | End: 2020-01-10 | Stop reason: HOSPADM

## 2020-01-07 RX ORDER — BENZONATATE 100 MG/1
100 CAPSULE ORAL 3 TIMES DAILY PRN
Status: DISCONTINUED | OUTPATIENT
Start: 2020-01-07 | End: 2020-01-10 | Stop reason: HOSPADM

## 2020-01-07 RX ORDER — MAGNESIUM HYDROXIDE/ALUMINUM HYDROXICE/SIMETHICONE 120; 1200; 1200 MG/30ML; MG/30ML; MG/30ML
30 SUSPENSION ORAL EVERY 6 HOURS PRN
Status: DISCONTINUED | OUTPATIENT
Start: 2020-01-07 | End: 2020-01-10 | Stop reason: HOSPADM

## 2020-01-07 RX ORDER — PRAVASTATIN SODIUM 40 MG
40 TABLET ORAL
Status: DISCONTINUED | OUTPATIENT
Start: 2020-01-08 | End: 2020-01-10 | Stop reason: HOSPADM

## 2020-01-07 RX ORDER — AZITHROMYCIN 250 MG/1
500 TABLET, FILM COATED ORAL EVERY 24 HOURS
Status: DISCONTINUED | OUTPATIENT
Start: 2020-01-08 | End: 2020-01-10 | Stop reason: HOSPADM

## 2020-01-07 RX ORDER — DILTIAZEM HYDROCHLORIDE 240 MG/1
240 CAPSULE, COATED, EXTENDED RELEASE ORAL DAILY
Status: DISCONTINUED | OUTPATIENT
Start: 2020-01-08 | End: 2020-01-10 | Stop reason: HOSPADM

## 2020-01-07 RX ORDER — GUAIFENESIN 600 MG
600 TABLET, EXTENDED RELEASE 12 HR ORAL EVERY 12 HOURS SCHEDULED
Status: DISCONTINUED | OUTPATIENT
Start: 2020-01-07 | End: 2020-01-10 | Stop reason: HOSPADM

## 2020-01-07 RX ORDER — ONDANSETRON 2 MG/ML
4 INJECTION INTRAMUSCULAR; INTRAVENOUS ONCE
Status: COMPLETED | OUTPATIENT
Start: 2020-01-07 | End: 2020-01-07

## 2020-01-07 RX ORDER — SERTRALINE HYDROCHLORIDE 100 MG/1
100 TABLET, FILM COATED ORAL DAILY
Status: DISCONTINUED | OUTPATIENT
Start: 2020-01-08 | End: 2020-01-10 | Stop reason: HOSPADM

## 2020-01-07 RX ORDER — ONDANSETRON 2 MG/ML
4 INJECTION INTRAMUSCULAR; INTRAVENOUS EVERY 6 HOURS PRN
Status: DISCONTINUED | OUTPATIENT
Start: 2020-01-07 | End: 2020-01-10 | Stop reason: HOSPADM

## 2020-01-07 RX ORDER — ASPIRIN 81 MG/1
81 TABLET ORAL DAILY
Status: DISCONTINUED | OUTPATIENT
Start: 2020-01-08 | End: 2020-01-10 | Stop reason: HOSPADM

## 2020-01-07 RX ORDER — ISOSORBIDE MONONITRATE 60 MG/1
60 TABLET, EXTENDED RELEASE ORAL DAILY
Status: DISCONTINUED | OUTPATIENT
Start: 2020-01-08 | End: 2020-01-10 | Stop reason: HOSPADM

## 2020-01-07 RX ORDER — HEPARIN SODIUM 5000 [USP'U]/ML
5000 INJECTION, SOLUTION INTRAVENOUS; SUBCUTANEOUS EVERY 8 HOURS SCHEDULED
Status: DISCONTINUED | OUTPATIENT
Start: 2020-01-07 | End: 2020-01-10 | Stop reason: HOSPADM

## 2020-01-07 RX ORDER — ALBUTEROL SULFATE 2.5 MG/3ML
5 SOLUTION RESPIRATORY (INHALATION) ONCE
Status: COMPLETED | OUTPATIENT
Start: 2020-01-07 | End: 2020-01-07

## 2020-01-07 RX ORDER — METHIMAZOLE 5 MG/1
5 TABLET ORAL 3 TIMES WEEKLY
Status: DISCONTINUED | OUTPATIENT
Start: 2020-01-08 | End: 2020-01-10 | Stop reason: HOSPADM

## 2020-01-07 RX ADMIN — ALBUTEROL SULFATE 5 MG: 2.5 SOLUTION RESPIRATORY (INHALATION) at 19:56

## 2020-01-07 RX ADMIN — GUAIFENESIN 600 MG: 600 TABLET ORAL at 23:35

## 2020-01-07 RX ADMIN — HEPARIN SODIUM 5000 UNITS: 5000 INJECTION INTRAVENOUS; SUBCUTANEOUS at 23:36

## 2020-01-07 RX ADMIN — METOPROLOL TARTRATE 75 MG: 50 TABLET, FILM COATED ORAL at 23:35

## 2020-01-07 RX ADMIN — SODIUM CHLORIDE 50 ML/HR: 0.9 INJECTION, SOLUTION INTRAVENOUS at 23:20

## 2020-01-07 RX ADMIN — ONDANSETRON 4 MG: 2 INJECTION INTRAMUSCULAR; INTRAVENOUS at 21:20

## 2020-01-07 RX ADMIN — CEFTRIAXONE 1000 MG: 1 INJECTION, POWDER, FOR SOLUTION INTRAMUSCULAR; INTRAVENOUS at 22:05

## 2020-01-07 RX ADMIN — AZITHROMYCIN MONOHYDRATE 500 MG: 500 INJECTION, POWDER, LYOPHILIZED, FOR SOLUTION INTRAVENOUS at 20:48

## 2020-01-07 NOTE — TELEPHONE ENCOUNTER
Patient's daughter called requesting an appointment tomorrow because patient is short of breath (when walking) and is starting with black stools  After speaking with you I told patient's daughter, as instructed, to take patient to hospital   Daughter said "are you kidding, it's not even that bad"  I repeated to daughter that Dr Nemesio Henry wants you to take her to hospital   Daughter "okay"

## 2020-01-08 DIAGNOSIS — E78.2 MIXED HYPERLIPIDEMIA: ICD-10-CM

## 2020-01-08 LAB
ANION GAP SERPL CALCULATED.3IONS-SCNC: 9 MMOL/L (ref 4–13)
ATRIAL RATE: 110 BPM
BUN SERPL-MCNC: 21 MG/DL (ref 5–25)
CALCIUM SERPL-MCNC: 8.6 MG/DL (ref 8.3–10.1)
CHLORIDE SERPL-SCNC: 104 MMOL/L (ref 100–108)
CO2 SERPL-SCNC: 27 MMOL/L (ref 21–32)
CREAT SERPL-MCNC: 1.37 MG/DL (ref 0.6–1.3)
ERYTHROCYTE [DISTWIDTH] IN BLOOD BY AUTOMATED COUNT: 17.1 % (ref 11.6–15.1)
FLUAV RNA NPH QL NAA+PROBE: ABNORMAL
FLUBV RNA NPH QL NAA+PROBE: ABNORMAL
GFR SERPL CREATININE-BSD FRML MDRD: 35 ML/MIN/1.73SQ M
GLUCOSE SERPL-MCNC: 115 MG/DL (ref 65–140)
HCT VFR BLD AUTO: 39.2 % (ref 34.8–46.1)
HGB BLD-MCNC: 12.1 G/DL (ref 11.5–15.4)
LACTATE SERPL-SCNC: 1.4 MMOL/L (ref 0.5–2)
LACTATE SERPL-SCNC: 2.8 MMOL/L (ref 0.5–2)
MCH RBC QN AUTO: 28.1 PG (ref 26.8–34.3)
MCHC RBC AUTO-ENTMCNC: 30.9 G/DL (ref 31.4–37.4)
MCV RBC AUTO: 91 FL (ref 82–98)
PLATELET # BLD AUTO: 159 THOUSANDS/UL (ref 149–390)
PMV BLD AUTO: 9.5 FL (ref 8.9–12.7)
POTASSIUM SERPL-SCNC: 4 MMOL/L (ref 3.5–5.3)
PROCALCITONIN SERPL-MCNC: 0.27 NG/ML
QRS AXIS: 83 DEGREES
QRSD INTERVAL: 68 MS
QT INTERVAL: 330 MS
QTC INTERVAL: 444 MS
RBC # BLD AUTO: 4.3 MILLION/UL (ref 3.81–5.12)
RSV RNA NPH QL NAA+PROBE: DETECTED
SODIUM SERPL-SCNC: 140 MMOL/L (ref 136–145)
T WAVE AXIS: -37 DEGREES
VENTRICULAR RATE: 109 BPM
WBC # BLD AUTO: 12.42 THOUSAND/UL (ref 4.31–10.16)

## 2020-01-08 PROCEDURE — 93010 ELECTROCARDIOGRAM REPORT: CPT

## 2020-01-08 PROCEDURE — 83605 ASSAY OF LACTIC ACID: CPT | Performed by: PHYSICIAN ASSISTANT

## 2020-01-08 PROCEDURE — 87205 SMEAR GRAM STAIN: CPT | Performed by: PHYSICIAN ASSISTANT

## 2020-01-08 PROCEDURE — 99232 SBSQ HOSP IP/OBS MODERATE 35: CPT | Performed by: INTERNAL MEDICINE

## 2020-01-08 PROCEDURE — 85027 COMPLETE CBC AUTOMATED: CPT | Performed by: PHYSICIAN ASSISTANT

## 2020-01-08 PROCEDURE — 87077 CULTURE AEROBIC IDENTIFY: CPT | Performed by: PHYSICIAN ASSISTANT

## 2020-01-08 PROCEDURE — 80048 BASIC METABOLIC PNL TOTAL CA: CPT | Performed by: PHYSICIAN ASSISTANT

## 2020-01-08 PROCEDURE — 87070 CULTURE OTHR SPECIMN AEROBIC: CPT | Performed by: PHYSICIAN ASSISTANT

## 2020-01-08 PROCEDURE — 84145 PROCALCITONIN (PCT): CPT | Performed by: PHYSICIAN ASSISTANT

## 2020-01-08 PROCEDURE — 87631 RESP VIRUS 3-5 TARGETS: CPT | Performed by: INTERNAL MEDICINE

## 2020-01-08 RX ORDER — SIMVASTATIN 20 MG
20 TABLET ORAL
Qty: 90 TABLET | Refills: 3 | Status: SHIPPED | OUTPATIENT
Start: 2020-01-08 | End: 2020-01-21 | Stop reason: SDUPTHER

## 2020-01-08 RX ADMIN — METOPROLOL TARTRATE 75 MG: 50 TABLET, FILM COATED ORAL at 08:17

## 2020-01-08 RX ADMIN — AZITHROMYCIN 500 MG: 250 TABLET, FILM COATED ORAL at 21:39

## 2020-01-08 RX ADMIN — SERTRALINE HYDROCHLORIDE 100 MG: 100 TABLET ORAL at 08:22

## 2020-01-08 RX ADMIN — ASPIRIN 81 MG: 81 TABLET, COATED ORAL at 08:22

## 2020-01-08 RX ADMIN — BENZONATATE 100 MG: 100 CAPSULE ORAL at 21:40

## 2020-01-08 RX ADMIN — METHIMAZOLE 5 MG: 5 TABLET ORAL at 08:22

## 2020-01-08 RX ADMIN — POTASSIUM CHLORIDE 20 MEQ: 1500 TABLET, EXTENDED RELEASE ORAL at 08:22

## 2020-01-08 RX ADMIN — GUAIFENESIN 600 MG: 600 TABLET ORAL at 21:39

## 2020-01-08 RX ADMIN — HEPARIN SODIUM 5000 UNITS: 5000 INJECTION INTRAVENOUS; SUBCUTANEOUS at 05:39

## 2020-01-08 RX ADMIN — BUPROPION HYDROCHLORIDE 150 MG: 150 TABLET, EXTENDED RELEASE ORAL at 08:17

## 2020-01-08 RX ADMIN — DILTIAZEM HYDROCHLORIDE 240 MG: 240 CAPSULE, COATED, EXTENDED RELEASE ORAL at 08:17

## 2020-01-08 RX ADMIN — PRAVASTATIN SODIUM 40 MG: 40 TABLET ORAL at 17:22

## 2020-01-08 RX ADMIN — CEFTRIAXONE 1000 MG: 1 INJECTION, POWDER, FOR SOLUTION INTRAMUSCULAR; INTRAVENOUS at 21:45

## 2020-01-08 RX ADMIN — GUAIFENESIN 600 MG: 600 TABLET ORAL at 08:17

## 2020-01-08 RX ADMIN — HEPARIN SODIUM 5000 UNITS: 5000 INJECTION INTRAVENOUS; SUBCUTANEOUS at 15:11

## 2020-01-08 RX ADMIN — ISOSORBIDE MONONITRATE 60 MG: 60 TABLET, EXTENDED RELEASE ORAL at 08:22

## 2020-01-08 RX ADMIN — HEPARIN SODIUM 5000 UNITS: 5000 INJECTION INTRAVENOUS; SUBCUTANEOUS at 21:40

## 2020-01-08 RX ADMIN — BENZONATATE 100 MG: 100 CAPSULE ORAL at 15:12

## 2020-01-08 NOTE — ASSESSMENT & PLAN NOTE
Wt Readings from Last 3 Encounters:   01/07/20 83 kg (183 lb)   09/26/19 84 8 kg (187 lb)   05/22/19 89 8 kg (198 lb)     Appears euvolemic at this time  Continue home Lasix   Monitor fluid status

## 2020-01-08 NOTE — ASSESSMENT & PLAN NOTE
Patient reports worsening dypsnea on exertion for the past three days   Likely due to pneumonia   Supplemental O2 as needed to maintain saturations above 90%   PT/OT consults   BMP slightly elevated over baseline  Monitor closely with IV fluid hydration to avoid volume overload

## 2020-01-08 NOTE — SOCIAL WORK
Cm reviewed pt care coordination rounds  Pt is not medically cleared for d/c  Pt is being treated for PNA  Will require 2-3 days of IP Stays  Cm will f/u for final medical clearance and dcp

## 2020-01-08 NOTE — TELEPHONE ENCOUNTER
Received a call from Estrella's daughter requesting a refill on simvastatin      Verified sig and pharmacy  Aware of 48 hr fill policy  Please advise

## 2020-01-08 NOTE — NURSING NOTE
Contacted [facility] provider Iveth WALTERS regarding patient's elevated lactic acid of that is 2 8 decreased from the previous draw of 3 2  Will continue to monitor

## 2020-01-08 NOTE — PLAN OF CARE
Problem: Potential for Falls  Goal: Patient will remain free of falls  Description  INTERVENTIONS:  - Assess patient frequently for physical needs  -  Identify cognitive and physical deficits and behaviors that affect risk of falls    -  [place] fall precautions as indicated by assessment   - Educate patient/family on patient safety including physical limitations  - Instruct patient to call for assistance with activity based on assessment  - Modify environment to reduce risk of injury  - Consider OT/PT consult to assist with strengthening/mobility  Outcome: Progressing     Problem: PAIN - ADULT  Goal: Verbalizes/displays adequate comfort level or baseline comfort level  Description  Interventions:  - Encourage patient to monitor pain and request assistance  - Assess pain using appropriate pain scale  - Administer analgesics based on type and severity of pain and evaluate response  - Implement non-pharmacological measures as appropriate and evaluate response  - Consider cultural and social influences on pain and pain management  - Notify physician/advanced practitioner if interventions unsuccessful or patient reports new pain  Outcome: Progressing     Problem: INFECTION - ADULT  Goal: Absence or prevention of progression during hospitalization  Description  INTERVENTIONS:  - Assess and monitor for signs and symptoms of infection  - Monitor lab/diagnostic results  - Monitor all insertion sites, i e  indwelling lines, tubes, and drains  - Monitor endotracheal if appropriate and nasal secretions for changes in amount and color  - [place] appropriate cooling/warming therapies per order  - Administer medications as ordered  - Instruct and encourage patient and family to use good hand hygiene technique  - Identify and instruct in appropriate isolation precautions for identified infection/condition  Outcome: Progressing  Goal: Absence of fever/infection during neutropenic period  Description  INTERVENTIONS:  - Monitor WBC    Outcome: Progressing     Problem: SAFETY ADULT  Goal: Maintain or return to baseline ADL function  Description  INTERVENTIONS:  -  Assess patient's ability to carry out ADLs; assess patient's baseline for ADL function and identify physical deficits which impact ability to perform ADLs (bathing, care of mouth/teeth, toileting, grooming, dressing, etc )  - Assess/evaluate cause of self-care deficits   - Assess range of motion  - Assess patient's mobility; develop plan if impaired  - Assess patient's need for assistive devices and provide as appropriate  - Encourage maximum independence but intervene and supervise when necessary  - Involve family in performance of ADLs  - Assess for home care needs following discharge   - Consider OT consult to assist with ADL evaluation and planning for discharge  - Provide patient education as appropriate  Outcome: Progressing     Problem: DISCHARGE PLANNING  Goal: Discharge to home or other facility with appropriate resources  Description  INTERVENTIONS:  - Identify barriers to discharge w/patient and caregiver  - Arrange for needed discharge resources and transportation as appropriate  - Identify discharge learning needs (meds, wound care, etc )  - Arrange for interpretive services to assist at discharge as needed  - Refer to Case Management Department for coordinating discharge planning if the patient needs post-hospital services based on physician/advanced practitioner order or complex needs related to functional status, cognitive ability, or social support system  Outcome: Progressing     Problem: Knowledge Deficit  Goal: Patient/family/caregiver demonstrates understanding of disease process, treatment plan, medications, and discharge instructions  Description  Complete learning assessment and assess knowledge base    Interventions:  - Provide teaching at level of understanding  - Provide teaching via preferred learning methods  Outcome: Progressing     Problem: CARDIOVASCULAR - ADULT  Goal: Maintains optimal cardiac output and hemodynamic stability  Description  INTERVENTIONS:  - Monitor I/O, vital signs and rhythm  - Monitor for S/S and trends of decreased cardiac output  - Administer and titrate ordered vasoactive medications to optimize hemodynamic stability  - Assess quality of pulses, skin color and temperature  - Assess for signs of decreased coronary artery perfusion  - Instruct patient to report change in severity of symptoms  Outcome: Progressing  Goal: Absence of cardiac dysrhythmias or at baseline rhythm  Description  INTERVENTIONS:  - Continuous cardiac monitoring, vital signs, obtain 12 lead EKG if ordered  - Administer antiarrhythmic and heart rate control medications as ordered  - Monitor electrolytes and administer replacement therapy as ordered  Outcome: Progressing     Problem: RESPIRATORY - ADULT  Goal: Achieves optimal ventilation and oxygenation  Description  INTERVENTIONS:  - Assess for changes in respiratory status  - Assess for changes in mentation and behavior  - Position to facilitate oxygenation and minimize respiratory effort  - Oxygen administered by appropriate delivery if ordered  - Initiate smoking cessation education as indicated  - Encourage broncho-pulmonary hygiene including cough, deep breathe, Incentive Spirometry  - Assess the need for suctioning and aspirate as needed  - Assess and instruct to report SOB or any respiratory difficulty  - Respiratory Therapy support as indicated  Outcome: Progressing

## 2020-01-08 NOTE — H&P
[address] [facility]  H&P- Zoila Espinal 4/6/1932, 80 y o  female MRN: [id number]    Unit/Bed#: E4 -01 Encounter: [id number]    Primary Care Provider: Reginald Arriaga MD   Date and time admitted to hospital: 1/7/2020  7:04 PM        * Pneumonia  Assessment & Plan  Patient presents with three days of progressively worsening SOB   Found to have CT evidence of multifocal pneumonia- background emphysema with superimposed reticulonodular and tree-in-bud infiltrates throughout both lungs most severe in right upper lobe and both lower lobes   Started on IV Zithromax and Ceftriaxone in ED, continue   Started on NC oxygen in ED for saturation of 91%, recovered to 98% on 2 L, wean as tolerated  Respiratory protocol, flutter valve, incentive spirometry   Aspiration precautions   Blood and sputum cultures pending     Sepsis [facility])  Assessment & Plan  Patient met sepsis criteria at time of admission with elevated lactic acid, tachycardia and leukocytosis   Source likely pneumonia  IV antibiotics and IV fluids  Continue to trend lactic   Check procalcitonin  Blood and sputum cultures pending       Paroxysmal atrial fibrillation (HCC)  Assessment & Plan  History of A fib maintained on Lopressor and Cardizem   Not on anticoagulation due to bleeding    Dyspnea on exertion  Assessment & Plan  Patient reports worsening dypsnea on exertion for the past three days   Likely due to pneumonia   Supplemental O2 as needed to maintain saturations above 90%   PT/OT consults   BMP slightly elevated over baseline  Monitor closely with IV fluid hydration to avoid volume overload     VAZQUEZ (acute kidney injury) ([facility] [address] )  Assessment & Plan  Creatinine elevated above baseline to 1 86  Likely due to slight dehydration in the setting of pneumonia   Will hold daily Lasix and proceed with gentle IV fluid hydration     Chronic diastolic congestive heart failure (HCC)  Assessment & Plan  Wt Readings from Last 3 Encounters:   01/07/20 83 kg (183 lb)   09/26/19 84 8 kg (187 lb)   05/22/19 89 8 kg (198 lb)     Appears euvolemic at this time  Continue home Lasix   Monitor fluid status         Benign essential hypertension  Assessment & Plan  Appears controlled at this time  Continue Lopressor, Cardizem and Lasix   Continue to monitor     VTE Prophylaxis: Heparin  / sequential compression device   Code Status: DNR but accepts intubation  POLST: POLST form is not discussed and not completed at this time  Discussion with family: daughter at bedside    Anticipated Length of Stay:  Patient will be admitted on an Inpatient basis with an anticipated length of stay of  More than 2 midnights  Justification for Hospital Stay: pneumonia    Total Time for Visit, including Counseling / Coordination of Care: 1 hour  Greater than 50% of this total time spent on direct patient counseling and coordination of care  Chief Complaint:   Shortness of breath    History of Present Illness:    Blossom Oliveros is a 80 y o  female with a PMHx of hyperthyroidism, HTN, HLD, CHF, A fib who presents with shortness of breath  Patient reports she has been felling more short of breath than usual for the past three days  Reports she does feel slightly short of breath a rest but worse with exertion  Reports associated runny nose and generalized fatigue  Notes a cough productive of clear to white sputum  Denies fevers or chills  Denies history of COPD, but does note she used to smoke when she was younger  Patient does note many family members are sick with similar symptoms at home  Does report she has taken Robitussin and Tylenol without much relief of her cough and fatigue  Denies positional shortness of breath or orthopnea  Does not use home oxygen  Review of Systems:    Review of Systems   Constitutional: Negative for chills and fever  HENT: Positive for rhinorrhea  Negative for congestion  Eyes: Negative for visual disturbance     Respiratory: Positive for cough, shortness of breath and wheezing  Cardiovascular: Positive for leg swelling  Negative for chest pain  Gastrointestinal: Negative for abdominal pain and vomiting  Genitourinary: Negative for difficulty urinating  Musculoskeletal: Negative for back pain and gait problem  Skin: Negative for rash  Allergic/Immunologic: Negative for immunocompromised state  Neurological: Positive for weakness  Negative for dizziness and light-headedness  Psychiatric/Behavioral: Negative for confusion  Past Medical and Surgical History:     Past Medical History:   Diagnosis Date    Anemia     Atrial fibrillation ([facility] )     Atrial flutter ([facility] )     Depression     Diastolic CHF ([facility] )     Dyslipidemia     Glaucoma     H/O: hysterectomy     Hypertension     Hyperthyroidism     Impaired glucose tolerance     RESOLVED 2/3/2016    Pacemaker     Psoriasis     S/P cataract surgery     S/P knee replacement     [facility]     [facility]        Past Surgical History:   Procedure Laterality Date    CARDIAC PACEMAKER PLACEMENT      CARDIAC PACEMAKER PLACEMENT      dual chamber    CATARACT EXTRACTION      ESOPHAGOGASTRODUODENOSCOPY N/A 12/30/2016    Procedure: ESOPHAGOGASTRODUODENOSCOPY (EGD); Surgeon: Destin Najera MD;  Location: [facility]; Service:     EYE SURGERY      cataract    HYSTERECTOMY      JOINT REPLACEMENT      bilateral     WI COLONOSCOPY FLX DX W/COLLJ SPEC WHEN PFRMD N/A 4/24/2017    Procedure: COLONOSCOPY with polypectomy;  Surgeon: Mariam Donis MD;  Location: [facility]; Service: Gastroenterology    REPLACEMENT TOTAL KNEE Bilateral        Meds/Allergies:    Prior to Admission medications    Medication Sig Start Date End Date Taking?  Authorizing Provider   ALFALFA PO Take 3 tablets by mouth      Historical Provider, MD   aspirin (ECOTRIN LOW STRENGTH) 81 mg EC tablet Take 81 mg by mouth daily    Historical Provider, MD   B Complex Vitamins (B COMPLEX 1 PO) Take by mouth    Historical Provider, MD   buPROPion [facility] SR) 150 mg 12 hr tablet Take 1 tablet (150 mg total) by mouth daily 12/5/19   Asohk Landon MD   cholecalciferol (VITAMIN D3) 1,000 units tablet Take 1 tablet by mouth daily 2/18/17   [employer], DO   diltiazem (CARDIZEM CD) 240 mg 24 hr capsule Take 1 capsule (240 mg total) by mouth daily 12/27/19   Lynnette Wilcox DO   fluocinonide (LIDEX) 0 05 % external solution Apply topically 2 (two) times a day as needed for irritation 9/26/19   Ashok Landon MD   furosemide (LASIX) 40 mg tablet Take 1 tablet (40 mg total) by mouth 2 (two) times a day 6/17/19   Ashok Landon MD   isosorbide mononitrate (IMDUR) 60 mg 24 hr tablet Take 1 tablet (60 mg total) by mouth daily 9/26/19   Ashok Landon MD   ketoconazole (NIZORAL) 2 % shampoo Apply 1 application topically once for 1 dose 10/1/19 10/1/19  Ashok Landon MD   LECITHIN PO Take 1 tablet by mouth daily    Historical Provider, MD   methimazole (TAPAZOLE) 5 mg tablet Take 1 tablet (5 mg total) by mouth 3 (three) times a week Monday Wednesday Friday 9/27/19   Ashok Landon MD   metoprolol tartrate (LOPRESSOR) 25 mg tablet Take 1 tablet (25 mg total) by mouth every 12 (twelve) hours  Patient taking differently: Take 100 mg by mouth  12/17/19   Lynnette Wilcox DO   metoprolol tartrate (LOPRESSOR) 50 mg tablet Take 1 tablet (50 mg total) by mouth every 12 (twelve) hours 9/26/19   Ashok Landon MD   Omega-3 Fatty Acids (OMEGA-3 PO) Take by mouth 2 (two) times a day      Historical Provider, MD   potassium chloride (K-DUR,KLOR-CON) 20 mEq tablet Take 1 tablet (20 mEq total) by mouth daily 7/23/19   Lynnette Wilcox DO   sertraline (ZOLOFT) 100 mg tablet Take 1 tablet (100 mg total) by mouth daily 9/26/19   Ashok Landon MD   simvastatin (ZOCOR) 20 mg tablet Take 1 tablet (20 mg total) by mouth daily at bedtime 2/18/19   Ashok Landon MD   Zinc Sulfate (ZINC 15 PO) Take 100 mg by mouth      Historical Provider, MD     I have reviewed home medications with patient personally  Allergies: No Known Allergies    Social History:     Marital Status:    Occupation: retired  Patient Pre-hospital Living Situation: lives alone with significant family support  Patient Pre-hospital Level of Mobility: ambulatory  Patient Pre-hospital Diet Restrictions: none  Substance Use History:   Social History     Substance and Sexual Activity   Alcohol Use No     Social History     Tobacco Use   Smoking Status Former Smoker   Smokeless Tobacco Never Used     Social History     Substance and Sexual Activity   Drug Use No       Family History:    Family History   Problem Relation Age of Onset    Alcohol abuse Father     Diabetes Father     Alcohol abuse Brother        Physical Exam:     Vitals:   Blood Pressure: 111/58 (01/07/20 2211)  Pulse: (!) 108 (01/07/20 2211)  Temperature: 98 °F (36 7 °C) (01/07/20 1713)  Temp Source: Temporal (01/07/20 1713)  Respirations: 19 (01/07/20 2211)  Weight - Scale: 83 kg (183 lb) (01/07/20 1713)  SpO2: 94 % (01/07/20 2211)    Physical Exam   Constitutional: She is oriented to person, place, and time  HENT:   Head: Normocephalic and atraumatic  Mouth/Throat: Oropharynx is clear and moist    Eyes: Conjunctivae and EOM are normal  No scleral icterus  Neck: Normal range of motion  Cardiovascular: Normal rate, regular rhythm and normal heart sounds  No murmur heard  Pulmonary/Chest: Effort normal  No respiratory distress  She has no wheezes  She has rales in the right lower field and the left lower field  Abdominal: Soft  She exhibits no distension  Musculoskeletal: Normal range of motion  She exhibits no edema  Neurological: She is alert and oriented to person, place, and time  Skin: Skin is warm and dry  Psychiatric: She has a normal mood and affect   Her behavior is normal  Thought content normal          Additional Data:     Lab Results: I have personally reviewed pertinent reports  Results from last 7 days   Lab Units 01/07/20  1720   WBC Thousand/uL 12 44*   HEMOGLOBIN g/dL 13 3   HEMATOCRIT % 43 8   PLATELETS Thousands/uL 192   NEUTROS PCT % 82*   LYMPHS PCT % 9*   MONOS PCT % 7   EOS PCT % 1     Results from last 7 days   Lab Units 01/07/20  1720   SODIUM mmol/L 140   POTASSIUM mmol/L 4 2   CHLORIDE mmol/L 102   CO2 mmol/L 27   BUN mg/dL 26*   CREATININE mg/dL 1 86*   ANION GAP mmol/L 11   CALCIUM mg/dL 9 0   GLUCOSE RANDOM mg/dL 157*                 Results from last 7 days   Lab Units 01/07/20  2144 01/07/20  1938   LACTIC ACID mmol/L 3 2* 2 1*       Imaging: I have personally reviewed pertinent reports  CT chest without contrast   Final Result by Gregory Canela MD (01/07 2036)      Background emphysema  Superimposed reticulonodular and tree-in-bud infiltrates throughout both lungs most severe in the right upper lobe and both lower lobes suspicious for multilobar pneumonia  Probable extensive reactive adenopathy  Follow-up CT    scan chest recommended after treatment to ensure resolution in 2-3 months  Cardiomegaly  1 4 cm pancreatic cyst  For simple cyst(s) less than 1 5 cm, recommend followup every 2 year for 2 times  After 4 years, no more followups  This patient has completed all of the recommended followups   Preferred imaging modality: abdomen MRI and MRCP with    and without IV contrast, or triple phase abdomen CT with IV contrast, or abdomen MRI and MRCP without IV contrast       The recommendations regarding pancreatic findings assumes that patient does not have family history of pancreatic cancer nor have any symptoms potentially attributable to pancreatic cystic lesions (hyperamylasemia, recent-onset diabetes, severe    epigastric pain, weight loss, steatorrhea, or jaundice ) If these conditions are not true, then management should be deferred to judgement of specialists such as gastroenterologists or oncologic surgeons  Recommendations are based on recent consensus    statements on management of pancreatic cystic lesions from [address] [place] [address] [employer], [employer], the journal Pancreatology, and our own institutional consensus  The study was marked in [facility] for immediate notification and follow-up  Workstation performed: [id number]         XR chest 2 views   ED Interpretation by Libra Guajardo DO (01/07 1930)   No infiltrate/CHF          EKG, Pathology, and Other Studies Reviewed on Admission:   · EKG: atrial paced    Allscripts / Epic Records Reviewed: Yes     ** Please Note: This note has been constructed using a voice recognition system   **

## 2020-01-08 NOTE — ASSESSMENT & PLAN NOTE
Patient met sepsis criteria at time of admission with elevated lactic acid, tachycardia and leukocytosis   Source likely pneumonia  IV antibiotics and IV fluids  Continue to trend lactic   Check procalcitonin  Blood and sputum cultures pending

## 2020-01-08 NOTE — ED PROVIDER NOTES
History  Chief Complaint   Patient presents with    Shortness of Breath     Patient reports productive cough for past 4 days, feeling sob and weak at rest and with exertion   Black or Bloody Stool     Patient also concerned about dark stools for past 4-5 days  49-year-old female with a history of hypertension, atrial flutter, CHF presents with a 4 day history of productive cough of white sputum and increasing shortness of breath  No chest pain  No fevers or chills  No nausea or vomiting  She states she has become more fatigued secondary to the cough  She also has dark stools and is concerned about a possible GI bleed as she has had one in the past       History provided by:  Patient and relative   used: No    Shortness of Breath   Severity:  Unable to specify  Onset quality:  Gradual  Duration:  4 days  Timing:  Constant  Progression:  Worsening  Chronicity:  New  Context: activity and URI    Relieved by:  None tried  Worsened by:   Activity and coughing  Ineffective treatments:  Diuretics  Associated symptoms: cough, sputum production and wheezing    Associated symptoms: no abdominal pain, no chest pain, no claudication, no diaphoresis, no ear pain, no fever, no headaches, no hemoptysis, no neck pain, no PND, no rash, no sore throat, no syncope, no swollen glands and no vomiting    Cough:     Cough characteristics:  Productive    Sputum characteristics:  White    Onset quality:  Gradual    Duration:  4 days    Timing:  Intermittent    Progression:  Unchanged    Chronicity:  New  Risk factors: no tobacco use    Black or Bloody Stool   Quality:  Black and tarry  Amount:  Unable to specify  Duration:  1 day  Timing:  Intermittent  Chronicity:  New  Context: defecation    Similar prior episodes: no    Relieved by:  None tried  Worsened by:  Nothing  Ineffective treatments:  None tried  Associated symptoms: no abdominal pain, no dizziness, no fever, no hematemesis, no light-headedness and no vomiting    Risk factors: anticoagulant use        Prior to Admission Medications   Prescriptions Last Dose Informant Patient Reported? Taking?    ALFALFA PO  Self Yes No   Sig: Take 3 tablets by mouth     B Complex Vitamins (B COMPLEX 1 PO)  Self Yes No   Sig: Take by mouth   LECITHIN PO  Self Yes No   Sig: Take 1 tablet by mouth daily   Omega-3 Fatty Acids (OMEGA-3 PO)  Self Yes No   Sig: Take by mouth 2 (two) times a day     Zinc Sulfate (ZINC 15 PO)  Self Yes No   Sig: Take 100 mg by mouth     aspirin (ECOTRIN LOW STRENGTH) 81 mg EC tablet  Self Yes No   Sig: Take 81 mg by mouth daily   buPROPion (WELLBUTRIN SR) 150 mg 12 hr tablet   No No   Sig: Take 1 tablet (150 mg total) by mouth daily   cholecalciferol (VITAMIN D3) 1,000 units tablet  Self No No   Sig: Take 1 tablet by mouth daily   diltiazem (CARDIZEM CD) 240 mg 24 hr capsule   No No   Sig: Take 1 capsule (240 mg total) by mouth daily   fluocinonide (LIDEX) 0 05 % external solution   No No   Sig: Apply topically 2 (two) times a day as needed for irritation   furosemide (LASIX) 40 mg tablet  Self No No   Sig: Take 1 tablet (40 mg total) by mouth 2 (two) times a day   isosorbide mononitrate (IMDUR) 60 mg 24 hr tablet   No No   Sig: Take 1 tablet (60 mg total) by mouth daily   ketoconazole (NIZORAL) 2 % shampoo   No No   Sig: Apply 1 application topically once for 1 dose   methimazole (TAPAZOLE) 5 mg tablet   No No   Sig: Take 1 tablet (5 mg total) by mouth 3 (three) times a week Monday Wednesday Friday   metoprolol tartrate (LOPRESSOR) 25 mg tablet   No No   Sig: Take 1 tablet (25 mg total) by mouth every 12 (twelve) hours   Patient taking differently: Take 100 mg by mouth    metoprolol tartrate (LOPRESSOR) 50 mg tablet   No No   Sig: Take 1 tablet (50 mg total) by mouth every 12 (twelve) hours   potassium chloride (K-DUR,KLOR-CON) 20 mEq tablet  Self No No   Sig: Take 1 tablet (20 mEq total) by mouth daily   sertraline (ZOLOFT) 100 mg tablet   No No Sig: Take 1 tablet (100 mg total) by mouth daily   simvastatin (ZOCOR) 20 mg tablet  Self No No   Sig: Take 1 tablet (20 mg total) by mouth daily at bedtime      Facility-Administered Medications: None       Past Medical History:   Diagnosis Date    Anemia     Atrial fibrillation (HCC)     Atrial flutter (HCC)     Cancer ([place] [address] )     PT UNSURE OF TYPE OF CANCER     Depression     Diastolic CHF ([place] [address] )     Dyslipidemia     Glaucoma     Glaucoma     H/O: hysterectomy     Hypertension     Hyperthyroidism     Impaired glucose tolerance     RESOLVED 2/3/2016    Pacemaker     Psoriasis     S/P cataract surgery     S/P knee replacement     SSS (sick sinus syndrome) (HCC)     SSS (sick sinus syndrome) ([place] [address] )        Past Surgical History:   Procedure Laterality Date    CARDIAC PACEMAKER PLACEMENT      CARDIAC PACEMAKER PLACEMENT      dual chamber    CATARACT EXTRACTION      ESOPHAGOGASTRODUODENOSCOPY N/A 12/30/2016    Procedure: ESOPHAGOGASTRODUODENOSCOPY (EGD); Surgeon: Sandi Hobson MD;  Location: [facility]; Service:     EYE SURGERY      cataract    HYSTERECTOMY      JOINT REPLACEMENT      bilateral     CO COLONOSCOPY FLX DX W/COLLJ SPEC WHEN PFRMD N/A 4/24/2017    Procedure: COLONOSCOPY with polypectomy;  Surgeon: Mercedes Shanks MD;  Location: [facility]; Service: Gastroenterology    REPLACEMENT TOTAL KNEE Bilateral        Family History   Problem Relation Age of Onset    Alcohol abuse Father     Diabetes Father     Alcohol abuse Brother      I have reviewed and agree with the history as documented  Social History     Tobacco Use    Smoking status: Former Smoker    Smokeless tobacco: Never Used   Substance Use Topics    Alcohol use: No    Drug use: No        Review of Systems   Constitutional: Positive for activity change and fatigue  Negative for chills, diaphoresis and fever  HENT: Negative  Negative for congestion, ear pain, rhinorrhea and sore throat  Eyes: Negative    Negative for discharge, redness and itching  Respiratory: Positive for cough, sputum production, shortness of breath and wheezing  Negative for apnea, hemoptysis and chest tightness  Cardiovascular: Negative for chest pain, palpitations, claudication, leg swelling, syncope and PND  Gastrointestinal: Positive for blood in stool  Negative for abdominal pain, hematemesis and vomiting  Endocrine: Negative  Genitourinary: Negative  Negative for flank pain, frequency and urgency  Musculoskeletal: Negative  Negative for back pain and neck pain  Skin: Negative  Negative for rash  Allergic/Immunologic: Negative  Neurological: Negative  Negative for dizziness, syncope, weakness, light-headedness, numbness and headaches  Hematological: Negative  All other systems reviewed and are negative  Physical Exam  Physical Exam   Constitutional: She is oriented to person, place, and time  She appears well-developed and well-nourished  Non-toxic appearance  She does not have a sickly appearance  She does not appear ill  No distress  HENT:   Head: Normocephalic and atraumatic  Right Ear: External ear normal    Left Ear: External ear normal    Mouth/Throat: Oropharynx is clear and moist  No oropharyngeal exudate  Eyes: Pupils are equal, round, and reactive to light  Conjunctivae are normal  Right eye exhibits no discharge  Left eye exhibits no discharge  No scleral icterus  Cardiovascular: Regular rhythm and normal heart sounds  Tachycardia present  Exam reveals no gallop and no friction rub  No murmur heard  Pulmonary/Chest: Effort normal  No accessory muscle usage or stridor  No tachypnea  No respiratory distress  She has wheezes in the right upper field, the right middle field, the right lower field, the left upper field, the left middle field and the left lower field   She has rales in the right upper field, the right middle field, the right lower field, the left upper field, the left middle field and the left lower field  She exhibits no tenderness  Abdominal: Soft  Bowel sounds are normal  She exhibits no distension and no mass  There is no tenderness  No hernia  Genitourinary: Rectal exam shows guaiac negative stool  Musculoskeletal: Normal range of motion  She exhibits no edema  Neurological: She is alert and oriented to person, place, and time  She has normal reflexes  She exhibits normal muscle tone  Skin: Skin is warm and dry  No rash noted  She is not diaphoretic  No erythema  No pallor  Psychiatric: She has a normal mood and affect  Nursing note and vitals reviewed        Vital Signs  ED Triage Vitals [01/07/20 1713]   Temperature Pulse Respirations Blood Pressure SpO2   98 °F (36 7 °C) (!) 112 20 115/57 93 %      Temp Source Heart Rate Source Patient Position - Orthostatic VS BP Location FiO2 (%)   Temporal Monitor Sitting Left arm --      Pain Score       No Pain           Vitals:    01/07/20 1713 01/07/20 1922   BP: 115/57 140/65   Pulse: (!) 112 (!) 109   Patient Position - Orthostatic VS: Sitting Lying         Visual Acuity      ED Medications  Medications   albuterol inhalation solution 5 mg (has no administration in time range)       Diagnostic Studies  Results Reviewed     Procedure Component Value Units Date/Time    Lactic acid, plasma [[id number]]     Lab Status:  No result Specimen:  Blood     POCT urinalysis dipstick [[id number]]     Lab Status:  No result Specimen:  Urine     Troponin I [[id number]]     Lab Status:  No result Specimen:  Blood     NT-BNP PRO [[id number]]     Lab Status:  No result Specimen:  Blood     Basic metabolic panel [[id number]]  (Abnormal) Collected:  01/07/20 1720    Lab Status:  Final result Specimen:  Blood from Arm, Left Updated:  01/07/20 1757     Sodium 140 mmol/L      Potassium 4 2 mmol/L      Chloride 102 mmol/L      CO2 27 mmol/L      ANION GAP 11 mmol/L      BUN 26 mg/dL      Creatinine 1 86 mg/dL      Glucose 157 mg/dL      Calcium 9 0 mg/dL eGFR 24 ml/min/1 73sq m     Narrative:       [place] guidelines for Chronic Kidney Disease (CKD):     Stage 1 with normal or high GFR (GFR > 90 mL/min/1 73 square meters)    Stage 2 Mild CKD (GFR = 60-89 mL/min/1 73 square meters)    Stage 3A Moderate CKD (GFR = 45-59 mL/min/1 73 square meters)    Stage 3B Moderate CKD (GFR = 30-44 mL/min/1 73 square meters)    Stage 4 Severe CKD (GFR = 15-29 mL/min/1 73 square meters)    Stage 5 End Stage CKD (GFR <15 mL/min/1 73 square meters)  Note: GFR calculation is accurate only with a steady state creatinine    CBC and differential [[id number]]  (Abnormal) Collected:  01/07/20 1720    Lab Status:  Final result Specimen:  Blood from Arm, Left Updated:  01/07/20 1737     WBC 12 44 Thousand/uL      RBC 4 72 Million/uL      Hemoglobin 13 3 g/dL      Hematocrit 43 8 %      MCV 93 fL      MCH 28 2 pg      MCHC 30 4 g/dL      RDW 17 0 %      MPV 9 6 fL      Platelets 278 Thousands/uL      nRBC 0 /100 WBCs      Neutrophils Relative 82 %      Immat GRANS % 1 %      Lymphocytes Relative 9 %      Monocytes Relative 7 %      Eosinophils Relative 1 %      Basophils Relative 0 %      Neutrophils Absolute 10 18 Thousands/µL      Immature Grans Absolute 0 08 Thousand/uL      Lymphocytes Absolute 1 16 Thousands/µL      Monocytes Absolute 0 91 Thousand/µL      Eosinophils Absolute 0 06 Thousand/µL      Basophils Absolute 0 05 Thousands/µL                  XR chest 2 views   ED Interpretation by Chandrika Gee DO (01/07 1930)   No infiltrate/CHF      CT chest without contrast    (Results Pending)              Procedures  CriticalCare Time  Performed by: Chandrika Gee DO  Authorized by: Chandrika Gee DO     Critical care provider statement:     Critical care time (minutes):  30    Critical care time was exclusive of:  Separately billable procedures and treating other patients and teaching time    Critical care was necessary to treat or prevent imminent or life-threatening deterioration of the following conditions:  Sepsis    Critical care was time spent personally by me on the following activities:  Blood draw for specimens, obtaining history from patient or surrogate, development of treatment plan with patient or surrogate, discussions with consultants, evaluation of patient's response to treatment, examination of patient, interpretation of cardiac output measurements, ordering and performing treatments and interventions, ordering and review of laboratory studies, ordering and review of radiographic studies, re-evaluation of patient's condition and review of old charts    I assumed direction of critical care for this patient from another provider in my specialty: no      ECG 12 Lead Documentation Only  Date/Time: 1/7/2020 9:09 PM  Performed by: Brandon Estes DO  Authorized by: Brandon Estes DO     Indications / Diagnosis:  Sob  ECG reviewed by me, the ED Provider: yes    Patient location:  ED  Interpretation:     Interpretation: abnormal    Rate:     ECG rate:  109    ECG rate assessment: tachycardic    Rhythm:     Rhythm: sinus rhythm    Ectopy:     Ectopy: none    Conduction:     Conduction: normal    ST segments:     ST segments:  Abnormal    Depression:  II, III, aVF, V4, V5 and V6  T waves:     T waves: normal               ED Course                               MDM  Number of Diagnoses or Management Options  Diagnosis management comments: 29-year-old female presents with increasing shortness of breath productive cough with white phlegm  This has been ongoing for the last 4 days  No fevers or chills  She states she is starting to feel fatigue  She is also complaining of dark colored stools  On exam she is alert no acute distress  She does have wheezing and rales on exam   No increase over baseline edema  Rectal exam is negative for heme-positive stool  Will do cardiac workup    Chest x-ray done in triage does not show an infiltrate  Higher suspicion for pneumonia so will do CT of chest   Will give albuterol and reassess  Amount and/or Complexity of Data Reviewed  Clinical lab tests: ordered and reviewed  Tests in the radiology section of CPT®: ordered and reviewed  Independent visualization of images, tracings, or specimens: yes          Disposition  Final diagnoses:   None     ED Disposition     None      Follow-up Information    None         Patient's Medications   Discharge Prescriptions    No medications on file     No discharge procedures on file      ED Provider  Electronically Signed by           Malia Vasquez DO  01/07/20 2109       Malia Vasquez DO  01/07/20 2116

## 2020-01-08 NOTE — ASSESSMENT & PLAN NOTE
Patient presents with three days of progressively worsening SOB   Found to have CT evidence of multifocal pneumonia- background emphysema with superimposed reticulonodular and tree-in-bud infiltrates throughout both lungs most severe in right upper lobe and both lower lobes   Started on IV Zithromax and Ceftriaxone in ED, continue   Started on NC oxygen in ED for saturation of 91%, recovered to 98% on 2 L, wean as tolerated  Respiratory protocol, flutter valve, incentive spirometry   Aspiration precautions   Blood and sputum cultures pending

## 2020-01-08 NOTE — ASSESSMENT & PLAN NOTE
Creatinine elevated above baseline to 1 86  Likely due to slight dehydration in the setting of pneumonia   Will hold daily Lasix and proceed with gentle IV fluid hydration

## 2020-01-09 LAB
ANION GAP SERPL CALCULATED.3IONS-SCNC: 6 MMOL/L (ref 4–13)
BUN SERPL-MCNC: 18 MG/DL (ref 5–25)
CALCIUM SERPL-MCNC: 8.8 MG/DL (ref 8.3–10.1)
CHLORIDE SERPL-SCNC: 103 MMOL/L (ref 100–108)
CO2 SERPL-SCNC: 30 MMOL/L (ref 21–32)
CREAT SERPL-MCNC: 1.27 MG/DL (ref 0.6–1.3)
ERYTHROCYTE [DISTWIDTH] IN BLOOD BY AUTOMATED COUNT: 17 % (ref 11.6–15.1)
GFR SERPL CREATININE-BSD FRML MDRD: 38 ML/MIN/1.73SQ M
GLUCOSE SERPL-MCNC: 84 MG/DL (ref 65–140)
HCT VFR BLD AUTO: 36.2 % (ref 34.8–46.1)
HGB BLD-MCNC: 11.1 G/DL (ref 11.5–15.4)
MCH RBC QN AUTO: 28.2 PG (ref 26.8–34.3)
MCHC RBC AUTO-ENTMCNC: 30.7 G/DL (ref 31.4–37.4)
MCV RBC AUTO: 92 FL (ref 82–98)
PLATELET # BLD AUTO: 168 THOUSANDS/UL (ref 149–390)
PMV BLD AUTO: 9.5 FL (ref 8.9–12.7)
POTASSIUM SERPL-SCNC: 4.1 MMOL/L (ref 3.5–5.3)
RBC # BLD AUTO: 3.93 MILLION/UL (ref 3.81–5.12)
SODIUM SERPL-SCNC: 139 MMOL/L (ref 136–145)
WBC # BLD AUTO: 9.08 THOUSAND/UL (ref 4.31–10.16)

## 2020-01-09 PROCEDURE — 80048 BASIC METABOLIC PNL TOTAL CA: CPT | Performed by: INTERNAL MEDICINE

## 2020-01-09 PROCEDURE — 94760 N-INVAS EAR/PLS OXIMETRY 1: CPT

## 2020-01-09 PROCEDURE — 94640 AIRWAY INHALATION TREATMENT: CPT

## 2020-01-09 PROCEDURE — 99232 SBSQ HOSP IP/OBS MODERATE 35: CPT | Performed by: INTERNAL MEDICINE

## 2020-01-09 PROCEDURE — 85027 COMPLETE CBC AUTOMATED: CPT | Performed by: INTERNAL MEDICINE

## 2020-01-09 RX ORDER — IPRATROPIUM BROMIDE AND ALBUTEROL SULFATE 2.5; .5 MG/3ML; MG/3ML
3 SOLUTION RESPIRATORY (INHALATION)
Status: DISCONTINUED | OUTPATIENT
Start: 2020-01-09 | End: 2020-01-10 | Stop reason: HOSPADM

## 2020-01-09 RX ADMIN — ISOSORBIDE MONONITRATE 60 MG: 60 TABLET, EXTENDED RELEASE ORAL at 08:06

## 2020-01-09 RX ADMIN — SERTRALINE HYDROCHLORIDE 100 MG: 100 TABLET ORAL at 08:06

## 2020-01-09 RX ADMIN — IPRATROPIUM BROMIDE AND ALBUTEROL SULFATE 3 ML: 2.5; .5 SOLUTION RESPIRATORY (INHALATION) at 18:52

## 2020-01-09 RX ADMIN — HEPARIN SODIUM 5000 UNITS: 5000 INJECTION INTRAVENOUS; SUBCUTANEOUS at 14:11

## 2020-01-09 RX ADMIN — HEPARIN SODIUM 5000 UNITS: 5000 INJECTION INTRAVENOUS; SUBCUTANEOUS at 05:50

## 2020-01-09 RX ADMIN — METOPROLOL TARTRATE 75 MG: 50 TABLET, FILM COATED ORAL at 08:06

## 2020-01-09 RX ADMIN — DILTIAZEM HYDROCHLORIDE 240 MG: 240 CAPSULE, COATED, EXTENDED RELEASE ORAL at 08:07

## 2020-01-09 RX ADMIN — IPRATROPIUM BROMIDE AND ALBUTEROL SULFATE 3 ML: 2.5; .5 SOLUTION RESPIRATORY (INHALATION) at 13:53

## 2020-01-09 RX ADMIN — POTASSIUM CHLORIDE 20 MEQ: 1500 TABLET, EXTENDED RELEASE ORAL at 08:06

## 2020-01-09 RX ADMIN — GUAIFENESIN 600 MG: 600 TABLET ORAL at 21:38

## 2020-01-09 RX ADMIN — CEFTRIAXONE 1000 MG: 1 INJECTION, POWDER, FOR SOLUTION INTRAMUSCULAR; INTRAVENOUS at 21:45

## 2020-01-09 RX ADMIN — BENZONATATE 100 MG: 100 CAPSULE ORAL at 05:55

## 2020-01-09 RX ADMIN — GUAIFENESIN 600 MG: 600 TABLET ORAL at 08:06

## 2020-01-09 RX ADMIN — BUPROPION HYDROCHLORIDE 150 MG: 150 TABLET, EXTENDED RELEASE ORAL at 08:07

## 2020-01-09 RX ADMIN — PRAVASTATIN SODIUM 40 MG: 40 TABLET ORAL at 15:41

## 2020-01-09 RX ADMIN — HEPARIN SODIUM 5000 UNITS: 5000 INJECTION INTRAVENOUS; SUBCUTANEOUS at 21:37

## 2020-01-09 RX ADMIN — AZITHROMYCIN 500 MG: 250 TABLET, FILM COATED ORAL at 21:38

## 2020-01-09 RX ADMIN — ASPIRIN 81 MG: 81 TABLET, COATED ORAL at 08:06

## 2020-01-09 NOTE — PROGRESS NOTES
Progress Note - Hyun Monroy 4/6/1932, 80 y o  female MRN: [id number]    Unit/Bed#: E4 -01 Encounter: [id number]    Primary Care Provider: Jamie Garcia MD   Date and time admitted to hospital: 1/7/2020  7:04 PM        Sepsis [facility])  Assessment & Plan  Continue azithromycin and ceftriaxone day 2 of 5       Paroxysmal atrial fibrillation [facility])  Assessment & Plan  Ventricular rate controlled, not on anticoagulation due to history of previous GI bleed    Dyspnea on exertion  Assessment & Plan  Secondary to pneumonia    VAZQUEZ (acute kidney injury) ([facility] [address] )  Assessment & Plan  Creatinine is improved to 1 38    [facility]  Assessment & Plan  Wt Readings from Last 3 Encounters:   01/07/20 83 kg (183 lb)   09/26/19 84 8 kg (187 lb)   05/22/19 89 8 kg (198 lb)     Appears euvolemic at this time  Monitor fluid status         Benign essential hypertension  Assessment & Plan    Continue Lopressor, Cardizem  Continue to monitor     * Pneumonia  Assessment & Plan  Presented with sepsis of a pulmonary etiology - patient with of with elevated heart rate and notable tachycardia, leukocytosis      Initial lactate were elevated and subsequently have clear - she will continue IV fluids and intravenous antibiotics    Will check rapid flu, multilobar pneumonia is high risk situation      [facility] Internal Medicine Progress Note  Patient: Hyun Monroy 80 y o  female   MRN: [id number]  PCP: Jamie Garcia MD  Unit/Bed#: E4 -67 Encounter: [id number]  Date Of Visit: 01/08/20    Assessment:    Principal Problem:    Pneumonia  Active Problems:    Benign essential hypertension    Chronic diastolic congestive heart failure (HCC)    VAZQUEZ (acute kidney injury) ([facility] [address] )    Dyspnea on exertion    Paroxysmal atrial fibrillation ([facility] [address] )    Sepsis ([facility] [address] )       Plan:    · Continue intravenous antibiotics  · Physical therapy and occupational therapy consult  · Respiratory protocol       VTE Pharmacologic Prophylaxis:   Pharmacologic: Heparin  Mechanical VTE Prophylaxis in Place: Yes    Patient Centered Rounds: I have performed bedside rounds with nursing staff today  Discussions with Specialists or Other Care Team Provider:  Case management    Education and Discussions with Family / Patient:  Daughter of the patient    Time Spent for Care: 20 minutes  More than 50% of total time spent on counseling and coordination of care as described above  Current Length of Stay: 1 day(s)    Current Patient Status: Inpatient   Certification Statement: The patient will continue to require additional inpatient hospital stay due to Intravenous antibiotics    Discharge Plan / Estimated Discharge Date:  48 hours    Code Status: Level 2 - DNAR: but accepts endotracheal intubation      Subjective:   Patient seen and examined, breathing is improved  She feels like she is able to tolerate a full breath  Still requiring oxygen at 2 L    A complete and comprehensive 14 point organ system review has been performed and all other systems are negative other than stated above  Objective:     Vitals:   Temp (24hrs), Av 7 °F (37 1 °C), Min:98 °F (36 7 °C), Max:99 8 °F (37 7 °C)    Temp:  [98 °F (36 7 °C)-99 8 °F (37 7 °C)] 98 °F (36 7 °C)  HR:  [102-109] 104  Resp:  [18-20] 20  BP: (105-133)/(57-71) 108/71  SpO2:  [91 %-97 %] 97 %  Body mass index is 29 54 kg/m²  Input and Output Summary (last 24 hours):        Intake/Output Summary (Last 24 hours) at 2020  Last data filed at 2020 0859  Gross per 24 hour   Intake 1212 5 ml   Output 200 ml   Net 1012 5 ml       Physical Exam:     General: well appearing, no acute distress  HEENT: atraumatic, PERRLA, moist mucosa, normal pharynx, normal tonsils and adenoids, normal tongue, no fluid in sinuses  Neck: Trachea midline, no carotid bruit, no masses  Respiratory:  Rhonchorous lung sounds, worse in the right upper lobe  Cardiovascular: RRR, no m/r/g, Normal S1 and S2  Abdomen: Soft, non-tender, non-distended, normal bowel sounds in all quadrants, no hepatosplenomegaly, no tympany  Rectal: deferred  Musculoskeletal: normal ROM in upper and lower extremities  Integumentary: warm, dry, and pink, with no rash, purpura, or petechia  Heme/Lymph: no lymphadenopathy, no bruises  Neurological: Cranial Nerves II-XII grossly intact, no tics, normal sensation to pressure and light touch  Psychiatric: cooperative with normal mood, affect, and cognition      Additional Data:     Labs:    Results from last 7 days   Lab Units 01/08/20  0509 01/07/20  1720   WBC Thousand/uL 12 42* 12 44*   HEMOGLOBIN g/dL 12 1 13 3   HEMATOCRIT % 39 2 43 8   PLATELETS Thousands/uL 159 192   NEUTROS PCT %  --  82*   LYMPHS PCT %  --  9*   MONOS PCT %  --  7   EOS PCT %  --  1     Results from last 7 days   Lab Units 01/08/20  0509   POTASSIUM mmol/L 4 0   CHLORIDE mmol/L 104   CO2 mmol/L 27   BUN mg/dL 21   CREATININE mg/dL 1 37*   CALCIUM mg/dL 8 6           * I Have Reviewed All Lab Data Listed Above  * Additional Pertinent Lab Tests Reviewed: [facility] Admission Reviewed    Imaging:  CT of the chest    Recent Cultures (last 7 days):     Results from last 7 days   Lab Units 01/07/20  2042 01/07/20  2040   BLOOD CULTURE  Received in Microbiology Lab  Culture in Progress  Received in Microbiology Lab  Culture in Progress         Last 24 Hours Medication List:     Current Facility-Administered Medications:  acetaminophen 650 mg Oral Q6H PRN Sowmya Smdavidde, PA-C    aluminum-magnesium hydroxide-simethicone 30 mL Oral Q6H PRN SELENA Moncada-KAREN    aspirin 81 mg Oral Daily Sowmya Smudde, PA-C    cefTRIAXone 1,000 mg Intravenous Q24H Sowmya Volodymyr PA-C    And        azithromycin 500 mg Oral Q24H Sowmya Smudde, PA-C    benzonatate 100 mg Oral TID PRN SELENA Moncada-KAREN    buPROPion 150 mg Oral Daily Sowmya Smudde, PA-C    diltiazem 240 mg Oral Daily Sowmya Smudde, PA-C    guaiFENesin 600 mg Oral Q12H [facility] Sowmya Smudde, PA-C    heparin (porcine) 5,000 Units Subcutaneous Q8H [facility] Sowmya Smudde, PA-C    isosorbide mononitrate 60 mg Oral Daily Sowmya Smudde, PA-C    methimazole 5 mg Oral Once per day on Mon Wed Fri Sowmya Smudde, PA-C    metoprolol tartrate 75 mg Oral Q12H [facility]    ondansetron 4 mg Intravenous Q6H PRN Sowmya Smudde, PA-C    polyethylene glycol 17 g Oral Daily PRN Sowmya Smudde, PA-C    potassium chloride 20 mEq Oral Daily Sowmya Smudde, PA-C    pravastatin 40 mg Oral Daily With KAREN Osborne, PA-C    sertraline 100 mg Oral Daily Sowmya Smudde, PA-C    sodium chloride 50 mL/hr Intravenous Continuous Sowmya Smudde, PA-C Last Rate: Stopped (01/08/20 0859)        Today, Patient Was Seen By: Miranda Marti DO    ** Please Note: This note has been constructed using a voice recognition system   **

## 2020-01-09 NOTE — PLAN OF CARE
Problem: Potential for Falls  Goal: Patient will remain free of falls  Description  INTERVENTIONS:  - Assess patient frequently for physical needs  -  Identify cognitive and physical deficits and behaviors that affect risk of falls    -  [place] fall precautions as indicated by assessment   - Educate patient/family on patient safety including physical limitations  - Instruct patient to call for assistance with activity based on assessment  - Modify environment to reduce risk of injury  - Consider OT/PT consult to assist with strengthening/mobility  Outcome: Progressing     Problem: PAIN - ADULT  Goal: Verbalizes/displays adequate comfort level or baseline comfort level  Description  Interventions:  - Encourage patient to monitor pain and request assistance  - Assess pain using appropriate pain scale  - Administer analgesics based on type and severity of pain and evaluate response  - Implement non-pharmacological measures as appropriate and evaluate response  - Consider cultural and social influences on pain and pain management  - Notify physician/advanced practitioner if interventions unsuccessful or patient reports new pain  Outcome: Progressing     Problem: INFECTION - ADULT  Goal: Absence or prevention of progression during hospitalization  Description  INTERVENTIONS:  - Assess and monitor for signs and symptoms of infection  - Monitor lab/diagnostic results  - Monitor all insertion sites, i e  indwelling lines, tubes, and drains  - Monitor endotracheal if appropriate and nasal secretions for changes in amount and color  - [place] appropriate cooling/warming therapies per order  - Administer medications as ordered  - Instruct and encourage patient and family to use good hand hygiene technique  - Identify and instruct in appropriate isolation precautions for identified infection/condition  Outcome: Progressing  Goal: Absence of fever/infection during neutropenic period  Description  INTERVENTIONS:  - Monitor WBC    Outcome: Progressing     Problem: SAFETY ADULT  Goal: Maintain or return to baseline ADL function  Description  INTERVENTIONS:  -  Assess patient's ability to carry out ADLs; assess patient's baseline for ADL function and identify physical deficits which impact ability to perform ADLs (bathing, care of mouth/teeth, toileting, grooming, dressing, etc )  - Assess/evaluate cause of self-care deficits   - Assess range of motion  - Assess patient's mobility; develop plan if impaired  - Assess patient's need for assistive devices and provide as appropriate  - Encourage maximum independence but intervene and supervise when necessary  - Involve family in performance of ADLs  - Assess for home care needs following discharge   - Consider OT consult to assist with ADL evaluation and planning for discharge  - Provide patient education as appropriate  Outcome: Progressing     Problem: DISCHARGE PLANNING  Goal: Discharge to home or other facility with appropriate resources  Description  INTERVENTIONS:  - Identify barriers to discharge w/patient and caregiver  - Arrange for needed discharge resources and transportation as appropriate  - Identify discharge learning needs (meds, wound care, etc )  - Arrange for interpretive services to assist at discharge as needed  - Refer to Case Management Department for coordinating discharge planning if the patient needs post-hospital services based on physician/advanced practitioner order or complex needs related to functional status, cognitive ability, or social support system  Outcome: Progressing     Problem: Knowledge Deficit  Goal: Patient/family/caregiver demonstrates understanding of disease process, treatment plan, medications, and discharge instructions  Description  Complete learning assessment and assess knowledge base    Interventions:  - Provide teaching at level of understanding  - Provide teaching via preferred learning methods  Outcome: Progressing     Problem: CARDIOVASCULAR - ADULT  Goal: Maintains optimal cardiac output and hemodynamic stability  Description  INTERVENTIONS:  - Monitor I/O, vital signs and rhythm  - Monitor for S/S and trends of decreased cardiac output  - Administer and titrate ordered vasoactive medications to optimize hemodynamic stability  - Assess quality of pulses, skin color and temperature  - Assess for signs of decreased coronary artery perfusion  - Instruct patient to report change in severity of symptoms  Outcome: Progressing  Goal: Absence of cardiac dysrhythmias or at baseline rhythm  Description  INTERVENTIONS:  - Continuous cardiac monitoring, vital signs, obtain 12 lead EKG if ordered  - Administer antiarrhythmic and heart rate control medications as ordered  - Monitor electrolytes and administer replacement therapy as ordered  Outcome: Progressing     Problem: RESPIRATORY - ADULT  Goal: Achieves optimal ventilation and oxygenation  Description  INTERVENTIONS:  - Assess for changes in respiratory status  - Assess for changes in mentation and behavior  - Position to facilitate oxygenation and minimize respiratory effort  - Oxygen administered by appropriate delivery if ordered  - Initiate smoking cessation education as indicated  - Encourage broncho-pulmonary hygiene including cough, deep breathe, Incentive Spirometry  - Assess the need for suctioning and aspirate as needed  - Assess and instruct to report SOB or any respiratory difficulty  - Respiratory Therapy support as indicated  Outcome: Progressing

## 2020-01-09 NOTE — ASSESSMENT & PLAN NOTE
Wt Readings from Last 3 Encounters:   01/07/20 83 kg (183 lb)   09/26/19 84 8 kg (187 lb)   05/22/19 89 8 kg (198 lb)     Appears euvolemic at this time  Monitor fluid status

## 2020-01-09 NOTE — SOCIAL WORK
Cm reviewed pt care coordination rounds  Pt is a tentative d/c for tomorrow  Cm will f/u for final medical clearance and dcp

## 2020-01-09 NOTE — ASSESSMENT & PLAN NOTE
Presented with sepsis of a pulmonary etiology - patient with of with elevated heart rate and notable tachycardia, leukocytosis      Initial lactate were elevated and subsequently have clear - she will continue IV fluids and intravenous antibiotics    Will check rapid flu, multilobar pneumonia is high risk situation

## 2020-01-10 VITALS
BODY MASS INDEX: 29.54 KG/M2 | WEIGHT: 183 LBS | RESPIRATION RATE: 20 BRPM | SYSTOLIC BLOOD PRESSURE: 114 MMHG | TEMPERATURE: 98.4 F | HEART RATE: 107 BPM | OXYGEN SATURATION: 95 % | DIASTOLIC BLOOD PRESSURE: 63 MMHG

## 2020-01-10 LAB — PROCALCITONIN SERPL-MCNC: 0.15 NG/ML

## 2020-01-10 PROCEDURE — 94640 AIRWAY INHALATION TREATMENT: CPT

## 2020-01-10 PROCEDURE — 94760 N-INVAS EAR/PLS OXIMETRY 1: CPT

## 2020-01-10 PROCEDURE — 84145 PROCALCITONIN (PCT): CPT | Performed by: INTERNAL MEDICINE

## 2020-01-10 PROCEDURE — 99239 HOSP IP/OBS DSCHRG MGMT >30: CPT | Performed by: INTERNAL MEDICINE

## 2020-01-10 RX ORDER — CEFUROXIME AXETIL 500 MG/1
500 TABLET ORAL EVERY 12 HOURS SCHEDULED
Qty: 3 TABLET | Refills: 0 | Status: SHIPPED | OUTPATIENT
Start: 2020-01-10 | End: 2020-01-13

## 2020-01-10 RX ORDER — GUAIFENESIN 600 MG
600 TABLET, EXTENDED RELEASE 12 HR ORAL EVERY 12 HOURS SCHEDULED
Qty: 10 TABLET | Refills: 0 | Status: SHIPPED | OUTPATIENT
Start: 2020-01-10 | End: 2022-05-05

## 2020-01-10 RX ORDER — BENZONATATE 100 MG/1
100 CAPSULE ORAL 3 TIMES DAILY PRN
Qty: 20 CAPSULE | Refills: 0 | Status: SHIPPED | OUTPATIENT
Start: 2020-01-10 | End: 2020-08-17

## 2020-01-10 RX ORDER — AZITHROMYCIN 250 MG/1
250 TABLET, FILM COATED ORAL EVERY 24 HOURS
Qty: 3 TABLET | Refills: 0 | Status: SHIPPED | OUTPATIENT
Start: 2020-01-10 | End: 2020-01-13

## 2020-01-10 RX ADMIN — METOPROLOL TARTRATE 75 MG: 50 TABLET, FILM COATED ORAL at 09:02

## 2020-01-10 RX ADMIN — BENZONATATE 100 MG: 100 CAPSULE ORAL at 04:38

## 2020-01-10 RX ADMIN — HEPARIN SODIUM 5000 UNITS: 5000 INJECTION INTRAVENOUS; SUBCUTANEOUS at 06:27

## 2020-01-10 RX ADMIN — IPRATROPIUM BROMIDE AND ALBUTEROL SULFATE 3 ML: 2.5; .5 SOLUTION RESPIRATORY (INHALATION) at 02:37

## 2020-01-10 RX ADMIN — BUPROPION HYDROCHLORIDE 150 MG: 150 TABLET, EXTENDED RELEASE ORAL at 09:04

## 2020-01-10 RX ADMIN — ASPIRIN 81 MG: 81 TABLET, COATED ORAL at 09:02

## 2020-01-10 RX ADMIN — GUAIFENESIN 600 MG: 600 TABLET ORAL at 09:03

## 2020-01-10 RX ADMIN — SERTRALINE HYDROCHLORIDE 100 MG: 100 TABLET ORAL at 09:03

## 2020-01-10 RX ADMIN — IPRATROPIUM BROMIDE AND ALBUTEROL SULFATE 3 ML: 2.5; .5 SOLUTION RESPIRATORY (INHALATION) at 08:00

## 2020-01-10 RX ADMIN — DILTIAZEM HYDROCHLORIDE 240 MG: 240 CAPSULE, COATED, EXTENDED RELEASE ORAL at 09:04

## 2020-01-10 RX ADMIN — ISOSORBIDE MONONITRATE 60 MG: 60 TABLET, EXTENDED RELEASE ORAL at 09:03

## 2020-01-10 RX ADMIN — POTASSIUM CHLORIDE 20 MEQ: 1500 TABLET, EXTENDED RELEASE ORAL at 09:02

## 2020-01-10 RX ADMIN — METHIMAZOLE 5 MG: 5 TABLET ORAL at 09:03

## 2020-01-10 NOTE — PROGRESS NOTES
Progress Note - Maria Isabel Santoyo 4/6/1932, 80 y o  female MRN: [id number]    Unit/Bed#: E4 -01 Encounter: [id number]    Primary Care Provider: Gerardo Rueda MD   Date and time admitted to hospital: 1/7/2020  7:04 PM        Sepsis [facility])  Assessment & Plan  Continue azithromycin and ceftriaxone day 3 of 5       Of a Pulm Etiology  Weaned off O2, as evidenced by leukocytosis, Tachycardia, POA    Paroxysmal atrial fibrillation (HCC)  Assessment & Plan  Ventricular rate controlled, not on anticoagulation due to history of previous GI bleed    Dyspnea on exertion  Assessment & Plan  Secondary to pneumonia    VAZQUEZ (acute kidney injury) ([facility] [address] )  Assessment & Plan  Creatinine is improved to 1 38      Resolved  Peak Cr 1 86    Chronic diastolic congestive heart failure (HCC)  Assessment & Plan  Wt Readings from Last 3 Encounters:   01/07/20 83 kg (183 lb)   09/26/19 84 8 kg (187 lb)   05/22/19 89 8 kg (198 lb)     Appears euvolemic at this time  Monitor fluid status         Benign essential hypertension  Assessment & Plan    Continue Lopressor, Cardizem  Continue to monitor     * Pneumonia  Assessment & Plan  Presented with sepsis of a pulmonary etiology - patient with of with elevated heart rate and notable tachycardia, leukocytosis      Positive RSV Pneumonia - Will continue current abx regimen given lactate - Repeat PCT in the am          [facility]'s Internal Medicine Progress Note  Patient: Maria Isabel Santoyo 80 y o  female   MRN: [id number]  PCP: Gerardo Rueda MD  Unit/Bed#: E4 -62 Encounter: [id number]  Date Of Visit: 01/09/20    Assessment:    Principal Problem:    Pneumonia  Active Problems:    Benign essential hypertension    Chronic diastolic congestive heart failure (HCC)    VAZQUEZ (acute kidney injury) ([facility] [address] )    Dyspnea on exertion    Paroxysmal atrial fibrillation ([facility] [address] )    Sepsis ([facility] [address] )      Plan:    · Continue current antibiotics  · Standard precautions for RSV  · Discharge planning  · Wean off oxygen       VTE Pharmacologic Prophylaxis:   Pharmacologic: Heparin  Mechanical VTE Prophylaxis in Place: Yes    Patient Centered Rounds: I have performed bedside rounds with nursing staff today  Discussions with Specialists or Other Care Team Provider:  Case management    Education and Discussions with Family / Patient:  Daughter of patient    Time Spent for Care: 20 minutes  More than 50% of total time spent on counseling and coordination of care as described above  Current Length of Stay: 2 day(s)    Current Patient Status: Inpatient   Certification Statement: The patient will continue to require additional inpatient hospital stay due to Ongoing treatment of pneumonia    Discharge Plan / Estimated Discharge Date:  24 hour    Code Status: Level 2 - DNAR: but accepts endotracheal intubation      Subjective:   Patient seen and examined, no chest pain  Reports breathing is improved  Still with cough    A complete and comprehensive 14 point organ system review has been performed and all other systems are negative other than stated above  Objective:     Vitals:   Temp (24hrs), Av 5 °F (36 4 °C), Min:97 3 °F (36 3 °C), Max:97 9 °F (36 6 °C)    Temp:  [97 3 °F (36 3 °C)-97 9 °F (36 6 °C)] 97 3 °F (36 3 °C)  HR:  [104-108] 104  Resp:  [16-20] 18  BP: ()/(53-75) 101/69  SpO2:  [93 %-100 %] 100 %  Body mass index is 29 54 kg/m²  Input and Output Summary (last 24 hours):        Intake/Output Summary (Last 24 hours) at 2020  Last data filed at 2020 0550  Gross per 24 hour   Intake 300 ml   Output 200 ml   Net 100 ml       Physical Exam:     General: well appearing, no acute distress  HEENT: atraumatic, PERRLA, moist mucosa, normal pharynx, normal tonsils and adenoids, normal tongue, no fluid in sinuses  Neck: Trachea midline, no carotid bruit, no masses  Respiratory:  Lung sounds are rhonchorous  Cardiovascular: RRR, no m/r/g, Normal S1 and S2  Abdomen: Soft, non-tender, non-distended, normal bowel sounds in all quadrants, no hepatosplenomegaly, no tympany  Rectal: deferred  Musculoskeletal: normal ROM in upper and lower extremities  Integumentary: warm, dry, and pink, with no rash, purpura, or petechia  Heme/Lymph: no lymphadenopathy, no bruises  Neurological: Cranial Nerves II-XII grossly intact, no tics, normal sensation to pressure and light touch  Psychiatric: cooperative with normal mood, affect, and cognition      Additional Data:     Labs:    Results from last 7 days   Lab Units 01/09/20  0605  01/07/20  1720   WBC Thousand/uL 9 08   < > 12 44*   HEMOGLOBIN g/dL 11 1*   < > 13 3   HEMATOCRIT % 36 2   < > 43 8   PLATELETS Thousands/uL 168   < > 192   NEUTROS PCT %  --   --  82*   LYMPHS PCT %  --   --  9*   MONOS PCT %  --   --  7   EOS PCT %  --   --  1    < > = values in this interval not displayed  Results from last 7 days   Lab Units 01/09/20  0605   POTASSIUM mmol/L 4 1   CHLORIDE mmol/L 103   CO2 mmol/L 30   BUN mg/dL 18   CREATININE mg/dL 1 27   CALCIUM mg/dL 8 8           * I Have Reviewed All Lab Data Listed Above  * Additional Pertinent Lab Tests Reviewed: Shari [facility] Admission Reviewed    Imaging:    No new imaging, will need repeat chest x-ray in 6 weeks    Recent Cultures (last 7 days):     Results from last 7 days   Lab Units 01/08/20  0052 01/07/20  2042 01/07/20  2040   BLOOD CULTURE   --  No Growth at 24 hrs  No Growth at 24 hrs     SPUTUM CULTURE  Culture too young- will reincubate  --   --    GRAM STAIN RESULT  2+ Epithelial cells per low power field*  No polys seen*  2+ Gram positive rods*  1+ Gram negative rods*  1+ Gram positive cocci in pairs*  1+ Budding yeast*  --   --        Last 24 Hours Medication List:     Current Facility-Administered Medications:  acetaminophen 650 mg Oral Q6H PRN Sowmya Helm PA-C    aluminum-magnesium hydroxide-simethicone 30 mL Oral Q6H PRN Sowmya Helm PA-C    aspirin 81 mg Oral Daily Matty Major PA-C cefTRIAXone 1,000 mg Intravenous Q24H Pedro Grumet, PA-C Last Rate: Stopped (01/08/20 1575)   And        azithromycin 500 mg Oral Q24H Sowmya Smudde, PA-C    benzonatate 100 mg Oral TID PRN Pedro Grumet, PA-C    buPROPion 150 mg Oral Daily Sowmya Smudde, PA-C    diltiazem 240 mg Oral Daily Sowmya Smudde, PA-C    guaiFENesin 600 mg Oral Q12H [address] Sowmya Smudde, PA-C    heparin (porcine) 5,000 Units Subcutaneous Q8H [address] Sowmya Smudde, PA-C    ipratropium-albuterol 3 mL Nebulization Q6H Kraig Jones DO    isosorbide mononitrate 60 mg Oral Daily Sowmya Smudde, PA-C    methimazole 5 mg Oral Once per day on Mon Wed Fri Sowmya Smudde, PA-C    metoprolol tartrate 75 mg Oral Q12H [address] Sowmya Smudde, PA-C    ondansetron 4 mg Intravenous Q6H PRN Sowmya Smudde, PA-C    polyethylene glycol 17 g Oral Daily PRN Sowmya Smudde, PA-C    potassium chloride 20 mEq Oral Daily Sowmya Smudde, PA-C    pravastatin 40 mg Oral Daily With KAREN Osborne, PA-C    sertraline 100 mg Oral Daily Pedro Grmohinder, CHEL         Today, Patient Was Seen By: Roxanne Kendrick DO    ** Please Note: This note has been constructed using a voice recognition system   **

## 2020-01-10 NOTE — ASSESSMENT & PLAN NOTE
Continue azithromycin and ceftriaxone day 3 of 5       Of a Pulm Etiology  Weaned off O2, as evidenced by leukocytosis, Tachycardia, POA

## 2020-01-10 NOTE — DISCHARGE INSTR - AVS FIRST PAGE
Mrs Yazmin Bustamante were treated on the 4th floor for RSV pneumonia by Dr Petra Zamudio, and Myron Ernst, RN  You will need a repeat CXR in 6 weeks to be ordered by your primary care provider  It is possible that you may have some cough and shortness of breath over the next few days, and its recommended that you increase your activities slowly  Thank you for choosing [facility] for your healthcare needs  If I can be of any assistance in the future, please feel to contact me        Dr Petra Zamudio and Myron Ernst

## 2020-01-10 NOTE — DISCHARGE INSTRUCTIONS
Pneumonia   WHAT YOU NEED TO KNOW:   Pneumonia is an infection in your lungs caused by bacteria, viruses, fungi, or parasites  You can become infected if you come in contact with someone who is sick  You can get pneumonia if you recently had surgery or needed a ventilator to help you breathe  Pneumonia can also be caused by accidentally inhaling saliva or small pieces of food  Pneumonia may cause mild symptoms, or it can be severe and life-threatening  DISCHARGE INSTRUCTIONS:   Return to the emergency department if:   · You cough up blood  · Your heart beats more than 100 beats in 1 minute  · You are very tired, confused, and cannot think clearly  · You have chest pain or trouble breathing  · Your lips or fingernails turn gray or blue  Contact your healthcare provider if:   · Your symptoms are the same or get worse 48 hours after you start antibiotics  · Your fever is not below 99°F (37 2°C) 48 hours after you start antibiotics  · You have a fever higher than 101°F (38 3°C)  · You cannot eat, or you have loss of appetite, nausea, or are vomiting  · You have questions or concerns about your condition or care  Medicines:   · Antibiotics  treat pneumonia caused by bacteria  · Acetaminophen  decreases pain and fever  It is available without a doctor's order  Ask how much to take and how often to take it  Follow directions  Read the labels of all other medicines you are using to see if they also contain acetaminophen, or ask your doctor or pharmacist  Acetaminophen can cause liver damage if not taken correctly  Do not use more than 4 grams (4,000 milligrams) total of acetaminophen in one day  · NSAIDs , such as ibuprofen, help decrease swelling, pain, and fever  This medicine is available with or without a doctor's order  NSAIDs can cause stomach bleeding or kidney problems in certain people   If you take blood thinner medicine, always ask your healthcare provider if NSAIDs are safe for you  Always read the medicine label and follow directions  · Take your medicine as directed  Contact your healthcare provider if you think your medicine is not helping or if you have side effects  Tell him or her if you are allergic to any medicine  Keep a list of the medicines, vitamins, and herbs you take  Include the amounts, and when and why you take them  Bring the list or the pill bottles to follow-up visits  Carry your medicine list with you in case of an emergency  Follow up with your healthcare provider as directed: You will need to return for more tests  Write down your questions so you remember to ask them during your visits  Manage your symptoms:   · Rest as needed  Rest often throughout the day  Alternate times of activity with times of rest     · Drink liquids as directed  Ask how much liquid to drink each day and which liquids are best for you  Liquids help thin your mucus, which may make it easier for you to cough it up  · Do not smoke  Avoid secondhand smoke  Smoking increases your risk for pneumonia  Smoking also makes it harder for you to get better after you have had pneumonia  Ask your healthcare provider for information if you need help to quit smoking  · Use a cool mist humidifier  A humidifier will help increase air moisture in your home  This may make it easier for you to breathe and help decrease your cough  · Keep your head elevated  You may be able to breathe better if you lie down with the head of your bed up  Prevent pneumonia:   · Prevent the spread of germs  Wash your hands often with soap and water  Use gel hand cleanser when there is no soap and water available  Do not touch your eyes, nose, or mouth unless you have washed your hands first  Cover your mouth when you cough  Cough into a tissue or your shirtsleeve so you do not spread germs from your hands  If you are sick, stay away from others as much as possible  · Limit alcohol    Women should limit alcohol to 1 drink a day  Men should limit alcohol to 2 drinks a day  A drink of alcohol is 12 ounces of beer, 5 ounces of wine, or 1½ ounces of liquor  · Ask about vaccines  You may need a vaccine to help prevent pneumonia  Get an influenza (flu) vaccine every year as soon as it becomes available  © 2017 [employer] Information is for End User's use only and may not be sold, redistributed or otherwise used for commercial purposes  All illustrations and images included in CareNotes® are the copyrighted property of [employer]  or [employer]  The above information is an  only  It is not intended as medical advice for individual conditions or treatments  Talk to your doctor, nurse or pharmacist before following any medical regimen to see if it is safe and effective for you

## 2020-01-10 NOTE — ASSESSMENT & PLAN NOTE
Presented with sepsis of a pulmonary etiology - patient with of with elevated heart rate and notable tachycardia, leukocytosis      Positive RSV Pneumonia - Will continue current abx regimen given lactate - Repeat PCT in the am

## 2020-01-10 NOTE — PLAN OF CARE
Problem: Potential for Falls  Goal: Patient will remain free of falls  Description  INTERVENTIONS:  - Assess patient frequently for physical needs  -  Identify cognitive and physical deficits and behaviors that affect risk of falls    -  [place] fall precautions as indicated by assessment   - Educate patient/family on patient safety including physical limitations  - Instruct patient to call for assistance with activity based on assessment  - Modify environment to reduce risk of injury  - Consider OT/PT consult to assist with strengthening/mobility  Outcome: Progressing     Problem: PAIN - ADULT  Goal: Verbalizes/displays adequate comfort level or baseline comfort level  Description  Interventions:  - Encourage patient to monitor pain and request assistance  - Assess pain using appropriate pain scale  - Administer analgesics based on type and severity of pain and evaluate response  - Implement non-pharmacological measures as appropriate and evaluate response  - Consider cultural and social influences on pain and pain management  - Notify physician/advanced practitioner if interventions unsuccessful or patient reports new pain  Outcome: Progressing     Problem: INFECTION - ADULT  Goal: Absence or prevention of progression during hospitalization  Description  INTERVENTIONS:  - Assess and monitor for signs and symptoms of infection  - Monitor lab/diagnostic results  - Monitor all insertion sites, i e  indwelling lines, tubes, and drains  - Monitor endotracheal if appropriate and nasal secretions for changes in amount and color  - [place] appropriate cooling/warming therapies per order  - Administer medications as ordered  - Instruct and encourage patient and family to use good hand hygiene technique  - Identify and instruct in appropriate isolation precautions for identified infection/condition  Outcome: Progressing  Goal: Absence of fever/infection during neutropenic period  Description  INTERVENTIONS:  - Monitor WBC    Outcome: Progressing     Problem: SAFETY ADULT  Goal: Maintain or return to baseline ADL function  Description  INTERVENTIONS:  -  Assess patient's ability to carry out ADLs; assess patient's baseline for ADL function and identify physical deficits which impact ability to perform ADLs (bathing, care of mouth/teeth, toileting, grooming, dressing, etc )  - Assess/evaluate cause of self-care deficits   - Assess range of motion  - Assess patient's mobility; develop plan if impaired  - Assess patient's need for assistive devices and provide as appropriate  - Encourage maximum independence but intervene and supervise when necessary  - Involve family in performance of ADLs  - Assess for home care needs following discharge   - Consider OT consult to assist with ADL evaluation and planning for discharge  - Provide patient education as appropriate  Outcome: Progressing     Problem: DISCHARGE PLANNING  Goal: Discharge to home or other facility with appropriate resources  Description  INTERVENTIONS:  - Identify barriers to discharge w/patient and caregiver  - Arrange for needed discharge resources and transportation as appropriate  - Identify discharge learning needs (meds, wound care, etc )  - Arrange for interpretive services to assist at discharge as needed  - Refer to Case Management Department for coordinating discharge planning if the patient needs post-hospital services based on physician/advanced practitioner order or complex needs related to functional status, cognitive ability, or social support system  Outcome: Progressing     Problem: Knowledge Deficit  Goal: Patient/family/caregiver demonstrates understanding of disease process, treatment plan, medications, and discharge instructions  Description  Complete learning assessment and assess knowledge base    Interventions:  - Provide teaching at level of understanding  - Provide teaching via preferred learning methods  Outcome: Progressing     Problem: CARDIOVASCULAR - ADULT  Goal: Maintains optimal cardiac output and hemodynamic stability  Description  INTERVENTIONS:  - Monitor I/O, vital signs and rhythm  - Monitor for S/S and trends of decreased cardiac output  - Administer and titrate ordered vasoactive medications to optimize hemodynamic stability  - Assess quality of pulses, skin color and temperature  - Assess for signs of decreased coronary artery perfusion  - Instruct patient to report change in severity of symptoms  Outcome: Progressing  Goal: Absence of cardiac dysrhythmias or at baseline rhythm  Description  INTERVENTIONS:  - Continuous cardiac monitoring, vital signs, obtain 12 lead EKG if ordered  - Administer antiarrhythmic and heart rate control medications as ordered  - Monitor electrolytes and administer replacement therapy as ordered  Outcome: Progressing     Problem: RESPIRATORY - ADULT  Goal: Achieves optimal ventilation and oxygenation  Description  INTERVENTIONS:  - Assess for changes in respiratory status  - Assess for changes in mentation and behavior  - Position to facilitate oxygenation and minimize respiratory effort  - Oxygen administered by appropriate delivery if ordered  - Initiate smoking cessation education as indicated  - Encourage broncho-pulmonary hygiene including cough, deep breathe, Incentive Spirometry  - Assess the need for suctioning and aspirate as needed  - Assess and instruct to report SOB or any respiratory difficulty  - Respiratory Therapy support as indicated  Outcome: Progressing

## 2020-01-10 NOTE — PLAN OF CARE
Problem: Potential for Falls  Goal: Patient will remain free of falls  Description  INTERVENTIONS:  - Assess patient frequently for physical needs  -  Identify cognitive and physical deficits and behaviors that affect risk of falls    -  [place] fall precautions as indicated by assessment   - Educate patient/family on patient safety including physical limitations  - Instruct patient to call for assistance with activity based on assessment  - Modify environment to reduce risk of injury  - Consider OT/PT consult to assist with strengthening/mobility  Outcome: Progressing     Problem: PAIN - ADULT  Goal: Verbalizes/displays adequate comfort level or baseline comfort level  Description  Interventions:  - Encourage patient to monitor pain and request assistance  - Assess pain using appropriate pain scale  - Administer analgesics based on type and severity of pain and evaluate response  - Implement non-pharmacological measures as appropriate and evaluate response  - Consider cultural and social influences on pain and pain management  - Notify physician/advanced practitioner if interventions unsuccessful or patient reports new pain  Outcome: Progressing     Problem: INFECTION - ADULT  Goal: Absence or prevention of progression during hospitalization  Description  INTERVENTIONS:  - Assess and monitor for signs and symptoms of infection  - Monitor lab/diagnostic results  - Monitor all insertion sites, i e  indwelling lines, tubes, and drains  - Monitor endotracheal if appropriate and nasal secretions for changes in amount and color  - [place] appropriate cooling/warming therapies per order  - Administer medications as ordered  - Instruct and encourage patient and family to use good hand hygiene technique  - Identify and instruct in appropriate isolation precautions for identified infection/condition  Outcome: Progressing     Problem: SAFETY ADULT  Goal: Maintain or return to baseline ADL function  Description  INTERVENTIONS:  -  Assess patient's ability to carry out ADLs; assess patient's baseline for ADL function and identify physical deficits which impact ability to perform ADLs (bathing, care of mouth/teeth, toileting, grooming, dressing, etc )  - Assess/evaluate cause of self-care deficits   - Assess range of motion  - Assess patient's mobility; develop plan if impaired  - Assess patient's need for assistive devices and provide as appropriate  - Encourage maximum independence but intervene and supervise when necessary  - Involve family in performance of ADLs  - Assess for home care needs following discharge   - Consider OT consult to assist with ADL evaluation and planning for discharge  - Provide patient education as appropriate  Outcome: Progressing     Problem: DISCHARGE PLANNING  Goal: Discharge to home or other facility with appropriate resources  Description  INTERVENTIONS:  - Identify barriers to discharge w/patient and caregiver  - Arrange for needed discharge resources and transportation as appropriate  - Identify discharge learning needs (meds, wound care, etc )  - Arrange for interpretive services to assist at discharge as needed  - Refer to Case Management Department for coordinating discharge planning if the patient needs post-hospital services based on physician/advanced practitioner order or complex needs related to functional status, cognitive ability, or social support system  Outcome: Progressing     Problem: Knowledge Deficit  Goal: Patient/family/caregiver demonstrates understanding of disease process, treatment plan, medications, and discharge instructions  Description  Complete learning assessment and assess knowledge base    Interventions:  - Provide teaching at level of understanding  - Provide teaching via preferred learning methods  Outcome: Progressing     Problem: CARDIOVASCULAR - ADULT  Goal: Maintains optimal cardiac output and hemodynamic stability  Description  INTERVENTIONS:  - Monitor I/O, vital signs and rhythm  - Monitor for S/S and trends of decreased cardiac output  - Administer and titrate ordered vasoactive medications to optimize hemodynamic stability  - Assess quality of pulses, skin color and temperature  - Assess for signs of decreased coronary artery perfusion  - Instruct patient to report change in severity of symptoms  Outcome: Progressing  Goal: Absence of cardiac dysrhythmias or at baseline rhythm  Description  INTERVENTIONS:  - Continuous cardiac monitoring, vital signs, obtain 12 lead EKG if ordered  - Administer antiarrhythmic and heart rate control medications as ordered  - Monitor electrolytes and administer replacement therapy as ordered  Outcome: Progressing     Problem: RESPIRATORY - ADULT  Goal: Achieves optimal ventilation and oxygenation  Description  INTERVENTIONS:  - Assess for changes in respiratory status  - Assess for changes in mentation and behavior  - Position to facilitate oxygenation and minimize respiratory effort  - Oxygen administered by appropriate delivery if ordered  - Initiate smoking cessation education as indicated  - Encourage broncho-pulmonary hygiene including cough, deep breathe, Incentive Spirometry  - Assess the need for suctioning and aspirate as needed  - Assess and instruct to report SOB or any respiratory difficulty  - Respiratory Therapy support as indicated  Outcome: Progressing

## 2020-01-10 NOTE — NURSING NOTE
Let Dr Arizmendi Book know that patient missed a dose of ceftriaxone on 01/08  Did not infuse  Noticed when went to hang 01/09 dose

## 2020-01-11 NOTE — ASSESSMENT & PLAN NOTE
Presented with sepsis of a pulmonary etiology - patient with of with elevated heart rate and notable tachycardia, leukocytosis      Positive RSV Pneumonia - Will continue current abx regimen to complete a 5 day course    Repeat chest x-ray in 6 weeks

## 2020-01-11 NOTE — DISCHARGE SUMMARY
Discharge- Lisandra Celestin 4/6/1932, 80 y o  female MRN: [id number]    Unit/Bed#: E4 -01 Encounter: [id number]    Primary Care Provider: Elba Barros MD   Date and time admitted to hospital: 1/7/2020  7:04 PM        Sepsis [facility])  Assessment & Plan        Of a Pulm Etiology  Weaned off O2, as evidenced by leukocytosis, Tachycardia, POA    Paroxysmal atrial fibrillation (HCC)  Assessment & Plan  Ventricular rate controlled, not on anticoagulation due to history of previous GI bleed    Dyspnea on exertion  Assessment & Plan  Secondary to pneumonia    VAZQUEZ (acute kidney injury) ([facility] [address] )  Assessment & Plan  Creatinine back to baseline by the time of discharge       Peak Cr 1 86    Chronic diastolic congestive heart failure ([facility] [address] )  Assessment & Plan  Wt Readings from Last 3 Encounters:   01/07/20 83 kg (183 lb)   09/26/19 84 8 kg (187 lb)   05/22/19 89 8 kg (198 lb)     Appears euvolemic at this time  Monitor fluid status         Benign essential hypertension  Assessment & Plan    Continue Lopressor, Cardizem  Continue to monitor     * Pneumonia  Assessment & Plan  Presented with sepsis of a pulmonary etiology - patient with of with elevated heart rate and notable tachycardia, leukocytosis  Positive RSV Pneumonia - Will continue current abx regimen to complete a 5 day course    Repeat chest x-ray in 6 weeks          Admitting Provider:  Sarika Zhong MD  Discharge Provider:  Roxanne Kendrick DO  Admission Date: 1/7/2020       Discharge Date: 01/10/20   LOS: 3  Primary Care Physician at Discharge: Elba Barros, [address] [place] [address] COURSE:  Lisandra Celestin is a 80 y o  female who presented shortness of breath difficulty breathing, she was found to have sepsis present on admission with tachycardia, leukocytosis and elevated lactic acid  The patient was started on ceftriaxone azithromycin and subsequently transitioned to Ceftin and azithromycin at discharge    The patient's lactate did normalize with aggressive IV hydration as well as intravenous antibiotics  Her procalcitonin did normalize and she will complete a course of antibiotics at discharge  The patient initially required oxygen therapy and subsequently was weaned to room air  She was found on chest x-ray to have multifocal pneumonia, and a flu panel demonstrated respiratory syncytial virus  At the time of discharge patient was tolerating oral diet she was without any acute complaints and subsequently discharged home with family members  The patient will get a repeat chest x-ray in 6 weeks to ensure resolution of her pneumonia      DISCHARGE DIAGNOSES  Sepsis ([facility] [address] )  Assessment & Plan        Of a Pulm Etiology  Weaned off O2, as evidenced by leukocytosis, Tachycardia, POA    Paroxysmal atrial fibrillation (HCC)  Assessment & Plan  Ventricular rate controlled, not on anticoagulation due to history of previous GI bleed    Dyspnea on exertion  Assessment & Plan  Secondary to pneumonia    VAZQUEZ (acute kidney injury) ([facility] [address] )  Assessment & Plan  Creatinine back to baseline by the time of discharge       Peak Cr 1 86    Chronic diastolic congestive heart failure ([facility] )  Assessment & Plan  Wt Readings from Last 3 Encounters:   01/07/20 83 kg (183 lb)   09/26/19 84 8 kg (187 lb)   05/22/19 89 8 kg (198 lb)     Appears euvolemic at this time  Monitor fluid status         Benign essential hypertension  Assessment & Plan    Continue Lopressor, Cardizem  Continue to monitor     * Pneumonia  Assessment & Plan  Presented with sepsis of a pulmonary etiology - patient with of with elevated heart rate and notable tachycardia, leukocytosis  Positive RSV Pneumonia - Will continue current abx regimen to complete a 5 day course    Repeat chest x-ray in 6 weeks          CONSULTING PROVIDERS   None    PROCEDURES PERFORMED  * No surgery found *    RADIOLOGY RESULTS  Xr Chest 2 Views    Result Date: 1/8/2020  Narrative: CHEST INDICATION:   cough   COMPARISON:  Prior chest x-ray performed September 18, 2017  Follow-up chest CT performed January 7, 2020  EXAM PERFORMED/VIEWS:  XR CHEST PA & LATERAL FINDINGS:  Left-sided chest wall pacemaker is identified  Pacemaker leads are intact  Heart shadow is enlarged but unchanged from prior exam   Hilar prominence consistent with lymphadenopathy  Reticular prominence in the lungs, subsequently shown to represent multilobar pneumonia better seen on follow-up CT  Emphysematous changes are noted consistent with chronic obstructive pulmonary disease  No pneumothorax  Small costophrenic angle effusions noted  Osseous structures appear within normal limits for patient age  Impression: Emphysematous changes are noted  Mild cardiomegaly is seen  Small costophrenic angle effusions  Reticular prominence in the lungs, subsequently shown to represent multilobar pneumonia better seen on follow-up CT  Workstation performed: [id number]     Ct Chest Without Contrast    Result Date: 1/7/2020  Narrative: CT CHEST WITHOUT IV CONTRAST INDICATION:   Productive cough for 4 days with shortness of breath and weakness  Bloody stools  COMPARISON:  None available  TECHNIQUE: CT examination of the chest was performed without intravenous contrast   Axial, sagittal, and coronal 2D reformatted images were created from the source data and submitted for interpretation  Radiation dose length product (DLP) for this visit:  204 mGy-cm   This examination, like all CT scans performed in the [facility], was performed utilizing techniques to minimize radiation dose exposure, including the use of iterative reconstruction and automated exposure control  FINDINGS: LUNGS:  Background emphysema  Superimposed reticulonodular and tree-in-bud infiltrates throughout both lungs most severe in the right upper lobe and both lower lobes suspicious for multilobar pneumonia    Probable scar in the right upper lobe laterally peribronchial thickening in the lower lobes  No endotracheal or endobronchial lesion identified  PLEURA:  Small bilateral pleural effusions  HEART/GREAT VESSELS:  Cardiomegaly  No aortic aneurysm  MEDIASTINUM AND IAIN:  No evidence of adenopathy including a subcarinal node measuring 1 6 cm, precarinal node measuring 1 3 cm and right paratracheal node measuring 1 8 cm  CHEST WALL AND LOWER NECK:   Left chest wall intracardiac device  VISUALIZED STRUCTURES IN THE UPPER ABDOMEN:  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 measuring 1 4 cm  Otherwise unremarkable  OSSEOUS STRUCTURES:  No acute fracture or destructive osseous lesion  Impression: Background emphysema  Superimposed reticulonodular and tree-in-bud infiltrates throughout both lungs most severe in the right upper lobe and both lower lobes suspicious for multilobar pneumonia  Probable extensive reactive adenopathy  Follow-up CT scan chest recommended after treatment to ensure resolution in 2-3 months  Cardiomegaly  1 4 cm pancreatic cyst  For simple cyst(s) less than 1 5 cm, recommend followup every 2 year for 2 times  After 4 years, no more followups  This patient has completed all of the recommended followups  Preferred imaging modality: abdomen MRI and MRCP with  and without IV contrast, or triple phase abdomen CT with IV contrast, or abdomen MRI and MRCP without IV contrast  The recommendations regarding pancreatic findings assumes that patient does not have family history of pancreatic cancer nor have any symptoms potentially attributable to pancreatic cystic lesions (hyperamylasemia, recent-onset diabetes, severe epigastric pain, weight loss, steatorrhea, or jaundice ) If these conditions are not true, then management should be deferred to judgement of specialists such as gastroenterologists or oncologic surgeons   Recommendations are based on recent consensus statements on management of pancreatic cystic lesions from [address] [place] [address] Gastro[employer] Association, [employer] of Radiology, the journal Pancreatology, and our own institutional consensus  The study was marked in [facility] for immediate notification and follow-up  Workstation performed: [id number]       LABS  Results from last 7 days   Lab Units 01/09/20  0605 01/08/20  0509 01/07/20  1720   WBC Thousand/uL 9 08 12 42* 12 44*   HEMOGLOBIN g/dL 11 1* 12 1 13 3   HEMATOCRIT % 36 2 39 2 43 8   MCV fL 92 91 93   PLATELETS Thousands/uL 168 159 192     Results from last 7 days   Lab Units 01/09/20  0605 01/08/20  0509 01/07/20  1720   SODIUM mmol/L 139 140 140   POTASSIUM mmol/L 4 1 4 0 4 2   CHLORIDE mmol/L 103 104 102   CO2 mmol/L 30 27 27   BUN mg/dL 18 21 26*   CREATININE mg/dL 1 27 1 37* 1 86*   CALCIUM mg/dL 8 8 8 6 9 0   EGFR ml/min/1 73sq m 38 35 24   GLUCOSE RANDOM mg/dL 84 115 157*     Results from last 7 days   Lab Units 01/07/20  1938   TROPONIN I ng/mL <0 02     Results from last 7 days   Lab Units 01/07/20  1938   NT-PRO BNP pg/mL 6,272*                      Results from last 7 days   Lab Units 01/10/20  0536 01/08/20  0509  01/08/20  0050 01/07/20  2144 01/07/20  1938   LACTIC ACID mmol/L  --  1 4  --  2 8* 3 2* 2 1*   PROCALCITONIN ng/ml 0 15 0 27*   < >  --   --   --     < > = values in this interval not displayed  Cultures:         Invalid input(s): Virgen Paz        Results from last 7 days   Lab Units 01/08/20  1058 01/08/20  0052 01/07/20  2042 01/07/20  2040   BLOOD CULTURE   --   --  No Growth at 48 hrs  No Growth at 48 hrs     SPUTUM CULTURE   --  2+ Growth of   --   --    GRAM STAIN RESULT   --  2+ Epithelial cells per low power field*  No polys seen*  2+ Gram positive rods*  1+ Gram negative rods*  1+ Gram positive cocci in pairs*  1+ Budding yeast*  --   --    INFLUENZA A PCR  None Detected  --   --   --        PHYSICAL EXAM:  Vitals:   Blood Pressure: 114/63 (01/10/20 0855)  Pulse: (!) 107 (01/10/20 0855)  Temperature: 98 4 °F (36 9 °C) (01/09/20 2300)  Temp Source: Temporal (01/09/20 2300)  Respirations: 20 (01/10/20 0855)  Weight - Scale: 83 kg (183 lb) (01/07/20 1713)  SpO2: 95 % (01/10/20 0855)    General: well appearing, no acute distress  HEENT: atraumatic, PERRLA, moist mucosa, normal pharynx, normal tonsils and adenoids, normal tongue, no fluid in sinuses  Neck: Trachea midline, no carotid bruit, no masses  Respiratory: normal chest wall expansion, CTA B, no r/r/w, no rubs  Cardiovascular: RRR, no m/r/g, Normal S1 and S2  Abdomen: Soft, non-tender, non-distended, normal bowel sounds in all quadrants, no hepatosplenomegaly, no tympany  Rectal: deferred  Musculoskeletal: normal ROM in upper and lower extremities  Integumentary: warm, dry, and pink, with no rash, purpura, or petechia  Heme/Lymph: no lymphadenopathy, no bruises  Neurological: Cranial Nerves II-XII grossly intact, no tics, normal sensation to pressure and light touch  Psychiatric: cooperative with normal mood, affect, and cognition      Discharge Disposition: Home/Self Care      Test Results Pending at Discharge:    Order Current Status    Blood culture #1 Preliminary result    Blood culture #2 Preliminary result              Medications   · Summary of Medication Adjustments made as a result of this hospitalization:  No new home chronic medications  · Medication Dosing Tapers - Please refer to Discharge Medication List for details on any medication dosing tapers (if applicable to patient)  · Discharge Medication List: See after visit summary for reconciled discharge medications  Diet restrictions: Activity restrictions: No strenuous activity  Discharge Condition: good    Outpatient Follow-Up and Discharge Instructions  See after visit summary section titled Discharge Instructions for information provided to patient and family  Code Status: Prior  Discharge Statement   I spent 35 minutes discharging the patient  This time was spent on the day of discharge   Greater than 50% of total time was spent with the patient and / or family counseling and / or coordination of care  ** Please Note: This note has been constructed using a voice recognition system   **

## 2020-01-12 LAB
BACTERIA BLD CULT: NORMAL
BACTERIA BLD CULT: NORMAL
BACTERIA SPT RESP CULT: ABNORMAL
BACTERIA SPT RESP CULT: ABNORMAL
GRAM STN SPEC: ABNORMAL

## 2020-01-13 ENCOUNTER — TRANSITIONAL CARE MANAGEMENT (OUTPATIENT)
Dept: INTERNAL MEDICINE CLINIC | Facility: CLINIC | Age: 85
End: 2020-01-13

## 2020-01-21 ENCOUNTER — OFFICE VISIT (OUTPATIENT)
Dept: INTERNAL MEDICINE CLINIC | Facility: CLINIC | Age: 85
End: 2020-01-21
Payer: MEDICARE

## 2020-01-21 VITALS
WEIGHT: 175 LBS | HEART RATE: 61 BPM | DIASTOLIC BLOOD PRESSURE: 72 MMHG | TEMPERATURE: 97.7 F | OXYGEN SATURATION: 96 % | SYSTOLIC BLOOD PRESSURE: 110 MMHG | HEIGHT: 66 IN | BODY MASS INDEX: 28.12 KG/M2

## 2020-01-21 DIAGNOSIS — Z23 NEED FOR VACCINATION AGAINST STREPTOCOCCUS PNEUMONIAE USING PNEUMOCOCCAL CONJUGATE VACCINE 13: Primary | ICD-10-CM

## 2020-01-21 DIAGNOSIS — A41.9 SEPSIS, DUE TO UNSPECIFIED ORGANISM, UNSPECIFIED WHETHER ACUTE ORGAN DYSFUNCTION PRESENT (HCC): ICD-10-CM

## 2020-01-21 DIAGNOSIS — E78.2 MIXED HYPERLIPIDEMIA: ICD-10-CM

## 2020-01-21 DIAGNOSIS — I50.32 CHRONIC DIASTOLIC CONGESTIVE HEART FAILURE (HCC): ICD-10-CM

## 2020-01-21 DIAGNOSIS — R63.4 WEIGHT LOSS: ICD-10-CM

## 2020-01-21 DIAGNOSIS — L40.9 PSORIASIS: ICD-10-CM

## 2020-01-21 DIAGNOSIS — I48.0 PAROXYSMAL ATRIAL FIBRILLATION (HCC): ICD-10-CM

## 2020-01-21 DIAGNOSIS — J18.9 PNEUMONIA OF BOTH LUNGS DUE TO INFECTIOUS ORGANISM, UNSPECIFIED PART OF LUNG: ICD-10-CM

## 2020-01-21 PROCEDURE — G0009 ADMIN PNEUMOCOCCAL VACCINE: HCPCS

## 2020-01-21 PROCEDURE — 99495 TRANSJ CARE MGMT MOD F2F 14D: CPT | Performed by: FAMILY MEDICINE

## 2020-01-21 PROCEDURE — 90670 PCV13 VACCINE IM: CPT

## 2020-01-21 RX ORDER — SIMVASTATIN 20 MG
20 TABLET ORAL
Qty: 90 TABLET | Refills: 3 | Status: SHIPPED | OUTPATIENT
Start: 2020-01-21 | End: 2021-01-04

## 2020-01-21 RX ORDER — KETOCONAZOLE 20 MG/ML
1 SHAMPOO TOPICAL ONCE
Qty: 100 ML | Refills: 3 | Status: SHIPPED | OUTPATIENT
Start: 2020-01-21 | End: 2020-02-04 | Stop reason: SDUPTHER

## 2020-01-21 RX ORDER — KETOCONAZOLE 20 MG/ML
1 SHAMPOO TOPICAL ONCE
Qty: 100 ML | Refills: 3 | Status: SHIPPED | OUTPATIENT
Start: 2020-01-21 | End: 2020-01-21 | Stop reason: SDUPTHER

## 2020-01-21 RX ORDER — FLUOCINONIDE TOPICAL SOLUTION USP, 0.05% 0.5 MG/ML
SOLUTION TOPICAL 2 TIMES DAILY PRN
Qty: 180 ML | Refills: 3 | Status: SHIPPED | OUTPATIENT
Start: 2020-01-21 | End: 2020-01-21 | Stop reason: SDUPTHER

## 2020-01-21 RX ORDER — FLUOCINONIDE TOPICAL SOLUTION USP, 0.05% 0.5 MG/ML
SOLUTION TOPICAL 2 TIMES DAILY PRN
Qty: 180 ML | Refills: 3 | Status: SHIPPED | OUTPATIENT
Start: 2020-01-21 | End: 2020-02-04 | Stop reason: SDUPTHER

## 2020-01-22 DIAGNOSIS — F41.8 DEPRESSION WITH ANXIETY: ICD-10-CM

## 2020-01-22 PROBLEM — R63.4 WEIGHT LOSS: Status: ACTIVE | Noted: 2020-01-22

## 2020-01-22 RX ORDER — SERTRALINE HYDROCHLORIDE 100 MG/1
TABLET, FILM COATED ORAL
Qty: 90 TABLET | Refills: 1 | Status: SHIPPED | OUTPATIENT
Start: 2020-01-22 | End: 2020-06-29

## 2020-01-22 RX ORDER — METOPROLOL TARTRATE 50 MG/1
50 TABLET, FILM COATED ORAL EVERY 12 HOURS SCHEDULED
Start: 2020-01-22 | End: 2020-02-12 | Stop reason: SDUPTHER

## 2020-01-22 NOTE — PROGRESS NOTES
Assessment/Plan:     No problem-specific Assessment & Plan notes found for this encounter  Diagnoses and all orders for this visit:    Need for vaccination against Streptococcus pneumoniae using pneumococcal conjugate vaccine 13  -     PNEUMOCOCCAL CONJUGATE VACCINE 13-VALENT GREATER THAN 6 MONTHS    Pneumonia of both lungs due to infectious organism, unspecified part of lung  -     XR chest pa & lateral; Future    Sepsis, due to unspecified organism, unspecified whether acute organ dysfunction present (HCC)    Paroxysmal atrial fibrillation (HCC)  -     metoprolol tartrate (LOPRESSOR) 50 mg tablet; Take 1 tablet (50 mg total) by mouth every 12 (twelve) hours    Weight loss    Chronic diastolic congestive heart failure (HCC)    Mixed hyperlipidemia  -     simvastatin (ZOCOR) 20 mg tablet; Take 1 tablet (20 mg total) by mouth daily at bedtime    Psoriasis  -     Discontinue: ketoconazole (NIZORAL) 2 % shampoo; Apply 1 application topically once for 1 dose  -     Discontinue: fluocinonide (LIDEX) 0 05 % external solution; Apply topically 2 (two) times a day as needed for irritation  -     ketoconazole (NIZORAL) 2 % shampoo; Apply 1 application topically once for 1 dose  -     fluocinonide (LIDEX) 0 05 % external solution; Apply topically 2 (two) times a day as needed for irritation        Orders recommendations as noted above  Reviewed medications with her  Daughter has been adjusting the dose of the metoprolol since her mother has seemed more unsteady and had increased dizziness on the 100 mg twice a day  Discussed with her the risk for return of the atrial fibrillation  Continue to follow blood pressure and heart rate at home  Daughter is planning to decrease from the current dose of 75 mg in the morning and 50 in the evening to 50 mg b i d  Next week  Follow-up with Cardiology as planned  Watch for any palpitations  Continue to hold the Imdur as per cardiology's recommendation    Be very careful to avoid falls especially with lightheadedness/dizziness  Stay adequately hydrated  Try to supplement meals with something like Ensure or boost   Reviewed with her and her daughter that she has lost a significant amount await over the last year  Will continue follow this regularly  Watch for any increasing shortness of breath, increasing weight, or increasing peripheral edema  Continue with the diuretic  Continue other medications  Will have her follow up in about 8 weeks  Will have her repeat chest x-ray in about 6 weeks  Call or follow-up sooner if any issues arise  Subjective:     Patient ID: Vernell Álvarez is a 80 y o  female  She presents for follow-up after recent hospitalization for bilateral pneumonia and sepsis  She had begun feeling somewhat poorly about a week prior to admission  Symptoms had seem to start gradually  She felt that they would resolve but symptoms continue to worsen with increasing cough has well as generalized weakness and dark stools  She had presented to the emergency room and was diagnosed with a bilateral pneumonia and sepsis  RSV was positive  She was treated with antibiotics and slowly improved  During the hospitalization she did have the atrial fibrillation  Her dosage of the metoprolol was increased to 100 mg twice a day during the hospitalization and atrial fibrillation did stop  Daughter has noticed since she returned home that she has been somewhat more unsteady and has increased dizziness  Daughter has been decreasing the dose of the metoprolol  She is currently on 75 mg in the morning and 50 mg in the evening  Daughter is been following heart rate and blood pressure and she has been regular per the daughter's report  Daughter plans on decreasing the dose of the metoprolol further to 50 mg twice a day  They are considering whether she will go back on anticoagulation and plan to discuss this further with Cardiology    She continues to have some issues with lightheadedness especially with changes in position  Her Imdur was stopped during the hospitalization  Appetite is fair  She has had a change in appetite over the past year so  Has had a significant weight loss over the last year  Denies any nausea or vomiting  Still has some shortness of breath  This is somewhat more than her usual baseline but does seem to be improving  Minimal cough  Denies any fevers or chills  Denies any nausea or vomiting  Denies any significant peripheral edema  Review of Systems   Constitutional: Positive for fatigue  Negative for activity change, appetite change, chills and fever  HENT: Negative for congestion and rhinorrhea  Eyes: Negative for visual disturbance  Respiratory: Positive for shortness of breath  Negative for cough and chest tightness  Cardiovascular: Negative for chest pain and palpitations  Gastrointestinal: Negative for abdominal pain, blood in stool, diarrhea, nausea and vomiting  Endocrine: Negative for polydipsia, polyphagia and polyuria  Genitourinary: Negative for dysuria, frequency and urgency  Musculoskeletal: Positive for gait problem  Skin: Negative for color change  Neurological: Positive for dizziness and light-headedness  Negative for headaches  Hematological: Does not bruise/bleed easily  Psychiatric/Behavioral: Negative for confusion and sleep disturbance  The patient is not nervous/anxious  The following portions of the patient's history were reviewed and updated as appropriate:   She  has a past medical history of Anemia, Atrial fibrillation ([address] ), Atrial flutter ([address] ), Depression, Diastolic CHF ([address] ), Dyslipidemia, Glaucoma, H/O: hysterectomy, Hypertension, Hyperthyroidism, Impaired glucose tolerance, Pacemaker, Psoriasis, S/P cataract surgery, S/P knee replacement, SSS (sick sinus syndrome) ([address] ), and SSS (sick sinus syndrome) ([address] )    She   Patient Active Problem List    Diagnosis Date Noted    Weight loss 01/22/2020    Pneumonia 01/07/2020    Sepsis ([place] [address] ) 01/07/2020    Dyspnea on exertion 09/20/2017    Osteoporosis 03/09/2017    Paroxysmal atrial fibrillation ([place] [address] ) 02/22/2017    Anemia 02/18/2017    Renal atrophy 02/18/2017    Hyperphosphatemia 02/18/2017    Vitamin D deficiency 02/18/2017    Hypokalemia 02/17/2017    Pulmonary hypertension ([place] [address] ) 02/17/2017    VAZQUEZ (acute kidney injury) ([place] [address] ) 02/17/2017    Presence of permanent cardiac pacemaker     H/O: hysterectomy     Benign essential hypertension     Hyperthyroidism     Glaucoma     Chronic diastolic congestive heart failure (HCC)     SSS (sick sinus syndrome) ([place] [address] )     Depression     Depression with anxiety 08/12/2015    Fatigue 11/18/2014    Vertigo 11/18/2014    Atrial flutter ([place] [address] ) 10/01/2014    Dyslipidemia 09/12/2012    Psoriasis 09/12/2012     She  has a past surgical history that includes Cardiac pacemaker placement; Eye surgery; Hysterectomy; Cardiac pacemaker placement; Joint replacement; Esophagogastroduodenoscopy (N/A, 12/30/2016); Cataract extraction; pr colonoscopy flx dx w/collj spec when pfrmd (N/A, 4/24/2017); and Replacement total knee (Bilateral)  Her family history includes Alcohol abuse in her brother and father; Diabetes in her father  She  reports that she has quit smoking  She has never used smokeless tobacco  She reports that she does not drink alcohol or use drugs    Current Outpatient Medications   Medication Sig Dispense Refill    ALFALFA PO Take 3 tablets by mouth        aspirin (ECOTRIN LOW STRENGTH) 81 mg EC tablet Take 81 mg by mouth daily      B Complex Vitamins (B COMPLEX 1 PO) Take by mouth      buPROPion (WELLBUTRIN SR) 150 mg 12 hr tablet Take 1 tablet (150 mg total) by mouth daily 90 tablet 1    cholecalciferol (VITAMIN D3) 1,000 units tablet Take 1 tablet by mouth daily 30 tablet 0    diltiazem (CARDIZEM CD) 240 mg 24 hr capsule Take 1 capsule (240 mg total) by mouth daily 90 capsule 3    fluocinonide (LIDEX) 0 05 % external solution Apply topically 2 (two) times a day as needed for irritation 180 mL 3    furosemide (LASIX) 40 mg tablet Take 1 tablet (40 mg total) by mouth 2 (two) times a day 180 tablet 3    guaiFENesin (MUCINEX) 600 mg 12 hr tablet Take 1 tablet (600 mg total) by mouth every 12 (twelve) hours 10 tablet 0    ketoconazole (NIZORAL) 2 % shampoo Apply 1 application topically once for 1 dose 100 mL 3    LECITHIN PO Take 1 tablet by mouth daily      methimazole (TAPAZOLE) 5 mg tablet Take 1 tablet (5 mg total) by mouth 3 (three) times a week Monday Wednesday Friday 36 tablet 3    metoprolol tartrate (LOPRESSOR) 50 mg tablet Take 1 tablet (50 mg total) by mouth every 12 (twelve) hours      Omega-3 Fatty Acids (OMEGA-3 PO) Take by mouth 2 (two) times a day        potassium chloride (K-DUR,KLOR-CON) 20 mEq tablet Take 1 tablet (20 mEq total) by mouth daily 90 tablet 3    sertraline (ZOLOFT) 100 mg tablet Take 1 tablet (100 mg total) by mouth daily 90 tablet 1    simvastatin (ZOCOR) 20 mg tablet Take 1 tablet (20 mg total) by mouth daily at bedtime 90 tablet 3    Zinc Sulfate (ZINC 15 PO) Take 100 mg by mouth        benzonatate (TESSALON PERLES) 100 mg capsule Take 1 capsule (100 mg total) by mouth 3 (three) times a day as needed for cough (Patient not taking: Reported on 1/21/2020) 20 capsule 0     No current facility-administered medications for this visit        Current Outpatient Medications on File Prior to Visit   Medication Sig    ALFALFA PO Take 3 tablets by mouth      aspirin (ECOTRIN LOW STRENGTH) 81 mg EC tablet Take 81 mg by mouth daily    B Complex Vitamins (B COMPLEX 1 PO) Take by mouth    buPROPion (WELLBUTRIN SR) 150 mg 12 hr tablet Take 1 tablet (150 mg total) by mouth daily    cholecalciferol (VITAMIN D3) 1,000 units tablet Take 1 tablet by mouth daily    diltiazem (CARDIZEM CD) 240 mg 24 hr capsule Take 1 capsule (240 mg total) by mouth daily  furosemide (LASIX) 40 mg tablet Take 1 tablet (40 mg total) by mouth 2 (two) times a day    guaiFENesin (MUCINEX) 600 mg 12 hr tablet Take 1 tablet (600 mg total) by mouth every 12 (twelve) hours    LECITHIN PO Take 1 tablet by mouth daily    methimazole (TAPAZOLE) 5 mg tablet Take 1 tablet (5 mg total) by mouth 3 (three) times a week Monday Wednesday Friday    Omega-3 Fatty Acids (OMEGA-3 PO) Take by mouth 2 (two) times a day      potassium chloride (K-DUR,KLOR-CON) 20 mEq tablet Take 1 tablet (20 mEq total) by mouth daily    sertraline (ZOLOFT) 100 mg tablet Take 1 tablet (100 mg total) by mouth daily    Zinc Sulfate (ZINC 15 PO) Take 100 mg by mouth      [DISCONTINUED] metoprolol tartrate (LOPRESSOR) 25 mg tablet Take 1 tablet (25 mg total) by mouth every 12 (twelve) hours (Patient taking differently: Take 100 mg by mouth )    benzonatate (TESSALON PERLES) 100 mg capsule Take 1 capsule (100 mg total) by mouth 3 (three) times a day as needed for cough (Patient not taking: Reported on 1/21/2020)    [DISCONTINUED] isosorbide mononitrate (IMDUR) 60 mg 24 hr tablet Take 1 tablet (60 mg total) by mouth daily (Patient not taking: Reported on 1/21/2020)     No current facility-administered medications on file prior to visit  She has No Known Allergies       Objective:     Physical Exam   Constitutional: She is oriented to person, place, and time  She is cooperative  No distress  HENT:   Head: Normocephalic and atraumatic  Mouth/Throat: Oropharynx is clear and moist    Hearing aids bilaterally; moist mucous membranes   Eyes: Pupils are equal, round, and reactive to light  Conjunctivae are normal  Right eye exhibits no discharge  Left eye exhibits no discharge  Neck: No JVD present  Cardiovascular: Normal rate, regular rhythm and normal heart sounds  Exam reveals no gallop and no friction rub  No murmur heard  Pulmonary/Chest: No respiratory distress  She has decreased breath sounds   She has no wheezes  She has no rhonchi  She has no rales  Abdominal: Bowel sounds are normal  She exhibits no distension  There is no tenderness  Musculoskeletal: She exhibits no edema  Lymphadenopathy:     She has no cervical adenopathy  Neurological: She is alert and oriented to person, place, and time  Skin: Skin is warm and dry  Psychiatric: She has a normal mood and affect  Her behavior is normal    Tired appearing   Nursing note and vitals reviewed  Vitals:    01/21/20 1145   BP: 110/72   BP Location: Left arm   Patient Position: Sitting   Cuff Size: Standard   Pulse: 61   Temp: 97 7 °F (36 5 °C)   TempSrc: Temporal   SpO2: 96%   Weight: 79 4 kg (175 lb)   Height: 5' 6" (1 676 m)       Xr Chest 2 Views    Result Date: 1/8/2020  Narrative: CHEST INDICATION:   cough  COMPARISON:  Prior chest x-ray performed September 18, 2017  Follow-up chest CT performed January 7, 2020  EXAM PERFORMED/VIEWS:  XR CHEST PA & LATERAL FINDINGS:  Left-sided chest wall pacemaker is identified  Pacemaker leads are intact  Heart shadow is enlarged but unchanged from prior exam   Hilar prominence consistent with lymphadenopathy  Reticular prominence in the lungs, subsequently shown to represent multilobar pneumonia better seen on follow-up CT  Emphysematous changes are noted consistent with chronic obstructive pulmonary disease  No pneumothorax  Small costophrenic angle effusions noted  Osseous structures appear within normal limits for patient age  Impression: Emphysematous changes are noted  Mild cardiomegaly is seen  Small costophrenic angle effusions  Reticular prominence in the lungs, subsequently shown to represent multilobar pneumonia better seen on follow-up CT  Workstation performed: [id number]     Ct Chest Without Contrast    Result Date: 1/7/2020  Narrative: CT CHEST WITHOUT IV CONTRAST INDICATION:   Productive cough for 4 days with shortness of breath and weakness  Bloody stools   COMPARISON:  None available  TECHNIQUE: CT examination of the chest was performed without intravenous contrast   Axial, sagittal, and coronal 2D reformatted images were created from the source data and submitted for interpretation  Radiation dose length product (DLP) for this visit:  204 mGy-cm   This examination, like all CT scans performed in the [facility], was performed utilizing techniques to minimize radiation dose exposure, including the use of iterative reconstruction and automated exposure control  FINDINGS: LUNGS:  Background emphysema  Superimposed reticulonodular and tree-in-bud infiltrates throughout both lungs most severe in the right upper lobe and both lower lobes suspicious for multilobar pneumonia  Probable scar in the right upper lobe laterally peribronchial thickening in the lower lobes  No endotracheal or endobronchial lesion identified  PLEURA:  Small bilateral pleural effusions  HEART/GREAT VESSELS:  Cardiomegaly  No aortic aneurysm  MEDIASTINUM AND IAIN:  No evidence of adenopathy including a subcarinal node measuring 1 6 cm, precarinal node measuring 1 3 cm and right paratracheal node measuring 1 8 cm  CHEST WALL AND LOWER NECK:   Left chest wall intracardiac device  VISUALIZED STRUCTURES IN THE UPPER ABDOMEN:  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 measuring 1 4 cm  Otherwise unremarkable  OSSEOUS STRUCTURES:  No acute fracture or destructive osseous lesion  Impression: Background emphysema  Superimposed reticulonodular and tree-in-bud infiltrates throughout both lungs most severe in the right upper lobe and both lower lobes suspicious for multilobar pneumonia  Probable extensive reactive adenopathy  Follow-up CT scan chest recommended after treatment to ensure resolution in 2-3 months  Cardiomegaly  1 4 cm pancreatic cyst  For simple cyst(s) less than 1 5 cm, recommend followup every 2 year for 2 times  After 4 years, no more followups   This patient has completed all of the recommended followups  Preferred imaging modality: abdomen MRI and MRCP with  and without IV contrast, or triple phase abdomen CT with IV contrast, or abdomen MRI and MRCP without IV contrast  The recommendations regarding pancreatic findings assumes that patient does not have family history of pancreatic cancer nor have any symptoms potentially attributable to pancreatic cystic lesions (hyperamylasemia, recent-onset diabetes, severe epigastric pain, weight loss, steatorrhea, or jaundice ) If these conditions are not true, then management should be deferred to judgement of specialists such as gastroenterologists or oncologic surgeons  Recommendations are based on recent consensus statements on management of pancreatic cystic lesions from [facility], [facility], the journal Pancreatology, and our own institutional consensus  The study was marked in [facility] for immediate notification and follow-up   Workstation performed: [id number]       Admission on 01/07/2020, Discharged on 01/10/2020   Component Date Value Ref Range Status    WBC 01/07/2020 12 44* 4 31 - 10 16 Thousand/uL Final    RBC 01/07/2020 4 72  3 81 - 5 12 Million/uL Final    Hemoglobin 01/07/2020 13 3  11 5 - 15 4 g/dL Final    Hematocrit 01/07/2020 43 8  34 8 - 46 1 % Final    MCV 01/07/2020 93  82 - 98 fL Final    MCH 01/07/2020 28 2  26 8 - 34 3 pg Final    MCHC 01/07/2020 30 4* 31 4 - 37 4 g/dL Final    RDW 01/07/2020 17 0* 11 6 - 15 1 % Final    MPV 01/07/2020 9 6  8 9 - 12 7 fL Final    Platelets 23/40/5539 192  149 - 390 Thousands/uL Final    nRBC 01/07/2020 0  /100 WBCs Final    Neutrophils Relative 01/07/2020 82* 43 - 75 % Final    Immat GRANS % 01/07/2020 1  0 - 2 % Final    Lymphocytes Relative 01/07/2020 9* 14 - 44 % Final    Monocytes Relative 01/07/2020 7  4 - 12 % Final    Eosinophils Relative 01/07/2020 1  0 - 6 % Final    Basophils Relative 01/07/2020 0  0 - 1 % Final    Neutrophils Absolute 01/07/2020 10 18* 1 85 - 7 62 Thousands/µL Final    Immature Grans Absolute 01/07/2020 0 08  0 00 - 0 20 Thousand/uL Final    Lymphocytes Absolute 01/07/2020 1 16  0 60 - 4 47 Thousands/µL Final    Monocytes Absolute 01/07/2020 0 91  0 17 - 1 22 Thousand/µL Final    Eosinophils Absolute 01/07/2020 0 06  0 00 - 0 61 Thousand/µL Final    Basophils Absolute 01/07/2020 0 05  0 00 - 0 10 Thousands/µL Final    Sodium 01/07/2020 140  136 - 145 mmol/L Final    Potassium 01/07/2020 4 2  3 5 - 5 3 mmol/L Final    Chloride 01/07/2020 102  100 - 108 mmol/L Final    CO2 01/07/2020 27  21 - 32 mmol/L Final    ANION GAP 01/07/2020 11  4 - 13 mmol/L Final    BUN 01/07/2020 26* 5 - 25 mg/dL Final    Creatinine 01/07/2020 1 86* 0 60 - 1 30 mg/dL Final    Standardized to IDMS reference method    Glucose 01/07/2020 157* 65 - 140 mg/dL Final      If the patient is fasting, the ADA then defines impaired fasting glucose as > 100 mg/dL and diabetes as > or equal to 123 mg/dL  Specimen collection should occur prior to Sulfasalazine administration due to the potential for falsely depressed results  Specimen collection should occur prior to Sulfapyridine administration due to the potential for falsely elevated results   Calcium 01/07/2020 9 0  8 3 - 10 1 mg/dL Final    eGFR 01/07/2020 24  ml/min/1 73sq m Final    LACTIC ACID 01/07/2020 2 1* 0 5 - 2 0 mmol/L Final    Troponin I 01/07/2020 <0 02  <=0 04 ng/mL Final    3Autovalidation override  [employer] Chemistry analyzer 99% cutoff is > 0 04 ng/mL in network labs     o cTnI 99% cutoff is useful only when applied to patients in the clinical setting of myocardial ischemia   o cTnI 99% cutoff should be interpreted in the context of clinical history, ECG findings and possibly cardiac imaging to establish correct diagnosis     o cTnI 99% cutoff may be suggestive but clearly not indicative of a coronary event without the clinical setting of myocardial ischemia   NT-proBNP 01/07/2020 6,272* <450 pg/mL Final    Blood Culture 01/07/2020 No Growth After 5 Days  Final    Blood Culture 01/07/2020 No Growth After 5 Days  Final    LACTIC ACID 01/07/2020 3 2* 0 5 - 2 0 mmol/L Final    LACTIC ACID 01/08/2020 2 8* 0 5 - 2 0 mmol/L Final    Sputum Culture 01/08/2020 2+ Growth of Streptococcus pneumoniae*  Corrected    Sputum Culture 01/08/2020 2+ Growth of    Corrected    Mixed Respiratory wilder    Gram Stain Result 01/08/2020 2+ Epithelial cells per low power field*  Final    This is an appended report  These results have been appended to a previously preliminary verified report   Gram Stain Result 01/08/2020 No polys seen*  Final    This is an appended report  These results have been appended to a previously preliminary verified report   Gram Stain Result 01/08/2020 2+ Gram positive rods*  Final    This is an appended report  These results have been appended to a previously preliminary verified report   Gram Stain Result 01/08/2020 1+ Gram negative rods*  Final    This is an appended report  These results have been appended to a previously preliminary verified report   Gram Stain Result 01/08/2020 1+ Gram positive cocci in pairs*  Final    This is an appended report  These results have been appended to a previously preliminary verified report   Gram Stain Result 01/08/2020 1+ Budding yeast*  Final    This is an appended report  These results have been appended to a previously preliminary verified report   Procalcitonin 01/08/2020 0 27* <=0 25 ng/ml Final    Comment: Suspected Lower Respiratory Tract Infection (LRTI):  - LESS than or EQUAL to 0 25 ng/mL:   low likelihood for bacterial LRTI; antibiotics DISCOURAGED   - GREATER than 0 25 ng/mL:   increased likelihood for bacterial LRTI; antibiotics ENCOURAGED  Suspected Sepsis:  - Strongly consider initiating antibiotics in ALL UNSTABLE patients    - LESS than or EQUAL to 0 5 ng/mL:   low likelihood for bacterial sepsis; antibiotics DISCOURAGED   - GREATER than 0 5 ng/mL:   increased likelihood for bacterial sepsis; antibiotics ENCOURAGED   - GREATER than 2 ng/mL:   high risk for severe sepsis / septic shock; antibiotics strongly ENCOURAGED  Decisions on antibiotic use should not be based solely on Procalcitonin (PCT) levels  If PCT is low but uncertainty exists with stopping antibiotics, repeat PCT in 6-24 hours to confirm the low level  If antibiotics are administered (regardless if initial PCT was high or low), repeat PCT every 1-2 days to consider early antibiotic cessation (when GREATER                            than 80% decrease from the peak OR when PCT drops below designated cutoffs, whichever comes first), so long as the infection is NOT one that typically requires prolonged treatment durations (e g , bone/joint infections, endocarditis, Staph  aureus bacteremia)      Situations of FALSE-POSITIVE Procalcitonin values:  1) Newborns < 67 hours old  2) Massive stress from severe trauma / burns, major surgery, acute pancreatitis, cardiogenic / hemorrhagic shock, sickle cell crisis, or other organ perfusion abnormalities  3) Malaria and some Candidal infections  4) Treatment with agents that stimulate cytokines (e g , OKT3, anti-lymphocyte globulins, alemtuzumab, IL-2, granulocyte transfusion [NOT GCSFs])  5) Chronic renal disease causes elevated baseline levels (consider GREATER than 0 75 ng/mL as an abnormal cut-off); initiating HD/CRRT may cause transient decreases  6) Paraneoplastic syndromes from medullary thyroid or SCLC, some forms of vasculitis, and acute [id number]                            disease    Situations of FALSE-NEGATIVE Procalcitonin values:  1) Too early in clinical course for PCT to have reached its peak (may repeat in 6-24 hours to confirm low level)  2) Localized infection WITHOUT systemic (SIRS / sepsis) response (e g , an abscess, osteomyelitis, cystitis)  3) Mycobacteria (e g , Tuberculosis, MAC)  4) Cystic fibrosis exacerbations      WBC 01/08/2020 12 42* 4 31 - 10 16 Thousand/uL Final    RBC 01/08/2020 4 30  3 81 - 5 12 Million/uL Final    Hemoglobin 01/08/2020 12 1  11 5 - 15 4 g/dL Final    Hematocrit 01/08/2020 39 2  34 8 - 46 1 % Final    MCV 01/08/2020 91  82 - 98 fL Final    MCH 01/08/2020 28 1  26 8 - 34 3 pg Final    MCHC 01/08/2020 30 9* 31 4 - 37 4 g/dL Final    RDW 01/08/2020 17 1* 11 6 - 15 1 % Final    Platelets 27/30/2509 159  149 - 390 Thousands/uL Final    MPV 01/08/2020 9 5  8 9 - 12 7 fL Final    Sodium 01/08/2020 140  136 - 145 mmol/L Final    Potassium 01/08/2020 4 0  3 5 - 5 3 mmol/L Final    Chloride 01/08/2020 104  100 - 108 mmol/L Final    CO2 01/08/2020 27  21 - 32 mmol/L Final    ANION GAP 01/08/2020 9  4 - 13 mmol/L Final    BUN 01/08/2020 21  5 - 25 mg/dL Final    Creatinine 01/08/2020 1 37* 0 60 - 1 30 mg/dL Final    Standardized to IDMS reference method    Glucose 01/08/2020 115  65 - 140 mg/dL Final      If the patient is fasting, the ADA then defines impaired fasting glucose as > 100 mg/dL and diabetes as > or equal to 123 mg/dL  Specimen collection should occur prior to Sulfasalazine administration due to the potential for falsely depressed results  Specimen collection should occur prior to Sulfapyridine administration due to the potential for falsely elevated results      Calcium 01/08/2020 8 6  8 3 - 10 1 mg/dL Final    eGFR 01/08/2020 35  ml/min/1 73sq m Final    LACTIC ACID 01/08/2020 1 4  0 5 - 2 0 mmol/L Final    INFLUENZA A PCR 01/08/2020 None Detected  None Detected Final    INFLUENZA B PCR 01/08/2020 None Detected  None Detected Final    RSV PCR 01/08/2020 Detected* None Detected Final    Ventricular Rate 01/07/2020 109  BPM Final    Atrial Rate 01/07/2020 110  BPM Final    QRSD Interval 01/07/2020 68  ms Final    QT Interval 01/07/2020 330  ms Final    QTC Interval 01/07/2020 444  ms Final    QRS [place] 01/07/2020 83  degrees Final    T Wave Axis 01/07/2020 -37  degrees Final    Sodium 01/09/2020 139  136 - 145 mmol/L Final    Potassium 01/09/2020 4 1  3 5 - 5 3 mmol/L Final    Chloride 01/09/2020 103  100 - 108 mmol/L Final    CO2 01/09/2020 30  21 - 32 mmol/L Final    ANION GAP 01/09/2020 6  4 - 13 mmol/L Final    BUN 01/09/2020 18  5 - 25 mg/dL Final    Creatinine 01/09/2020 1 27  0 60 - 1 30 mg/dL Final    Standardized to IDMS reference method    Glucose 01/09/2020 84  65 - 140 mg/dL Final      If the patient is fasting, the ADA then defines impaired fasting glucose as > 100 mg/dL and diabetes as > or equal to 123 mg/dL  Specimen collection should occur prior to Sulfasalazine administration due to the potential for falsely depressed results  Specimen collection should occur prior to Sulfapyridine administration due to the potential for falsely elevated results   Calcium 01/09/2020 8 8  8 3 - 10 1 mg/dL Final    eGFR 01/09/2020 38  ml/min/1 73sq m Final    WBC 01/09/2020 9 08  4 31 - 10 16 Thousand/uL Final    RBC 01/09/2020 3 93  3 81 - 5 12 Million/uL Final    Hemoglobin 01/09/2020 11 1* 11 5 - 15 4 g/dL Final    Hematocrit 01/09/2020 36 2  34 8 - 46 1 % Final    MCV 01/09/2020 92  82 - 98 fL Final    MCH 01/09/2020 28 2  26 8 - 34 3 pg Final    MCHC 01/09/2020 30 7* 31 4 - 37 4 g/dL Final    RDW 01/09/2020 17 0* 11 6 - 15 1 % Final    Platelets 21/63/7857 168  149 - 390 Thousands/uL Final    MPV 01/09/2020 9 5  8 9 - 12 7 fL Final    Procalcitonin 01/10/2020 0 15  <=0 25 ng/ml Final    Comment: Suspected Lower Respiratory Tract Infection (LRTI):  - LESS than or EQUAL to 0 25 ng/mL:   low likelihood for bacterial LRTI; antibiotics DISCOURAGED   - GREATER than 0 25 ng/mL:   increased likelihood for bacterial LRTI; antibiotics ENCOURAGED  Suspected Sepsis:  - Strongly consider initiating antibiotics in ALL UNSTABLE patients    - LESS than or EQUAL to 0 5 ng/mL:   low likelihood for bacterial sepsis; antibiotics DISCOURAGED   - GREATER than 0 5 ng/mL:   increased likelihood for bacterial sepsis; antibiotics ENCOURAGED   - GREATER than 2 ng/mL:   high risk for severe sepsis / septic shock; antibiotics strongly ENCOURAGED  Decisions on antibiotic use should not be based solely on Procalcitonin (PCT) levels  If PCT is low but uncertainty exists with stopping antibiotics, repeat PCT in 6-24 hours to confirm the low level  If antibiotics are administered (regardless if initial PCT was high or low), repeat PCT every 1-2 days to consider early antibiotic cessation (when GREATER                            than 80% decrease from the peak OR when PCT drops below designated cutoffs, whichever comes first), so long as the infection is NOT one that typically requires prolonged treatment durations (e g , bone/joint infections, endocarditis, Staph  aureus bacteremia)      Situations of FALSE-POSITIVE Procalcitonin values:  1) Newborns < 67 hours old  2) Massive stress from severe trauma / burns, major surgery, acute pancreatitis, cardiogenic / hemorrhagic shock, sickle cell crisis, or other organ perfusion abnormalities  3) Malaria and some Candidal infections  4) Treatment with agents that stimulate cytokines (e g , OKT3, anti-lymphocyte globulins, alemtuzumab, IL-2, granulocyte transfusion [NOT GCSFs])  5) Chronic renal disease causes elevated baseline levels (consider GREATER than 0 75 ng/mL as an abnormal cut-off); initiating HD/CRRT may cause transient decreases  6) Paraneoplastic syndromes from medullary thyroid or SCLC, some forms of vasculitis, and acute [id number]                            disease    Situations of FALSE-NEGATIVE Procalcitonin values:  1) Too early in clinical course for PCT to have reached its peak (may repeat in 6-24 hours to confirm low level)  2) Localized infection WITHOUT systemic (SIRS / sepsis) response (e g , an abscess, osteomyelitis, cystitis)  3) Mycobacteria (e g , Tuberculosis, MAC)  4) Cystic fibrosis exacerbations         Transitional Care Management Review:  Dimitry Gross is a 80 y o  female here for TCM follow up  During the TCM phone call patient stated:    TCM Call (since 12/22/2019)     Date and time call was made  1/13/2020  3:42 PM    Patient was hospitialized at  [address]    Date of Admission  01/07/20    Date of discharge  01/10/20    Diagnosis  Pneumonia    Disposition  Home    Current Symptoms  None      TCM Call (since 12/22/2019)     Post hospital issues  Reduced activity    Should patient be enrolled in anticoag monitoring? No    Scheduled for follow up?   Yes    Did you obtain your prescribed medications  Yes    Do you need help managing your prescriptions or medications  No    Is transportation to your appointment needed  Yes    Specify why  dtr provides    I have advised the patient to call PCP with any new or worsening symptoms  [address]   Family    The type of support provided  Emotional; Physical    Do you have social support  Yes, as much as I need    Are you recieving any outpatient services  No    Are you recieving home care services  No    Are you using any community resources  No    Current waiver services  No    Have you fallen in the last 12 months  No    Interperter language line needed  No    Counseling  Family    Counseling topics  Activities of daily living    Comments  dtr Jacki Daniels stated she is doing well              Jose Tomas MD

## 2020-02-04 DIAGNOSIS — L40.9 PSORIASIS: ICD-10-CM

## 2020-02-04 RX ORDER — KETOCONAZOLE 20 MG/ML
1 SHAMPOO TOPICAL ONCE
Qty: 100 ML | Refills: 3 | Status: SHIPPED | OUTPATIENT
Start: 2020-02-04 | End: 2022-07-22 | Stop reason: SDUPTHER

## 2020-02-04 RX ORDER — FLUOCINONIDE TOPICAL SOLUTION USP, 0.05% 0.5 MG/ML
SOLUTION TOPICAL 2 TIMES DAILY PRN
Qty: 180 ML | Refills: 3 | Status: SHIPPED | OUTPATIENT
Start: 2020-02-04 | End: 2021-07-28

## 2020-02-06 ENCOUNTER — IN-CLINIC DEVICE VISIT (OUTPATIENT)
Dept: CARDIOLOGY CLINIC | Facility: CLINIC | Age: 85
End: 2020-02-06

## 2020-02-06 DIAGNOSIS — Z95.0 PRESENCE OF CARDIAC PACEMAKER: Primary | ICD-10-CM

## 2020-02-06 PROCEDURE — 99024 POSTOP FOLLOW-UP VISIT: CPT | Performed by: INTERNAL MEDICINE

## 2020-02-06 NOTE — PROGRESS NOTES
Results for orders placed or performed in visit on 02/06/20   Cardiac EP device report    Narrative    SJM-DUAL-PM  [facility] (NON-BILL[facility])  BATTERY VOLTAGE ADEQUATE (10 YRS)  AP: 1 2%  : 36%  ALL LEAD PARAMETERS WITHIN NORMAL LIMITS  [id number] AF IN PROGRESS (HX OF SAME)  AF BURDEN: 94%  PT HOWEVER HAD ATRIAL UNDERSENSING ON REPORT ON 02/02/20, PER DR MOE RERPROGRAM ATRIAL SENSITIVITY, REPROGRAMMED FROM 0 3mV TO 0 2mV  PT DOES NOT TAKE AC DUE TO GI BLEED  PT TAKES ASA 81MG, DILTIAZEM, METOPROLOL TART  NO OTHER PROGRAMMING CHANGES MADE TO DEVICE PARAMETERS  PACEMAKER FUNCTIONING APPROPRIATELY    [address] [place] [address] Street

## 2020-02-07 ENCOUNTER — TELEPHONE (OUTPATIENT)
Dept: INTERNAL MEDICINE CLINIC | Facility: CLINIC | Age: 85
End: 2020-02-07

## 2020-02-07 NOTE — TELEPHONE ENCOUNTER
[facility] called, the ketoconazole shampoo comes in a bottle of 120  They were looking for permission to change from 100ml to 120ml  Also wanted to know if for directions they can put "PRN" due to having refills on the script  Did authorize both

## 2020-02-10 DIAGNOSIS — R06.00 DYSPNEA ON EXERTION: ICD-10-CM

## 2020-02-11 RX ORDER — ISOSORBIDE MONONITRATE 60 MG/1
TABLET, EXTENDED RELEASE ORAL
Qty: 30 TABLET | Refills: 0 | Status: SHIPPED | OUTPATIENT
Start: 2020-02-11 | End: 2021-07-28

## 2020-02-12 DIAGNOSIS — I48.0 PAROXYSMAL ATRIAL FIBRILLATION (HCC): ICD-10-CM

## 2020-02-12 RX ORDER — METOPROLOL TARTRATE 50 MG/1
50 TABLET, FILM COATED ORAL EVERY 12 HOURS SCHEDULED
Qty: 180 TABLET | Refills: 1 | Status: SHIPPED | OUTPATIENT
Start: 2020-02-12 | End: 2020-07-15

## 2020-03-05 ENCOUNTER — IN-CLINIC DEVICE VISIT (OUTPATIENT)
Dept: CARDIOLOGY CLINIC | Facility: CLINIC | Age: 85
End: 2020-03-05

## 2020-03-05 DIAGNOSIS — Z95.0 PRESENCE OF CARDIAC PACEMAKER: Primary | ICD-10-CM

## 2020-03-05 PROCEDURE — 99024 POSTOP FOLLOW-UP VISIT: CPT | Performed by: INTERNAL MEDICINE

## 2020-03-05 NOTE — PROGRESS NOTES
Results for orders placed or performed in visit on 03/05/20   Cardiac EP device report    Narrative    SJM-DUAL-PM  NON-BILLABLE DEVICE INTERROGATED IN THE [facility] OFFICE  BATTERY VOLTAGE ADEQUATE (9 1 YRS)  AP: 1%   : 30%  ALL LEAD PARAMETERS WITHIN NORMAL LIMITS  NO SIGNIFICANT HIGH RATE EPISODES  PT BROUGHT IN TODAY TO TURN AUTO CAPTURE THRESHOLD TO OFF PER DR MOE  NO OTHER PROGRAMMING CHANGES MADE TO DEVICE PARAMETERS  PACEMAKER FUNCTIONING APPROPRIATELY    [address] [place] [address]

## 2020-03-23 DIAGNOSIS — F32.A DEPRESSION, UNSPECIFIED DEPRESSION TYPE: ICD-10-CM

## 2020-03-23 RX ORDER — BUPROPION HYDROCHLORIDE 150 MG/1
150 TABLET, EXTENDED RELEASE ORAL DAILY
Qty: 90 TABLET | Refills: 1 | Status: SHIPPED | OUTPATIENT
Start: 2020-03-23 | End: 2020-10-28 | Stop reason: SDUPTHER

## 2020-03-24 ENCOUNTER — TELEMEDICINE (OUTPATIENT)
Dept: INTERNAL MEDICINE CLINIC | Facility: CLINIC | Age: 85
End: 2020-03-24
Payer: MEDICARE

## 2020-03-24 DIAGNOSIS — R63.4 WEIGHT LOSS: ICD-10-CM

## 2020-03-24 DIAGNOSIS — F41.8 DEPRESSION WITH ANXIETY: ICD-10-CM

## 2020-03-24 DIAGNOSIS — I10 BENIGN ESSENTIAL HYPERTENSION: ICD-10-CM

## 2020-03-24 DIAGNOSIS — I48.0 PAROXYSMAL ATRIAL FIBRILLATION (HCC): ICD-10-CM

## 2020-03-24 DIAGNOSIS — E05.90 HYPERTHYROIDISM: ICD-10-CM

## 2020-03-24 DIAGNOSIS — I50.32 CHRONIC DIASTOLIC CONGESTIVE HEART FAILURE (HCC): Primary | ICD-10-CM

## 2020-03-24 DIAGNOSIS — Z00.00 MEDICARE ANNUAL WELLNESS VISIT, SUBSEQUENT: ICD-10-CM

## 2020-03-24 DIAGNOSIS — E55.9 VITAMIN D DEFICIENCY: ICD-10-CM

## 2020-03-24 PROBLEM — J18.9 PNEUMONIA: Status: RESOLVED | Noted: 2020-01-07 | Resolved: 2020-03-24

## 2020-03-24 PROBLEM — A41.9 SEPSIS (HCC): Status: RESOLVED | Noted: 2020-01-07 | Resolved: 2020-03-24

## 2020-03-24 PROCEDURE — 4040F PNEUMOC VAC/ADMIN/RCVD: CPT | Performed by: FAMILY MEDICINE

## 2020-03-24 PROCEDURE — 1036F TOBACCO NON-USER: CPT | Performed by: FAMILY MEDICINE

## 2020-03-24 PROCEDURE — 3078F DIAST BP <80 MM HG: CPT | Performed by: FAMILY MEDICINE

## 2020-03-24 PROCEDURE — 1160F RVW MEDS BY RX/DR IN RCRD: CPT | Performed by: FAMILY MEDICINE

## 2020-03-24 PROCEDURE — 1170F FXNL STATUS ASSESSED: CPT | Performed by: FAMILY MEDICINE

## 2020-03-24 PROCEDURE — 3074F SYST BP LT 130 MM HG: CPT | Performed by: FAMILY MEDICINE

## 2020-03-24 PROCEDURE — G0439 PPPS, SUBSEQ VISIT: HCPCS | Performed by: FAMILY MEDICINE

## 2020-03-24 PROCEDURE — 1125F AMNT PAIN NOTED PAIN PRSNT: CPT | Performed by: FAMILY MEDICINE

## 2020-03-24 NOTE — PROGRESS NOTES
Virtual AWV Consent    Reason for visit is annual Medicare wellness and routine visit via video visit  Encounter provider Francois Chau MD    Provider located at [address]  [address]      Recent Visits  No visits were found meeting these conditions  Showing recent visits within past 7 days and meeting all other requirements     Future Appointments  No visits were found meeting these conditions  Showing future appointments within next 150 days and meeting all other requirements        After connecting through [employer], the patient was identified by name and date of birth  Yi Medeiros was informed that this is a telemedicine visit and that the visit is being conducted through [facility] which may not be secure and therefore, might not be HIPAA-compliant  My office door was closed  No one else was in the room  She acknowledged consent and understanding of privacy and security of the video platform   The patient has agreed to participate and understands they can discontinue the visit at any time   Assessment and Plan:     Problem List Items Addressed This Visit        Endocrine    Hyperthyroidism    Relevant Orders    TSH, 3rd generation       Cardiovascular and Mediastinum    Benign essential hypertension    Relevant Orders    CBC and differential    Comprehensive metabolic panel    Lipid panel    Chronic diastolic congestive heart failure (HCC) - Primary    Relevant Orders    Comprehensive metabolic panel    Paroxysmal atrial fibrillation (HCC)    Relevant Orders    Comprehensive metabolic panel       Other    Vitamin D deficiency    Relevant Orders    Vitamin D 25 hydroxy    Depression with anxiety    Weight loss      Other Visit Diagnoses     Medicare annual wellness visit, subsequent               Preventive health issues were discussed with patient, and age appropriate screening tests were ordered as noted in patient's After Visit Summary  Personalized health advice and appropriate referrals for health education or preventive services given if needed, as noted in patient's After Visit Summary  History of Present Illness:     Patient presents for Medicare Annual Wellness visit    Patient Care Team:  Brian Ann MD as PCP - General (Family Medicine)  Brian Ann MD as PCP - General  DO Cinthia Ceja, MD Kendrick Shepard MD Romell Constant, MD as Endoscopist     Problem List:     Patient Active Problem List   Diagnosis    Presence of permanent cardiac pacemaker    H/O: hysterectomy    Benign essential hypertension    Hyperthyroidism    Glaucoma    Chronic diastolic congestive heart failure (HCC)    SSS (sick sinus syndrome) ([address] )    Depression    Hypokalemia    Pulmonary hypertension ([address] )    VAZQUEZ (acute kidney injury) ([address] )    Anemia    Renal atrophy    Hyperphosphatemia    Vitamin D deficiency    Atrial flutter ([address] )    Depression with anxiety    Dyslipidemia    Dyspnea on exertion    Fatigue    Osteoporosis    Paroxysmal atrial fibrillation (HCC)    Psoriasis    Vertigo    Weight loss      Past Medical and Surgical History:     Past Medical History:   Diagnosis Date    Anemia     Atrial fibrillation ([address] )     Atrial flutter ([address] )     Depression     Diastolic CHF ([address] )     Dyslipidemia     Glaucoma     H/O: hysterectomy     Hypertension     Hyperthyroidism     Impaired glucose tolerance     RESOLVED 2/3/2016    Pacemaker     Psoriasis     S/P cataract surgery     S/P knee replacement     SSS (sick sinus syndrome) ([address] )     SSS (sick sinus syndrome) ([address] )      Past Surgical History:   Procedure Laterality Date    CARDIAC PACEMAKER PLACEMENT      CARDIAC PACEMAKER PLACEMENT      dual chamber    CATARACT EXTRACTION      ESOPHAGOGASTRODUODENOSCOPY N/A 12/30/2016    Procedure: ESOPHAGOGASTRODUODENOSCOPY (EGD);   Surgeon: Charles Linares MD; Location: [facility]; Service:     EYE SURGERY      cataract    HYSTERECTOMY      JOINT REPLACEMENT      bilateral     IN COLONOSCOPY FLX DX W/COLLJ SPEC WHEN PFRMD N/A 4/24/2017    Procedure: COLONOSCOPY with polypectomy;  Surgeon: Pema Lowery MD;  Location: [facility]; Service: Gastroenterology    REPLACEMENT TOTAL KNEE Bilateral       Family History:     Family History   Problem Relation Age of Onset    Alcohol abuse Father     Diabetes Father     Alcohol abuse Brother       Social History:        Social History     Socioeconomic History    Marital status:       Spouse name: Not on file    Number of children: Not on file    Years of education: Not on file    Highest education level: Not on file   Occupational History    Not on file   Social Needs    Financial resource strain: Not on file    Food insecurity:     Worry: Not on file     Inability: Not on file    Transportation needs:     Medical: Not on file     Non-medical: Not on file   Tobacco Use    Smoking status: Former Smoker    Smokeless tobacco: Never Used   Substance and Sexual Activity    Alcohol use: No    Drug use: No    Sexual activity: Not Currently   Lifestyle    Physical activity:     Days per week: Not on file     Minutes per session: Not on file    Stress: Not on file   Relationships    Social connections:     Talks on phone: Not on file     Gets together: Not on file     Attends Jew service: Not on file     Active member of club or organization: Not on file     Attends meetings of clubs or organizations: Not on file     Relationship status: Not on file    Intimate partner violence:     Fear of current or ex partner: Not on file     Emotionally abused: Not on file     Physically abused: Not on file     Forced sexual activity: Not on file   Other Topics Concern    Not on file   Social History Narrative    Not on file      Medications and Allergies:     Current Outpatient Medications   Medication Sig Dispense Refill    ALFALFA PO Take 3 tablets by mouth        aspirin (ECOTRIN LOW STRENGTH) 81 mg EC tablet Take 81 mg by mouth daily      B Complex Vitamins (B COMPLEX 1 PO) Take by mouth      benzonatate (TESSALON PERLES) 100 mg capsule Take 1 capsule (100 mg total) by mouth 3 (three) times a day as needed for cough (Patient not taking: Reported on 1/21/2020) 20 capsule 0    buPROPion (WELLBUTRIN SR) 150 mg 12 hr tablet Take 1 tablet (150 mg total) by mouth daily 90 tablet 1    cholecalciferol (VITAMIN D3) 1,000 units tablet Take 1 tablet by mouth daily 30 tablet 0    diltiazem (CARDIZEM CD) 240 mg 24 hr capsule Take 1 capsule (240 mg total) by mouth daily 90 capsule 3    fluocinonide (LIDEX) 0 05 % external solution Apply topically 2 (two) times a day as needed for irritation 180 mL 3    furosemide (LASIX) 40 mg tablet Take 1 tablet (40 mg total) by mouth 2 (two) times a day 180 tablet 3    guaiFENesin (MUCINEX) 600 mg 12 hr tablet Take 1 tablet (600 mg total) by mouth every 12 (twelve) hours 10 tablet 0    isosorbide mononitrate (IMDUR) 60 mg 24 hr tablet TAKE 1 TABLET BY MOUTH EVERY DAY 30 tablet 0    ketoconazole (NIZORAL) 2 % shampoo Apply 1 application topically once for 1 dose 100 mL 3    LECITHIN PO Take 1 tablet by mouth daily      methimazole (TAPAZOLE) 5 mg tablet Take 1 tablet (5 mg total) by mouth 3 (three) times a week Monday Wednesday Friday 36 tablet 3    metoprolol tartrate (LOPRESSOR) 50 mg tablet Take 1 tablet (50 mg total) by mouth every 12 (twelve) hours 180 tablet 1    Omega-3 Fatty Acids (OMEGA-3 PO) Take by mouth 2 (two) times a day        potassium chloride (K-DUR,KLOR-CON) 20 mEq tablet Take 1 tablet (20 mEq total) by mouth daily 90 tablet 3    sertraline (ZOLOFT) 100 mg tablet TAKE 1 TABLET DAILY 90 tablet 1    simvastatin (ZOCOR) 20 mg tablet Take 1 tablet (20 mg total) by mouth daily at bedtime 90 tablet 3    Zinc Sulfate (ZINC 15 PO) Take 100 mg by mouth         No current facility-administered medications for this visit  No Known Allergies   Immunizations:     Immunization History   Administered Date(s) Administered    INFLUENZA 10/03/2016    Influenza Split High Dose Preservative Free IM 10/03/2016    Pneumococcal Conjugate 13-Valent 01/21/2020      Health Maintenance: There are no preventive care reminders to display for this patient  Topic Date Due    DTaP,Tdap,and Td Vaccines (1 - Tdap) 04/06/1943    Influenza Vaccine  07/01/2019      Medicare Health Risk Assessment:     LMP  (LMP Unknown)      Guzman Parra is here for her Subsequent Wellness visit  Last Medicare Wellness visit information reviewed, patient interviewed and updates made to the record today  Health Risk Assessment:   Patient rates overall health as very good  Patient feels that their physical health rating is slightly worse  Eyesight was rated as slightly worse  Hearing was rated as slightly worse  Patient feels that their emotional and mental health rating is same  Pain experienced in the last 7 days has been none  Patient states that she has experienced weight loss or gain in last 6 months  Fall Risk Screening: In the past year, patient has experienced: no history of falling in past year      Urinary Incontinence Screening:   Patient has not leaked urine accidently in the last six months  Home Safety:  Patient has trouble with stairs inside or outside of their home  Patient has working smoke alarms and has working carbon monoxide detector  Home safety hazards include: none  Nutrition:   Current diet is Regular  Medications:   Patient is currently taking over-the-counter supplements  OTC medications include: see medication list  Patient is able to manage medications       Activities of Daily Living (ADLs)/Instrumental Activities of Daily Living (IADLs):   Walk and transfer into and out of bed and chair?: Yes  Dress and groom yourself?: Yes    Bathe or shower yourself?: Yes    Feed yourself? Yes  Do your laundry/housekeeping?: Yes  Manage your money, pay your bills and track your expenses?: Yes  Make your own meals?: Yes    Do your own shopping?: No    Previous Hospitalizations:   Any hospitalizations or ED visits within the last 12 months?: Yes    How many hospitalizations have you had in the last year?: 1-2    Advance Care Planning:   Living will: Yes    Durable POA for healthcare:  Yes    Advanced directive: Yes    Provider agrees with end of life decisions: Yes      Cognitive Screening:   Provider or family/friend/caregiver concerned regarding cognition?: No    PREVENTIVE SCREENINGS      Cardiovascular Screening:    General: History Lipid Disorder and Screening Current      Diabetes Screening:     General: Screening Current      Colorectal Cancer Screening:     General: Screening Not Indicated      Breast Cancer Screening:     General: Patient Declines      Cervical Cancer Screening:    General: Screening Not Indicated      Osteoporosis Screening:    General: History Osteoporosis and Patient Declines      Abdominal Aortic Aneurysm (AAA) Screening:        General: Screening Not Indicated      Lung Cancer Screening:     General: Screening Not Indicated      Hepatitis C Screening:    General: Screening Not Indicated      Elly Yoo MD

## 2020-03-24 NOTE — PATIENT INSTRUCTIONS
Medicare Preventive Visit Patient Instructions  Thank you for completing your Welcome to Medicare Visit or Medicare Annual Wellness Visit today  Your next wellness visit will be due in one year (3/24/2021)  The screening/preventive services that you may require over the next 5-10 years are detailed below  Some tests may not apply to you based off risk factors and/or age  Screening tests ordered at today's visit but not completed yet may show as past due  Also, please note that scanned in results may not display below  Preventive Screenings:  Service Recommendations Previous Testing/Comments   Colorectal Cancer Screening  * Colonoscopy    * Fecal Occult Blood Test (FOBT)/Fecal Immunochemical Test (FIT)  * Fecal DNA/Cologuard Test  * Flexible Sigmoidoscopy Age: 54-65 years old   Colonoscopy: every 10 years (may be performed more frequently if at higher risk)  OR  FOBT/FIT: every 1 year  OR  Cologuard: every 3 years  OR  Sigmoidoscopy: every 5 years  Screening may be recommended earlier than age 48 if at higher risk for colorectal cancer  Also, an individualized decision between you and your healthcare provider will decide whether screening between the ages of 74-80 would be appropriate  Colonoscopy: Not on file  FOBT/FIT: Not on file  Cologuard: Not on file  Sigmoidoscopy: Not on file         Breast Cancer Screening Age: 36 years old  Frequency: every 1-2 years  Not required if history of left and right mastectomy Mammogram: Not on file       Cervical Cancer Screening Between the ages of 21-29, pap smear recommended once every 3 years  Between the ages of 33-67, can perform pap smear with HPV co-testing every 5 years     Recommendations may differ for women with a history of total hysterectomy, cervical cancer, or abnormal pap smears in past  Pap Smear: Not on file       Hepatitis C Screening Once for adults born between [facility]  More frequently in patients at high risk for Hepatitis C Hep C Antibody: Not on file       Diabetes Screening 1-2 times per year if you're at risk for diabetes or have pre-diabetes Fasting glucose: 103 mg/dL   A1C: 5 5 %       Cholesterol Screening Once every 5 years if you don't have a lipid disorder  May order more often based on risk factors  Lipid panel: 09/23/2019         Other Preventive Screenings Covered by Medicare:  1  Abdominal Aortic Aneurysm (AAA) Screening: covered once if your at risk  You're considered to be at risk if you have a family history of AAA  2  Lung Cancer Screening: covers low dose CT scan once per year if you meet all of the following conditions: (1) Age 50-69; (2) No signs or symptoms of lung cancer; (3) Current smoker or have quit smoking within the last 15 years; (4) You have a tobacco smoking history of at least 30 pack years (packs per day multiplied by number of years you smoked); (5) You get a written order from a healthcare provider  3  Glaucoma Screening: covered annually if you're considered high risk: (1) You have diabetes OR (2) Family history of glaucoma OR (3)  aged 48 and older OR (3)  American aged 72 and older  3  Osteoporosis Screening: covered every 2 years if you meet one of the following conditions: (1) You're estrogen deficient and at risk for osteoporosis based off medical history and other findings; (2) Have a vertebral abnormality; (3) On glucocorticoid therapy for more than 3 months; (4) Have primary hyperparathyroidism; (5) On osteoporosis medications and need to assess response to drug therapy  · Last bone density test (DXA Scan): Not on file  5  HIV Screening: covered annually if you're between the age of 12-76  Also covered annually if you are younger than 13 and older than 72 with risk factors for HIV infection  For pregnant patients, it is covered up to 3 times per pregnancy      Immunizations:  Immunization Recommendations   Influenza Vaccine Annual influenza vaccination during flu season is recommended for all persons aged >= 6 months who do not have contraindications   Pneumococcal Vaccine (Prevnar and Pneumovax)  * Prevnar = PCV13  * Pneumovax = PPSV23   Adults 25-60 years old: 1-3 doses may be recommended based on certain risk factors  Adults 72 years old: Prevnar (PCV13) vaccine recommended followed by Pneumovax (PPSV23) vaccine  If already received PPSV23 since turning 65, then PCV13 recommended at least one year after PPSV23 dose  Hepatitis B Vaccine 3 dose series if at intermediate or high risk (ex: diabetes, end stage renal disease, liver disease)   Tetanus (Td) Vaccine - COST NOT COVERED BY MEDICARE PART B Following completion of primary series, a booster dose should be given every 10 years to maintain immunity against tetanus  Td may also be given as tetanus wound prophylaxis  Tdap Vaccine - COST NOT COVERED BY MEDICARE PART B Recommended at least once for all adults  For pregnant patients, recommended with each pregnancy  Shingles Vaccine (Shingrix) - COST NOT COVERED BY MEDICARE PART B  2 shot series recommended in those aged 48 and above     Health Maintenance Due:  There are no preventive care reminders to display for this patient  Immunizations Due:      Topic Date Due    DTaP,Tdap,and Td Vaccines (1 - Tdap) 04/06/1943    Influenza Vaccine  07/01/2019     Advance Directives   What are advance directives? Advance directives are legal documents that state your wishes and plans for medical care  These plans are made ahead of time in case you lose your ability to make decisions for yourself  Advance directives can apply to any medical decision, such as the treatments you want, and if you want to donate organs  What are the types of advance directives? There are many types of advance directives, and each state has rules about how to use them  You may choose a combination of any of the following:  · Living will: This is a written record of the treatment you want   You can also choose which treatments you do not want, which to limit, and which to stop at a certain time  This includes surgery, medicine, IV fluid, and tube feedings  · Durable power of  for healthcare [facility]): This is a written record that states who you want to make healthcare choices for you when you are unable to make them for yourself  This person, called a proxy, is usually a family member or a friend  You may choose more than 1 proxy  · Do not resuscitate (DNR) order:  A DNR order is used in case your heart stops beating or you stop breathing  It is a request not to have certain forms of treatment, such as CPR  A DNR order may be included in other types of advance directives  · Medical directive: This covers the care that you want if you are in a coma, near death, or unable to make decisions for yourself  You can list the treatments you want for each condition  Treatment may include pain medicine, surgery, blood transfusions, dialysis, IV or tube feedings, and a ventilator (breathing machine)  · Values history: This document has questions about your views, beliefs, and how you feel and think about life  This information can help others choose the care that you would choose  Why are advance directives important? An advance directive helps you control your care  Although spoken wishes may be used, it is better to have your wishes written down  Spoken wishes can be misunderstood, or not followed  Treatments may be given even if you do not want them  An advance directive may make it easier for your family to make difficult choices about your care  Weight Management   Why it is important to manage your weight:  Being overweight increases your risk of health conditions such as heart disease, high blood pressure, type 2 diabetes, and certain types of cancer  It can also increase your risk for osteoarthritis, sleep apnea, and other respiratory problems  Aim for a slow, steady weight loss   Even a small amount of weight loss can lower your risk of health problems  How to lose weight safely:  A safe and healthy way to lose weight is to eat fewer calories and get regular exercise  You can lose up about 1 pound a week by decreasing the number of calories you eat by 500 calories each day  Healthy meal plan for weight management:  A healthy meal plan includes a variety of foods, contains fewer calories, and helps you stay healthy  A healthy meal plan includes the following:  · Eat whole-grain foods more often  A healthy meal plan should contain fiber  Fiber is the part of grains, fruits, and vegetables that is not broken down by your body  Whole-grain foods are healthy and provide extra fiber in your diet  Some examples of whole-grain foods are whole-wheat breads and pastas, oatmeal, brown rice, and bulgur  · Eat a variety of vegetables every day  Include dark, leafy greens such as spinach, kale, savannah greens, and mustard greens  Eat yellow and orange vegetables such as carrots, sweet potatoes, and winter squash  · Eat a variety of fruits every day  Choose fresh or canned fruit (canned in its own juice or light syrup) instead of juice  Fruit juice has very little or no fiber  · Eat low-fat dairy foods  Drink fat-free (skim) milk or 1% milk  Eat fat-free yogurt and low-fat cottage cheese  Try low-fat cheeses such as mozzarella and other reduced-fat cheeses  · Choose meat and other protein foods that are low in fat  Choose beans or other legumes such as split peas or lentils  Choose fish, skinless poultry (chicken or turkey), or lean cuts of red meat (beef or pork)  Before you cook meat or poultry, cut off any visible fat  · Use less fat and oil  Try baking foods instead of frying them  Add less fat, such as margarine, sour cream, regular salad dressing and mayonnaise to foods  Eat fewer high-fat foods  Some examples of high-fat foods include french fries, doughnuts, ice cream, and cakes  · Eat fewer sweets    Limit foods and drinks that are high in sugar  This includes candy, cookies, regular soda, and sweetened drinks  Exercise:  Exercise at least 30 minutes per day on most days of the week  Some examples of exercise include walking, biking, dancing, and swimming  You can also fit in more physical activity by taking the stairs instead of the elevator or parking farther away from stores  Ask your healthcare provider about the best exercise plan for you  © Copyright [employer] 2018 Information is for End User's use only and may not be sold, redistributed or otherwise used for commercial purposes   All illustrations and images included in CareNotes® are the copyrighted property of [employer]  or [address] [place] [address]

## 2020-03-24 NOTE — PROGRESS NOTES
Virtual Regular Visit    Problem List Items Addressed This Visit        Endocrine    Hyperthyroidism    Relevant Orders    TSH, 3rd generation       Cardiovascular and Mediastinum    Benign essential hypertension    Relevant Orders    CBC and differential    Comprehensive metabolic panel    Lipid panel    Chronic diastolic congestive heart failure (HCC) - Primary    Relevant Orders    Comprehensive metabolic panel    Paroxysmal atrial fibrillation (HCC)    Relevant Orders    Comprehensive metabolic panel       Other    Vitamin D deficiency    Relevant Orders    Vitamin D 25 hydroxy    Depression with anxiety    Weight loss      Other Visit Diagnoses     Medicare annual wellness visit, subsequent                   Reason for visit is routine visit/follow-up as well as annual Medicare wellness    Encounter provider Ariadna Alarcon MD    Provider located at [address] [place] [address] [place] [address]      Recent Visits  No visits were found meeting these conditions  Showing recent visits within past 7 days and meeting all other requirements     Future Appointments  No visits were found meeting these conditions  Showing future appointments within next 150 days and meeting all other requirements        After connecting through [employer], the patient was identified by name and date of birth  Yahaira Paz was informed that this is a telemedicine visit and that the visit is being conducted through [employer] which may not be secure and therefore, might not be HIPAA-compliant  My office door was closed  No one else was in the room  She acknowledged consent and understanding of privacy and security of the video platform  The patient has agreed to participate and understands they can discontinue the visit at any time  Subjective  Yahaira Paz is a 80 y o  female was evaluated via video visit for routine visit/follow-up as well as Medicare wellness    Has generally been doing relatively well  Appetite has not been back to normal but is eating well  Daughter provides most meals for her  Tries to remain active around her daughter's house  Has basically been living there full-time  Breathing has been stable  Denies any significant shortness of breath at present  Continues with the Lasix  Denies any significant peripheral edema  Denies any chest pain or palpitations  Still gets some lightheadedness at times but not severe  Follows up with Cardiology but is trying to avoid any appointments in person at present because of the coronavirus  Has been staying in  Has not been going out  Daughter has been following her blood pressure and weight at home  Her weight and blood pressure have been good at home  Her daughter follows her blood pressure and heart rate at home and these have been relatively well controlled  Denies any urinary symptoms  Mood has been stable on Wellbutrin  They did have some difficulty with the pharmacy and the prescription but this has been taking care of  Denies any recent falls  Has recovered well after her recent episodes of pneumonia  Usually sleeps relatively well  Appetite has been good  Denies any nausea, vomiting, or diarrhea        Past Medical History:   Diagnosis Date    Anemia     [facility] fibrillation [facility]     Atrial flutter ([facility])     Depression     [facility]     Dyslipidemia     Glaucoma     H/O: hysterectomy     Hypertension     Hyperthyroidism     Impaired glucose tolerance     RESOLVED 2/3/2016    Pacemaker     Psoriasis     S/P cataract surgery     S/P knee replacement     [facility]ick [facility] syndrome) ([facility])     [facility]        Past Surgical History:   Procedure Laterality Date    CARDIAC PACEMAKER PLACEMENT      CARDIAC PACEMAKER PLACEMENT      dual chamber    CATARACT EXTRACTION      ESOPHAGOGASTRODUODENOSCOPY N/A 12/30/2016    Procedure: ESOPHAGOGASTRODUODENOSCOPY (EGD); Surgeon: Chestine Kayser, MD;  Location: [facility]; Service:     EYE SURGERY      cataract    HYSTERECTOMY      JOINT REPLACEMENT      bilateral     MA COLONOSCOPY FLX DX W/COLLJ SPEC WHEN PFRMD N/A 4/24/2017    Procedure: COLONOSCOPY with polypectomy;  Surgeon: Allyson Villa MD;  Location: [facility];   Service: Gastroenterology    REPLACEMENT TOTAL KNEE Bilateral        Current Outpatient Medications   Medication Sig Dispense Refill    ALFALFA PO Take 3 tablets by mouth        aspirin (ECOTRIN LOW STRENGTH) 81 mg EC tablet Take 81 mg by mouth daily      B Complex Vitamins (B COMPLEX 1 PO) Take by mouth      benzonatate (TESSALON PERLES) 100 mg capsule Take 1 capsule (100 mg total) by mouth 3 (three) times a day as needed for cough (Patient not taking: Reported on 1/21/2020) 20 capsule 0    buPROPion (WELLBUTRIN SR) 150 mg 12 hr tablet Take 1 tablet (150 mg total) by mouth daily 90 tablet 1    cholecalciferol (VITAMIN D3) 1,000 units tablet Take 1 tablet by mouth daily 30 tablet 0    diltiazem (CARDIZEM CD) 240 mg 24 hr capsule Take 1 capsule (240 mg total) by mouth daily 90 capsule 3    fluocinonide (LIDEX) 0 05 % external solution Apply topically 2 (two) times a day as needed for irritation 180 mL 3    furosemide (LASIX) 40 mg tablet Take 1 tablet (40 mg total) by mouth 2 (two) times a day 180 tablet 3    guaiFENesin (MUCINEX) 600 mg 12 hr tablet Take 1 tablet (600 mg total) by mouth every 12 (twelve) hours 10 tablet 0    isosorbide mononitrate (IMDUR) 60 mg 24 hr tablet TAKE 1 TABLET BY MOUTH EVERY DAY 30 tablet 0    ketoconazole (NIZORAL) 2 % shampoo Apply 1 application topically once for 1 dose 100 mL 3    LECITHIN PO Take 1 tablet by mouth daily      methimazole (TAPAZOLE) 5 mg tablet Take 1 tablet (5 mg total) by mouth 3 (three) times a week Monday Wednesday Friday 36 tablet 3    metoprolol tartrate (LOPRESSOR) 50 mg tablet Take 1 tablet (50 mg total) by mouth every 12 (twelve) hours 180 tablet 1    Omega-3 Fatty Acids (OMEGA-3 PO) Take by mouth 2 (two) times a day        potassium chloride (K-DUR,KLOR-CON) 20 mEq tablet Take 1 tablet (20 mEq total) by mouth daily 90 tablet 3    sertraline (ZOLOFT) 100 mg tablet TAKE 1 TABLET DAILY 90 tablet 1    simvastatin (ZOCOR) 20 mg tablet Take 1 tablet (20 mg total) by mouth daily at bedtime 90 tablet 3    Zinc Sulfate (ZINC 15 PO) Take 100 mg by mouth         No current facility-administered medications for this visit  No Known Allergies    Review of Systems   Constitutional: Negative for activity change, appetite change, chills and fever  HENT: Positive for hearing loss  Negative for congestion and rhinorrhea  Eyes: Negative for visual disturbance  Respiratory: Negative for chest tightness and shortness of breath  Cardiovascular: Negative for chest pain and palpitations  Gastrointestinal: Negative for abdominal pain, blood in stool, diarrhea, nausea and vomiting  Endocrine: Negative for polydipsia, polyphagia and polyuria  Genitourinary: Negative for dysuria, frequency and urgency  Musculoskeletal: Negative for gait problem  Skin: Negative for color change  Neurological: Negative for dizziness and headaches  Hematological: Does not bruise/bleed easily  Psychiatric/Behavioral: Negative for confusion, dysphoric mood and sleep disturbance  The patient is not nervous/anxious  Physical Exam   Constitutional: She is oriented to person, place, and time  She appears well-developed  She is cooperative  HENT:   Right Ear: Decreased hearing is noted  Left Ear: Decreased hearing is noted  Pulmonary/Chest: No accessory muscle usage  No tachypnea  Neurological: She is alert and oriented to person, place, and time  Psychiatric: She has a normal mood and affect   Her speech is normal  Judgment normal  Cognition and memory are normal         I spent 25 minutes with the patient during this visit

## 2020-03-26 ENCOUNTER — REMOTE DEVICE CLINIC VISIT (OUTPATIENT)
Dept: CARDIOLOGY CLINIC | Facility: CLINIC | Age: 85
End: 2020-03-26
Payer: MEDICARE

## 2020-03-26 DIAGNOSIS — Z95.0 PRESENCE OF CARDIAC PACEMAKER: Primary | ICD-10-CM

## 2020-03-26 PROCEDURE — 93296 REM INTERROG EVL PM/IDS: CPT | Performed by: INTERNAL MEDICINE

## 2020-03-26 PROCEDURE — 93294 REM INTERROG EVL PM/LDLS PM: CPT | Performed by: INTERNAL MEDICINE

## 2020-03-26 NOTE — PROGRESS NOTES
Results for orders placed or performed in visit on 03/26/20   Cardiac EP device report    Narrative    [id number]  MERLIN TRANSMISSION: BATTERY VOLTAGE ADEQUATE (8 6 YRS)  AP: 0%  : 48% (>40%~DDDR/60)  ALL AVAILABLE LEAD PARAMETERS WITHIN NORMAL LIMITS  1 AMS W/ AF IN PROGRESS (HX OF SAME)  AF BURDEN: >99%  PT DOES NOT TAKE AC DUE TO HX OF GI BLEED  PT TAKES METOPROLOL TART, CARDIZEM CD, ASA 81MG  EF: 68% (NUC ST TX 4/22/19)  PACEMAKER FUNCTIONING APPROPRIATELY    [address] [place] [address] Street

## 2020-04-26 ENCOUNTER — HOSPITAL ENCOUNTER (EMERGENCY)
Facility: HOSPITAL | Age: 85
Discharge: HOME | End: 2020-04-27
Attending: EMERGENCY MEDICINE | Admitting: EMERGENCY MEDICINE
Payer: MEDICARE

## 2020-04-26 ENCOUNTER — APPOINTMENT (EMERGENCY)
Dept: CT IMAGING | Facility: HOSPITAL | Age: 85
End: 2020-04-26
Payer: MEDICARE
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
## 2020-04-26 DIAGNOSIS — E87.6 HYPOKALEMIA: ICD-10-CM

## 2020-04-26 DIAGNOSIS — R79.89 ELEVATED SERUM CREATININE: ICD-10-CM

## 2020-04-26 DIAGNOSIS — E04.2 MULTIPLE THYROID NODULES: ICD-10-CM

## 2020-04-26 DIAGNOSIS — K86.9 PANCREATIC LESION: ICD-10-CM

## 2020-04-26 DIAGNOSIS — J43.9 EMPHYSEMA OF LUNG (HCC): ICD-10-CM

## 2020-04-26 DIAGNOSIS — S30.1XXA ABDOMINAL HEMATOMA: ICD-10-CM

## 2020-04-26 DIAGNOSIS — J90 CHRONIC BILATERAL PLEURAL EFFUSIONS: ICD-10-CM

## 2020-04-26 LAB
ALBUMIN SERPL BCP-MCNC: 3.2 G/DL (ref 3.5–5)
ALP SERPL-CCNC: 75 U/L (ref 46–116)
ALT SERPL W P-5'-P-CCNC: 31 U/L (ref 12–78)
ANION GAP SERPL CALCULATED.3IONS-SCNC: 4 MMOL/L (ref 4–13)
AST SERPL W P-5'-P-CCNC: 27 U/L (ref 5–45)
BASOPHILS # BLD AUTO: 0.03 THOUSANDS/ΜL (ref 0–0.1)
BASOPHILS NFR BLD AUTO: 0 % (ref 0–1)
BILIRUB SERPL-MCNC: 0.61 MG/DL (ref 0.2–1)
BUN SERPL-MCNC: 15 MG/DL (ref 5–25)
CALCIUM SERPL-MCNC: 9.1 MG/DL (ref 8.3–10.1)
CHLORIDE SERPL-SCNC: 103 MMOL/L (ref 100–108)
CO2 SERPL-SCNC: 34 MMOL/L (ref 21–32)
CREAT SERPL-MCNC: 1.43 MG/DL (ref 0.6–1.3)
EOSINOPHIL # BLD AUTO: 0.28 THOUSAND/ΜL (ref 0–0.61)
EOSINOPHIL NFR BLD AUTO: 3 % (ref 0–6)
ERYTHROCYTE [DISTWIDTH] IN BLOOD BY AUTOMATED COUNT: 17.5 % (ref 11.6–15.1)
GFR SERPL CREATININE-BSD FRML MDRD: 33 ML/MIN/1.73SQ M
GLUCOSE SERPL-MCNC: 130 MG/DL (ref 65–140)
HCT VFR BLD AUTO: 42.3 % (ref 34.8–46.1)
HGB BLD-MCNC: 13.2 G/DL (ref 11.5–15.4)
IMM GRANULOCYTES # BLD AUTO: 0.08 THOUSAND/UL (ref 0–0.2)
IMM GRANULOCYTES NFR BLD AUTO: 1 % (ref 0–2)
LYMPHOCYTES # BLD AUTO: 2.17 THOUSANDS/ΜL (ref 0.6–4.47)
LYMPHOCYTES NFR BLD AUTO: 25 % (ref 14–44)
MAGNESIUM SERPL-MCNC: 2.2 MG/DL (ref 1.6–2.6)
MCH RBC QN AUTO: 29.2 PG (ref 26.8–34.3)
MCHC RBC AUTO-ENTMCNC: 31.2 G/DL (ref 31.4–37.4)
MCV RBC AUTO: 94 FL (ref 82–98)
MONOCYTES # BLD AUTO: 0.79 THOUSAND/ΜL (ref 0.17–1.22)
MONOCYTES NFR BLD AUTO: 9 % (ref 4–12)
NEUTROPHILS # BLD AUTO: 5.31 THOUSANDS/ΜL (ref 1.85–7.62)
NEUTS SEG NFR BLD AUTO: 62 % (ref 43–75)
NRBC BLD AUTO-RTO: 0 /100 WBCS
PLATELET # BLD AUTO: 177 THOUSANDS/UL (ref 149–390)
PMV BLD AUTO: 9.4 FL (ref 8.9–12.7)
POTASSIUM SERPL-SCNC: 3.3 MMOL/L (ref 3.5–5.3)
PROT SERPL-MCNC: 7.3 G/DL (ref 6.4–8.2)
RBC # BLD AUTO: 4.52 MILLION/UL (ref 3.81–5.12)
SODIUM SERPL-SCNC: 141 MMOL/L (ref 136–145)
WBC # BLD AUTO: 8.66 THOUSAND/UL (ref 4.31–10.16)

## 2020-04-26 PROCEDURE — 96374 THER/PROPH/DIAG INJ IV PUSH: CPT

## 2020-04-26 PROCEDURE — 36415 COLL VENOUS BLD VENIPUNCTURE: CPT | Performed by: NURSE PRACTITIONER

## 2020-04-26 PROCEDURE — 83735 ASSAY OF MAGNESIUM: CPT | Performed by: NURSE PRACTITIONER

## 2020-04-26 PROCEDURE — 72125 CT NECK SPINE W/O DYE: CPT

## 2020-04-26 PROCEDURE — 99284 EMERGENCY DEPT VISIT MOD MDM: CPT

## 2020-04-26 PROCEDURE — 80053 COMPREHEN METABOLIC PANEL: CPT | Performed by: NURSE PRACTITIONER

## 2020-04-26 PROCEDURE — 70450 CT HEAD/BRAIN W/O DYE: CPT

## 2020-04-26 PROCEDURE — 85025 COMPLETE CBC W/AUTO DIFF WBC: CPT | Performed by: NURSE PRACTITIONER

## 2020-04-26 PROCEDURE — 74177 CT ABD & PELVIS W/CONTRAST: CPT

## 2020-04-26 PROCEDURE — 93005 ELECTROCARDIOGRAM TRACING: CPT

## 2020-04-26 RX ORDER — ONDANSETRON 2 MG/ML
4 INJECTION INTRAMUSCULAR; INTRAVENOUS ONCE
Status: COMPLETED | OUTPATIENT
Start: 2020-04-26 | End: 2020-04-26

## 2020-04-26 RX ADMIN — IOHEXOL 100 ML: 350 INJECTION, SOLUTION INTRAVENOUS at 23:33

## 2020-04-26 RX ADMIN — ONDANSETRON 4 MG: 2 INJECTION INTRAMUSCULAR; INTRAVENOUS at 23:31

## 2020-04-27 VITALS
BODY MASS INDEX: 28.18 KG/M2 | DIASTOLIC BLOOD PRESSURE: 63 MMHG | WEIGHT: 174.6 LBS | TEMPERATURE: 97.7 F | HEART RATE: 71 BPM | SYSTOLIC BLOOD PRESSURE: 141 MMHG | RESPIRATION RATE: 20 BRPM | OXYGEN SATURATION: 98 %

## 2020-04-27 LAB
ATRIAL RATE: 312 BPM
BACTERIA UR QL AUTO: ABNORMAL /HPF
BILIRUB UR QL STRIP: NEGATIVE
CLARITY UR: CLEAR
CLARITY, POC: CLEAR
COLOR UR: YELLOW
COLOR, POC: YELLOW
GLUCOSE UR STRIP-MCNC: NEGATIVE MG/DL
HGB UR QL STRIP.AUTO: ABNORMAL
KETONES UR STRIP-MCNC: NEGATIVE MG/DL
LEUKOCYTE ESTERASE UR QL STRIP: ABNORMAL
NITRITE UR QL STRIP: NEGATIVE
NON-SQ EPI CELLS URNS QL MICRO: ABNORMAL /HPF
P AXIS: 0 DEGREES
PH UR STRIP.AUTO: 6.5 [PH] (ref 4.5–8)
PROT UR STRIP-MCNC: NEGATIVE MG/DL
QRS AXIS: -85 DEGREES
QRSD INTERVAL: 162 MS
QT INTERVAL: 474 MS
QTC INTERVAL: 477 MS
RBC #/AREA URNS AUTO: ABNORMAL /HPF
SP GR UR STRIP.AUTO: 1.01 (ref 1–1.03)
T WAVE AXIS: 86 DEGREES
UROBILINOGEN UR QL STRIP.AUTO: 0.2 E.U./DL
VENTRICULAR RATE: 61 BPM
WBC #/AREA URNS AUTO: ABNORMAL /HPF

## 2020-04-27 PROCEDURE — 93010 ELECTROCARDIOGRAM REPORT: CPT | Performed by: INTERNAL MEDICINE

## 2020-04-27 PROCEDURE — 99285 EMERGENCY DEPT VISIT HI MDM: CPT | Performed by: NURSE PRACTITIONER

## 2020-04-27 PROCEDURE — 81001 URINALYSIS AUTO W/SCOPE: CPT

## 2020-04-27 RX ORDER — ACETAMINOPHEN 325 MG/1
975 TABLET ORAL ONCE
Status: COMPLETED | OUTPATIENT
Start: 2020-04-27 | End: 2020-04-27

## 2020-04-27 RX ADMIN — ACETAMINOPHEN 975 MG: 325 TABLET ORAL at 02:03

## 2020-04-29 ENCOUNTER — TELEMEDICINE (OUTPATIENT)
Dept: INTERNAL MEDICINE CLINIC | Facility: CLINIC | Age: 85
End: 2020-04-29
Payer: MEDICARE

## 2020-04-29 VITALS — HEIGHT: 66 IN | BODY MASS INDEX: 27 KG/M2 | WEIGHT: 168 LBS

## 2020-04-29 DIAGNOSIS — E04.2 MULTIPLE THYROID NODULES: ICD-10-CM

## 2020-04-29 DIAGNOSIS — S70.11XD CONTUSION OF RIGHT THIGH, SUBSEQUENT ENCOUNTER: Primary | ICD-10-CM

## 2020-04-29 PROBLEM — S70.11XA CONTUSION OF RIGHT THIGH: Status: ACTIVE | Noted: 2020-04-29

## 2020-04-29 PROCEDURE — 99213 OFFICE O/P EST LOW 20 MIN: CPT | Performed by: FAMILY MEDICINE

## 2020-06-01 DIAGNOSIS — I50.32 CHRONIC DIASTOLIC CONGESTIVE HEART FAILURE (HCC): ICD-10-CM

## 2020-06-01 RX ORDER — FUROSEMIDE 40 MG/1
TABLET ORAL
Qty: 180 TABLET | Refills: 3 | Status: SHIPPED | OUTPATIENT
Start: 2020-06-01 | End: 2021-05-15

## 2020-06-10 DIAGNOSIS — E04.2 MULTIPLE THYROID NODULES: Primary | ICD-10-CM

## 2020-06-10 DIAGNOSIS — E05.90 HYPERTHYROIDISM: ICD-10-CM

## 2020-06-11 ENCOUNTER — HOSPITAL ENCOUNTER (OUTPATIENT)
Dept: ULTRASOUND IMAGING | Facility: HOSPITAL | Age: 85
Discharge: HOME | End: 2020-06-11
Payer: MEDICARE

## 2020-06-11 DIAGNOSIS — E05.90 HYPERTHYROIDISM: ICD-10-CM

## 2020-06-11 DIAGNOSIS — E04.2 MULTIPLE THYROID NODULES: ICD-10-CM

## 2020-06-11 PROCEDURE — 76536 US EXAM OF HEAD AND NECK: CPT

## 2020-06-16 DIAGNOSIS — E05.90 HYPERTHYROIDISM: Primary | ICD-10-CM

## 2020-06-16 DIAGNOSIS — R63.4 WEIGHT LOSS: ICD-10-CM

## 2020-06-17 ENCOUNTER — APPOINTMENT (OUTPATIENT)
Dept: LAB | Facility: HOSPITAL | Age: 85
End: 2020-06-17
Payer: MEDICARE

## 2020-06-17 DIAGNOSIS — R63.4 WEIGHT LOSS: ICD-10-CM

## 2020-06-17 DIAGNOSIS — E05.90 HYPERTHYROIDISM: ICD-10-CM

## 2020-06-17 LAB
ALBUMIN SERPL BCP-MCNC: 3.1 G/DL (ref 3.5–5)
ALP SERPL-CCNC: 104 U/L (ref 46–116)
ALT SERPL W P-5'-P-CCNC: 19 U/L (ref 12–78)
ANION GAP SERPL CALCULATED.3IONS-SCNC: 5 MMOL/L (ref 4–13)
AST SERPL W P-5'-P-CCNC: 28 U/L (ref 5–45)
BASOPHILS # BLD AUTO: 0.05 THOUSANDS/ΜL (ref 0–0.1)
BASOPHILS NFR BLD AUTO: 1 % (ref 0–1)
BILIRUB SERPL-MCNC: 0.71 MG/DL (ref 0.2–1)
BUN SERPL-MCNC: 23 MG/DL (ref 5–25)
CALCIUM SERPL-MCNC: 9.2 MG/DL (ref 8.3–10.1)
CHLORIDE SERPL-SCNC: 102 MMOL/L (ref 100–108)
CO2 SERPL-SCNC: 33 MMOL/L (ref 21–32)
CREAT SERPL-MCNC: 1.48 MG/DL (ref 0.6–1.3)
EOSINOPHIL # BLD AUTO: 0.49 THOUSAND/ΜL (ref 0–0.61)
EOSINOPHIL NFR BLD AUTO: 5 % (ref 0–6)
ERYTHROCYTE [DISTWIDTH] IN BLOOD BY AUTOMATED COUNT: 16.6 % (ref 11.6–15.1)
GFR SERPL CREATININE-BSD FRML MDRD: 31 ML/MIN/1.73SQ M
GLUCOSE P FAST SERPL-MCNC: 140 MG/DL (ref 65–99)
HCT VFR BLD AUTO: 43.5 % (ref 34.8–46.1)
HGB BLD-MCNC: 13.5 G/DL (ref 11.5–15.4)
IMM GRANULOCYTES # BLD AUTO: 0.03 THOUSAND/UL (ref 0–0.2)
IMM GRANULOCYTES NFR BLD AUTO: 0 % (ref 0–2)
LYMPHOCYTES # BLD AUTO: 1.92 THOUSANDS/ΜL (ref 0.6–4.47)
LYMPHOCYTES NFR BLD AUTO: 21 % (ref 14–44)
MCH RBC QN AUTO: 30.2 PG (ref 26.8–34.3)
MCHC RBC AUTO-ENTMCNC: 31 G/DL (ref 31.4–37.4)
MCV RBC AUTO: 97 FL (ref 82–98)
MONOCYTES # BLD AUTO: 0.66 THOUSAND/ΜL (ref 0.17–1.22)
MONOCYTES NFR BLD AUTO: 7 % (ref 4–12)
NEUTROPHILS # BLD AUTO: 6.12 THOUSANDS/ΜL (ref 1.85–7.62)
NEUTS SEG NFR BLD AUTO: 66 % (ref 43–75)
NRBC BLD AUTO-RTO: 0 /100 WBCS
PLATELET # BLD AUTO: 195 THOUSANDS/UL (ref 149–390)
PMV BLD AUTO: 9.3 FL (ref 8.9–12.7)
POTASSIUM SERPL-SCNC: 3.6 MMOL/L (ref 3.5–5.3)
PROT SERPL-MCNC: 7.1 G/DL (ref 6.4–8.2)
RBC # BLD AUTO: 4.47 MILLION/UL (ref 3.81–5.12)
SODIUM SERPL-SCNC: 140 MMOL/L (ref 136–145)
T3FREE SERPL-MCNC: 2.22 PG/ML (ref 2.3–4.2)
T4 FREE SERPL-MCNC: 0.72 NG/DL (ref 0.76–1.46)
TSH SERPL DL<=0.05 MIU/L-ACNC: 1.31 UIU/ML (ref 0.36–3.74)
WBC # BLD AUTO: 9.27 THOUSAND/UL (ref 4.31–10.16)

## 2020-06-17 PROCEDURE — 84443 ASSAY THYROID STIM HORMONE: CPT

## 2020-06-17 PROCEDURE — 84481 FREE ASSAY (FT-3): CPT

## 2020-06-17 PROCEDURE — 36415 COLL VENOUS BLD VENIPUNCTURE: CPT

## 2020-06-17 PROCEDURE — 84439 ASSAY OF FREE THYROXINE: CPT

## 2020-06-17 PROCEDURE — 85025 COMPLETE CBC W/AUTO DIFF WBC: CPT

## 2020-06-17 PROCEDURE — 80053 COMPREHEN METABOLIC PANEL: CPT

## 2020-06-23 DIAGNOSIS — R63.4 WEIGHT LOSS: ICD-10-CM

## 2020-06-23 DIAGNOSIS — E44.0 MODERATE PROTEIN-CALORIE MALNUTRITION (HCC): Primary | ICD-10-CM

## 2020-06-23 RX ORDER — DRONABINOL 5 MG/1
5 CAPSULE ORAL
Qty: 60 CAPSULE | Refills: 1 | Status: SHIPPED | OUTPATIENT
Start: 2020-06-23 | End: 2020-07-02 | Stop reason: SDUPTHER

## 2020-06-25 ENCOUNTER — REMOTE DEVICE CLINIC VISIT (OUTPATIENT)
Dept: CARDIOLOGY CLINIC | Facility: CLINIC | Age: 85
End: 2020-06-25
Payer: MEDICARE

## 2020-06-25 DIAGNOSIS — Z95.0 CARDIAC PACEMAKER IN SITU: Primary | ICD-10-CM

## 2020-06-25 PROCEDURE — 93296 REM INTERROG EVL PM/IDS: CPT | Performed by: INTERNAL MEDICINE

## 2020-06-25 PROCEDURE — 93294 REM INTERROG EVL PM/LDLS PM: CPT | Performed by: INTERNAL MEDICINE

## 2020-06-29 DIAGNOSIS — I50.32 CHRONIC DIASTOLIC CONGESTIVE HEART FAILURE (HCC): ICD-10-CM

## 2020-06-29 DIAGNOSIS — F41.8 DEPRESSION WITH ANXIETY: ICD-10-CM

## 2020-06-29 RX ORDER — SERTRALINE HYDROCHLORIDE 100 MG/1
TABLET, FILM COATED ORAL
Qty: 90 TABLET | Refills: 1 | Status: SHIPPED | OUTPATIENT
Start: 2020-06-29 | End: 2021-01-04

## 2020-06-29 RX ORDER — POTASSIUM CHLORIDE 1500 MG/1
TABLET, EXTENDED RELEASE ORAL
Qty: 90 TABLET | Refills: 3 | Status: SHIPPED | OUTPATIENT
Start: 2020-06-29 | End: 2021-06-11 | Stop reason: SDUPTHER

## 2020-07-02 ENCOUNTER — OFFICE VISIT (OUTPATIENT)
Dept: INTERNAL MEDICINE CLINIC | Facility: CLINIC | Age: 85
End: 2020-07-02
Payer: MEDICARE

## 2020-07-02 VITALS
WEIGHT: 161.8 LBS | TEMPERATURE: 96.8 F | HEIGHT: 66 IN | BODY MASS INDEX: 26 KG/M2 | OXYGEN SATURATION: 96 % | DIASTOLIC BLOOD PRESSURE: 60 MMHG | HEART RATE: 70 BPM | SYSTOLIC BLOOD PRESSURE: 110 MMHG

## 2020-07-02 DIAGNOSIS — E44.0 MODERATE PROTEIN-CALORIE MALNUTRITION (HCC): ICD-10-CM

## 2020-07-02 DIAGNOSIS — R63.4 WEIGHT LOSS: Primary | ICD-10-CM

## 2020-07-02 DIAGNOSIS — F41.8 DEPRESSION WITH ANXIETY: ICD-10-CM

## 2020-07-02 DIAGNOSIS — I50.32 CHRONIC DIASTOLIC CONGESTIVE HEART FAILURE (HCC): ICD-10-CM

## 2020-07-02 DIAGNOSIS — E05.90 HYPERTHYROIDISM: ICD-10-CM

## 2020-07-02 PROBLEM — E44.1 MILD PROTEIN-CALORIE MALNUTRITION (HCC): Status: ACTIVE | Noted: 2020-07-02

## 2020-07-02 PROCEDURE — 3078F DIAST BP <80 MM HG: CPT | Performed by: FAMILY MEDICINE

## 2020-07-02 PROCEDURE — 3074F SYST BP LT 130 MM HG: CPT | Performed by: FAMILY MEDICINE

## 2020-07-02 PROCEDURE — 1160F RVW MEDS BY RX/DR IN RCRD: CPT | Performed by: FAMILY MEDICINE

## 2020-07-02 PROCEDURE — 3008F BODY MASS INDEX DOCD: CPT | Performed by: FAMILY MEDICINE

## 2020-07-02 PROCEDURE — 1036F TOBACCO NON-USER: CPT | Performed by: FAMILY MEDICINE

## 2020-07-02 PROCEDURE — 4040F PNEUMOC VAC/ADMIN/RCVD: CPT | Performed by: FAMILY MEDICINE

## 2020-07-02 PROCEDURE — 99214 OFFICE O/P EST MOD 30 MIN: CPT | Performed by: FAMILY MEDICINE

## 2020-07-02 RX ORDER — DRONABINOL 5 MG/1
5 CAPSULE ORAL
Qty: 60 CAPSULE | Refills: 1 | Status: SHIPPED | OUTPATIENT
Start: 2020-07-02 | End: 2021-07-28

## 2020-07-02 NOTE — PROGRESS NOTES
Assessment/Plan:    No problem-specific Assessment & Plan notes found for this encounter  Diagnoses and all orders for this visit:    Weight loss  -     dronabinol (MARINOL) 5 MG capsule; Take 1 capsule (5 mg total) by mouth 2 (two) times a day before meals  -     CT abdomen pelvis w contrast; Future    Moderate protein-calorie malnutrition (HCC)  -     dronabinol (MARINOL) 5 MG capsule; Take 1 capsule (5 mg total) by mouth 2 (two) times a day before meals  -     CT abdomen pelvis w contrast; Future    Depression with anxiety    Chronic diastolic congestive heart failure ([address] )    Hyperthyroidism        Weight loss discussed with both her and her daughter  Discussed with her that she has lost over 30 lb over approximately last year  Some of this likely associated with her hospitalization for pneumonia in the winter but likely not all of it  She does have moderate malnutrition with some wasting of the interosseous muscles in the hands and some wasting of the musculature in the face  Discussed protein supplementation as they had been  Discussed increasing calorie intake  Small frequent meals  Will check a CT of the abdomen and pelvis to rule out other causes for this  Much of this likely associated with decreased sense of taste and decreased desire for food  Will start her on the Marinol  This was previously prescribed after a phone call but daughter had not started it  Potential side effects discussed  Recent laboratory testing reviewed with them  Doubt that this is associated with depression since she denies these symptoms  Doubt cardiac cause for the weight loss since her cardiac symptoms have been stable  Recent thyroid testing normal   Will have her follow-up for recheck in about 6-8 weeks or sooner if needed  Continue follow weight at home  Call or follow-up sooner if needed  Subjective:      Patient ID: Ben Jaime is a 80 y o  female  She presents for follow-up for weight loss  Has continued to lose weight  Has lost over 30 lb in the last year  She states that she feels generally well  Denies any nausea, vomiting, or diarrhea  Denies any early satiety  States that her appetite is just less and at times she has no desire for food  Taste is also off which affects how much she eats  Daughter has been supplementing her meals with protein supplements which she has been doing well with  Denies any abdominal pain  Denies any significant shortness of breath  Denies any chest pain or palpitations  Denies any symptoms of depression  Feels that her mood is good  With the coronavirus, her activity level really has not changed drastically since she had not gone out frequently even prior to that  Denies any changes in bowel movements  Denies any blood or mucus in the bowel movements  Denies any dark bowel movements  The following portions of the patient's history were reviewed and updated as appropriate: She  has a past medical history of Anemia, Atrial fibrillation ([facility] ), Atrial flutter ([facility] ), Depression, Diastolic CHF ([facility] ), Dyslipidemia, Glaucoma, H/O: hysterectomy, Hypertension, Hyperthyroidism, Impaired glucose tolerance, Pacemaker, Psoriasis, S/P cataract surgery, S/P knee replacement, SSS (sick sinus syndrome) ([facility] ), and SSS (sick sinus syndrome) ([facility] )    She   Patient Active Problem List    Diagnosis Date Noted    Moderate protein-calorie malnutrition ([facility] ) 07/02/2020    Contusion of right thigh 04/29/2020    Multiple thyroid nodules 04/29/2020    Weight loss 01/22/2020    Dyspnea on exertion 09/20/2017    Osteoporosis 03/09/2017    Paroxysmal atrial fibrillation (HCC) 02/22/2017    Anemia 02/18/2017    Renal atrophy 02/18/2017    Hyperphosphatemia 02/18/2017    Vitamin D deficiency 02/18/2017    Hypokalemia 02/17/2017    Pulmonary hypertension ([facility] ) 02/17/2017    VAZQUEZ (acute kidney injury) ([facility] ) 02/17/2017    Presence of permanent cardiac pacemaker     H/O: hysterectomy     Benign essential hypertension     Hyperthyroidism     Glaucoma     Chronic diastolic congestive heart failure (HCC)     SSS (sick sinus syndrome) ([facility] )     Depression     Depression with anxiety 08/12/2015    Fatigue 11/18/2014    Vertigo 11/18/2014    Atrial flutter ([facility] ) 10/01/2014    Dyslipidemia 09/12/2012    Psoriasis 09/12/2012     She  has a past surgical history that includes Cardiac pacemaker placement; Eye surgery; Hysterectomy; Cardiac pacemaker placement; Joint replacement; Esophagogastroduodenoscopy (N/A, 12/30/2016); Cataract extraction; pr colonoscopy flx dx w/collj spec when pfrmd (N/A, 4/24/2017); and Replacement total knee (Bilateral)  Her family history includes Alcohol abuse in her brother and father; Diabetes in her father  She  reports that she has quit smoking  She has never used smokeless tobacco  She reports that she does not drink alcohol or use drugs    Current Outpatient Medications   Medication Sig Dispense Refill    ALFALFA PO Take 3 tablets by mouth        aspirin (ECOTRIN LOW STRENGTH) 81 mg EC tablet Take 81 mg by mouth daily      B Complex Vitamins (B COMPLEX 1 PO) Take by mouth      buPROPion (WELLBUTRIN SR) 150 mg 12 hr tablet Take 1 tablet (150 mg total) by mouth daily 90 tablet 1    cholecalciferol (VITAMIN D3) 1,000 units tablet Take 1 tablet by mouth daily 30 tablet 0    diltiazem (CARDIZEM CD) 240 mg 24 hr capsule Take 1 capsule (240 mg total) by mouth daily 90 capsule 3    dronabinol (MARINOL) 5 MG capsule Take 1 capsule (5 mg total) by mouth 2 (two) times a day before meals 60 capsule 1    fluocinonide (LIDEX) 0 05 % external solution Apply topically 2 (two) times a day as needed for irritation 180 mL 3    furosemide (LASIX) 40 mg tablet TAKE 1 TABLET TWICE A  tablet 3    guaiFENesin (MUCINEX) 600 mg 12 hr tablet Take 1 tablet (600 mg total) by mouth every 12 (twelve) hours 10 tablet 0    isosorbide mononitrate (IMDUR) 60 mg 24 hr tablet TAKE 1 TABLET BY MOUTH EVERY DAY 30 tablet 0    ketoconazole (NIZORAL) 2 % shampoo Apply 1 application topically once for 1 dose 100 mL 3    KLOR-CON M20 20 MEQ tablet TAKE 1 TABLET DAILY 90 tablet 3    LECITHIN PO Take 1 tablet by mouth daily      methimazole (TAPAZOLE) 5 mg tablet Take 1 tablet (5 mg total) by mouth 3 (three) times a week Monday Wednesday Friday 36 tablet 3    metoprolol tartrate (LOPRESSOR) 50 mg tablet Take 1 tablet (50 mg total) by mouth every 12 (twelve) hours 180 tablet 1    Omega-3 Fatty Acids (OMEGA-3 PO) Take by mouth 2 (two) times a day        sertraline (ZOLOFT) 100 mg tablet TAKE 1 TABLET DAILY 90 tablet 1    simvastatin (ZOCOR) 20 mg tablet Take 1 tablet (20 mg total) by mouth daily at bedtime 90 tablet 3    Zinc Sulfate (ZINC 15 PO) Take 100 mg by mouth        benzonatate (TESSALON PERLES) 100 mg capsule Take 1 capsule (100 mg total) by mouth 3 (three) times a day as needed for cough (Patient not taking: Reported on 7/2/2020) 20 capsule 0     No current facility-administered medications for this visit        Current Outpatient Medications on File Prior to Visit   Medication Sig    ALFALFA PO Take 3 tablets by mouth      aspirin (ECOTRIN LOW STRENGTH) 81 mg EC tablet Take 81 mg by mouth daily    B Complex Vitamins (B COMPLEX 1 PO) Take by mouth    buPROPion (WELLBUTRIN SR) 150 mg 12 hr tablet Take 1 tablet (150 mg total) by mouth daily    cholecalciferol (VITAMIN D3) 1,000 units tablet Take 1 tablet by mouth daily    diltiazem (CARDIZEM CD) 240 mg 24 hr capsule Take 1 capsule (240 mg total) by mouth daily    fluocinonide (LIDEX) 0 05 % external solution Apply topically 2 (two) times a day as needed for irritation    furosemide (LASIX) 40 mg tablet TAKE 1 TABLET TWICE A DAY    guaiFENesin (MUCINEX) 600 mg 12 hr tablet Take 1 tablet (600 mg total) by mouth every 12 (twelve) hours    isosorbide mononitrate (IMDUR) 60 mg 24 hr tablet TAKE 1 TABLET BY MOUTH EVERY DAY    ketoconazole (NIZORAL) 2 % shampoo Apply 1 application topically once for 1 dose    KLOR-CON M20 20 MEQ tablet TAKE 1 TABLET DAILY    LECITHIN PO Take 1 tablet by mouth daily    methimazole (TAPAZOLE) 5 mg tablet Take 1 tablet (5 mg total) by mouth 3 (three) times a week Monday Wednesday Friday    metoprolol tartrate (LOPRESSOR) 50 mg tablet Take 1 tablet (50 mg total) by mouth every 12 (twelve) hours    Omega-3 Fatty Acids (OMEGA-3 PO) Take by mouth 2 (two) times a day      sertraline (ZOLOFT) 100 mg tablet TAKE 1 TABLET DAILY    simvastatin (ZOCOR) 20 mg tablet Take 1 tablet (20 mg total) by mouth daily at bedtime    Zinc Sulfate (ZINC 15 PO) Take 100 mg by mouth      benzonatate (TESSALON PERLES) 100 mg capsule Take 1 capsule (100 mg total) by mouth 3 (three) times a day as needed for cough (Patient not taking: Reported on 7/2/2020)     No current facility-administered medications on file prior to visit  She has No Known Allergies       Review of Systems   Constitutional: Positive for appetite change  Negative for activity change, chills, fatigue and fever  HENT: Negative for congestion and rhinorrhea  Eyes: Negative for visual disturbance  Respiratory: Negative for cough, chest tightness and shortness of breath  Cardiovascular: Negative for chest pain and palpitations  Gastrointestinal: Negative for abdominal distention, abdominal pain, blood in stool, diarrhea, nausea and vomiting  Endocrine: Negative for polydipsia, polyphagia and polyuria  Genitourinary: Negative for dysuria, frequency and urgency  Musculoskeletal: Negative for gait problem  Skin: Negative for color change  Neurological: Negative for dizziness and headaches  Hematological: Does not bruise/bleed easily  Psychiatric/Behavioral: Negative for confusion, decreased concentration, dysphoric mood and sleep disturbance  The patient is not nervous/anxious            Objective:      /60 (BP Location: Left arm, Patient Position: Sitting, Cuff Size: Standard)   Pulse 70   Temp (!) 96 8 °F (36 °C) (Temporal)   Ht 5' 6" (1 676 m)   Wt 73 4 kg (161 lb 12 8 oz)   LMP  (LMP Unknown)   SpO2 96%   BMI 26 12 kg/m²          Physical Exam   Constitutional: She is oriented to person, place, and time  She is cooperative  No distress  Appears younger than her stated age   HENT:   Head: Normocephalic and atraumatic  Mouth/Throat: Oropharynx is clear and moist    Eyes: Pupils are equal, round, and reactive to light  Conjunctivae are normal  Right eye exhibits no discharge  Left eye exhibits no discharge  Cardiovascular: Normal rate, regular rhythm and normal heart sounds  Exam reveals no gallop and no friction rub  No murmur heard  Pulmonary/Chest: No respiratory distress  She has no wheezes  She has no rales  Abdominal: Bowel sounds are normal  She exhibits no distension  There is no tenderness  Musculoskeletal: She exhibits no edema  Some muscle wasting into the interosseous muscles of the hands and also some noted in the temporal areas bilaterally   Lymphadenopathy:     She has no cervical adenopathy  Neurological: She is alert and oriented to person, place, and time  Skin: Skin is warm and dry  Psychiatric: She has a normal mood and affect  Her speech is normal and behavior is normal  Judgment normal  Cognition and memory are normal    Nursing note and vitals reviewed  Below is the patient's most recent value for Albumin, ALT, AST, BUN, Calcium, Chloride, Cholesterol, CO2, Creatinine, GFR, Glucose, HDL, Hematocrit, Hemoglobin, Hemoglobin A1C, LDL, Magnesium, Phosphorus, Platelets, Potassium, PSA, Sodium, Triglycerides, and WBC     Lab Results   Component Value Date    ALT 19 06/17/2020    AST 28 06/17/2020    BUN 23 06/17/2020    CALCIUM 9 2 06/17/2020     06/17/2020    CHOL 153 10/24/2015    CO2 33 (H) 06/17/2020    CREATININE 1 48 (H) 06/17/2020    HDL 62 (H) 09/23/2019 HCT 43 5 06/17/2020    HGB 13 5 06/17/2020    HGBA1C 5 5 01/25/2016    MG 2 2 04/26/2020    PHOS 3 7 02/21/2017     06/17/2020    K 3 6 06/17/2020     11/18/2015    TRIG 105 09/23/2019    WBC 9 27 06/17/2020     Note: for a comprehensive list of the patient's lab results, access the Results Review activity

## 2020-07-02 NOTE — PATIENT INSTRUCTIONS
Malnutrition   WHAT YOU NEED TO KNOW:   What is malnutrition? Malnutrition occurs when you do not get enough calories or nutrients to keep you healthy  Nutrients include protein, fat, carbohydrates, vitamins, and minerals  What increases my risk of malnutrition? · Not eating the right amount or kinds of food    · Inability to digest and absorb nutrients properly    · A health condition that increases the amount of calories your body needs    · Pregnancy    · Eating disorders such as anorexia or bulimia    · Drug or alcohol abuse  What are the signs and symptoms of malnutrition? · Irritable (bad mood) and tired    · Slower growth than normal, or no growth (in children)    · Weight loss or loss of appetite    · Slow wound healing and an increase in infections    · Bone or joint pain, weak muscles, or sunken temples    · Brittle and spooned nails    · Dry, scaly skin or change in skin color    · Change of hair color, or hair loss    · Bloated abdomen and swelling in other parts of the body  How is malnutrition diagnosed? Your healthcare provider will examine you and check your height and weight  He may ask you questions about your health and the medicines that you take  He may also ask what you eat to find out if you are getting enough calories and nutrients  Your healthcare provider may also do blood tests to find out if your body is low in certain nutrients  How is malnutrition treated? Treatment depends on what caused your malnutrition  You may need medicine to treat a health problem that is causing your malnutrition  · Increased calories and nutrients  will be needed  A dietitian may help you plan larger, healthy meals  If you have trouble eating larger meals, eat small meals throughout the day  You may need to include snacks between meals  You may need to eat or drink a nutrition supplement if you have trouble eating the right kinds and amounts of food       · Vitamins and minerals  may be needed to replace vitamins and minerals your body needs  They may be given in your IV, as a shot, or as a pill  · Appetite stimulants  are medicines that help improve your appetite so you will want to eat more  When should I contact my healthcare provider? · You lose a large amount of weight within a short amount of time  · You feel depressed, confused, tired, irritable, and you do not feel like eating  · You have questions or concerns about your condition or care  When should I seek immediate care or call 911? · You have pain in your chest, back, neck, jaw, stomach, or down one or both arms  · You have trouble breathing  CARE AGREEMENT:   You have the right to help plan your care  Learn about your health condition and how it may be treated  Discuss treatment options with your caregivers to decide what care you want to receive  You always have the right to refuse treatment  The above information is an  only  It is not intended as medical advice for individual conditions or treatments  Talk to your doctor, nurse or pharmacist before following any medical regimen to see if it is safe and effective for you  © 2017 [employer]  Information is for End User's use only and may not be sold, redistributed or otherwise used for commercial purposes  All illustrations and images included in CareNotes® are the copyrighted property of [employer]  or [employer]

## 2020-07-15 DIAGNOSIS — I48.0 PAROXYSMAL ATRIAL FIBRILLATION (HCC): ICD-10-CM

## 2020-07-15 RX ORDER — METOPROLOL TARTRATE 50 MG/1
TABLET, FILM COATED ORAL
Qty: 180 TABLET | Refills: 1 | Status: SHIPPED | OUTPATIENT
Start: 2020-07-15 | End: 2021-01-25

## 2020-08-16 DIAGNOSIS — E05.90 HYPERTHYROIDISM: ICD-10-CM

## 2020-08-16 RX ORDER — METHIMAZOLE 5 MG/1
TABLET ORAL
Qty: 36 TABLET | Refills: 3 | Status: SHIPPED | OUTPATIENT
Start: 2020-08-16 | End: 2020-11-18 | Stop reason: SDUPTHER

## 2020-08-17 ENCOUNTER — TELEMEDICINE (OUTPATIENT)
Dept: INTERNAL MEDICINE CLINIC | Facility: CLINIC | Age: 85
End: 2020-08-17
Payer: MEDICARE

## 2020-08-17 VITALS — BODY MASS INDEX: 25.5 KG/M2 | WEIGHT: 158 LBS | DIASTOLIC BLOOD PRESSURE: 75 MMHG | SYSTOLIC BLOOD PRESSURE: 114 MMHG

## 2020-08-17 DIAGNOSIS — R06.00 DYSPNEA ON EXERTION: ICD-10-CM

## 2020-08-17 DIAGNOSIS — E05.90 HYPERTHYROIDISM: ICD-10-CM

## 2020-08-17 DIAGNOSIS — I50.32 CHRONIC DIASTOLIC CONGESTIVE HEART FAILURE (HCC): ICD-10-CM

## 2020-08-17 DIAGNOSIS — F41.8 DEPRESSION WITH ANXIETY: ICD-10-CM

## 2020-08-17 DIAGNOSIS — E78.2 MIXED HYPERLIPIDEMIA: ICD-10-CM

## 2020-08-17 DIAGNOSIS — E44.0 MODERATE PROTEIN-CALORIE MALNUTRITION (HCC): ICD-10-CM

## 2020-08-17 DIAGNOSIS — R63.4 WEIGHT LOSS: Primary | ICD-10-CM

## 2020-08-17 PROCEDURE — 1036F TOBACCO NON-USER: CPT | Performed by: FAMILY MEDICINE

## 2020-08-17 PROCEDURE — 99214 OFFICE O/P EST MOD 30 MIN: CPT | Performed by: FAMILY MEDICINE

## 2020-08-17 PROCEDURE — 3078F DIAST BP <80 MM HG: CPT | Performed by: FAMILY MEDICINE

## 2020-08-17 PROCEDURE — 4040F PNEUMOC VAC/ADMIN/RCVD: CPT | Performed by: FAMILY MEDICINE

## 2020-08-17 PROCEDURE — 1160F RVW MEDS BY RX/DR IN RCRD: CPT | Performed by: FAMILY MEDICINE

## 2020-08-17 PROCEDURE — 3074F SYST BP LT 130 MM HG: CPT | Performed by: FAMILY MEDICINE

## 2020-08-17 NOTE — PROGRESS NOTES
Virtual Regular Visit      Assessment/Plan:    Problem List Items Addressed This Visit        Endocrine    Hyperthyroidism    Relevant Orders    TSH, 3rd generation       Cardiovascular and Mediastinum    Chronic diastolic congestive heart failure (HCC)    Relevant Orders    Comprehensive metabolic panel       Other    Depression with anxiety    Dyspnea on exertion    Weight loss - Primary    Relevant Orders    CBC and differential    Moderate protein-calorie malnutrition (HCC)    Relevant Orders    CBC and differential    Prealbumin      Other Visit Diagnoses     BMI 25 0-25 9,adult        Mixed hyperlipidemia        Relevant Orders    Lipid panel        Orders recommendations as noted above  She is improving with current medications  Continue with protein supplementation  Try to remain as active as possible but be careful to avoid falls  Continue follow weight  She declines any further investigation for the weight loss especially since she is improving  She declines flu shot in the fall  Will have her follow up in about 2 months or sooner if needed  Reason for visit is   Chief Complaint   Patient presents with    Virtual Regular Visit        Encounter provider Indra Mcgovern MD    Provider located at [address] [place] [address]  [address] [place] [address]  [id number]      Recent Visits  Date Type Provider Dept   08/17/20 Polina Lovelace MD  [facility]   Showing recent visits within past 7 days and meeting all other requirements     Future Appointments  No visits were found meeting these conditions  Showing future appointments within next 150 days and meeting all other requirements        The patient was identified by name and date of birth   Cole Russelltler was informed that this is a telemedicine visit and that the visit is being conducted through [address] and patient was informed that this is not a secure, HIPAA-complaint platform  She agrees to proceed     My office door was closed  No one else was in the room  She acknowledged consent and understanding of privacy and security of the video platform  The patient has agreed to participate and understands they can discontinue the visit at any time  Patient is aware this is a billable service  Subjective  Jovanny Fletcher is a 80 y o  female evaluated via face time for follow-up  She was evaluated via face time for follow-up  She has been eating better  She got a new set of dentures which has definitely helped  Daughter has been supplementing the protein in her diet  She feels stronger  Less short of breath  Energy level improved  Denies any chest pain or palpitations  She feels that her balance is better as well  Denies any abdominal pain  She does not want any CT scans or any other further significant investigation since even if the CT scan showed something significant she would not want any treatment  Past Medical History:   Diagnosis Date    Anemia     Atrial fibrillation (HCC)     Atrial flutter (HCC)     Depression     Diastolic CHF ([address] )     Dyslipidemia     Glaucoma     H/O: hysterectomy     Hypertension     Hyperthyroidism     Impaired glucose tolerance     RESOLVED 2/3/2016    Pacemaker     Psoriasis     S/P cataract surgery     S/P knee replacement     SSS (sick sinus syndrome) (HCC)     SSS (sick sinus syndrome) ([address] )        Past Surgical History:   Procedure Laterality Date    CARDIAC PACEMAKER PLACEMENT      CARDIAC PACEMAKER PLACEMENT      dual chamber    CATARACT EXTRACTION      ESOPHAGOGASTRODUODENOSCOPY N/A 12/30/2016    Procedure: ESOPHAGOGASTRODUODENOSCOPY (EGD); Surgeon: Elissa Carbone MD;  Location: [facility];   Service:     EYE SURGERY      cataract    HYSTERECTOMY      JOINT REPLACEMENT      bilateral     NH COLONOSCOPY FLX DX W/COLLJ SPEC WHEN PFRMD N/A 4/24/2017    Procedure: COLONOSCOPY with polypectomy;  Surgeon: Thuy Mejia MD;  Location: [facility];   Service: Gastroenterology    REPLACEMENT TOTAL KNEE Bilateral        Current Outpatient Medications   Medication Sig Dispense Refill    ALFALFA PO Take 3 tablets by mouth        aspirin (ECOTRIN LOW STRENGTH) 81 mg EC tablet Take 81 mg by mouth daily      B Complex Vitamins (B COMPLEX 1 PO) Take by mouth      buPROPion (WELLBUTRIN SR) 150 mg 12 hr tablet Take 1 tablet (150 mg total) by mouth daily 90 tablet 1    cholecalciferol (VITAMIN D3) 1,000 units tablet Take 1 tablet by mouth daily 30 tablet 0    diltiazem (CARDIZEM CD) 240 mg 24 hr capsule Take 1 capsule (240 mg total) by mouth daily 90 capsule 3    dronabinol (MARINOL) 5 MG capsule Take 1 capsule (5 mg total) by mouth 2 (two) times a day before meals 60 capsule 1    fluocinonide (LIDEX) 0 05 % external solution Apply topically 2 (two) times a day as needed for irritation 180 mL 3    furosemide (LASIX) 40 mg tablet TAKE 1 TABLET TWICE A  tablet 3    guaiFENesin (MUCINEX) 600 mg 12 hr tablet Take 1 tablet (600 mg total) by mouth every 12 (twelve) hours 10 tablet 0    isosorbide mononitrate (IMDUR) 60 mg 24 hr tablet TAKE 1 TABLET BY MOUTH EVERY DAY 30 tablet 0    KLOR-CON M20 20 MEQ tablet TAKE 1 TABLET DAILY 90 tablet 3    LECITHIN PO Take 1 tablet by mouth daily      methimazole (TAPAZOLE) 5 mg tablet TAKE 1 TABLET THREE TIMES  WEEKLY MONDAY, WEDNESDAY,  AND FRIDAY 36 tablet 3    metoprolol tartrate (LOPRESSOR) 50 mg tablet TAKE 1 TABLET EVERY 12     HOURS 180 tablet 1    Omega-3 Fatty Acids (OMEGA-3 PO) Take by mouth 2 (two) times a day        sertraline (ZOLOFT) 100 mg tablet TAKE 1 TABLET DAILY 90 tablet 1    simvastatin (ZOCOR) 20 mg tablet Take 1 tablet (20 mg total) by mouth daily at bedtime 90 tablet 3    Zinc Sulfate (ZINC 15 PO) Take 100 mg by mouth        ketoconazole (NIZORAL) 2 % shampoo Apply 1 application topically once for 1 dose 100 mL 3     No current facility-administered medications for this visit  No Known Allergies    Review of Systems   Constitutional: Positive for activity change and appetite change  Negative for fatigue and fever  HENT: Positive for hearing loss  Negative for congestion  Respiratory: Negative for cough and shortness of breath  Cardiovascular: Negative for chest pain and palpitations  Gastrointestinal: Negative for anal bleeding, blood in stool and diarrhea  Musculoskeletal: Negative for arthralgias and back pain  Psychiatric/Behavioral: Negative for decreased concentration  Video Exam    Vitals:    08/17/20 1332   BP: 114/75   Weight: 71 7 kg (158 lb)       Physical Exam  Vitals signs and nursing note reviewed  Constitutional:       Appearance: She is well-developed  Pulmonary:      Effort: No tachypnea or respiratory distress  Skin:     Coloration: Skin is not pale  Neurological:      Mental Status: She is alert and oriented to person, place, and time  Psychiatric:         Mood and Affect: Mood and affect normal          Behavior: Behavior is cooperative  I spent 15 minutes directly with the patient during this visit      VIRTUAL VISIT DISCLAIMER    Margo Mishra acknowledges that she has consented to an online visit or consultation  She understands that the online visit is based solely on information provided by her, and that, in the absence of a face-to-face physical evaluation by the physician, the diagnosis she receives is both limited and provisional in terms of accuracy and completeness  This is not intended to replace a full medical face-to-face evaluation by the physician  Margo Mishra understands and accepts these terms

## 2020-08-17 NOTE — PROGRESS NOTES
Assessment/Plan:    No problem-specific Assessment & Plan notes found for this encounter  There are no diagnoses linked to this encounter  Subjective:      Patient ID: Miki Esquivel is a 80 y o  female  HPI    The following portions of the patient's history were reviewed and updated as appropriate: She  has a past medical history of Anemia, Atrial fibrillation ([facility] ), Atrial flutter ([facility] ), Depression, Diastolic CHF ([facility] ), Dyslipidemia, Glaucoma, H/O: hysterectomy, Hypertension, Hyperthyroidism, Impaired glucose tolerance, Pacemaker, Psoriasis, S/P cataract surgery, S/P knee replacement, SSS (sick sinus syndrome) ([facility] ), and SSS (sick sinus syndrome) ([facility] )  She   Patient Active Problem List    Diagnosis Date Noted    Moderate protein-calorie malnutrition ([facility] ) 07/02/2020    Contusion of right thigh 04/29/2020    Multiple thyroid nodules 04/29/2020    Weight loss 01/22/2020    Dyspnea on exertion 09/20/2017    Osteoporosis 03/09/2017    Paroxysmal atrial fibrillation ([facility] ) 02/22/2017    Anemia 02/18/2017    Renal atrophy 02/18/2017    Hyperphosphatemia 02/18/2017    Vitamin D deficiency 02/18/2017    Hypokalemia 02/17/2017    Pulmonary hypertension ([facility] ) 02/17/2017    VAZQUEZ (acute kidney injury) ([facility] ) 02/17/2017    Presence of permanent cardiac pacemaker     H/O: hysterectomy     Benign essential hypertension     Hyperthyroidism     Glaucoma     Chronic diastolic congestive heart failure (HCC)     SSS (sick sinus syndrome) ([facility] )     Depression     Depression with anxiety 08/12/2015    Fatigue 11/18/2014    Vertigo 11/18/2014    Atrial flutter ([facility] ) 10/01/2014    Dyslipidemia 09/12/2012    Psoriasis 09/12/2012     She  has a past surgical history that includes Cardiac pacemaker placement; Eye surgery; Hysterectomy; Cardiac pacemaker placement; Joint replacement; Esophagogastroduodenoscopy (N/A, 12/30/2016);  Cataract extraction; pr colonoscopy flx dx w/collj spec when pfrmd (N/A, 4/24/2017); and Replacement total knee (Bilateral)  Her family history includes Alcohol abuse in her brother and father; Diabetes in her father  She  reports that she has quit smoking  She has never used smokeless tobacco  She reports that she does not drink alcohol or use drugs    Current Outpatient Medications   Medication Sig Dispense Refill    ALFALFA PO Take 3 tablets by mouth        aspirin (ECOTRIN LOW STRENGTH) 81 mg EC tablet Take 81 mg by mouth daily      B Complex Vitamins (B COMPLEX 1 PO) Take by mouth      benzonatate (TESSALON PERLES) 100 mg capsule Take 1 capsule (100 mg total) by mouth 3 (three) times a day as needed for cough (Patient not taking: Reported on 7/2/2020) 20 capsule 0    buPROPion (WELLBUTRIN SR) 150 mg 12 hr tablet Take 1 tablet (150 mg total) by mouth daily 90 tablet 1    cholecalciferol (VITAMIN D3) 1,000 units tablet Take 1 tablet by mouth daily 30 tablet 0    diltiazem (CARDIZEM CD) 240 mg 24 hr capsule Take 1 capsule (240 mg total) by mouth daily 90 capsule 3    dronabinol (MARINOL) 5 MG capsule Take 1 capsule (5 mg total) by mouth 2 (two) times a day before meals 60 capsule 1    fluocinonide (LIDEX) 0 05 % external solution Apply topically 2 (two) times a day as needed for irritation 180 mL 3    furosemide (LASIX) 40 mg tablet TAKE 1 TABLET TWICE A  tablet 3    guaiFENesin (MUCINEX) 600 mg 12 hr tablet Take 1 tablet (600 mg total) by mouth every 12 (twelve) hours 10 tablet 0    isosorbide mononitrate (IMDUR) 60 mg 24 hr tablet TAKE 1 TABLET BY MOUTH EVERY DAY 30 tablet 0    ketoconazole (NIZORAL) 2 % shampoo Apply 1 application topically once for 1 dose 100 mL 3    KLOR-CON M20 20 MEQ tablet TAKE 1 TABLET DAILY 90 tablet 3    LECITHIN PO Take 1 tablet by mouth daily      methimazole (TAPAZOLE) 5 mg tablet TAKE 1 TABLET THREE TIMES  WEEKLY MONDAY, WEDNESDAY,  AND FRIDAY 36 tablet 3    metoprolol tartrate (LOPRESSOR) 50 mg tablet TAKE 1 TABLET EVERY 12 HOURS 180 tablet 1    Omega-3 Fatty Acids (OMEGA-3 PO) Take by mouth 2 (two) times a day        sertraline (ZOLOFT) 100 mg tablet TAKE 1 TABLET DAILY 90 tablet 1    simvastatin (ZOCOR) 20 mg tablet Take 1 tablet (20 mg total) by mouth daily at bedtime 90 tablet 3    Zinc Sulfate (ZINC 15 PO) Take 100 mg by mouth         No current facility-administered medications for this visit        Current Outpatient Medications on File Prior to Visit   Medication Sig    ALFALFA PO Take 3 tablets by mouth      aspirin (ECOTRIN LOW STRENGTH) 81 mg EC tablet Take 81 mg by mouth daily    B Complex Vitamins (B COMPLEX 1 PO) Take by mouth    benzonatate (TESSALON PERLES) 100 mg capsule Take 1 capsule (100 mg total) by mouth 3 (three) times a day as needed for cough (Patient not taking: Reported on 7/2/2020)    buPROPion (WELLBUTRIN SR) 150 mg 12 hr tablet Take 1 tablet (150 mg total) by mouth daily    cholecalciferol (VITAMIN D3) 1,000 units tablet Take 1 tablet by mouth daily    diltiazem (CARDIZEM CD) 240 mg 24 hr capsule Take 1 capsule (240 mg total) by mouth daily    dronabinol (MARINOL) 5 MG capsule Take 1 capsule (5 mg total) by mouth 2 (two) times a day before meals    fluocinonide (LIDEX) 0 05 % external solution Apply topically 2 (two) times a day as needed for irritation    furosemide (LASIX) 40 mg tablet TAKE 1 TABLET TWICE A DAY    guaiFENesin (MUCINEX) 600 mg 12 hr tablet Take 1 tablet (600 mg total) by mouth every 12 (twelve) hours    isosorbide mononitrate (IMDUR) 60 mg 24 hr tablet TAKE 1 TABLET BY MOUTH EVERY DAY    ketoconazole (NIZORAL) 2 % shampoo Apply 1 application topically once for 1 dose    KLOR-CON M20 20 MEQ tablet TAKE 1 TABLET DAILY    LECITHIN PO Take 1 tablet by mouth daily    methimazole (TAPAZOLE) 5 mg tablet TAKE 1 TABLET THREE TIMES  WEEKLY MONDAY, WEDNESDAY,  AND FRIDAY    metoprolol tartrate (LOPRESSOR) 50 mg tablet TAKE 1 TABLET EVERY 12     HOURS    Omega-3 Fatty Acids (OMEGA-3 PO) Take by mouth 2 (two) times a day      sertraline (ZOLOFT) 100 mg tablet TAKE 1 TABLET DAILY    simvastatin (ZOCOR) 20 mg tablet Take 1 tablet (20 mg total) by mouth daily at bedtime    Zinc Sulfate (ZINC 15 PO) Take 100 mg by mouth       No current facility-administered medications on file prior to visit  She has No Known Allergies       Review of Systems   Constitutional: Negative for activity change, appetite change, chills and fever  HENT: Negative for congestion and rhinorrhea  Eyes: Negative for visual disturbance  Respiratory: Negative for chest tightness and shortness of breath  Cardiovascular: Negative for chest pain and palpitations  Gastrointestinal: Negative for abdominal pain, blood in stool, diarrhea, nausea and vomiting  Endocrine: Negative for polydipsia, polyphagia and polyuria  Genitourinary: Negative for dysuria, frequency and urgency  Musculoskeletal: Negative for gait problem  Skin: Negative for color change  Neurological: Negative for dizziness and headaches  Hematological: Does not bruise/bleed easily  Psychiatric/Behavioral: Negative for confusion and sleep disturbance  The patient is not nervous/anxious  Objective:      LMP  (LMP Unknown)          Physical Exam  Constitutional:       General: She is not in acute distress  HENT:      Head: Normocephalic and atraumatic  Eyes:      General:         Right eye: No discharge  Left eye: No discharge  Conjunctiva/sclera: Conjunctivae normal       Pupils: Pupils are equal, round, and reactive to light  Cardiovascular:      Rate and Rhythm: Normal rate and regular rhythm  Heart sounds: Normal heart sounds  No murmur  No friction rub  No gallop  Pulmonary:      Effort: No respiratory distress  Breath sounds: No wheezing or rales  Abdominal:      General: Bowel sounds are normal  There is no distension  Tenderness: There is no abdominal tenderness  Lymphadenopathy:      Cervical: No cervical adenopathy  Skin:     General: Skin is warm and dry  Neurological:      Mental Status: She is oriented to person, place, and time  Psychiatric:         Behavior: Behavior normal              Below is the patient's most recent value for Albumin, ALT, AST, BUN, Calcium, Chloride, Cholesterol, CO2, Creatinine, GFR, Glucose, HDL, Hematocrit, Hemoglobin, Hemoglobin A1C, LDL, Magnesium, Phosphorus, Platelets, Potassium, PSA, Sodium, Triglycerides, and WBC  Lab Results   Component Value Date    ALT 19 06/17/2020    AST 28 06/17/2020    BUN 23 06/17/2020    CALCIUM 9 2 06/17/2020     06/17/2020    CHOL 153 10/24/2015    CO2 33 (H) 06/17/2020    CREATININE 1 48 (H) 06/17/2020    HDL 62 (H) 09/23/2019    HCT 43 5 06/17/2020    HGB 13 5 06/17/2020    HGBA1C 5 5 01/25/2016    MG 2 2 04/26/2020    PHOS 3 7 02/21/2017     06/17/2020    K 3 6 06/17/2020     11/18/2015    TRIG 105 09/23/2019    WBC 9 27 06/17/2020     Note: for a comprehensive list of the patient's lab results, access the Results Review activity

## 2020-09-24 ENCOUNTER — REMOTE DEVICE CLINIC VISIT (OUTPATIENT)
Dept: CARDIOLOGY CLINIC | Facility: CLINIC | Age: 85
End: 2020-09-24
Payer: MEDICARE

## 2020-09-24 DIAGNOSIS — Z95.0 PRESENCE OF CARDIAC PACEMAKER: Primary | ICD-10-CM

## 2020-09-24 PROCEDURE — 93294 REM INTERROG EVL PM/LDLS PM: CPT | Performed by: INTERNAL MEDICINE

## 2020-09-24 PROCEDURE — 93296 REM INTERROG EVL PM/IDS: CPT | Performed by: INTERNAL MEDICINE

## 2020-09-24 NOTE — PROGRESS NOTES
Results for orders placed or performed in visit on 09/24/20   Cardiac EP device report    Narrative    SJM-DUAL-PM/ NOT MRI CONDITIONAL  MERLIN TRANSMISSION: BATTERY VOLTAGE ADEQUATE (8 7-9 4 YRS)  AP 36%  51% (DDDR 60)  ALL AVAILABLE LEAD PARAMETERS WITHIN NORMAL LIMITS  8 AMS EPISODES W/ALL AVAIL EGRMS FOR FF O/S ON ATRIAL CHANNEL, PAT (NO AF)  ALL DURATION <1 MIN  PT ON ASA 81, DILTIAZEM, METOPROLOL TART  NORMAL DEVICE FUNCTION   EB

## 2020-10-28 DIAGNOSIS — F32.A DEPRESSION, UNSPECIFIED DEPRESSION TYPE: ICD-10-CM

## 2020-10-28 RX ORDER — BUPROPION HYDROCHLORIDE 150 MG/1
150 TABLET, EXTENDED RELEASE ORAL DAILY
Qty: 90 TABLET | Refills: 1 | Status: SHIPPED | OUTPATIENT
Start: 2020-10-28 | End: 2020-11-02 | Stop reason: SDUPTHER

## 2020-11-02 DIAGNOSIS — F32.A DEPRESSION, UNSPECIFIED DEPRESSION TYPE: ICD-10-CM

## 2020-11-02 RX ORDER — BUPROPION HYDROCHLORIDE 150 MG/1
150 TABLET, EXTENDED RELEASE ORAL DAILY
Qty: 14 TABLET | Refills: 0 | Status: SHIPPED | OUTPATIENT
Start: 2020-11-02 | End: 2021-02-22 | Stop reason: SDUPTHER

## 2020-11-18 DIAGNOSIS — E05.90 HYPERTHYROIDISM: ICD-10-CM

## 2020-11-20 RX ORDER — METHIMAZOLE 5 MG/1
5 TABLET ORAL 3 TIMES DAILY
Qty: 90 TABLET | Refills: 3 | Status: SHIPPED | OUTPATIENT
Start: 2020-11-20 | End: 2021-07-28

## 2020-12-10 ENCOUNTER — TELEPHONE (OUTPATIENT)
Dept: CARDIOLOGY CLINIC | Facility: CLINIC | Age: 85
End: 2020-12-10

## 2020-12-28 DIAGNOSIS — I48.0 PAROXYSMAL ATRIAL FIBRILLATION (HCC): ICD-10-CM

## 2020-12-28 RX ORDER — DILTIAZEM HYDROCHLORIDE 240 MG/1
240 CAPSULE, COATED, EXTENDED RELEASE ORAL DAILY
Qty: 90 CAPSULE | Refills: 0 | Status: SHIPPED | OUTPATIENT
Start: 2020-12-28 | End: 2020-12-31 | Stop reason: SDUPTHER

## 2020-12-31 DIAGNOSIS — I48.0 PAROXYSMAL ATRIAL FIBRILLATION (HCC): ICD-10-CM

## 2020-12-31 RX ORDER — DILTIAZEM HYDROCHLORIDE 240 MG/1
240 CAPSULE, COATED, EXTENDED RELEASE ORAL DAILY
Qty: 14 CAPSULE | Refills: 0 | Status: SHIPPED | OUTPATIENT
Start: 2020-12-31 | End: 2021-01-12 | Stop reason: SDUPTHER

## 2021-01-03 DIAGNOSIS — F41.8 DEPRESSION WITH ANXIETY: ICD-10-CM

## 2021-01-03 DIAGNOSIS — E78.2 MIXED HYPERLIPIDEMIA: ICD-10-CM

## 2021-01-04 RX ORDER — SERTRALINE HYDROCHLORIDE 100 MG/1
TABLET, FILM COATED ORAL
Qty: 90 TABLET | Refills: 1 | Status: SHIPPED | OUTPATIENT
Start: 2021-01-04 | End: 2021-06-27

## 2021-01-04 RX ORDER — SIMVASTATIN 20 MG
TABLET ORAL
Qty: 90 TABLET | Refills: 3 | Status: SHIPPED | OUTPATIENT
Start: 2021-01-04 | End: 2021-10-07

## 2021-01-12 DIAGNOSIS — I48.0 PAROXYSMAL ATRIAL FIBRILLATION (HCC): ICD-10-CM

## 2021-01-12 RX ORDER — DILTIAZEM HYDROCHLORIDE 240 MG/1
240 CAPSULE, COATED, EXTENDED RELEASE ORAL DAILY
Qty: 14 CAPSULE | Refills: 1 | Status: SHIPPED | OUTPATIENT
Start: 2021-01-12 | End: 2021-01-19 | Stop reason: SDUPTHER

## 2021-01-18 ENCOUNTER — REMOTE DEVICE CLINIC VISIT (OUTPATIENT)
Dept: CARDIOLOGY CLINIC | Facility: CLINIC | Age: 86
End: 2021-01-18
Payer: MEDICARE

## 2021-01-18 DIAGNOSIS — Z95.0 PRESENCE OF CARDIAC PACEMAKER: Primary | ICD-10-CM

## 2021-01-18 PROCEDURE — 93296 REM INTERROG EVL PM/IDS: CPT | Performed by: INTERNAL MEDICINE

## 2021-01-18 PROCEDURE — 93294 REM INTERROG EVL PM/LDLS PM: CPT | Performed by: INTERNAL MEDICINE

## 2021-01-18 NOTE — PROGRESS NOTES
Results for orders placed or performed in visit on 01/18/21   Cardiac EP device report    Narrative    SJM-DUAL-PM/ NOT MRI CONDITIONAL  MERLIN TRANSMISSION: BATTERY VOLTAGE ADEQUATE (8 5 YRS)  AP: 99%  : <1%  ALL AVAILABLE LEAD PARAMETERS WITHIN NORMAL LIMITS  24 AMS EPISODES W/ AVAIL EGRMS SHOWING PAT & 1 EPISODE SHOWS 30 MINS OF AF ON 12/23/20  PT DOES NOT TAKE AC, PT REFUSED IN THE PAST, WATCHMAN WAS DISCUSSED  PT HAS APPT W/ /DR TYSON ON 2/26/21   (TASK TO DR Merri Duverney)  AF BURDEN: <1%  PT TAKES CARDIZEM CD, METOPROLOL TART, ASA 81MG  EF: 60% (ECHO 12/12/18)  PACEMAKER FUNCTIONING APPROPRIATELY   [address] [place] [address] Street

## 2021-01-19 DIAGNOSIS — I48.0 PAROXYSMAL ATRIAL FIBRILLATION (HCC): ICD-10-CM

## 2021-01-19 RX ORDER — DILTIAZEM HYDROCHLORIDE 240 MG/1
240 CAPSULE, COATED, EXTENDED RELEASE ORAL DAILY
Qty: 90 CAPSULE | Refills: 0 | Status: SHIPPED | OUTPATIENT
Start: 2021-01-19 | End: 2021-01-19 | Stop reason: SDUPTHER

## 2021-01-19 RX ORDER — DILTIAZEM HYDROCHLORIDE 240 MG/1
240 CAPSULE, COATED, EXTENDED RELEASE ORAL DAILY
Qty: 90 CAPSULE | Refills: 0 | Status: SHIPPED | OUTPATIENT
Start: 2021-01-19 | End: 2021-07-19 | Stop reason: SDUPTHER

## 2021-01-25 DIAGNOSIS — I48.0 PAROXYSMAL ATRIAL FIBRILLATION (HCC): ICD-10-CM

## 2021-01-25 RX ORDER — METOPROLOL TARTRATE 50 MG/1
TABLET, FILM COATED ORAL
Qty: 180 TABLET | Refills: 1 | Status: SHIPPED | OUTPATIENT
Start: 2021-01-25 | End: 2021-06-27

## 2021-02-22 DIAGNOSIS — F32.A DEPRESSION, UNSPECIFIED DEPRESSION TYPE: ICD-10-CM

## 2021-02-22 RX ORDER — BUPROPION HYDROCHLORIDE 150 MG/1
150 TABLET, EXTENDED RELEASE ORAL DAILY
Qty: 90 TABLET | Refills: 3 | Status: SHIPPED | OUTPATIENT
Start: 2021-02-22 | End: 2021-07-28

## 2021-02-26 ENCOUNTER — IN-CLINIC DEVICE VISIT (OUTPATIENT)
Dept: CARDIOLOGY CLINIC | Facility: CLINIC | Age: 86
End: 2021-02-26
Payer: MEDICARE

## 2021-02-26 ENCOUNTER — OFFICE VISIT (OUTPATIENT)
Dept: CARDIOLOGY CLINIC | Facility: CLINIC | Age: 86
End: 2021-02-26
Payer: MEDICARE

## 2021-02-26 VITALS
SYSTOLIC BLOOD PRESSURE: 130 MMHG | HEIGHT: 66 IN | HEART RATE: 63 BPM | BODY MASS INDEX: 28.93 KG/M2 | DIASTOLIC BLOOD PRESSURE: 54 MMHG | WEIGHT: 180 LBS

## 2021-02-26 DIAGNOSIS — I50.32 CHRONIC DIASTOLIC CONGESTIVE HEART FAILURE (HCC): ICD-10-CM

## 2021-02-26 DIAGNOSIS — E78.5 DYSLIPIDEMIA: ICD-10-CM

## 2021-02-26 DIAGNOSIS — I10 BENIGN ESSENTIAL HYPERTENSION: ICD-10-CM

## 2021-02-26 DIAGNOSIS — Z95.0 PRESENCE OF PERMANENT CARDIAC PACEMAKER: Primary | ICD-10-CM

## 2021-02-26 DIAGNOSIS — I49.5 SSS (SICK SINUS SYNDROME) (HCC): ICD-10-CM

## 2021-02-26 DIAGNOSIS — Z95.0 PRESENCE OF PERMANENT CARDIAC PACEMAKER: ICD-10-CM

## 2021-02-26 DIAGNOSIS — I48.0 PAROXYSMAL ATRIAL FIBRILLATION (HCC): Primary | ICD-10-CM

## 2021-02-26 PROCEDURE — 93280 PM DEVICE PROGR EVAL DUAL: CPT | Performed by: INTERNAL MEDICINE

## 2021-02-26 PROCEDURE — 93000 ELECTROCARDIOGRAM COMPLETE: CPT | Performed by: INTERNAL MEDICINE

## 2021-02-26 PROCEDURE — 99214 OFFICE O/P EST MOD 30 MIN: CPT | Performed by: INTERNAL MEDICINE

## 2021-02-26 NOTE — PROGRESS NOTES
Cardiology Follow Up    [facility]  4/6/1932  [id number]  [facility]  [address] [address]  Phone#  [phone]  Fax#  [phone]    1  Paroxysmal atrial fibrillation (HCC)     2  SSS (sick sinus syndrome) ([facility] )     3  Presence of permanent cardiac pacemaker     4  Chronic diastolic congestive heart failure ([facility] )     5  Benign essential hypertension     6  Dyslipidemia         Discussion/Summary:  Mrs Dain Vega is a pleasant 26-year-old female who presents to the office today for routine follow-up  Since her last visit she has been feeling relatively well except for shortness of breath  This is unchanged since her last visit  She did undergo a stress test in 2019 which was unremarkable  An echocardiogram in 2018 was unremarkable  No further workup is recommended  Regarding her atrial fibrillation, she continues to maintain normal sinus rhythm with intermittent episodes of atrial fibrillation noted on her device checks  She is not on anticoagulation due to a history of GI bleeding  We discussed a Watchman device which she and her daughter will contemplate  She is due for a device check after our office visit today  Her blood pressure is well controlled in the office today  I have no recent assessment of her lipids  She does have blood work ordered by her primary care provider and I will await the results  She appears euvolemic on exam on her current diuretic regimen to which no changes were made  Low-salt diet was reinforced  I will see her back in the office in six months or sooner if deemed necessary  Interval History:   Mrs Dain Vega is a pleasant 26-year-old female who presents to the office today for routine followup  Since her last visit she continues with exertional shortness of breath with walking short distances in her home  This is unchanged since her last visit    She denies exertional chest pain  She denies signs or symptoms of heart failure including increasing lower extremity edema, paroxysmal nocturnal dyspnea or orthopnea  She denies signs or symptoms of recurrent atrial fibrillation  She denies symptoms of claudication  She denies any recurrent rectal bleeding  Problem List     Pacemaker    H/O: hysterectomy    Chronic atrial fibrillation (HCC)    Essential hypertension    Hyperlipidemia    Hyperthyroidism    Glaucoma    Chronic diastolic congestive heart failure (HCC)    SSS (sick sinus syndrome) (HCC)    Depression    Hypokalemia    Pulmonary hypertension (HCC)    Anemia    Renal atrophy    Hyperphosphatemia    Vitamin D deficiency    Atrial flutter ([facility] )    Overview Signed 3/15/2018 11:25 AM by Carlotta Short MD     Description: Typical right-sided isthmus dependent - counterclockwise - CHADS2 = 2  AV block    Depression with anxiety    Dyslipidemia    Dyspnea    Fatigue    Osteoporosis    Paroxysmal atrial fibrillation (HCC)    Psoriasis    Vertigo        Past Medical History:   Diagnosis Date    Anemia     Atrial fibrillation (HCC)     Atrial flutter (HCC)     Depression     Diastolic CHF ([address] )     Dyslipidemia     Glaucoma     H/O: hysterectomy     Hypertension     Hyperthyroidism     Impaired glucose tolerance     RESOLVED 2/3/2016    Pacemaker     Psoriasis     S/P cataract surgery     S/P knee replacement     SSS (sick sinus syndrome) (HCC)     SSS (sick sinus syndrome) ([address] )      Social History     Socioeconomic History    Marital status:       Spouse name: Not on file    Number of children: Not on file    Years of education: Not on file    Highest education level: Not on file   Occupational History    Not on file   Social Needs    Financial resource strain: Not on file    Food insecurity     Worry: Not on file     Inability: Not on file    Transportation needs     Medical: Not on file     Non-medical: Not on file   Tobacco Use    Smoking status: Former Smoker    Smokeless tobacco: Never Used   Substance and Sexual Activity    Alcohol use: No    Drug use: No    Sexual activity: Not Currently   Lifestyle    Physical activity     Days per week: Not on file     Minutes per session: Not on file    Stress: Not on file   Relationships    Social connections     Talks on phone: Not on file     Gets together: Not on file     Attends Alevism service: Not on file     Active member of club or organization: Not on file     Attends meetings of clubs or organizations: Not on file     Relationship status: Not on file    Intimate partner violence     Fear of current or ex partner: Not on file     Emotionally abused: Not on file     Physically abused: Not on file     Forced sexual activity: Not on file   Other Topics Concern    Not on file   Social History Narrative    Not on file      Family History   Problem Relation Age of Onset    Alcohol abuse Father     Diabetes Father     Alcohol abuse Brother      Past Surgical History:   Procedure Laterality Date    CARDIAC PACEMAKER PLACEMENT      CARDIAC PACEMAKER PLACEMENT      dual chamber    CATARACT EXTRACTION      ESOPHAGOGASTRODUODENOSCOPY N/A 12/30/2016    Procedure: ESOPHAGOGASTRODUODENOSCOPY (EGD); Surgeon: Erwin Zhao MD;  Location: [facility]; Service:     EYE SURGERY      cataract    HYSTERECTOMY      JOINT REPLACEMENT      bilateral     MI COLONOSCOPY FLX DX W/COLLJ SPEC WHEN PFRMD N/A 4/24/2017    Procedure: COLONOSCOPY with polypectomy;  Surgeon: Linda Serna MD;  Location: [facility];   Service: Gastroenterology    REPLACEMENT TOTAL KNEE Bilateral        Current Outpatient Medications:     ALFALFA PO, Take 3 tablets by mouth  , Disp: , Rfl:     aspirin (ECOTRIN LOW STRENGTH) 81 mg EC tablet, Take 81 mg by mouth daily, Disp: , Rfl:     B Complex Vitamins (B COMPLEX 1 PO), Take by mouth, Disp: , Rfl:     buPROPion (WELLBUTRIN SR) 150 mg 12 hr tablet, Take 1 tablet (150 mg total) by mouth daily, Disp: 90 tablet, Rfl: 3    cholecalciferol (VITAMIN D3) 1,000 units tablet, Take 1 tablet by mouth daily, Disp: 30 tablet, Rfl: 0    diltiazem (CARDIZEM CD) 240 mg 24 hr capsule, Take 1 capsule (240 mg total) by mouth daily, Disp: 90 capsule, Rfl: 0    dronabinol (MARINOL) 5 MG capsule, Take 1 capsule (5 mg total) by mouth 2 (two) times a day before meals, Disp: 60 capsule, Rfl: 1    fluocinonide (LIDEX) 0 05 % external solution, Apply topically 2 (two) times a day as needed for irritation, Disp: 180 mL, Rfl: 3    furosemide (LASIX) 40 mg tablet, TAKE 1 TABLET TWICE A DAY, Disp: 180 tablet, Rfl: 3    guaiFENesin (MUCINEX) 600 mg 12 hr tablet, Take 1 tablet (600 mg total) by mouth every 12 (twelve) hours, Disp: 10 tablet, Rfl: 0    isosorbide mononitrate (IMDUR) 60 mg 24 hr tablet, TAKE 1 TABLET BY MOUTH EVERY DAY, Disp: 30 tablet, Rfl: 0    ketoconazole (NIZORAL) 2 % shampoo, Apply 1 application topically once for 1 dose, Disp: 100 mL, Rfl: 3    KLOR-CON M20 20 MEQ tablet, TAKE 1 TABLET DAILY, Disp: 90 tablet, Rfl: 3    LECITHIN PO, Take 1 tablet by mouth daily, Disp: , Rfl:     methimazole (TAPAZOLE) 5 mg tablet, Take 1 tablet (5 mg total) by mouth 3 (three) times a day, Disp: 90 tablet, Rfl: 3    metoprolol tartrate (LOPRESSOR) 50 mg tablet, TAKE 1 TABLET EVERY 12     HOURS, Disp: 180 tablet, Rfl: 1    Omega-3 Fatty Acids (OMEGA-3 PO), Take by mouth 2 (two) times a day  , Disp: , Rfl:     sertraline (ZOLOFT) 100 mg tablet, TAKE 1 TABLET DAILY, Disp: 90 tablet, Rfl: 1    simvastatin (ZOCOR) 20 mg tablet, TAKE 1 TABLET DAILY AT     BEDTIME, Disp: 90 tablet, Rfl: 3    Zinc Sulfate (ZINC 15 PO), Take 100 mg by mouth  , Disp: , Rfl:   No Known Allergies    Labs:     Chemistry        Component Value Date/Time     11/18/2015 1318    K 3 6 06/17/2020 1025    K 3 9 11/18/2015 1318     06/17/2020 1025     11/18/2015 1318    CO2 33 (H) 06/17/2020 1025    CO2 29 2 11/18/2015 1318    BUN 23 06/17/2020 1025    BUN 13 11/18/2015 1318    CREATININE 1 48 (H) 06/17/2020 1025    CREATININE 1 13 11/18/2015 1318        Component Value Date/Time    CALCIUM 9 2 06/17/2020 1025    CALCIUM 9 1 11/18/2015 1318    ALKPHOS 104 06/17/2020 1025    ALKPHOS 71 10/26/2015 1012    AST 28 06/17/2020 1025    AST 28 10/26/2015 1012    ALT 19 06/17/2020 1025    ALT 29 10/26/2015 1012    BILITOT 0 94 10/26/2015 1012            Lab Results   Component Value Date    CHOL 153 10/24/2015    CHOL 157 09/08/2015    CHOL 193 05/13/2015     Lab Results   Component Value Date    HDL 62 (H) 09/23/2019    HDL 71 (H) 12/11/2018    HDL 63 (H) 08/28/2018     Lab Results   Component Value Date    LDLCALC 91 09/23/2019    LDLCALC 75 12/11/2018    LDLCALC 73 08/28/2018     Lab Results   Component Value Date    TRIG 105 09/23/2019    TRIG 102 12/11/2018    TRIG 84 08/28/2018     No results found for: CHOLHDL    Imaging: No results found  Review of Systems   Cardiovascular: Negative for chest pain, claudication, cyanosis, dyspnea on exertion, irregular heartbeat, leg swelling, near-syncope, orthopnea, palpitations, paroxysmal nocturnal dyspnea and syncope  All other systems reviewed and are negative  There were no vitals filed for this visit  There were no vitals filed for this visit  There is no height or weight on file to calculate BMI      Physical Exam:  General:  Alert and cooperative, appears stated age  HEENT:  PERRLA, EOMI, no scleral icterus, no conjunctival pallor  Neck:  No lymphadenopathy, no thyromegaly, no carotid bruits, no elevated JVP  Heart:  Regular rate and rhythm, normal S1/S2, no S3/S4, no murmur  Lungs:  Clear to auscultation bilaterally   Abdomen:  Soft, non-tender, positive bowel sounds, no rebound or guarding,   no organomegaly   Extremities:  No clubbing, cyanosis or edema   Vascular:  2+ pedal pulses  Skin:  No rashes or lesions on exposed skin  Neurologic:  Cranial nerves II-XII grossly intact without focal deficits

## 2021-02-26 NOTE — PROGRESS NOTES
Results for orders placed or performed in visit on 02/26/21   Cardiac EP device report    Narrative    SJM-DUAL-PM/ NOT MRI CONDITIONAL  DEVICE INTERROGATED IN THE [facility] OFFICE  BATTERY VOLTAGE ADEQUATE  (8 7 YRS)  AP 97%  <1%  ALL  LEAD PARAMETERS WITHIN NORMAL LIMITS  [address]  PATIENT REFUSES AC'S  NO SIGNIFICANT HIGH RATE EPISODES  NO PROGRAMMING CHANGES MADE TO DEVICE PARAMETERS  NORMAL DEVICE FUNCTION  ---HUNT

## 2021-03-02 DIAGNOSIS — Z23 ENCOUNTER FOR IMMUNIZATION: ICD-10-CM

## 2021-03-16 ENCOUNTER — TELEPHONE (OUTPATIENT)
Dept: INTERNAL MEDICINE CLINIC | Facility: CLINIC | Age: 86
End: 2021-03-16

## 2021-05-15 DIAGNOSIS — I50.32 CHRONIC DIASTOLIC CONGESTIVE HEART FAILURE (HCC): ICD-10-CM

## 2021-05-15 RX ORDER — FUROSEMIDE 40 MG/1
TABLET ORAL
Qty: 180 TABLET | Refills: 3 | Status: SHIPPED | OUTPATIENT
Start: 2021-05-15 | End: 2022-05-04

## 2021-06-08 ENCOUNTER — REMOTE DEVICE CLINIC VISIT (OUTPATIENT)
Dept: CARDIOLOGY CLINIC | Facility: CLINIC | Age: 86
End: 2021-06-08
Payer: MEDICARE

## 2021-06-08 DIAGNOSIS — Z95.0 PRESENCE OF CARDIAC PACEMAKER: Primary | ICD-10-CM

## 2021-06-08 PROCEDURE — 93294 REM INTERROG EVL PM/LDLS PM: CPT | Performed by: INTERNAL MEDICINE

## 2021-06-08 PROCEDURE — 93296 REM INTERROG EVL PM/IDS: CPT | Performed by: INTERNAL MEDICINE

## 2021-06-08 NOTE — PROGRESS NOTES
Results for orders placed or performed in visit on 06/08/21   Cardiac EP device report    Narrative    SJM-DUAL-PM/ NOT MRI CONDITIONAL  MERLIN TRANSMISSION: BATTERY VOLTAGE ADEQUATE (7 7 YRS)  AP: 52%  : 41% (>40%~DDDR/60)  ALL AVAILABLE LEAD PARAMETERS WITHIN NORMAL LIMITS  [address] AF IN PROGRESS (HX OF SAME)  AF BURDEN: 45%   PT DOES NOT TAKE AC DUE TO HX OF GI BLEED  PT TAKES METOPROLOL TART, CARDIZEN CD, ASA 81MG  EF: 60% (ECHO 12/12/18)  PACEMAKER FUNCTIONING APPROPRIATELY    [address] [place] [address] Street

## 2021-06-09 ENCOUNTER — TELEPHONE (OUTPATIENT)
Dept: INTERNAL MEDICINE CLINIC | Facility: CLINIC | Age: 86
End: 2021-06-09

## 2021-06-09 NOTE — TELEPHONE ENCOUNTER
Spoke with daughter to schedule patient follow up appt for patient, daughter will call us when she is home with appointment book to schedule a follow up with provider

## 2021-06-11 DIAGNOSIS — I50.32 CHRONIC DIASTOLIC CONGESTIVE HEART FAILURE (HCC): ICD-10-CM

## 2021-06-11 RX ORDER — POTASSIUM CHLORIDE 1500 MG/1
TABLET, EXTENDED RELEASE ORAL
Qty: 90 TABLET | Refills: 3 | Status: SHIPPED | OUTPATIENT
Start: 2021-06-11 | End: 2022-07-21 | Stop reason: SDUPTHER

## 2021-06-25 DIAGNOSIS — F41.8 DEPRESSION WITH ANXIETY: ICD-10-CM

## 2021-06-25 DIAGNOSIS — I48.0 PAROXYSMAL ATRIAL FIBRILLATION (HCC): ICD-10-CM

## 2021-06-27 RX ORDER — METOPROLOL TARTRATE 50 MG/1
TABLET, FILM COATED ORAL
Qty: 180 TABLET | Refills: 1 | Status: SHIPPED | OUTPATIENT
Start: 2021-06-27 | End: 2022-01-13

## 2021-06-27 RX ORDER — SERTRALINE HYDROCHLORIDE 100 MG/1
TABLET, FILM COATED ORAL
Qty: 90 TABLET | Refills: 1 | Status: SHIPPED | OUTPATIENT
Start: 2021-06-27 | End: 2021-10-14 | Stop reason: SDUPTHER

## 2021-07-19 DIAGNOSIS — I48.0 PAROXYSMAL ATRIAL FIBRILLATION (HCC): ICD-10-CM

## 2021-07-19 RX ORDER — DILTIAZEM HYDROCHLORIDE 240 MG/1
240 CAPSULE, COATED, EXTENDED RELEASE ORAL DAILY
Qty: 90 CAPSULE | Refills: 0 | Status: SHIPPED | OUTPATIENT
Start: 2021-07-19 | End: 2021-09-29 | Stop reason: SDUPTHER

## 2021-07-19 RX ORDER — DILTIAZEM HYDROCHLORIDE 240 MG/1
240 CAPSULE, COATED, EXTENDED RELEASE ORAL DAILY
Qty: 90 CAPSULE | Refills: 0 | Status: SHIPPED | OUTPATIENT
Start: 2021-07-19 | End: 2021-07-19 | Stop reason: SDUPTHER

## 2021-07-24 ENCOUNTER — NURSE TRIAGE (OUTPATIENT)
Dept: OTHER | Facility: OTHER | Age: 86
End: 2021-07-24

## 2021-07-24 DIAGNOSIS — I48.0 PAROXYSMAL ATRIAL FIBRILLATION (HCC): Primary | ICD-10-CM

## 2021-07-24 RX ORDER — DILTIAZEM HYDROCHLORIDE 240 MG/1
240 CAPSULE, COATED, EXTENDED RELEASE ORAL DAILY
Qty: 7 CAPSULE | Refills: 0 | Status: SHIPPED | OUTPATIENT
Start: 2021-07-24 | End: 2021-07-28

## 2021-07-25 NOTE — TELEPHONE ENCOUNTER
Per protocol, 7 day supply of patient's Cardizem ordered to local pharmacy because mail service will not deliver the medication until 7/29

## 2021-07-25 NOTE — TELEPHONE ENCOUNTER
Reason for Disposition   [1] Caller requesting a prescription renewal (no refills left), no triage required, AND [2] triager able to renew prescription per department policy    Answer Assessment - Initial Assessment Questions  1  NAME of MEDICATION: "What medicine are you calling about?"      Diltiazem 240mg     2  QUESTION: "What is your question?" (e g , medication refill, side effect)      Patient needs a medication refill  Patient's daughter reports the medication comes through the mail and the delivery isn't coming until 7/29 and the patient is out of her medication     3  PRESCRIBING HCP: "Who prescribed it?" Reason: if prescribed by specialist, call should be referred to that group  Cardiology     4   SYMPTOMS: "Do you have any symptoms?"     Denies    Protocols used: MEDICATION QUESTION CALL-ADULT-

## 2021-07-25 NOTE — TELEPHONE ENCOUNTER
Regarding: medication refill   ----- Message from Mani Garcia sent at 7/24/2021  8:13 PM EDT -----  "her mail order pharmacy did not deliver her medication diltiazem 240mg and she is completely out  I will need a refill sent to cover her until they deliver it   Please send to [facility] on [address] [facility] in [place]"

## 2021-07-28 ENCOUNTER — OFFICE VISIT (OUTPATIENT)
Dept: INTERNAL MEDICINE CLINIC | Facility: CLINIC | Age: 86
End: 2021-07-28
Payer: MEDICARE

## 2021-07-28 ENCOUNTER — APPOINTMENT (OUTPATIENT)
Dept: LAB | Facility: CLINIC | Age: 86
End: 2021-07-28
Payer: MEDICARE

## 2021-07-28 VITALS
TEMPERATURE: 97.3 F | WEIGHT: 176.5 LBS | SYSTOLIC BLOOD PRESSURE: 118 MMHG | HEIGHT: 66 IN | OXYGEN SATURATION: 96 % | BODY MASS INDEX: 28.37 KG/M2 | HEART RATE: 60 BPM | DIASTOLIC BLOOD PRESSURE: 72 MMHG

## 2021-07-28 DIAGNOSIS — I48.0 PAROXYSMAL ATRIAL FIBRILLATION (HCC): ICD-10-CM

## 2021-07-28 DIAGNOSIS — E78.5 DYSLIPIDEMIA: ICD-10-CM

## 2021-07-28 DIAGNOSIS — F32.A DEPRESSION, UNSPECIFIED DEPRESSION TYPE: ICD-10-CM

## 2021-07-28 DIAGNOSIS — I10 BENIGN ESSENTIAL HYPERTENSION: ICD-10-CM

## 2021-07-28 DIAGNOSIS — D64.9 ANEMIA, UNSPECIFIED TYPE: ICD-10-CM

## 2021-07-28 DIAGNOSIS — I27.20 PULMONARY HYPERTENSION (HCC): ICD-10-CM

## 2021-07-28 DIAGNOSIS — I10 BENIGN ESSENTIAL HYPERTENSION: Primary | ICD-10-CM

## 2021-07-28 DIAGNOSIS — I50.32 CHRONIC DIASTOLIC CONGESTIVE HEART FAILURE (HCC): ICD-10-CM

## 2021-07-28 DIAGNOSIS — E44.0 MODERATE PROTEIN-CALORIE MALNUTRITION (HCC): ICD-10-CM

## 2021-07-28 DIAGNOSIS — E05.90 HYPERTHYROIDISM: ICD-10-CM

## 2021-07-28 DIAGNOSIS — E55.9 VITAMIN D DEFICIENCY: ICD-10-CM

## 2021-07-28 DIAGNOSIS — L40.9 PSORIASIS: ICD-10-CM

## 2021-07-28 DIAGNOSIS — Z00.00 MEDICARE ANNUAL WELLNESS VISIT, SUBSEQUENT: ICD-10-CM

## 2021-07-28 DIAGNOSIS — I49.5 SSS (SICK SINUS SYNDROME) (HCC): ICD-10-CM

## 2021-07-28 LAB
25(OH)D3 SERPL-MCNC: 41.2 NG/ML (ref 30–100)
ALBUMIN SERPL BCP-MCNC: 3.5 G/DL (ref 3.5–5)
ALP SERPL-CCNC: 93 U/L (ref 46–116)
ALT SERPL W P-5'-P-CCNC: 26 U/L (ref 12–78)
ANION GAP SERPL CALCULATED.3IONS-SCNC: 5 MMOL/L (ref 4–13)
AST SERPL W P-5'-P-CCNC: 21 U/L (ref 5–45)
BASOPHILS # BLD AUTO: 0.05 THOUSANDS/ΜL (ref 0–0.1)
BASOPHILS NFR BLD AUTO: 1 % (ref 0–1)
BILIRUB SERPL-MCNC: 0.66 MG/DL (ref 0.2–1)
BUN SERPL-MCNC: 26 MG/DL (ref 5–25)
CALCIUM SERPL-MCNC: 9.2 MG/DL (ref 8.3–10.1)
CHLORIDE SERPL-SCNC: 102 MMOL/L (ref 100–108)
CHOLEST SERPL-MCNC: 131 MG/DL (ref 50–200)
CO2 SERPL-SCNC: 30 MMOL/L (ref 21–32)
CREAT SERPL-MCNC: 1.36 MG/DL (ref 0.6–1.3)
EOSINOPHIL # BLD AUTO: 0.25 THOUSAND/ΜL (ref 0–0.61)
EOSINOPHIL NFR BLD AUTO: 3 % (ref 0–6)
ERYTHROCYTE [DISTWIDTH] IN BLOOD BY AUTOMATED COUNT: 20.3 % (ref 11.6–15.1)
GFR SERPL CREATININE-BSD FRML MDRD: 35 ML/MIN/1.73SQ M
GLUCOSE P FAST SERPL-MCNC: 108 MG/DL (ref 65–99)
HCT VFR BLD AUTO: 41.3 % (ref 34.8–46.1)
HDLC SERPL-MCNC: 65 MG/DL
HGB BLD-MCNC: 11.9 G/DL (ref 11.5–15.4)
IMM GRANULOCYTES # BLD AUTO: 0.04 THOUSAND/UL (ref 0–0.2)
IMM GRANULOCYTES NFR BLD AUTO: 1 % (ref 0–2)
LDLC SERPL CALC-MCNC: 50 MG/DL (ref 0–100)
LYMPHOCYTES # BLD AUTO: 1.58 THOUSANDS/ΜL (ref 0.6–4.47)
LYMPHOCYTES NFR BLD AUTO: 18 % (ref 14–44)
MCH RBC QN AUTO: 22.9 PG (ref 26.8–34.3)
MCHC RBC AUTO-ENTMCNC: 28.8 G/DL (ref 31.4–37.4)
MCV RBC AUTO: 79 FL (ref 82–98)
MONOCYTES # BLD AUTO: 0.73 THOUSAND/ΜL (ref 0.17–1.22)
MONOCYTES NFR BLD AUTO: 9 % (ref 4–12)
NEUTROPHILS # BLD AUTO: 5.98 THOUSANDS/ΜL (ref 1.85–7.62)
NEUTS SEG NFR BLD AUTO: 68 % (ref 43–75)
NONHDLC SERPL-MCNC: 66 MG/DL
NRBC BLD AUTO-RTO: 0 /100 WBCS
PLATELET # BLD AUTO: 200 THOUSANDS/UL (ref 149–390)
PMV BLD AUTO: 10.2 FL (ref 8.9–12.7)
POTASSIUM SERPL-SCNC: 3.6 MMOL/L (ref 3.5–5.3)
PROT SERPL-MCNC: 7.5 G/DL (ref 6.4–8.2)
RBC # BLD AUTO: 5.2 MILLION/UL (ref 3.81–5.12)
SODIUM SERPL-SCNC: 137 MMOL/L (ref 136–145)
TRIGL SERPL-MCNC: 81 MG/DL
TSH SERPL DL<=0.05 MIU/L-ACNC: 1.67 UIU/ML (ref 0.36–3.74)
WBC # BLD AUTO: 8.63 THOUSAND/UL (ref 4.31–10.16)

## 2021-07-28 PROCEDURE — 82306 VITAMIN D 25 HYDROXY: CPT

## 2021-07-28 PROCEDURE — 85025 COMPLETE CBC W/AUTO DIFF WBC: CPT

## 2021-07-28 PROCEDURE — 80053 COMPREHEN METABOLIC PANEL: CPT

## 2021-07-28 PROCEDURE — 83550 IRON BINDING TEST: CPT

## 2021-07-28 PROCEDURE — G0439 PPPS, SUBSEQ VISIT: HCPCS | Performed by: FAMILY MEDICINE

## 2021-07-28 PROCEDURE — 84443 ASSAY THYROID STIM HORMONE: CPT

## 2021-07-28 PROCEDURE — 1123F ACP DISCUSS/DSCN MKR DOCD: CPT | Performed by: FAMILY MEDICINE

## 2021-07-28 PROCEDURE — 83540 ASSAY OF IRON: CPT

## 2021-07-28 PROCEDURE — 82728 ASSAY OF FERRITIN: CPT

## 2021-07-28 PROCEDURE — 36415 COLL VENOUS BLD VENIPUNCTURE: CPT

## 2021-07-28 PROCEDURE — 80061 LIPID PANEL: CPT

## 2021-07-28 PROCEDURE — 99214 OFFICE O/P EST MOD 30 MIN: CPT | Performed by: FAMILY MEDICINE

## 2021-07-28 RX ORDER — FLUOCINONIDE TOPICAL SOLUTION USP, 0.05% 0.5 MG/ML
SOLUTION TOPICAL 2 TIMES DAILY
Qty: 120 ML | Refills: 0 | Status: SHIPPED | OUTPATIENT
Start: 2021-07-28 | End: 2021-12-01 | Stop reason: SDUPTHER

## 2021-07-28 RX ORDER — METHIMAZOLE 5 MG/1
5 TABLET ORAL 3 TIMES WEEKLY
Qty: 90 TABLET | Refills: 3
Start: 2021-07-28 | End: 2021-11-01 | Stop reason: SDUPTHER

## 2021-07-28 RX ORDER — BUPROPION HYDROCHLORIDE 300 MG/1
300 TABLET ORAL EVERY MORNING
Qty: 90 TABLET | Refills: 1 | Status: SHIPPED | OUTPATIENT
Start: 2021-07-28 | End: 2021-11-05

## 2021-07-28 RX ORDER — TRIAMCINOLONE ACETONIDE 1 MG/G
CREAM TOPICAL 2 TIMES DAILY
Qty: 30 G | Refills: 0 | Status: SHIPPED | OUTPATIENT
Start: 2021-07-28

## 2021-07-28 NOTE — PATIENT INSTRUCTIONS
Medicare Preventive Visit Patient Instructions  Thank you for completing your Welcome to Medicare Visit or Medicare Annual Wellness Visit today  Your next wellness visit will be due in one year (7/29/2022)  The screening/preventive services that you may require over the next 5-10 years are detailed below  Some tests may not apply to you based off risk factors and/or age  Screening tests ordered at today's visit but not completed yet may show as past due  Also, please note that scanned in results may not display below  Preventive Screenings:  Service Recommendations Previous Testing/Comments   Colorectal Cancer Screening  * Colonoscopy    * Fecal Occult Blood Test (FOBT)/Fecal Immunochemical Test (FIT)  * Fecal DNA/Cologuard Test  * Flexible Sigmoidoscopy Age: 54-65 years old   Colonoscopy: every 10 years (may be performed more frequently if at higher risk)  OR  FOBT/FIT: every 1 year  OR  Cologuard: every 3 years  OR  Sigmoidoscopy: every 5 years  Screening may be recommended earlier than age 48 if at higher risk for colorectal cancer  Also, an individualized decision between you and your healthcare provider will decide whether screening between the ages of 74-80 would be appropriate  Colonoscopy: Not on file  FOBT/FIT: Not on file  Cologuard: Not on file  Sigmoidoscopy: Not on file    Screening Not Indicated     Breast Cancer Screening Age: 36 years old  Frequency: every 1-2 years  Not required if history of left and right mastectomy Mammogram: Not on file        Cervical Cancer Screening Between the ages of 21-29, pap smear recommended once every 3 years  Between the ages of 33-67, can perform pap smear with HPV co-testing every 5 years     Recommendations may differ for women with a history of total hysterectomy, cervical cancer, or abnormal pap smears in past  Pap Smear: Not on file    Screening Not Indicated   Hepatitis C Screening Once for adults born between 1945 and 1965  More frequently in patients at high risk for Hepatitis C Hep C Antibody: Not on file        Diabetes Screening 1-2 times per year if you're at risk for diabetes or have pre-diabetes Fasting glucose: 140 mg/dL   A1C: No results in last 5 years        Cholesterol Screening Once every 5 years if you don't have a lipid disorder  May order more often based on risk factors  Lipid panel: 09/23/2019    Screening Not Indicated  History Lipid Disorder     Other Preventive Screenings Covered by Medicare:  1  Abdominal Aortic Aneurysm (AAA) Screening: covered once if your at risk  You're considered to be at risk if you have a family history of AAA  2  Lung Cancer Screening: covers low dose CT scan once per year if you meet all of the following conditions: (1) Age 50-69; (2) No signs or symptoms of lung cancer; (3) Current smoker or have quit smoking within the last 15 years; (4) You have a tobacco smoking history of at least 30 pack years (packs per day multiplied by number of years you smoked); (5) You get a written order from a healthcare provider  3  Glaucoma Screening: covered annually if you're considered high risk: (1) You have diabetes OR (2) Family history of glaucoma OR (3)  aged 48 and older OR (3)  American aged 72 and older  3  Osteoporosis Screening: covered every 2 years if you meet one of the following conditions: (1) You're estrogen deficient and at risk for osteoporosis based off medical history and other findings; (2) Have a vertebral abnormality; (3) On glucocorticoid therapy for more than 3 months; (4) Have primary hyperparathyroidism; (5) On osteoporosis medications and need to assess response to drug therapy  · Last bone density test (DXA Scan): Not on file  5  HIV Screening: covered annually if you're between the age of 12-76  Also covered annually if you are younger than 13 and older than 72 with risk factors for HIV infection   For pregnant patients, it is covered up to 3 times per pregnancy  Immunizations:  Immunization Recommendations   Influenza Vaccine Annual influenza vaccination during flu season is recommended for all persons aged >= 6 months who do not have contraindications   Pneumococcal Vaccine (Prevnar and Pneumovax)  * Prevnar = PCV13  * Pneumovax = PPSV23   Adults 25-60 years old: 1-3 doses may be recommended based on certain risk factors  Adults 72 years old: Prevnar (PCV13) vaccine recommended followed by Pneumovax (PPSV23) vaccine  If already received PPSV23 since turning 65, then PCV13 recommended at least one year after PPSV23 dose  Hepatitis B Vaccine 3 dose series if at intermediate or high risk (ex: diabetes, end stage renal disease, liver disease)   Tetanus (Td) Vaccine - COST NOT COVERED BY MEDICARE PART B Following completion of primary series, a booster dose should be given every 10 years to maintain immunity against tetanus  Td may also be given as tetanus wound prophylaxis  Tdap Vaccine - COST NOT COVERED BY MEDICARE PART B Recommended at least once for all adults  For pregnant patients, recommended with each pregnancy  Shingles Vaccine (Shingrix) - COST NOT COVERED BY MEDICARE PART B  2 shot series recommended in those aged 48 and above     Health Maintenance Due:  There are no preventive care reminders to display for this patient  Immunizations Due:      Topic Date Due    COVID-19 Vaccine (1) Never done    DTaP,Tdap,and Td Vaccines (1 - Tdap) Never done    Pneumococcal Vaccine: 65+ Years (1 of 2 - PPSV23) 03/17/2020    Influenza Vaccine (1) 09/01/2021     Advance Directives   What are advance directives? Advance directives are legal documents that state your wishes and plans for medical care  These plans are made ahead of time in case you lose your ability to make decisions for yourself  Advance directives can apply to any medical decision, such as the treatments you want, and if you want to donate organs     What are the types of advance directives? There are many types of advance directives, and each state has rules about how to use them  You may choose a combination of any of the following:  · Living will: This is a written record of the treatment you want  You can also choose which treatments you do not want, which to limit, and which to stop at a certain time  This includes surgery, medicine, IV fluid, and tube feedings  · Durable power of  for healthcare [facility]): This is a written record that states who you want to make healthcare choices for you when you are unable to make them for yourself  This person, called a proxy, is usually a family member or a friend  You may choose more than 1 proxy  · Do not resuscitate (DNR) order:  A DNR order is used in case your heart stops beating or you stop breathing  It is a request not to have certain forms of treatment, such as CPR  A DNR order may be included in other types of advance directives  · Medical directive: This covers the care that you want if you are in a coma, near death, or unable to make decisions for yourself  You can list the treatments you want for each condition  Treatment may include pain medicine, surgery, blood transfusions, dialysis, IV or tube feedings, and a ventilator (breathing machine)  · Values history: This document has questions about your views, beliefs, and how you feel and think about life  This information can help others choose the care that you would choose  Why are advance directives important? An advance directive helps you control your care  Although spoken wishes may be used, it is better to have your wishes written down  Spoken wishes can be misunderstood, or not followed  Treatments may be given even if you do not want them  An advance directive may make it easier for your family to make difficult choices about your care     Weight Management   Why it is important to manage your weight:  Being overweight increases your risk of health conditions such as heart disease, high blood pressure, type 2 diabetes, and certain types of cancer  It can also increase your risk for osteoarthritis, sleep apnea, and other respiratory problems  Aim for a slow, steady weight loss  Even a small amount of weight loss can lower your risk of health problems  How to lose weight safely:  A safe and healthy way to lose weight is to eat fewer calories and get regular exercise  You can lose up about 1 pound a week by decreasing the number of calories you eat by 500 calories each day  Healthy meal plan for weight management:  A healthy meal plan includes a variety of foods, contains fewer calories, and helps you stay healthy  A healthy meal plan includes the following:  · Eat whole-grain foods more often  A healthy meal plan should contain fiber  Fiber is the part of grains, fruits, and vegetables that is not broken down by your body  Whole-grain foods are healthy and provide extra fiber in your diet  Some examples of whole-grain foods are whole-wheat breads and pastas, oatmeal, brown rice, and bulgur  · Eat a variety of vegetables every day  Include dark, leafy greens such as spinach, kale, savannah greens, and mustard greens  Eat yellow and orange vegetables such as carrots, sweet potatoes, and winter squash  · Eat a variety of fruits every day  Choose fresh or canned fruit (canned in its own juice or light syrup) instead of juice  Fruit juice has very little or no fiber  · Eat low-fat dairy foods  Drink fat-free (skim) milk or 1% milk  Eat fat-free yogurt and low-fat cottage cheese  Try low-fat cheeses such as mozzarella and other reduced-fat cheeses  · Choose meat and other protein foods that are low in fat  Choose beans or other legumes such as split peas or lentils  Choose fish, skinless poultry (chicken or turkey), or lean cuts of red meat (beef or pork)  Before you cook meat or poultry, cut off any visible fat  · Use less fat and oil    Try baking foods instead of frying them  Add less fat, such as margarine, sour cream, regular salad dressing and mayonnaise to foods  Eat fewer high-fat foods  Some examples of high-fat foods include french fries, doughnuts, ice cream, and cakes  · Eat fewer sweets  Limit foods and drinks that are high in sugar  This includes candy, cookies, regular soda, and sweetened drinks  Exercise:  Exercise at least 30 minutes per day on most days of the week  Some examples of exercise include walking, biking, dancing, and swimming  You can also fit in more physical activity by taking the stairs instead of the elevator or parking farther away from stores  Ask your healthcare provider about the best exercise plan for you  © Copyright [employer] 2018 Information is for End User's use only and may not be sold, redistributed or otherwise used for commercial purposes   All illustrations and images included in CareNotes® are the copyrighted property of A D A M [employer]  or [address] [place] [address]

## 2021-07-28 NOTE — PROGRESS NOTES
Assessment/Plan:    No problem-specific Assessment & Plan notes found for this encounter  Diagnoses and all orders for this visit:    Benign essential hypertension  -     CBC and differential; Future  -     Comprehensive metabolic panel; Future  -     Lipid panel; Future    Chronic diastolic congestive heart failure (HCC)    SSS (sick sinus syndrome) (HCC)    Pulmonary hypertension (HCC)  -     CBC and differential; Future    Paroxysmal atrial fibrillation (HCC)  -     CBC and differential; Future    Hyperthyroidism  -     TSH, 3rd generation; Future  -     methimazole (TAPAZOLE) 5 mg tablet; Take 1 tablet (5 mg total) by mouth 3 (three) times a week    Vitamin D deficiency  -     Vitamin D 25 hydroxy; Future    Dyslipidemia  -     Comprehensive metabolic panel; Future  -     Lipid panel; Future    Moderate protein-calorie malnutrition (HCC)    Psoriasis  -     fluocinonide (LIDEX) 0 05 % external solution; Apply topically 2 (two) times a day  -     triamcinolone (KENALOG) 0 1 % cream; Apply topically 2 (two) times a day    Depression, unspecified depression type  -     buPROPion (WELLBUTRIN XL) 300 mg 24 hr tablet; Take 1 tablet (300 mg total) by mouth every morning         orders recommendations as noted above  Discussed with her the shortness of breath with exertion  This could be anginal or related to the chronic diastolic congestive heart failure  Continue with the Lasix  Continue with the diltiazem and the metoprolol as previously  Continue follow-up with Cardiology  Continue with pacemaker checks  Last pacemaker check was about 6-8 weeks ago  Watch for any worsening  Continue with the Lasix and the potassium  She has not had laboratory testing done in a while  Will have this drawn today  We will call with the results  Will check a hemoglobin especially with her history of the GI bleed in the past   Watch for any return of the dark bowel movements which she currently denies    Continue with the methimazole 3 days a week  Will check a TSH  Denies any symptoms of hyperthyroidism  Daughter states that her mood has been worsening  She is on the Zoloft as well as the Wellbutrin  Will increase the dose of Wellbutrin and change to the 24 hour form to give better coverage throughout the day  Weight has improved significantly as has appetite  Continue off the Marinol  Signs and symptoms malnutrition have resolved at this point  Continue watch for worsening of food intake and continue follow weight at home  Psoriasis symptoms persist especially on the scalp and some areas on the chest now as well  Prescriptions as noted above  We will call with the results of her laboratory testing  Will make additional recommendations based on the results  Will have her follow up in about 3-5 months or sooner if needed based on her symptoms and laboratory testing  Subjective:      Patient ID: Jad Lomas is a 80 y o  female  She presents for routine visit as well as Medicare wellness  She has had some increasing fatigue  Continues with shortness of breath with even minimal exertion  This resolves quickly when she sits down  She feels this has worsened somewhat  Denies any chest pain or palpitations  Denies any significant cough  Denies any significant peripheral edema  Continues to follow-up with Cardiology and continues with pacemaker checks  Tolerating her metoprolol and diltiazem without difficulty  Denies any headaches or localized weakness  Appetite has improved significantly over the last year so  She has regained some of the weight that she had lost   No longer on the Marinol  Daughter is concerned about her mood  Feels that her depression symptoms have worsened  Continues with the Zoloft and the Wellbutrin  Denies any side effects from these  Continues with the methimazole 3 days a week  Denies any symptoms hyperthyroidism    She states that her bowel movements have been normal   Denies any dark bowel movements  Denies any abdominal pain  Has no symptoms similar to when she had the GI bleed in the past   Usually sleeps relatively well  Activity level is limited by her shortness of breath  The following portions of the patient's history were reviewed and updated as appropriate:   She  has a past medical history of Anemia, Atrial fibrillation ([facility] ), Atrial flutter ([facility] ), Depression, Diastolic CHF ([facility] ), Dyslipidemia, Glaucoma, H/O: hysterectomy, Hypertension, Hyperthyroidism, Impaired glucose tolerance, Pacemaker, Psoriasis, S/P cataract surgery, S/P knee replacement, SSS (sick sinus syndrome) ([facility] ), and SSS (sick sinus syndrome) ([facility] )  She   Patient Active Problem List    Diagnosis Date Noted    Moderate protein-calorie malnutrition ([place] [address] ) 07/02/2020    Contusion of right thigh 04/29/2020    Multiple thyroid nodules 04/29/2020    Weight loss 01/22/2020    Dyspnea on exertion 09/20/2017    Osteoporosis 03/09/2017    Paroxysmal atrial fibrillation ([facility] ) 02/22/2017    Anemia 02/18/2017    Renal atrophy 02/18/2017    Hyperphosphatemia 02/18/2017    Vitamin D deficiency 02/18/2017    Hypokalemia 02/17/2017    Pulmonary hypertension ([facility] ) 02/17/2017    VAZQUEZ (acute kidney injury) ([place] [address] ) 02/17/2017    Presence of permanent cardiac pacemaker     H/O: hysterectomy     Benign essential hypertension     Hyperthyroidism     Glaucoma     Chronic diastolic congestive heart failure (HCC)     SSS (sick sinus syndrome) ([place] [address] )     Depression     Depression with anxiety 08/12/2015    Fatigue 11/18/2014    Vertigo 11/18/2014    Dyslipidemia 09/12/2012    Psoriasis 09/12/2012     She  has a past surgical history that includes Cardiac pacemaker placement; Eye surgery; Hysterectomy; Cardiac pacemaker placement; Joint replacement; Esophagogastroduodenoscopy (N/A, 12/30/2016);  Cataract extraction; pr colonoscopy flx dx w/collj spec when pfrmd (N/A, 4/24/2017); and Replacement total knee (Bilateral)  Her family history includes Alcohol abuse in her brother and father; Diabetes in her father  She  reports that she has quit smoking  She has never used smokeless tobacco  She reports that she does not drink alcohol and does not use drugs    Current Outpatient Medications   Medication Sig Dispense Refill    ALFALFA PO Take 3 tablets by mouth        aspirin (ECOTRIN LOW STRENGTH) 81 mg EC tablet Take 81 mg by mouth daily      B Complex Vitamins (B COMPLEX 1 PO) Take by mouth      cholecalciferol (VITAMIN D3) 1,000 units tablet Take 1 tablet by mouth daily 30 tablet 0    diltiazem (CARDIZEM CD) 240 mg 24 hr capsule Take 1 capsule (240 mg total) by mouth daily 90 capsule 0    furosemide (LASIX) 40 mg tablet TAKE 1 TABLET TWICE A  tablet 3    guaiFENesin (MUCINEX) 600 mg 12 hr tablet Take 1 tablet (600 mg total) by mouth every 12 (twelve) hours 10 tablet 0    ketoconazole (NIZORAL) 2 % shampoo Apply 1 application topically once for 1 dose 100 mL 3    Klor-Con M20 20 MEQ tablet TAKE 1 TABLET DAILY 90 tablet 3    LECITHIN PO Take 1 tablet by mouth daily      metoprolol tartrate (LOPRESSOR) 50 mg tablet TAKE 1 TABLET EVERY 12     HOURS 180 tablet 1    Omega-3 Fatty Acids (OMEGA-3 PO) Take by mouth 2 (two) times a day        sertraline (ZOLOFT) 100 mg tablet TAKE 1 TABLET DAILY 90 tablet 1    simvastatin (ZOCOR) 20 mg tablet TAKE 1 TABLET DAILY AT     BEDTIME 90 tablet 3    Zinc Sulfate (ZINC 15 PO) Take 100 mg by mouth        buPROPion (WELLBUTRIN XL) 300 mg 24 hr tablet Take 1 tablet (300 mg total) by mouth every morning 90 tablet 1    fluocinonide (LIDEX) 0 05 % external solution Apply topically 2 (two) times a day 120 mL 0    methimazole (TAPAZOLE) 5 mg tablet Take 1 tablet (5 mg total) by mouth 3 (three) times a week 90 tablet 3    triamcinolone (KENALOG) 0 1 % cream Apply topically 2 (two) times a day 30 g 0     No current facility-administered medications for this visit       Current Outpatient Medications on File Prior to Visit   Medication Sig    ALFALFA PO Take 3 tablets by mouth      aspirin (ECOTRIN LOW STRENGTH) 81 mg EC tablet Take 81 mg by mouth daily    B Complex Vitamins (B COMPLEX 1 PO) Take by mouth    cholecalciferol (VITAMIN D3) 1,000 units tablet Take 1 tablet by mouth daily    diltiazem (CARDIZEM CD) 240 mg 24 hr capsule Take 1 capsule (240 mg total) by mouth daily    furosemide (LASIX) 40 mg tablet TAKE 1 TABLET TWICE A DAY    guaiFENesin (MUCINEX) 600 mg 12 hr tablet Take 1 tablet (600 mg total) by mouth every 12 (twelve) hours    ketoconazole (NIZORAL) 2 % shampoo Apply 1 application topically once for 1 dose    Klor-Con M20 20 MEQ tablet TAKE 1 TABLET DAILY    LECITHIN PO Take 1 tablet by mouth daily    metoprolol tartrate (LOPRESSOR) 50 mg tablet TAKE 1 TABLET EVERY 12     HOURS    Omega-3 Fatty Acids (OMEGA-3 PO) Take by mouth 2 (two) times a day      sertraline (ZOLOFT) 100 mg tablet TAKE 1 TABLET DAILY    simvastatin (ZOCOR) 20 mg tablet TAKE 1 TABLET DAILY AT     BEDTIME    Zinc Sulfate (ZINC 15 PO) Take 100 mg by mouth      [DISCONTINUED] buPROPion (WELLBUTRIN SR) 150 mg 12 hr tablet Take 1 tablet (150 mg total) by mouth daily    [DISCONTINUED] fluocinonide (LIDEX) 0 05 % external solution Apply topically 2 (two) times a day as needed for irritation    [DISCONTINUED] methimazole (TAPAZOLE) 5 mg tablet Take 1 tablet (5 mg total) by mouth 3 (three) times a day (Patient taking differently: Take 5 mg by mouth 3 (three) times a week 3 times a week)    [DISCONTINUED] diltiazem (CARDIZEM CD) 240 mg 24 hr capsule Take 1 capsule (240 mg total) by mouth daily for 7 days (Patient not taking: Reported on 7/28/2021)    [DISCONTINUED] dronabinol (MARINOL) 5 MG capsule Take 1 capsule (5 mg total) by mouth 2 (two) times a day before meals (Patient not taking: Reported on 7/28/2021)    [DISCONTINUED] isosorbide mononitrate (IMDUR) 60 mg 24 hr tablet TAKE 1 TABLET BY MOUTH EVERY DAY (Patient not taking: Reported on 7/28/2021)    [DISCONTINUED] KLOR-CON M20 20 MEQ tablet TAKE 1 TABLET DAILY     No current facility-administered medications on file prior to visit  She has No Known Allergies       Review of Systems   Constitutional: Positive for fatigue  Negative for activity change, appetite change, chills and fever  HENT: Negative for congestion and rhinorrhea  Eyes: Negative for visual disturbance  Respiratory: Positive for shortness of breath  Negative for cough and chest tightness  Cardiovascular: Negative for chest pain, palpitations and leg swelling  Gastrointestinal: Negative for abdominal pain, blood in stool, diarrhea, nausea and vomiting  Endocrine: Negative for polydipsia, polyphagia and polyuria  Genitourinary: Negative for dysuria, frequency and urgency  Musculoskeletal: Positive for arthralgias  Negative for gait problem  Skin: Negative for color change  Neurological: Negative for dizziness and headaches  Hematological: Does not bruise/bleed easily  Psychiatric/Behavioral: Positive for decreased concentration and dysphoric mood  Negative for confusion and sleep disturbance  The patient is not nervous/anxious  Objective:      /72 (BP Location: Right arm, Patient Position: Sitting, Cuff Size: Adult)   Pulse 60   Temp (!) 97 3 °F (36 3 °C) (Temporal)   Ht 5' 6" (1 676 m)   Wt 80 1 kg (176 lb 8 oz)   LMP  (LMP Unknown)   SpO2 96%   BMI 28 49 kg/m²          Physical Exam  Vitals and nursing note reviewed  Constitutional:       General: She is not in acute distress  Appearance: She is well-developed and well-groomed  HENT:      Head: Normocephalic and atraumatic  Comments:   Hearing aids bilaterally; hard of hearing  Eyes:      General:         Right eye: No discharge  Left eye: No discharge        Conjunctiva/sclera: Conjunctivae normal       Pupils: Pupils are equal, round, and reactive to light  Neck:      Thyroid: No thyromegaly  Vascular: No carotid bruit  Cardiovascular:      Rate and Rhythm: Normal rate and regular rhythm  Heart sounds: Normal heart sounds  No murmur heard  No friction rub  No gallop  Pulmonary:      Effort: No respiratory distress  Breath sounds: No wheezing or rales  Abdominal:      General: Bowel sounds are normal  There is no distension  Tenderness: There is no abdominal tenderness  Lymphadenopathy:      Cervical: No cervical adenopathy  Skin:     General: Skin is warm and dry  Neurological:      Mental Status: She is alert and oriented to person, place, and time  Psychiatric:         Mood and Affect: Affect normal  Mood is depressed  Speech: Speech normal          Behavior: Behavior normal  Behavior is cooperative

## 2021-07-28 NOTE — PROGRESS NOTES
Assessment and Plan:     Problem List Items Addressed This Visit        Endocrine    Hyperthyroidism    Relevant Medications    methimazole (TAPAZOLE) 5 mg tablet    Other Relevant Orders    TSH, 3rd generation (Completed)       Cardiovascular and Mediastinum    Benign essential hypertension - Primary    Relevant Orders    CBC and differential (Completed)    Comprehensive metabolic panel (Completed)    Lipid panel (Completed)    Chronic diastolic congestive heart failure (HCC)    SSS (sick sinus syndrome) (HCC)    Pulmonary hypertension (HCC)    Relevant Orders    CBC and differential (Completed)    Paroxysmal atrial fibrillation (HCC)    Relevant Orders    CBC and differential (Completed)       Musculoskeletal and Integument    Psoriasis    Relevant Medications    fluocinonide (LIDEX) 0 05 % external solution    triamcinolone (KENALOG) 0 1 % cream       Other    Depression    Relevant Medications    buPROPion (WELLBUTRIN XL) 300 mg 24 hr tablet    Vitamin D deficiency    Relevant Orders    Vitamin D 25 hydroxy (Completed)    Dyslipidemia    Relevant Orders    Comprehensive metabolic panel (Completed)    Lipid panel (Completed)    Moderate protein-calorie malnutrition ([address] )      Other Visit Diagnoses     Medicare annual wellness visit, subsequent            BMI Counseling: Body mass index is 28 49 kg/m²  The BMI is above normal  Nutrition recommendations include encouraging healthy choices of fruits and vegetables, consuming healthier snacks, limiting drinks that contain sugar, moderation in carbohydrate intake and reducing intake of cholesterol  Exercise recommendations include exercising 3-5 times per week  Depression Screening and Follow-up Plan: Patient's depression screening was positive with a PHQ-2 score of 2  Their PHQ-9 score was 5  Patient assessed for underlying major depression  Brief counseling provided and recommend additional follow-up/re-evaluation next office visit         Preventive health issues were discussed with patient, and age appropriate screening tests were ordered as noted in patient's After Visit Summary  Personalized health advice and appropriate referrals for health education or preventive services given if needed, as noted in patient's After Visit Summary       History of Present Illness:     Patient presents for Medicare Annual Wellness visit    Patient Care Team:  Bianca Kathleen MD as PCP - General (Family Medicine)  Bianca Kathleen MD as PCP - General  DO Olag Little Terressa Fishman, MD Perrin Presser, MD Balinda Fines, MD as Endoscopist     Problem List:     Patient Active Problem List   Diagnosis    Presence of permanent cardiac pacemaker    H/O: hysterectomy    Benign essential hypertension    Hyperthyroidism    Glaucoma    Chronic diastolic congestive heart failure (HCC)    SSS (sick sinus syndrome) ([address] )    Depression    Hypokalemia    Pulmonary hypertension ([address] )    VAZQUEZ (acute kidney injury) ([address] )    Anemia    Renal atrophy    Hyperphosphatemia    Vitamin D deficiency    Depression with anxiety    Dyslipidemia    Dyspnea on exertion    Fatigue    Osteoporosis    Paroxysmal atrial fibrillation (HCC)    Psoriasis    Vertigo    Weight loss    Contusion of right thigh    Multiple thyroid nodules    Moderate protein-calorie malnutrition ([address] )      Past Medical and Surgical History:     Past Medical History:   Diagnosis Date    Anemia     Atrial fibrillation ([address] )     Atrial flutter ([address] )     Depression     Diastolic CHF ([address] )     Dyslipidemia     Glaucoma     H/O: hysterectomy     Hypertension     Hyperthyroidism     Impaired glucose tolerance     RESOLVED 2/3/2016    Pacemaker     Psoriasis     S/P cataract surgery     S/P knee replacement     SSS (sick sinus syndrome) (HCC)     SSS (sick sinus syndrome) ([address] )      Past Surgical History:   Procedure Laterality Date    CARDIAC PACEMAKER PLACEMENT      CARDIAC PACEMAKER PLACEMENT      dual chamber    CATARACT EXTRACTION      ESOPHAGOGASTRODUODENOSCOPY N/A 12/30/2016    Procedure: ESOPHAGOGASTRODUODENOSCOPY (EGD); Surgeon: Evens Freire MD;  Location: [facility]; Service:     EYE SURGERY      cataract    HYSTERECTOMY      JOINT REPLACEMENT      bilateral     OK COLONOSCOPY FLX DX W/COLLJ SPEC WHEN PFRMD N/A 4/24/2017    Procedure: COLONOSCOPY with polypectomy;  Surgeon: Theo Pham MD;  Location: [facility]; Service: Gastroenterology    REPLACEMENT TOTAL KNEE Bilateral       Family History:     Family History   Problem Relation Age of Onset    Alcohol abuse Father     Diabetes Father     Alcohol abuse Brother       Social History:     Social History     Socioeconomic History    Marital status:      Spouse name: None    Number of children: None    Years of education: None    Highest education level: None   Occupational History    None   Tobacco Use    Smoking status: Former Smoker    Smokeless tobacco: Never Used   Vaping Use    Vaping Use: Never used   Substance and Sexual Activity    Alcohol use: No    Drug use: No    Sexual activity: Not Currently   Other Topics Concern    None   Social History Narrative    None     Social Determinants of Health     Financial Resource Strain:     Difficulty of Paying Living Expenses:    Food Insecurity:     Worried About Running Out of Food in the Last Year:     Ran Out of Food in the Last Year:    Transportation Needs:     Lack of Transportation (Medical):      Lack of Transportation (Non-Medical):    Physical Activity:     Days of Exercise per Week:     Minutes of Exercise per Session:    Stress:     Feeling of Stress :    Social Connections:     Frequency of Communication with Friends and Family:     Frequency of Social Gatherings with Friends and Family:     Attends Taoist Services:     Active Member of Clubs or Organizations:     Attends Club or Organization Meetings:    [place] Marital Status:    Intimate Partner Violence:     Fear of Current or Ex-Partner:     Emotionally Abused:     Physically Abused:     Sexually Abused:       Medications and Allergies:     Current Outpatient Medications   Medication Sig Dispense Refill    ALFALFA PO Take 3 tablets by mouth        aspirin (ECOTRIN LOW STRENGTH) 81 mg EC tablet Take 81 mg by mouth daily      B Complex Vitamins (B COMPLEX 1 PO) Take by mouth      cholecalciferol (VITAMIN D3) 1,000 units tablet Take 1 tablet by mouth daily 30 tablet 0    diltiazem (CARDIZEM CD) 240 mg 24 hr capsule Take 1 capsule (240 mg total) by mouth daily 90 capsule 0    furosemide (LASIX) 40 mg tablet TAKE 1 TABLET TWICE A  tablet 3    guaiFENesin (MUCINEX) 600 mg 12 hr tablet Take 1 tablet (600 mg total) by mouth every 12 (twelve) hours 10 tablet 0    ketoconazole (NIZORAL) 2 % shampoo Apply 1 application topically once for 1 dose 100 mL 3    Klor-Con M20 20 MEQ tablet TAKE 1 TABLET DAILY 90 tablet 3    LECITHIN PO Take 1 tablet by mouth daily      metoprolol tartrate (LOPRESSOR) 50 mg tablet TAKE 1 TABLET EVERY 12     HOURS 180 tablet 1    Omega-3 Fatty Acids (OMEGA-3 PO) Take by mouth 2 (two) times a day        sertraline (ZOLOFT) 100 mg tablet TAKE 1 TABLET DAILY 90 tablet 1    simvastatin (ZOCOR) 20 mg tablet TAKE 1 TABLET DAILY AT     BEDTIME 90 tablet 3    Zinc Sulfate (ZINC 15 PO) Take 100 mg by mouth        buPROPion (WELLBUTRIN XL) 300 mg 24 hr tablet Take 1 tablet (300 mg total) by mouth every morning 90 tablet 1    fluocinonide (LIDEX) 0 05 % external solution Apply topically 2 (two) times a day 120 mL 0    methimazole (TAPAZOLE) 5 mg tablet Take 1 tablet (5 mg total) by mouth 3 (three) times a week 90 tablet 3    triamcinolone (KENALOG) 0 1 % cream Apply topically 2 (two) times a day 30 g 0     No current facility-administered medications for this visit       No Known Allergies   Immunizations:     Immunization History Administered Date(s) Administered    INFLUENZA 10/03/2016    Influenza Split High Dose Preservative Free IM 10/03/2016    Pneumococcal Conjugate 13-Valent 01/21/2020      Health Maintenance: There are no preventive care reminders to display for this patient  Topic Date Due    COVID-19 Vaccine (1) Never done    DTaP,Tdap,and Td Vaccines (1 - Tdap) Never done    Pneumococcal Vaccine: 65+ Years (1 of 2 - PPSV23) 03/17/2020    Influenza Vaccine (1) 09/01/2021      Medicare Health Risk Assessment:     /72 (BP Location: Right arm, Patient Position: Sitting, Cuff Size: Adult)   Pulse 60   Temp (!) 97 3 °F (36 3 °C) (Temporal)   Ht 5' 6" (1 676 m)   Wt 80 1 kg (176 lb 8 oz)   LMP  (LMP Unknown)   SpO2 96%   BMI 28 49 kg/m²      Devan Mar is here for her Subsequent Wellness visit  Health Risk Assessment:   Patient rates overall health as good  Patient feels that their physical health rating is same  Patient is very satisfied with their life  Eyesight was rated as same  Hearing was rated as slightly worse  Patient feels that their emotional and mental health rating is slightly worse  Patients states they are never, rarely angry  Patient states they are never, rarely unusually tired/fatigued  Pain experienced in the last 7 days has been none  Patient states that she has experienced no weight loss or gain in last 6 months  Depression Screening:   PHQ-2 Score: 2  PHQ-9 Score: 5      Fall Risk Screening: In the past year, patient has experienced: no history of falling in past year      Urinary Incontinence Screening:   Patient has not leaked urine accidently in the last six months  Home Safety:  Patient has trouble with stairs inside or outside of their home  Patient has working smoke alarms and has working carbon monoxide detector  Home safety hazards include: none  Nutrition:   Current diet is Regular  Medications:   Patient is currently taking over-the-counter supplements  OTC medications include: see medication list  Patient is able to manage medications  Activities of Daily Living (ADLs)/Instrumental Activities of Daily Living (IADLs):   Walk and transfer into and out of bed and chair?: Yes  Dress and groom yourself?: Yes    Bathe or shower yourself?: Yes    Feed yourself? Yes  Do your laundry/housekeeping?: Yes  Manage your money, pay your bills and track your expenses?: Yes  Make your own meals?: Yes    Do your own shopping?: Yes    Previous Hospitalizations:   Any hospitalizations or ED visits within the last 12 months?: No      Advance Care Planning:   Living will: Yes    Advanced directive: Yes    Provider agrees with end of life decisions: Yes      Cognitive Screening:   Provider or family/friend/caregiver concerned regarding cognition?: No    PREVENTIVE SCREENINGS      Cardiovascular Screening:    General: Screening Not Indicated and History Lipid Disorder      Diabetes Screening:     General: Screening Current      Colorectal Cancer Screening:     General: Screening Not Indicated      Breast Cancer Screening:     General: Patient Declines      Cervical Cancer Screening:    General: Screening Not Indicated      Osteoporosis Screening:    General: Screening Not Indicated and History Osteoporosis      Abdominal Aortic Aneurysm (AAA) Screening:        General: Screening Not Indicated      Lung Cancer Screening:     General: Screening Not Indicated      Hepatitis C Screening:    General: Screening Not Indicated    Screening, Brief Intervention, and Referral to Treatment (SBIRT)    Screening  Typical number of drinks in a day: 0  Typical number of drinks in a week: 0  Interpretation: Low risk drinking behavior      Single Item Drug Screening:  How often have you used an illegal drug (including marijuana) or a prescription medication for non-medical reasons in the past year? never    Single Item Drug Screen Score: 0  Interpretation: Negative screen for possible drug use disorder    Brief Intervention  Alcohol & drug use screenings were reviewed  No concerns regarding substance use disorder identified         Manuel Wu MD

## 2021-07-29 LAB
FERRITIN SERPL-MCNC: 13 NG/ML (ref 8–388)
IRON SATN MFR SERPL: 10 %
IRON SERPL-MCNC: 41 UG/DL (ref 50–170)
TIBC SERPL-MCNC: 396 UG/DL (ref 250–450)

## 2021-08-30 ENCOUNTER — TELEPHONE (OUTPATIENT)
Dept: HEMATOLOGY ONCOLOGY | Facility: CLINIC | Age: 86
End: 2021-08-30

## 2021-08-30 NOTE — TELEPHONE ENCOUNTER
New Patient Encounter    New Patient Intake Form   Patient Details:  Rachel Gordon  4/6/1932  [id number]    Background Information:  [address] starts by opening a telephone encounter and gathering the following information   Who is calling to schedule? If not self, relationship to patient? Child   Referring Provider Naveed Lyle MD   What is the diagnosis? Anemia, unspecified type   Is this Cancer or Non-Cancer? Non-Cancer   Is this diagnosis confirmed? Yes   When was the diagnosis? 07/2021   Is there a confirmed diagnosis from a biopsy/tissue reviewed by pathology? NA   Were outside slides requested? No   Is patient aware of diagnosis? Yes   Is there a personal history and what kind? No   Is there a family history and what kind? No   Reason for visit? New Diagnosis   Have you had any imaging or labs done? If so: when, where? yes  07/28 at [address] records in [facility]? yes   Are records needed form an outside facility? No   If yes, name, city, state where facility is located  If patient has a prior history of cancer were old records obtained? No   Was the patient told to bring a disk? No   Does the patient smoke or Vape? No   If yes, how many packs or cartridges per day? Scheduling Information:   Preferred [place]:  [place]     Are there any dates/time the patient cannot be seen?       Miscellaneous:

## 2021-09-04 PROBLEM — E61.1 IRON DEFICIENCY: Chronic | Status: ACTIVE | Noted: 2021-09-04

## 2021-09-04 NOTE — PROGRESS NOTES
Hematology Outpatient Office Note    Date of Service: 9/10/2021    [facility] HEMATOLOGY SPECIALISTS 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)    Daughter: Shahnaz Nielson     Note patient is not anemic*      This is a 80 y o  c PMHx notable for chronic iron deficiency, hyperthyroidism, being seen in consultation for recurring iron deficiency In the absence of anemia    The pattern of iron deficiency below is notable  4 years evidence of iron deficiency back in 2017 which seems to have improved over the next year and a half and then has worsened again as of July 28, 2021  She has been taking iron since 8/2021  We discussed that in a postmenopausal state, the concern would be for colon cancer among other malignancies  However, she is on aspirin 81 mg which likely explains a call internal bleeding contributing to the slow decline in hemoglobin as noted below  She did have over upper GI bleeding in 2017 while on aspirin and Eliquis leading to the  Discontinuation of Eliquis  At 80years of age,  her willingness to undergo invasive testing including colonoscopy and so forth is less than when she was younger, understandably so  In the meantime, taking by mouth iron   For total 6 months is my recommendation until she can have her iron stores reassessed in February of 2022  I am happy to see her as needed  There is no indication for IV iron at this time  As of July she was not quite anemic but should we recheck her counts today, it is likely to be a little lower based on the trend  Results for David Marsh (MRN [id number]) as of 9/4/2021 18:21   Ref   Range 1/31/2017 09:35 5/12/2017 09:36 6/26/2017 09:23 12/11/2018 10:06 7/28/2021 09:23   Iron Latest Ref Range: 50 - 170 ug/dL 20 (L) 38 (L) 59 62 41 (L)   Ferritin Latest Ref Range: 8 - 388 ng/mL 15 30 30 16 13   Iron Saturation Latest Units: % 4 10 17 16 10   TIBC Latest Ref Range: 250 - 450 ug/dL 454 (H) 364 342 392 396        Results for Ya Ordaz (MRN [id number]) as of 9/10/2021 08:53   Ref  Range 1/8/2020 05:09 1/9/2020 06:05 4/26/2020 22:36 6/17/2020 10:25 7/28/2021 09:23   WBC Latest Ref Range: 4 31 - 10 16 Thousand/uL 12 42 (H) 9 08 8 66 9 27 8 63   Red Blood Cell Count Latest Ref Range: 3 81 - 5 12 Million/uL 4 30 3 93 4 52 4 47 5 20 (H)   Hemoglobin Latest Ref Range: 11 5 - 15 4 g/dL 12 1 11 1 (L) 13 2 13 5 11 9   HCT Latest Ref Range: 34 8 - 46 1 % 39 2 36 2 42 3 43 5 41 3   MCV Latest Ref Range: 82 - 98 fL 91 92 94 97 79 (L)   MCH Latest Ref Range: 26 8 - 34 3 pg 28 1 28 2 29 2 30 2 22 9 (L)   MCHC Latest Ref Range: 31 4 - 37 4 g/dL 30 9 (L) 30 7 (L) 31 2 (L) 31 0 (L) 28 8 (L)   RDW Latest Ref Range: 11 6 - 15 1 % 17 1 (H) 17 0 (H) 17 5 (H) 16 6 (H) 20 3 (H)   Platelet Count Latest Ref Range: 149 - 390 Thousands/uL 159 168 177 195 200   MPV Latest Ref Range: 8 9 - 12 7 fL 9 5 9 5 9 4 9 3 10 2   nRBC Latest Units: /100 WBCs   0 0 0           · Discussion of decision making    I personally reviewed the following lab results, the image studies, pathology, other specialty/physicians consult notes and recommendations, and outside medical records from [facility]/other institutions  I had a lengthy discussion with the patient and shared the work-up findings  We discussed the diagnosis and management plan as below   I spent 47 minutes reviewing the records (labs, clinician notes, outside records, medical history, ordering medicine/tests/procedures, interpreting the imaging/labs previously done) and coordination of care as well as direct time with the patient today, of which greater than 50% of the time was spent in counseling and coordination of care with the patient/family  ·  Recommendations  ·   The patient elects for her primary care physician to check iron profile including ferritin, iron, total iron binding capacity, iron saturation by February 2022 along with blood counts  I am happy to see the patient as needed thereafter or before so if her condition changes  ·  she plans to discuss with her cardiologist and primary care physician on whether her aspirin should be discontinued as I believe this is contributing to potential internal bleeding and worsening iron deficiency  ·  I advised the patient to take her over-the-counter iron every other day on a Monday, Wednesday, Friday schedule          Follow Up: prn    All questions were answered to the patient's satisfaction during this encounter  The patient knows the contact information for our office and knows to reach out for any relevant concerns related to this encounter  They are to call for any temperature 100 4 or higher, new symptoms including but not restricted to shaking chills, decreased appetite, nausea, vomiting, diarrhea, increased fatigue, shortness of breath or chest pain, confusion, and not feeling the strength to come to the clinic  For all other listed problems and medical diagnosis in their chart - they are managed by PCP and/or other specialists, which the patient acknowledges  Thank you very much for your consultation and making us a part of this patient's care  We are continuing to follow closely with you  Please do not hesitate to reach out to me with any additional questions or concerns  Ish Jones MD  Hematology & Medical Oncology Staff Physician             Disclaimer: This document was prepared using [employer] technology  If a word or phrase is confusing, or does not make sense, this is likely due to recognition error which was not discovered during this clinician's review   If you believe an error has occurred, please contact me through [address] [place] line for andrey?cation  HEMATOLOGICAL HISTORY OF PRESENT ILLNESS      Clotting History None   Bleeding History None   Cancer History None   Family Cancer History None   H/O Blood/Plt Transfusion 2U PRBC 2017   Tobacco/etoh/drug abuse 1 PPD x 40 years (quit 1997)   Hx COVID19 Infection and Vaccine Status Neither   Cancer Screening history n/a   Occupation Worked in a [employer] ([address] [place] [address]) [place]  x 40 years   New medications in the last month: Iron OTC   Pain: none    Pt had pacemaker placed 2016 and Eliquis started thereafter  She had UGIB in 2017 requiring 2U PRBC and Eliquis discontinued  ASA 81mg started 2017  SUBJECTIVE  (INTERVAL HISTORY)        I have reviewed the relevant past medical, surgical, social and family history  I have also reviewed allergies and medications for this patient  Review of Systems    Lives with her daughter  She denies F/C, N/V, LH, HA, CP  She has intermittent DURANT  A 10-point review of system was performed, pertinent positive and negative were detailed as above  Otherwise, the 10-point review of system was negative  Past Medical History:   Diagnosis Date    Anemia     Atrial fibrillation (HCC)     Atrial flutter (HCC)     Depression     Diastolic CHF ([facility] )     Dyslipidemia     Glaucoma     H/O: hysterectomy     Hypertension     Hyperthyroidism     Impaired glucose tolerance     RESOLVED 2/3/2016    Pacemaker     Psoriasis     S/P cataract surgery     S/P knee replacement     SSS (sick sinus syndrome) (HCC)     SSS (sick sinus syndrome) ([facility] )        Past Surgical History:   Procedure Laterality Date    CARDIAC PACEMAKER PLACEMENT      CARDIAC PACEMAKER PLACEMENT      dual chamber    CATARACT EXTRACTION      ESOPHAGOGASTRODUODENOSCOPY N/A 12/30/2016    Procedure: ESOPHAGOGASTRODUODENOSCOPY (EGD); Surgeon: Mera Wan MD;  Location: [facility];   Service:     EYE SURGERY      cataract    HYSTERECTOMY      JOINT REPLACEMENT      bilateral     FL COLONOSCOPY FLX DX W/COLLJ SPEC WHEN PFRMD N/A 4/24/2017    Procedure: COLONOSCOPY with polypectomy;  Surgeon: Hayley Cisneros MD;  Location: [facility]; Service: Gastroenterology    REPLACEMENT TOTAL KNEE Bilateral        Family History   Problem Relation Age of Onset    Alcohol abuse Father     Diabetes Father     Alcohol abuse Brother        Social History     Socioeconomic History    Marital status:      Spouse name: Not on file    Number of children: Not on file    Years of education: Not on file    Highest education level: Not on file   Occupational History    Not on file   Tobacco Use    Smoking status: Former Smoker    Smokeless tobacco: Never Used   Vaping Use    Vaping Use: Never used   Substance and Sexual Activity    Alcohol use: No    Drug use: No    Sexual activity: Not Currently   Other Topics Concern    Not on file   Social History Narrative    Not on file     Social Determinants of Health     Financial Resource Strain:     Difficulty of Paying Living Expenses:    Food Insecurity:     Worried About [address] in the Last Year:     [address] [employer] in the Last Year:    Transportation Needs:     Lack of Transportation (Medical):      Lack of Transportation (Non-Medical):    Physical Activity:     Days of Exercise per Week:     Minutes of Exercise per Session:    Stress:     Feeling of Stress :    Social Connections:     Frequency of Communication with Friends and Family:     Frequency of Social Gatherings with Friends and Family:     Attends Oriental orthodox Services:     Active Member of Clubs or Organizations:     Attends Club or Organization Meetings:     Marital Status:    Intimate Partner Violence:     Fear of Current or Ex-Partner:     Emotionally Abused:     Physically Abused:     Sexually Abused:        No Known Allergies    Current Outpatient Medications   Medication Sig Dispense Refill    ALFALFA PO Take 3 tablets by mouth        aspirin (Prashanth Villalobos LOW STRENGTH) 81 mg EC tablet Take 81 mg by mouth daily      B Complex Vitamins (B COMPLEX 1 PO) Take by mouth      buPROPion (WELLBUTRIN XL) 300 mg 24 hr tablet Take 1 tablet (300 mg total) by mouth every morning 90 tablet 1    cholecalciferol (VITAMIN D3) 1,000 units tablet Take 1 tablet by mouth daily 30 tablet 0    diltiazem (CARDIZEM CD) 240 mg 24 hr capsule Take 1 capsule (240 mg total) by mouth daily 90 capsule 0    fluocinonide (LIDEX) 0 05 % external solution Apply topically 2 (two) times a day 120 mL 0    furosemide (LASIX) 40 mg tablet TAKE 1 TABLET TWICE A  tablet 3    guaiFENesin (MUCINEX) 600 mg 12 hr tablet Take 1 tablet (600 mg total) by mouth every 12 (twelve) hours 10 tablet 0    Klor-Con M20 20 MEQ tablet TAKE 1 TABLET DAILY 90 tablet 3    LECITHIN PO Take 1 tablet by mouth daily      methimazole (TAPAZOLE) 5 mg tablet Take 1 tablet (5 mg total) by mouth 3 (three) times a week 90 tablet 3    metoprolol tartrate (LOPRESSOR) 50 mg tablet TAKE 1 TABLET EVERY 12     HOURS 180 tablet 1    Omega-3 Fatty Acids (OMEGA-3 PO) Take by mouth 2 (two) times a day        sertraline (ZOLOFT) 100 mg tablet TAKE 1 TABLET DAILY 90 tablet 1    simvastatin (ZOCOR) 20 mg tablet TAKE 1 TABLET DAILY AT     BEDTIME 90 tablet 3    triamcinolone (KENALOG) 0 1 % cream Apply topically 2 (two) times a day 30 g 0    Zinc Sulfate (ZINC 15 PO) Take 100 mg by mouth        ketoconazole (NIZORAL) 2 % shampoo Apply 1 application topically once for 1 dose 100 mL 3     No current facility-administered medications for this visit  (Not in a hospital admission)        Objective:     24 Hour Vitals Assessment:     Vitals:    09/10/21 0809   BP: 118/70   Pulse: 75   Resp: 18   Temp: 97 6 °F (36 4 °C)   SpO2: 98%       PHYSICIAN EXAM:  Daughter at side  General: Appearance: alert, cooperative, no distress  HEENT: Normocephalic, atraumatic  No scleral icterus  conjunctivae clear  EOMI    Chest: No tenderness to palpation  No open wound noted  Lungs: Clear to auscultation bilaterally, Respirations unlabored  Cardiac: Regular rate and rhythm, +S1and S2, -r/m/g  Abdomen: Soft, non-tender, non-distended  Bowel sounds are normal   Extremities:  No edema, cyanosis, clubbing  Skin: Skin color, turgor are normal  No rashes  Neurologic: Awake, Alert, and oriented, no gross focal deficits noted b/l  DATA REVIEW:    Pathology Result:    Final Diagnosis   Date Value Ref Range Status   04/24/2017   Final    A  Descending colon polyp:     - Hyperplastic polyp      - Negative for dysplasia and malignancy  B   Sigmoid colon polyp:     - Sessile serrated adenoma  - Negative for high-grade dysplasia and malignancy  12/30/2016   Final    A  Stomach, antrum, biopsy:             - Chronic active gastric antritis  - No Helicobacter pylori organisms are identified on the Warthin-Starry special stain, performed with an appropriate control              - No intestinal metaplasia, dysplasia or malignancy is identified  Interpretation performed at , [address] [place] [address]             Image Results:   Image result are reviewed and documented in Hematology/Oncology history  I personally reviewed these images  Cardiac EP device report  SJM-DUAL-PM/ NOT MRI CONDITIONAL  MERLIN TRANSMISSION: BATTERY VOLTAGE ADEQUATE (7 8 YRS)  AP: <1%  : 95% (>40%~DDDR/60)  ALL AVAILABLE LEAD PARAMETERS WITHIN NORMAL LIMITS  5 AMS EPISODES W/ AF IN PROGRESS (HX OF SAME)  AF BURDEN: >99%  PT DOES NOT TAKE AC DUE TO GI BLEED  PT TAKES CARDIZEM CD, METOPROLOL TART  EF: 68% (NUC ST TX~4/22/19)  PACEMAKER FUNCTIONING APPROPRIATELY  CH      LABS:  Lab data are reviewed and documented in HemOnc history         Lab Results   Component Value Date    HGB 11 9 07/28/2021    HCT 41 3 07/28/2021    MCV 79 (L) 07/28/2021     07/28/2021    WBC 8 63 07/28/2021    NRBC 0 07/28/2021     Lab Results   Component Value Date     11/18/2015    K 3 6 07/28/2021     07/28/2021    CO2 30 07/28/2021    ANIONGAP 9 11/18/2015    BUN 26 (H) 07/28/2021    CREATININE 1 36 (H) 07/28/2021    GLUCOSE 108 11/18/2015    GLUF 108 (H) 07/28/2021    CALCIUM 9 2 07/28/2021    AST 21 07/28/2021    ALT 26 07/28/2021    ALKPHOS 93 07/28/2021    PROT 6 9 10/26/2015    BILITOT 0 94 10/26/2015    EGFR 35 07/28/2021       Lab Results   Component Value Date    IRON 41 (L) 07/28/2021    TIBC 396 07/28/2021    FERRITIN 13 07/28/2021    FERRITIN 16 12/11/2018    FERRITIN 30 06/26/2017    FERRITIN 30 05/12/2017    FERRITIN 15 01/31/2017       No results found for: [id number]    No results for input(s): WBC, CREAT in the last 72 hours      Invalid input(s):  PLT     By:  Sd Gomez MD, 9/10/2021, 8:30 AM

## 2021-09-07 ENCOUNTER — REMOTE DEVICE CLINIC VISIT (OUTPATIENT)
Dept: CARDIOLOGY CLINIC | Facility: CLINIC | Age: 86
End: 2021-09-07
Payer: MEDICARE

## 2021-09-07 DIAGNOSIS — Z95.0 PRESENCE OF CARDIAC PACEMAKER: Primary | ICD-10-CM

## 2021-09-07 PROCEDURE — 93296 REM INTERROG EVL PM/IDS: CPT | Performed by: INTERNAL MEDICINE

## 2021-09-07 PROCEDURE — 93294 REM INTERROG EVL PM/LDLS PM: CPT | Performed by: INTERNAL MEDICINE

## 2021-09-07 NOTE — PROGRESS NOTES
Results for orders placed or performed in visit on 09/07/21   Cardiac EP device report    Narrative    SJM-DUAL-PM/ NOT MRI CONDITIONAL  MERLIN TRANSMISSION: BATTERY VOLTAGE ADEQUATE (7 8 YRS)  AP: <1%  : 95% (>40%~DDDR/60)  ALL AVAILABLE LEAD PARAMETERS WITHIN NORMAL LIMITS  5 AMS EPISODES W/ AF IN PROGRESS (HX OF SAME)  AF BURDEN: >99%  PT DOES NOT TAKE AC DUE TO GI BLEED  PT TAKES CARDIZEM CD, METOPROLOL TART  EF: 68% (NUC ST TX~4/22/19)  PACEMAKER FUNCTIONING APPROPRIATELY    [address] [place] [address] Street

## 2021-09-10 ENCOUNTER — CONSULT (OUTPATIENT)
Dept: HEMATOLOGY ONCOLOGY | Facility: CLINIC | Age: 86
End: 2021-09-10
Payer: MEDICARE

## 2021-09-10 VITALS
TEMPERATURE: 97.6 F | BODY MASS INDEX: 27.18 KG/M2 | HEIGHT: 67 IN | WEIGHT: 173.2 LBS | SYSTOLIC BLOOD PRESSURE: 118 MMHG | RESPIRATION RATE: 18 BRPM | DIASTOLIC BLOOD PRESSURE: 70 MMHG | HEART RATE: 75 BPM | OXYGEN SATURATION: 98 %

## 2021-09-10 DIAGNOSIS — E61.1 IRON DEFICIENCY: Primary | Chronic | ICD-10-CM

## 2021-09-10 DIAGNOSIS — D64.9 ANEMIA, UNSPECIFIED TYPE: ICD-10-CM

## 2021-09-10 PROCEDURE — 99204 OFFICE O/P NEW MOD 45 MIN: CPT | Performed by: INTERNAL MEDICINE

## 2021-09-29 DIAGNOSIS — I48.0 PAROXYSMAL ATRIAL FIBRILLATION (HCC): ICD-10-CM

## 2021-09-29 RX ORDER — DILTIAZEM HYDROCHLORIDE 240 MG/1
CAPSULE, COATED, EXTENDED RELEASE ORAL
Qty: 90 CAPSULE | Refills: 0 | Status: SHIPPED | OUTPATIENT
Start: 2021-09-29 | End: 2021-11-05

## 2021-10-07 DIAGNOSIS — E78.2 MIXED HYPERLIPIDEMIA: ICD-10-CM

## 2021-10-07 RX ORDER — SIMVASTATIN 20 MG
TABLET ORAL
Qty: 90 TABLET | Refills: 3 | Status: SHIPPED | OUTPATIENT
Start: 2021-10-07

## 2021-10-14 DIAGNOSIS — F41.8 DEPRESSION WITH ANXIETY: ICD-10-CM

## 2021-10-14 RX ORDER — SERTRALINE HYDROCHLORIDE 100 MG/1
100 TABLET, FILM COATED ORAL DAILY
Qty: 90 TABLET | Refills: 1 | Status: SHIPPED | OUTPATIENT
Start: 2021-10-14 | End: 2022-03-28 | Stop reason: SDUPTHER

## 2021-11-01 DIAGNOSIS — E05.90 HYPERTHYROIDISM: ICD-10-CM

## 2021-11-01 RX ORDER — METHIMAZOLE 5 MG/1
5 TABLET ORAL 3 TIMES WEEKLY
Qty: 90 TABLET | Refills: 1 | Status: SHIPPED | OUTPATIENT
Start: 2021-11-01 | End: 2022-07-21

## 2021-11-05 DIAGNOSIS — F32.A DEPRESSION, UNSPECIFIED DEPRESSION TYPE: ICD-10-CM

## 2021-11-05 DIAGNOSIS — I48.0 PAROXYSMAL ATRIAL FIBRILLATION (HCC): ICD-10-CM

## 2021-11-05 RX ORDER — BUPROPION HYDROCHLORIDE 300 MG/1
TABLET ORAL
Qty: 90 TABLET | Refills: 1 | Status: SHIPPED | OUTPATIENT
Start: 2021-11-05 | End: 2022-01-24 | Stop reason: SDUPTHER

## 2021-11-05 RX ORDER — DILTIAZEM HYDROCHLORIDE 240 MG/1
CAPSULE, COATED, EXTENDED RELEASE ORAL
Qty: 90 CAPSULE | Refills: 0 | Status: SHIPPED | OUTPATIENT
Start: 2021-11-05 | End: 2022-01-24 | Stop reason: SDUPTHER

## 2021-12-01 ENCOUNTER — OFFICE VISIT (OUTPATIENT)
Dept: INTERNAL MEDICINE CLINIC | Facility: CLINIC | Age: 86
End: 2021-12-01
Payer: MEDICARE

## 2021-12-01 VITALS
SYSTOLIC BLOOD PRESSURE: 114 MMHG | DIASTOLIC BLOOD PRESSURE: 72 MMHG | WEIGHT: 172.6 LBS | BODY MASS INDEX: 27.09 KG/M2 | HEIGHT: 67 IN | HEART RATE: 67 BPM | OXYGEN SATURATION: 95 % | TEMPERATURE: 97.3 F

## 2021-12-01 DIAGNOSIS — E78.5 DYSLIPIDEMIA: ICD-10-CM

## 2021-12-01 DIAGNOSIS — N18.31 STAGE 3A CHRONIC KIDNEY DISEASE (HCC): ICD-10-CM

## 2021-12-01 DIAGNOSIS — I10 BENIGN ESSENTIAL HYPERTENSION: Primary | ICD-10-CM

## 2021-12-01 DIAGNOSIS — F33.9 DEPRESSION, RECURRENT (HCC): ICD-10-CM

## 2021-12-01 DIAGNOSIS — I48.0 PAROXYSMAL ATRIAL FIBRILLATION (HCC): ICD-10-CM

## 2021-12-01 DIAGNOSIS — E55.9 VITAMIN D DEFICIENCY: ICD-10-CM

## 2021-12-01 DIAGNOSIS — F41.8 DEPRESSION WITH ANXIETY: ICD-10-CM

## 2021-12-01 DIAGNOSIS — I50.32 CHRONIC DIASTOLIC CONGESTIVE HEART FAILURE (HCC): ICD-10-CM

## 2021-12-01 DIAGNOSIS — E05.90 HYPERTHYROIDISM: ICD-10-CM

## 2021-12-01 DIAGNOSIS — L40.9 PSORIASIS: ICD-10-CM

## 2021-12-01 PROBLEM — N17.9 AKI (ACUTE KIDNEY INJURY) (HCC): Status: RESOLVED | Noted: 2017-02-17 | Resolved: 2021-12-01

## 2021-12-01 PROCEDURE — 99214 OFFICE O/P EST MOD 30 MIN: CPT | Performed by: FAMILY MEDICINE

## 2021-12-01 RX ORDER — FLUOCINONIDE TOPICAL SOLUTION USP, 0.05% 0.5 MG/ML
SOLUTION TOPICAL 2 TIMES DAILY
Qty: 360 ML | Refills: 3 | Status: SHIPPED | OUTPATIENT
Start: 2021-12-01 | End: 2021-12-02

## 2021-12-07 ENCOUNTER — REMOTE DEVICE CLINIC VISIT (OUTPATIENT)
Dept: CARDIOLOGY CLINIC | Facility: CLINIC | Age: 86
End: 2021-12-07
Payer: MEDICARE

## 2021-12-07 DIAGNOSIS — Z95.0 CARDIAC PACEMAKER IN SITU: Primary | ICD-10-CM

## 2021-12-07 PROCEDURE — 93294 REM INTERROG EVL PM/LDLS PM: CPT | Performed by: INTERNAL MEDICINE

## 2021-12-07 PROCEDURE — 93296 REM INTERROG EVL PM/IDS: CPT | Performed by: INTERNAL MEDICINE

## 2022-01-13 DIAGNOSIS — I48.0 PAROXYSMAL ATRIAL FIBRILLATION (HCC): ICD-10-CM

## 2022-01-13 RX ORDER — METOPROLOL TARTRATE 50 MG/1
TABLET, FILM COATED ORAL
Qty: 180 TABLET | Refills: 1 | Status: SHIPPED | OUTPATIENT
Start: 2022-01-13 | End: 2022-06-20

## 2022-01-24 ENCOUNTER — OFFICE VISIT (OUTPATIENT)
Dept: CARDIOLOGY CLINIC | Facility: CLINIC | Age: 87
End: 2022-01-24
Payer: MEDICARE

## 2022-01-24 VITALS
WEIGHT: 170.2 LBS | HEIGHT: 67 IN | SYSTOLIC BLOOD PRESSURE: 120 MMHG | BODY MASS INDEX: 26.71 KG/M2 | HEART RATE: 60 BPM | DIASTOLIC BLOOD PRESSURE: 60 MMHG

## 2022-01-24 DIAGNOSIS — E78.5 DYSLIPIDEMIA: ICD-10-CM

## 2022-01-24 DIAGNOSIS — I10 BENIGN ESSENTIAL HYPERTENSION: ICD-10-CM

## 2022-01-24 DIAGNOSIS — I50.32 CHRONIC DIASTOLIC CONGESTIVE HEART FAILURE (HCC): ICD-10-CM

## 2022-01-24 DIAGNOSIS — I49.5 SSS (SICK SINUS SYNDROME) (HCC): ICD-10-CM

## 2022-01-24 DIAGNOSIS — I48.0 PAROXYSMAL ATRIAL FIBRILLATION (HCC): ICD-10-CM

## 2022-01-24 DIAGNOSIS — F32.A DEPRESSION, UNSPECIFIED DEPRESSION TYPE: ICD-10-CM

## 2022-01-24 DIAGNOSIS — I48.0 PAROXYSMAL ATRIAL FIBRILLATION (HCC): Primary | ICD-10-CM

## 2022-01-24 DIAGNOSIS — Z95.0 PRESENCE OF PERMANENT CARDIAC PACEMAKER: ICD-10-CM

## 2022-01-24 PROCEDURE — 99214 OFFICE O/P EST MOD 30 MIN: CPT | Performed by: INTERNAL MEDICINE

## 2022-01-24 PROCEDURE — 93000 ELECTROCARDIOGRAM COMPLETE: CPT | Performed by: INTERNAL MEDICINE

## 2022-01-24 RX ORDER — DILTIAZEM HYDROCHLORIDE 240 MG/1
240 CAPSULE, COATED, EXTENDED RELEASE ORAL DAILY
Qty: 90 CAPSULE | Refills: 3 | Status: SHIPPED | OUTPATIENT
Start: 2022-01-24

## 2022-01-24 RX ORDER — BUPROPION HYDROCHLORIDE 300 MG/1
300 TABLET ORAL EVERY MORNING
Qty: 90 TABLET | Refills: 1 | Status: SHIPPED | OUTPATIENT
Start: 2022-01-24 | End: 2022-07-10

## 2022-01-24 NOTE — PROGRESS NOTES
Cardiology Follow Up    Rufina Navarro  4/6/1932  [id number]  [facility] - [place], [place] [facility]  [address] [address]  Phone#  [phone]  Fax#  [phone]    1  Paroxysmal atrial fibrillation (HCC)     2  SSS (sick sinus syndrome) ([facility] [address] )     3  Presence of permanent cardiac pacemaker     4  Chronic diastolic congestive heart failure ([facility] [address] )     5  Benign essential hypertension     6  Dyslipidemia         Discussion/Summary:  Mrs Laisha Dominique is a pleasant 40-year-old female who presents to the office today for routine follow-up  Since her last visit, her device checks reveals she is predominantly ventricular paced with underlying atrial fibrillation  She is not on systemic anticoagulation in the setting of a prior GI bleed  She and her daughter understand the stroke risk associated with this  Her blood pressure is well controlled in the office today  Her most recent lipids from last year reveal acceptable numbers on her current statin regimen to which no changes were advised  She is euvolemic on her current diuretic regimen to which no changes were advised  A low-salt diet was reinforced  I will see her back in the office in six months or sooner if deemed necessary  Interval History:   Mrs Laisha Dominique is a pleasant 40-year-old female who presents to the office today for routine followup  Since her last visit she has been feeling well  Her shortness of breath with exertion persists and her daughter thinks this has improved somewhat since her last visit  She does note this with ascending a flight of steps  She denies any exertional chest pain  She denies any signs or symptoms of congestive heart failure including lower extremity edema, paroxysmal nocturnal dyspnea, orthopnea, acute weight gain or increasing abdominal girth  She denies lightheadedness, syncope or presyncope  She denies any sensation of palpitations    She denies symptoms of claudication  Problem List     Pacemaker    H/O: hysterectomy    Chronic atrial fibrillation (HCC)    Essential hypertension    Hyperlipidemia    Hyperthyroidism    Glaucoma    Chronic diastolic congestive heart failure (HCC)    SSS (sick sinus syndrome) ([facility])    Depression    Hypokalemia    Pulmonary hypertension (HCC)    Anemia    Renal atrophy    Hyperphosphatemia    Vitamin D deficiency    Atrial flutter ([facility] [address] )    Overview Signed 3/15/2018 11:25 AM by Charlene Amaya MD     Description: Typical right-sided isthmus dependent - counterclockwise - CHADS2 = 2  AV block    Depression with anxiety    Dyslipidemia    Dyspnea    Fatigue    Osteoporosis    Paroxysmal atrial fibrillation (HCC)    Psoriasis    Vertigo        Past Medical History:   Diagnosis Date    VAZQUEZ (acute kidney injury) ([facility] [address] ) 2/17/2017    Anemia     Atrial fibrillation (HCC)     Atrial flutter (HCC)     Depression     Diastolic CHF ([facility] [address] )     Dyslipidemia     Glaucoma     H/O: hysterectomy     Hypertension     Hyperthyroidism     Impaired glucose tolerance     RESOLVED 2/3/2016    Pacemaker     Psoriasis     S/P cataract surgery     S/P knee replacement     SSS (sick sinus syndrome) (HCC)     SSS (sick sinus syndrome) ([facility] [address] )      Social History     Socioeconomic History    Marital status:       Spouse name: Not on file    Number of children: Not on file    Years of education: Not on file    Highest education level: Not on file   Occupational History    Not on file   Tobacco Use    Smoking status: Former Smoker    Smokeless tobacco: Never Used   Vaping Use    Vaping Use: Never used   Substance and Sexual Activity    Alcohol use: No    Drug use: No    Sexual activity: Not Currently   Other Topics Concern    Not on file   Social History Narrative    Not on file     Social Determinants of Health     Financial Resource Strain: Not on file   Food Insecurity: Not on file   Transportation Needs: Not on file   Physical Activity: Not on file   Stress: Not on file   Social Connections: Not on file   Intimate Partner Violence: Not on file   Housing Stability: Not on file      Family History   Problem Relation Age of Onset    Alcohol abuse Father     Diabetes Father     Alcohol abuse Brother      Past Surgical History:   Procedure Laterality Date    CARDIAC PACEMAKER PLACEMENT      CARDIAC PACEMAKER PLACEMENT      dual chamber    CATARACT EXTRACTION      ESOPHAGOGASTRODUODENOSCOPY N/A 12/30/2016    Procedure: ESOPHAGOGASTRODUODENOSCOPY (EGD); Surgeon: Dennis Savage MD;  Location: [facility]; Service:     EYE SURGERY      cataract    HYSTERECTOMY      JOINT REPLACEMENT      bilateral     LA COLONOSCOPY FLX DX W/COLLJ SPEC WHEN PFRMD N/A 4/24/2017    Procedure: COLONOSCOPY with polypectomy;  Surgeon: Jimena Barraza MD;  Location: [facility];   Service: Gastroenterology    REPLACEMENT TOTAL KNEE Bilateral        Current Outpatient Medications:     ALFALFA PO, Take 3 tablets by mouth  , Disp: , Rfl:     aspirin (ECOTRIN LOW STRENGTH) 81 mg EC tablet, Take 81 mg by mouth daily, Disp: , Rfl:     B Complex Vitamins (B COMPLEX 1 PO), Take by mouth, Disp: , Rfl:     buPROPion (WELLBUTRIN XL) 300 mg 24 hr tablet, Take 1 tablet (300 mg total) by mouth every morning, Disp: 90 tablet, Rfl: 1    cholecalciferol (VITAMIN D3) 1,000 units tablet, Take 1 tablet by mouth daily, Disp: 30 tablet, Rfl: 0    fluocinonide (LIDEX) 0 05 % external solution, Apply topically 2 (two) times a day, Disp: 180 mL, Rfl: 1    furosemide (LASIX) 40 mg tablet, TAKE 1 TABLET TWICE A DAY, Disp: 180 tablet, Rfl: 3    ketoconazole (NIZORAL) 2 % shampoo, Apply 1 application topically once for 1 dose, Disp: 100 mL, Rfl: 3    Klor-Con M20 20 MEQ tablet, TAKE 1 TABLET DAILY, Disp: 90 tablet, Rfl: 3    LECITHIN PO, Take 1 tablet by mouth daily, Disp: , Rfl:     methimazole (TAPAZOLE) 5 mg tablet, Take 1 tablet (5 mg total) by mouth 3 (three) times a week, Disp: 90 tablet, Rfl: 1    metoprolol tartrate (LOPRESSOR) 50 mg tablet, TAKE 1 TABLET EVERY 12     HOURS, Disp: 180 tablet, Rfl: 1    Omega-3 Fatty Acids (OMEGA-3 PO), Take by mouth 2 (two) times a day  , Disp: , Rfl:     sertraline (ZOLOFT) 100 mg tablet, Take 1 tablet (100 mg total) by mouth daily, Disp: 90 tablet, Rfl: 1    simvastatin (ZOCOR) 20 mg tablet, TAKE 1 TABLET DAILY AT     BEDTIME, Disp: 90 tablet, Rfl: 3    triamcinolone (KENALOG) 0 1 % cream, Apply topically 2 (two) times a day, Disp: 30 g, Rfl: 0    Zinc Sulfate (ZINC 15 PO), Take 100 mg by mouth  , Disp: , Rfl:     diltiazem (CARDIZEM CD) 240 mg 24 hr capsule, Take 1 capsule (240 mg total) by mouth daily, Disp: 90 capsule, Rfl: 3    guaiFENesin (MUCINEX) 600 mg 12 hr tablet, Take 1 tablet (600 mg total) by mouth every 12 (twelve) hours (Patient not taking: Reported on 1/24/2022 ), Disp: 10 tablet, Rfl: 0  No Known Allergies    Labs:     Chemistry        Component Value Date/Time     11/18/2015 1318    K 3 6 07/28/2021 0923    K 3 9 11/18/2015 1318     07/28/2021 0923     11/18/2015 1318    CO2 30 07/28/2021 0923    CO2 29 2 11/18/2015 1318    BUN 26 (H) 07/28/2021 0923    BUN 13 11/18/2015 1318    CREATININE 1 36 (H) 07/28/2021 0923    CREATININE 1 13 11/18/2015 1318        Component Value Date/Time    CALCIUM 9 2 07/28/2021 0923    CALCIUM 9 1 11/18/2015 1318    ALKPHOS 93 07/28/2021 0923    ALKPHOS 71 10/26/2015 1012    AST 21 07/28/2021 0923    AST 28 10/26/2015 1012    ALT 26 07/28/2021 0923    ALT 29 10/26/2015 1012    BILITOT 0 94 10/26/2015 1012            Lab Results   Component Value Date    CHOL 153 10/24/2015    CHOL 157 09/08/2015    CHOL 193 05/13/2015     Lab Results   Component Value Date    HDL 65 07/28/2021    HDL 62 (H) 09/23/2019    HDL 71 (H) 12/11/2018     Lab Results   Component Value Date    LDLCALC 50 07/28/2021    LDLCALC 91 09/23/2019    LDLCALC 75 12/11/2018     Lab Results   Component Value Date    TRIG 81 07/28/2021    TRIG 105 09/23/2019    TRIG 102 12/11/2018     No results found for: CHOLHDL    Imaging: No results found  Review of Systems   Cardiovascular: Positive for dyspnea on exertion  Negative for claudication, cyanosis, irregular heartbeat, leg swelling and near-syncope  All other systems reviewed and are negative  Vitals:    01/24/22 0915   BP: 120/60   Pulse: 60     Vitals:    01/24/22 0915   Weight: 77 2 kg (170 lb 3 2 oz)     Height: 5' 7" (170 2 cm)   Body mass index is 26 66 kg/m²      Physical Exam:  General:  Alert and cooperative, appears stated age  HEENT:  PERRLA, EOMI, no scleral icterus, no conjunctival pallor  Neck:  No lymphadenopathy, no thyromegaly, no carotid bruits, no elevated JVP  Heart:  Regular rate and rhythm, normal S1/S2, no S3/S4, no murmur  Lungs:  Clear to auscultation bilaterally   Abdomen:  Soft, non-tender, positive bowel sounds, no rebound or guarding,   no organomegaly   Extremities:  No clubbing, cyanosis or edema   Vascular:  2+ pedal pulses  Skin:  No rashes or lesions on exposed skin  Neurologic:  Cranial nerves II-XII grossly intact without focal deficits

## 2022-03-10 ENCOUNTER — REMOTE DEVICE CLINIC VISIT (OUTPATIENT)
Dept: CARDIOLOGY CLINIC | Facility: CLINIC | Age: 87
End: 2022-03-10
Payer: MEDICARE

## 2022-03-10 DIAGNOSIS — Z95.0 CARDIAC PACEMAKER IN SITU: Primary | ICD-10-CM

## 2022-03-10 PROCEDURE — 93294 REM INTERROG EVL PM/LDLS PM: CPT | Performed by: INTERNAL MEDICINE

## 2022-03-10 PROCEDURE — 93296 REM INTERROG EVL PM/IDS: CPT | Performed by: INTERNAL MEDICINE

## 2022-03-10 NOTE — PROGRESS NOTES
Results for orders placed or performed in visit on 03/10/22   Cardiac EP device report    Narrative    SJM-DUAL-PM/ NOT MRI CONDITIONAL  MERLIN TRANSMISSION: BATTERY VOLTAGE ADEQUATE (7 1-7 8 YRS)  AP<1%, -91% (>40% [email or web address])  ALL AVAILABLE LEAD PARAMETERS WITHIN NORMAL LIMITS  NO NEW SIGNIFICANT HIGH RATE EPISODES  PT IN % OF TIME & ON ASA 81MG, DILTIAZEM & METOPROLOL; NO ANTICOAGULATION DUE TO HX: GI BLEED  NORMAL DEVICE FUNCTION   GV

## 2022-03-28 DIAGNOSIS — F41.8 DEPRESSION WITH ANXIETY: ICD-10-CM

## 2022-03-28 RX ORDER — SERTRALINE HYDROCHLORIDE 100 MG/1
100 TABLET, FILM COATED ORAL DAILY
Qty: 90 TABLET | Refills: 1 | Status: SHIPPED | OUTPATIENT
Start: 2022-03-28 | End: 2022-07-21

## 2022-05-03 DIAGNOSIS — I50.32 CHRONIC DIASTOLIC CONGESTIVE HEART FAILURE (HCC): ICD-10-CM

## 2022-05-04 RX ORDER — FUROSEMIDE 40 MG/1
TABLET ORAL
Qty: 180 TABLET | Refills: 0 | Status: SHIPPED | OUTPATIENT
Start: 2022-05-04 | End: 2022-08-01

## 2022-05-05 ENCOUNTER — OFFICE VISIT (OUTPATIENT)
Dept: INTERNAL MEDICINE CLINIC | Facility: CLINIC | Age: 87
End: 2022-05-05
Payer: MEDICARE

## 2022-05-05 VITALS
HEIGHT: 67 IN | DIASTOLIC BLOOD PRESSURE: 76 MMHG | WEIGHT: 170 LBS | HEART RATE: 62 BPM | SYSTOLIC BLOOD PRESSURE: 114 MMHG | BODY MASS INDEX: 26.68 KG/M2 | TEMPERATURE: 97.3 F | OXYGEN SATURATION: 93 %

## 2022-05-05 DIAGNOSIS — I49.5 SSS (SICK SINUS SYNDROME) (HCC): ICD-10-CM

## 2022-05-05 DIAGNOSIS — F33.9 DEPRESSION, RECURRENT (HCC): ICD-10-CM

## 2022-05-05 DIAGNOSIS — E61.1 IRON DEFICIENCY: Chronic | ICD-10-CM

## 2022-05-05 DIAGNOSIS — N18.31 STAGE 3A CHRONIC KIDNEY DISEASE (HCC): ICD-10-CM

## 2022-05-05 DIAGNOSIS — I27.20 PULMONARY HYPERTENSION (HCC): ICD-10-CM

## 2022-05-05 DIAGNOSIS — E55.9 VITAMIN D DEFICIENCY: ICD-10-CM

## 2022-05-05 DIAGNOSIS — E05.90 HYPERTHYROIDISM: ICD-10-CM

## 2022-05-05 DIAGNOSIS — I50.32 CHRONIC DIASTOLIC CONGESTIVE HEART FAILURE (HCC): ICD-10-CM

## 2022-05-05 DIAGNOSIS — I48.0 PAROXYSMAL ATRIAL FIBRILLATION (HCC): ICD-10-CM

## 2022-05-05 DIAGNOSIS — I10 BENIGN ESSENTIAL HYPERTENSION: Primary | ICD-10-CM

## 2022-05-05 PROBLEM — R63.4 WEIGHT LOSS: Status: RESOLVED | Noted: 2020-01-22 | Resolved: 2022-05-05

## 2022-05-05 PROBLEM — E44.0 MODERATE PROTEIN-CALORIE MALNUTRITION (HCC): Status: RESOLVED | Noted: 2020-07-02 | Resolved: 2022-05-05

## 2022-05-05 PROCEDURE — 99214 OFFICE O/P EST MOD 30 MIN: CPT | Performed by: FAMILY MEDICINE

## 2022-05-05 NOTE — PROGRESS NOTES
Assessment/Plan:    No problem-specific Assessment & Plan notes found for this encounter  Diagnoses and all orders for this visit:    Benign essential hypertension  -     CBC and differential; Future  -     Comprehensive metabolic panel; Future  -     Lipid panel; Future    Chronic diastolic congestive heart failure (HCC)  -     CBC and differential; Future  -     Comprehensive metabolic panel; Future    Paroxysmal atrial fibrillation (HCC)  -     CBC and differential; Future  -     Comprehensive metabolic panel; Future  -     Lipid panel; Future    SSS (sick sinus syndrome) (HCC)  -     CBC and differential; Future  -     Comprehensive metabolic panel; Future    Pulmonary hypertension (HCC)  -     CBC and differential; Future  -     Comprehensive metabolic panel; Future    Hyperthyroidism  -     CBC and differential; Future  -     Comprehensive metabolic panel; Future  -     TSH, 3rd generation; Future    Stage 3a chronic kidney disease (HCC)  -     CBC and differential; Future  -     Comprehensive metabolic panel; Future    Depression, recurrent (HCC)  -     CBC and differential; Future  -     Comprehensive metabolic panel; Future    Vitamin D deficiency  -     CBC and differential; Future  -     Comprehensive metabolic panel; Future    Iron deficiency  -     CBC and differential; Future  -     Comprehensive metabolic panel; Future  -     Iron Panel (Includes Ferritin, Iron Sat%, Iron, and TIBC); Future      orders recommendations as noted above  Slip reprinted for her laboratory testing  Advised them to have this done in the near future we will contact them with the results  Blood pressure well controlled  Continue current medications  Watch for any signs or symptoms of congestive heart failure  Watch for any increased shortness of breath, increased peripheral edema, or sudden increases in weight  Mood has been stable on the Zoloft and the Wellbutrin  No side effects from these    Continue current dosage is  It she watch for any changes in bowel movements or energy level especially with her history of the GI bleed  Continue regular follow-up with Cardiology for the pacemaker  Continue with the methimazole 3 times a week and will check a TSH  Avoid nephrotoxins  Stay adequately hydrated  Continue with vitamin-D supplementation  Continue with the simvastatin as previously  Watch diet  Declines immunizations  Declines screening testing  Will have her follow up in about 3-5 months or sooner if needed  Subjective:      Patient ID: Jared Rubalcava is a 80 y o  female  She presents for routine follow-up  Has generally been doing well  Continues to live with her daughter in the [place] area  Appetite remains stable  Balance is at times and issue  Has been using a cane more often  Some dizziness at times but this is stable  Some stable shortness of breath with exertion  Denies any significant peripheral edema or changes in weight  Continues with the metoprolol without difficulty  Continues to follow-up with Cardiology  Mood has been stable on the Wellbutrin and the Zoloft  Denies any recent chest pain or palpitations  Continues with the diltiazem  Tolerating the methimazole 3 times a week  Did not have her laboratory testing done prior to this visit but they plan on doing this in the near future  Tolerating the simvastatin well  Denies any significant muscle aches or weakness  Usually sleeps relatively well  Denies any recent falls        The following portions of the patient's history were reviewed and updated as appropriate:   She  has a past medical history of VAZQUEZ (acute kidney injury) ([facility] [address] ) (2/17/2017), Anemia, Atrial fibrillation ([address] ), Atrial flutter ([address] ), Depression, Diastolic CHF ([address] ), Dyslipidemia, Glaucoma, H/O: hysterectomy, Hypertension, Hyperthyroidism, Impaired glucose tolerance, Pacemaker, Psoriasis, S/P cataract surgery, S/P knee replacement, SSS (sick sinus syndrome) ([place] [address] ), and SSS (sick sinus syndrome) ([place] [address] )  She   Patient Active Problem List    Diagnosis Date Noted    Stage 3a chronic kidney disease ([place] [address] ) 12/01/2021    Iron deficiency 09/04/2021    Contusion of right thigh 04/29/2020    Multiple thyroid nodules 04/29/2020    Dyspnea on exertion 09/20/2017    Osteoporosis 03/09/2017    Paroxysmal atrial fibrillation ([place] [address] ) 02/22/2017    Anemia 02/18/2017    Renal atrophy 02/18/2017    Hyperphosphatemia 02/18/2017    Vitamin D deficiency 02/18/2017    Hypokalemia 02/17/2017    Pulmonary hypertension ([place] [address] ) 02/17/2017    Presence of permanent cardiac pacemaker     H/O: hysterectomy     Benign essential hypertension     Hyperthyroidism     Glaucoma     Chronic diastolic congestive heart failure (HCC)     SSS [facility]sick [facility] syndrome) ([facility])     Depression, recurrent ([place] [address] )     Depression with anxiety 08/12/2015    Fatigue 11/18/2014    Vertigo 11/18/2014    Dyslipidemia 09/12/2012    Psoriasis 09/12/2012     She  has a past surgical history that includes Cardiac pacemaker placement; Eye surgery; Hysterectomy; Cardiac pacemaker placement; Joint replacement; Esophagogastroduodenoscopy (N/A, 12/30/2016); Cataract extraction; pr colonoscopy flx dx w/collj spec when pfrmd (N/A, 4/24/2017); and Replacement total knee (Bilateral)  Her family history includes Alcohol abuse in her brother and father; Diabetes in her father  She  reports that she has quit smoking  She has never used smokeless tobacco  She reports that she does not drink alcohol and does not use drugs    Current Outpatient Medications   Medication Sig Dispense Refill    ALFALFA PO Take 3 tablets by mouth        aspirin (ECOTRIN LOW STRENGTH) 81 mg EC tablet Take 81 mg by mouth daily      B Complex Vitamins (B COMPLEX 1 PO) Take by mouth      buPROPion (WELLBUTRIN XL) 300 mg 24 hr tablet Take 1 tablet (300 mg total) by mouth every morning 90 tablet 1    cholecalciferol (VITAMIN D3) 1,000 units tablet Take 1 tablet by mouth daily 30 tablet 0    diltiazem (CARDIZEM CD) 240 mg 24 hr capsule Take 1 capsule (240 mg total) by mouth daily 90 capsule 3    fluocinonide (LIDEX) 0 05 % external solution Apply topically 2 (two) times a day 180 mL 1    furosemide (LASIX) 40 mg tablet TAKE 1 TABLET TWICE A  tablet 0    Klor-Con M20 20 MEQ tablet TAKE 1 TABLET DAILY 90 tablet 3    LECITHIN PO Take 1 tablet by mouth daily      methimazole (TAPAZOLE) 5 mg tablet Take 1 tablet (5 mg total) by mouth 3 (three) times a week 90 tablet 1    metoprolol tartrate (LOPRESSOR) 50 mg tablet TAKE 1 TABLET EVERY 12     HOURS 180 tablet 1    Omega-3 Fatty Acids (OMEGA-3 PO) Take by mouth 2 (two) times a day        sertraline (ZOLOFT) 100 mg tablet Take 1 tablet (100 mg total) by mouth daily 90 tablet 1    simvastatin (ZOCOR) 20 mg tablet TAKE 1 TABLET DAILY AT     BEDTIME 90 tablet 3    triamcinolone (KENALOG) 0 1 % cream Apply topically 2 (two) times a day 30 g 0    Zinc Sulfate (ZINC 15 PO) Take 100 mg by mouth        ketoconazole (NIZORAL) 2 % shampoo Apply 1 application topically once for 1 dose 100 mL 3     No current facility-administered medications for this visit       Current Outpatient Medications on File Prior to Visit   Medication Sig    ALFALFA PO Take 3 tablets by mouth      aspirin (ECOTRIN LOW STRENGTH) 81 mg EC tablet Take 81 mg by mouth daily    B Complex Vitamins (B COMPLEX 1 PO) Take by mouth    buPROPion (WELLBUTRIN XL) 300 mg 24 hr tablet Take 1 tablet (300 mg total) by mouth every morning    cholecalciferol (VITAMIN D3) 1,000 units tablet Take 1 tablet by mouth daily    diltiazem (CARDIZEM CD) 240 mg 24 hr capsule Take 1 capsule (240 mg total) by mouth daily    fluocinonide (LIDEX) 0 05 % external solution Apply topically 2 (two) times a day    furosemide (LASIX) 40 mg tablet TAKE 1 TABLET TWICE A DAY    Klor-Con M20 20 MEQ tablet TAKE 1 TABLET DAILY    LECITHIN PO Take 1 tablet by mouth daily    methimazole (TAPAZOLE) 5 mg tablet Take 1 tablet (5 mg total) by mouth 3 (three) times a week    metoprolol tartrate (LOPRESSOR) 50 mg tablet TAKE 1 TABLET EVERY 12     HOURS    Omega-3 Fatty Acids (OMEGA-3 PO) Take by mouth 2 (two) times a day      sertraline (ZOLOFT) 100 mg tablet Take 1 tablet (100 mg total) by mouth daily    simvastatin (ZOCOR) 20 mg tablet TAKE 1 TABLET DAILY AT     BEDTIME    triamcinolone (KENALOG) 0 1 % cream Apply topically 2 (two) times a day    Zinc Sulfate (ZINC 15 PO) Take 100 mg by mouth      ketoconazole (NIZORAL) 2 % shampoo Apply 1 application topically once for 1 dose     No current facility-administered medications on file prior to visit  She has No Known Allergies       Review of Systems   Constitutional: Positive for fatigue  Negative for activity change, appetite change, chills and fever  HENT: Negative for congestion and rhinorrhea  Eyes: Negative for visual disturbance  Respiratory: Positive for shortness of breath  Negative for cough and chest tightness  Cardiovascular: Negative for chest pain and palpitations  Gastrointestinal: Negative for abdominal pain, blood in stool, diarrhea, nausea and vomiting  Endocrine: Negative for polydipsia, polyphagia and polyuria  Genitourinary: Negative for dysuria, frequency and urgency  Musculoskeletal: Positive for gait problem  Skin: Negative for color change  Neurological: Negative for dizziness and headaches  Hematological: Does not bruise/bleed easily  Psychiatric/Behavioral: Negative for confusion, decreased concentration, dysphoric mood and sleep disturbance  The patient is not nervous/anxious            Objective:      /76 (BP Location: Left arm, Patient Position: Sitting, Cuff Size: Standard)   Pulse 62   Temp (!) 97 3 °F (36 3 °C)   Ht 5' 7" (1 702 m)   Wt 77 1 kg (170 lb)   LMP  (LMP Unknown)   SpO2 93%   BMI 26 63 kg/m²          Physical Exam  Vitals and nursing note reviewed  Constitutional:       General: She is not in acute distress  Appearance: She is well-developed and well-groomed  HENT:      Head: Normocephalic and atraumatic  Comments: Hard of hearing; hearing aids bilaterally  Eyes:      General:         Right eye: No discharge  Left eye: No discharge  Conjunctiva/sclera: Conjunctivae normal       Pupils: Pupils are equal, round, and reactive to light  Cardiovascular:      Rate and Rhythm: Normal rate and regular rhythm  Heart sounds: Normal heart sounds  No murmur heard  No friction rub  No gallop  Comments: Trace peripheral edema bilaterally  Pulmonary:      Effort: No respiratory distress  Breath sounds: No wheezing or rales  Abdominal:      General: Bowel sounds are normal  There is no distension  Tenderness: There is no abdominal tenderness  Lymphadenopathy:      Cervical: No cervical adenopathy  Skin:     General: Skin is warm and dry  Neurological:      Mental Status: She is alert and oriented to person, place, and time  Psychiatric:         Mood and Affect: Mood and affect normal          Speech: Speech normal          Behavior: Behavior normal  Behavior is cooperative  Cognition and Memory: Cognition and memory normal          BMI Counseling: Body mass index is 26 63 kg/m²  The BMI is above normal  Nutrition recommendations include encouraging healthy choices of fruits and vegetables, limiting drinks that contain sugar, moderation in carbohydrate intake and reducing intake of cholesterol  Rationale for BMI follow-up plan is due to patient being overweight or obese

## 2022-06-20 DIAGNOSIS — I48.0 PAROXYSMAL ATRIAL FIBRILLATION (HCC): ICD-10-CM

## 2022-06-20 RX ORDER — METOPROLOL TARTRATE 50 MG/1
TABLET, FILM COATED ORAL
Qty: 180 TABLET | Refills: 1 | Status: SHIPPED | OUTPATIENT
Start: 2022-06-20

## 2022-06-21 NOTE — RESULT NOTES
Verified Results  (1) CBC/PLT/DIFF [id number] 07:34AM Dave Vasquez Order Number: [id number]     Test Name Result Flag Reference   WBC COUNT 7 15 Thousand/uL  4 31-10 16   RBC COUNT 4 77 Million/uL  3 81-5 12   HEMOGLOBIN 14 4 g/dL  11 5-15 4   HEMATOCRIT 43 8 %  34 8-46  1   MCV 92 fL  82-98   MCH 30 2 pg  26 8-34 3   MCHC 32 9 g/dL  31 4-37 4   RDW 14 7 %  11 6-15 1   MPV 9 6 fL  8 9-12 7   PLATELET COUNT 628 Thousands/uL  149-390   NEUTROPHILS RELATIVE PERCENT 56 %  43-75   LYMPHOCYTES RELATIVE PERCENT 31 %  14-44   MONOCYTES RELATIVE PERCENT 8 %  4-12   EOSINOPHILS RELATIVE PERCENT 4 %  0-6   BASOPHILS RELATIVE PERCENT 1 %  0-1   NEUTROPHILS ABSOLUTE COUNT 4 03 Thousands/?L  1 85-7 62   LYMPHOCYTES ABSOLUTE COUNT 2 24 Thousands/?L  0 60-4 47   MONOCYTES ABSOLUTE COUNT 0 55 Thousand/?L  0 17-1 22   EOSINOPHILS ABSOLUTE COUNT 0 29 Thousand/?L  0 00-0 61   BASOPHILS ABSOLUTE COUNT 0 04 Thousands/?L  0 00-0 10     (1) COMPREHENSIVE METABOLIC PANEL [id number] 98:74EP Dave Vasquez Order Number: [id number]     Test Name Result Flag Reference   GLUCOSE,RANDM 116 mg/dL     If the patient is fasting, the ADA then defines impaired fasting glucose as > 100 mg/dL and diabetes as > or equal to 123 mg/dL     SODIUM 143 mmol/L  136-145   POTASSIUM 3 9 mmol/L  3 5-5 3   CHLORIDE 105 mmol/L  100-108   CARBON DIOXIDE 30 mmol/L  21-32   ANION GAP (CALC) 8 mmol/L  4-13   BLOOD UREA NITROGEN 20 mg/dL  5-25   CREATININE 1 15 mg/dL  0 60-1 30   Standardized to IDMS reference method   CALCIUM 8 6 mg/dL  8 3-10 1   BILI, TOTAL 0 80 mg/dL  0 20-1 00   ALK PHOSPHATAS 78 U/L     ALT (SGPT) 28 U/L  12-78   AST(SGOT) 25 U/L  5-45   ALBUMIN 3 5 g/dL  3 5-5 0   TOTAL PROTEIN 6 5 g/dL  6 4-8 2   eGFR Non-African American 45 0 ml/min/1 73sq m     [employer] Disease Education Program recommendations are as follows:  GFR calculation is accurate only with a steady state creatinine  Chronic Kidney disease less than 60 ml/min/1 73 sq  meters  Kidney failure less than 15 ml/min/1 73 sq  meters  (1) LIPID PANEL, FASTING [id number] 07:34AM Emily Render Order Number: [id number]     Test Name Result Flag Reference   CHOLESTEROL 166 mg/dL     HDL,DIRECT 72 mg/dL H 40-60   Specimen collection should occur prior to Metamizole administration due to the potential for falsely depressed results  LDL CHOLESTEROL CALCULATED 78 mg/dL  0-100   Triglyceride:         Normal              <150 mg/dl       Borderline High    150-199 mg/dl       High               200-499 mg/dl       Very High          >499 mg/dl  Cholesterol:         Desirable        <200 mg/dl      Borderline High  200-239 mg/dl      High             >239 mg/dl  HDL Cholesterol:        High    >59 mg/dL      Low     <41 mg/dL  LDL CALCULATED:    This screening LDL is a calculated result  It does not have the accuracy of the Direct Measured LDL in the monitoring of patients with hyperlipidemia and/or statin therapy  Direct Measure LDL (YSV713) must be ordered separately in these patients  TRIGLYCERIDES 82 mg/dL  <=150   Specimen collection should occur prior to N-Acetylcysteine or Metamizole administration due to the potential for falsely depressed results  (1) TSH [id number] 07:34AM Emily Render Order Number: [id number]     Test Name Result Flag Reference   TSH 1 150 uIU/mL  0 358-3 740   Patients undergoing fluorescein dye angiography may retain small amounts of fluorescein in the body for 48-72 hours post procedure  Samples containing fluorescein can produce falsely depressed TSH values  If the patient had this procedure,a specimen should be resubmitted post fluorescein clearance            The recommended reference ranges for TSH during pregnancy are as follows:  First trimester 0 1 to 2 5 uIU/mL  Second trimester  0 2 to 3 0 uIU/mL  Third trimester 0 3 to 3 0 uIU/m unsteady 5

## 2022-07-01 ENCOUNTER — IN-CLINIC DEVICE VISIT (OUTPATIENT)
Dept: CARDIOLOGY CLINIC | Facility: CLINIC | Age: 87
End: 2022-07-01
Payer: MEDICARE

## 2022-07-01 DIAGNOSIS — Z95.0 PRESENCE OF CARDIAC PACEMAKER: Primary | ICD-10-CM

## 2022-07-01 PROCEDURE — 93280 PM DEVICE PROGR EVAL DUAL: CPT | Performed by: INTERNAL MEDICINE

## 2022-07-01 NOTE — PROGRESS NOTES
Results for orders placed or performed in visit on 07/01/22   Cardiac EP device report    Narrative    SJM-DUAL-PM/ NOT MRI CONDITIONAL  DEVICE INTERROGATED IN THE [place] OFFICE  BATTERY VOLTAGE ADEQUATE (6 3 YRS)  AP: <1%  : 94% (>40%~DDDR/60)  ALL LEAD PARAMETERS WITHIN NORMAL LIMITS  1 AMS EPISODE W/ AF IN PROGRESS (HX OF SAME) AF BURDEN: 100%  PT DOES NOT TAKE AC DUE TO GI BLEED  PT TAKES METOPROLOL TART, CARDIZEM, CD, ASA 81MG  EF: 68% (NUC ST TX~4/22/19)  NO PROGRAMMING CHANGES MADE TO DEVICE PARAMETERS  PACEMAKER FUNCTIONING APPROPRIATELY    [address] [place] [address] Street

## 2022-07-10 DIAGNOSIS — F32.A DEPRESSION, UNSPECIFIED DEPRESSION TYPE: ICD-10-CM

## 2022-07-10 RX ORDER — BUPROPION HYDROCHLORIDE 300 MG/1
TABLET ORAL
Qty: 90 TABLET | Refills: 1 | Status: SHIPPED | OUTPATIENT
Start: 2022-07-10

## 2022-07-21 DIAGNOSIS — L40.9 PSORIASIS: ICD-10-CM

## 2022-07-21 DIAGNOSIS — I50.32 CHRONIC DIASTOLIC CONGESTIVE HEART FAILURE (HCC): ICD-10-CM

## 2022-07-21 DIAGNOSIS — F41.8 DEPRESSION WITH ANXIETY: ICD-10-CM

## 2022-07-21 DIAGNOSIS — E05.90 HYPERTHYROIDISM: ICD-10-CM

## 2022-07-21 RX ORDER — METHIMAZOLE 5 MG/1
TABLET ORAL
Qty: 39 TABLET | Refills: 3 | Status: SHIPPED | OUTPATIENT
Start: 2022-07-21 | End: 2022-07-22 | Stop reason: SDUPTHER

## 2022-07-21 RX ORDER — POTASSIUM CHLORIDE 1500 MG/1
TABLET, EXTENDED RELEASE ORAL
Qty: 90 TABLET | Refills: 3 | Status: SHIPPED | OUTPATIENT
Start: 2022-07-21 | End: 2022-07-22 | Stop reason: SDUPTHER

## 2022-07-21 RX ORDER — POTASSIUM CHLORIDE 20 MEQ/1
20 TABLET, EXTENDED RELEASE ORAL DAILY
Qty: 90 TABLET | Refills: 3 | Status: SHIPPED | OUTPATIENT
Start: 2022-07-21 | End: 2022-07-21

## 2022-07-21 RX ORDER — SERTRALINE HYDROCHLORIDE 100 MG/1
TABLET, FILM COATED ORAL
Qty: 90 TABLET | Refills: 1 | Status: SHIPPED | OUTPATIENT
Start: 2022-07-21

## 2022-07-21 RX ORDER — FLUOCINONIDE TOPICAL SOLUTION USP, 0.05% 0.5 MG/ML
SOLUTION TOPICAL
Qty: 180 ML | Refills: 1 | Status: SHIPPED | OUTPATIENT
Start: 2022-07-21 | End: 2022-07-22 | Stop reason: SDUPTHER

## 2022-07-22 DIAGNOSIS — L40.9 PSORIASIS: ICD-10-CM

## 2022-07-22 DIAGNOSIS — E05.90 HYPERTHYROIDISM: ICD-10-CM

## 2022-07-22 DIAGNOSIS — I50.32 CHRONIC DIASTOLIC CONGESTIVE HEART FAILURE (HCC): ICD-10-CM

## 2022-07-24 RX ORDER — KETOCONAZOLE 20 MG/ML
1 SHAMPOO TOPICAL ONCE
Qty: 100 ML | Refills: 3 | Status: SHIPPED | OUTPATIENT
Start: 2022-07-24 | End: 2022-10-06

## 2022-07-24 RX ORDER — METHIMAZOLE 5 MG/1
5 TABLET ORAL 3 TIMES WEEKLY
Qty: 39 TABLET | Refills: 3 | Status: SHIPPED | OUTPATIENT
Start: 2022-07-25

## 2022-07-24 RX ORDER — POTASSIUM CHLORIDE 20 MEQ/1
20 TABLET, EXTENDED RELEASE ORAL DAILY
Qty: 90 TABLET | Refills: 3 | Status: SHIPPED | OUTPATIENT
Start: 2022-07-24

## 2022-07-24 RX ORDER — FLUOCINONIDE TOPICAL SOLUTION USP, 0.05% 0.5 MG/ML
1 SOLUTION TOPICAL 2 TIMES DAILY
Qty: 180 ML | Refills: 1 | Status: SHIPPED | OUTPATIENT
Start: 2022-07-24

## 2022-08-01 DIAGNOSIS — I50.32 CHRONIC DIASTOLIC CONGESTIVE HEART FAILURE (HCC): ICD-10-CM

## 2022-08-01 RX ORDER — FUROSEMIDE 40 MG/1
TABLET ORAL
Qty: 180 TABLET | Refills: 0 | Status: SHIPPED | OUTPATIENT
Start: 2022-08-01

## 2022-09-29 ENCOUNTER — RA CDI HCC (OUTPATIENT)
Dept: OTHER | Facility: HOSPITAL | Age: 87
End: 2022-09-29

## 2022-09-29 NOTE — PROGRESS NOTES
I11 0  [facility] 75  coding opportunities          Chart Reviewed number of suggestions sent to Provider: 1     Patients Insurance     Medicare Insurance: [employer]

## 2022-10-03 ENCOUNTER — APPOINTMENT (OUTPATIENT)
Dept: LAB | Facility: CLINIC | Age: 87
End: 2022-10-03
Payer: MEDICARE

## 2022-10-03 DIAGNOSIS — E61.1 IRON DEFICIENCY: ICD-10-CM

## 2022-10-03 DIAGNOSIS — F33.9 DEPRESSION, RECURRENT (HCC): ICD-10-CM

## 2022-10-03 DIAGNOSIS — E78.5 DYSLIPIDEMIA: ICD-10-CM

## 2022-10-03 DIAGNOSIS — E55.9 VITAMIN D DEFICIENCY: ICD-10-CM

## 2022-10-03 DIAGNOSIS — I49.5 SSS (SICK SINUS SYNDROME) (HCC): ICD-10-CM

## 2022-10-03 DIAGNOSIS — I48.0 PAROXYSMAL ATRIAL FIBRILLATION (HCC): ICD-10-CM

## 2022-10-03 DIAGNOSIS — I50.32 CHRONIC DIASTOLIC CONGESTIVE HEART FAILURE (HCC): ICD-10-CM

## 2022-10-03 DIAGNOSIS — N18.31 STAGE 3A CHRONIC KIDNEY DISEASE (HCC): ICD-10-CM

## 2022-10-03 DIAGNOSIS — I27.20 PULMONARY HYPERTENSION (HCC): ICD-10-CM

## 2022-10-03 DIAGNOSIS — I10 BENIGN ESSENTIAL HYPERTENSION: ICD-10-CM

## 2022-10-03 DIAGNOSIS — E05.90 HYPERTHYROIDISM: ICD-10-CM

## 2022-10-03 LAB
25(OH)D3 SERPL-MCNC: 51.7 NG/ML (ref 30–100)
ALBUMIN SERPL BCP-MCNC: 3.4 G/DL (ref 3.5–5)
ALP SERPL-CCNC: 86 U/L (ref 46–116)
ALT SERPL W P-5'-P-CCNC: 27 U/L (ref 12–78)
ANION GAP SERPL CALCULATED.3IONS-SCNC: 7 MMOL/L (ref 4–13)
AST SERPL W P-5'-P-CCNC: 29 U/L (ref 5–45)
BASOPHILS # BLD AUTO: 0.05 THOUSANDS/ΜL (ref 0–0.1)
BASOPHILS NFR BLD AUTO: 1 % (ref 0–1)
BILIRUB SERPL-MCNC: 0.92 MG/DL (ref 0.2–1)
BUN SERPL-MCNC: 15 MG/DL (ref 5–25)
CALCIUM ALBUM COR SERPL-MCNC: 9.7 MG/DL (ref 8.3–10.1)
CALCIUM SERPL-MCNC: 9.2 MG/DL (ref 8.3–10.1)
CHLORIDE SERPL-SCNC: 105 MMOL/L (ref 96–108)
CHOLEST SERPL-MCNC: 137 MG/DL
CO2 SERPL-SCNC: 30 MMOL/L (ref 21–32)
CREAT SERPL-MCNC: 1.32 MG/DL (ref 0.6–1.3)
EOSINOPHIL # BLD AUTO: 0.42 THOUSAND/ΜL (ref 0–0.61)
EOSINOPHIL NFR BLD AUTO: 5 % (ref 0–6)
ERYTHROCYTE [DISTWIDTH] IN BLOOD BY AUTOMATED COUNT: 15 % (ref 11.6–15.1)
FERRITIN SERPL-MCNC: 146 NG/ML (ref 8–388)
GFR SERPL CREATININE-BSD FRML MDRD: 35 ML/MIN/1.73SQ M
GLUCOSE P FAST SERPL-MCNC: 83 MG/DL (ref 65–99)
HCT VFR BLD AUTO: 46.2 % (ref 34.8–46.1)
HDLC SERPL-MCNC: 62 MG/DL
HGB BLD-MCNC: 15 G/DL (ref 11.5–15.4)
IMM GRANULOCYTES # BLD AUTO: 0.03 THOUSAND/UL (ref 0–0.2)
IMM GRANULOCYTES NFR BLD AUTO: 0 % (ref 0–2)
IRON SATN MFR SERPL: 33 % (ref 15–50)
IRON SERPL-MCNC: 81 UG/DL (ref 50–170)
LDLC SERPL CALC-MCNC: 57 MG/DL (ref 0–100)
LYMPHOCYTES # BLD AUTO: 2.33 THOUSANDS/ΜL (ref 0.6–4.47)
LYMPHOCYTES NFR BLD AUTO: 27 % (ref 14–44)
MCH RBC QN AUTO: 32.7 PG (ref 26.8–34.3)
MCHC RBC AUTO-ENTMCNC: 32.5 G/DL (ref 31.4–37.4)
MCV RBC AUTO: 101 FL (ref 82–98)
MONOCYTES # BLD AUTO: 0.65 THOUSAND/ΜL (ref 0.17–1.22)
MONOCYTES NFR BLD AUTO: 8 % (ref 4–12)
NEUTROPHILS # BLD AUTO: 5.01 THOUSANDS/ΜL (ref 1.85–7.62)
NEUTS SEG NFR BLD AUTO: 59 % (ref 43–75)
NONHDLC SERPL-MCNC: 75 MG/DL
NRBC BLD AUTO-RTO: 0 /100 WBCS
PLATELET # BLD AUTO: 159 THOUSANDS/UL (ref 149–390)
PMV BLD AUTO: 9.8 FL (ref 8.9–12.7)
POTASSIUM SERPL-SCNC: 3.4 MMOL/L (ref 3.5–5.3)
PROT SERPL-MCNC: 7 G/DL (ref 6.4–8.4)
RBC # BLD AUTO: 4.59 MILLION/UL (ref 3.81–5.12)
SODIUM SERPL-SCNC: 142 MMOL/L (ref 135–147)
TIBC SERPL-MCNC: 245 UG/DL (ref 250–450)
TRIGL SERPL-MCNC: 89 MG/DL
TSH SERPL DL<=0.05 MIU/L-ACNC: 1.42 UIU/ML (ref 0.45–4.5)
WBC # BLD AUTO: 8.49 THOUSAND/UL (ref 4.31–10.16)

## 2022-10-03 PROCEDURE — 83540 ASSAY OF IRON: CPT

## 2022-10-03 PROCEDURE — 83550 IRON BINDING TEST: CPT

## 2022-10-03 PROCEDURE — 82728 ASSAY OF FERRITIN: CPT

## 2022-10-03 PROCEDURE — 80061 LIPID PANEL: CPT

## 2022-10-03 PROCEDURE — 80053 COMPREHEN METABOLIC PANEL: CPT

## 2022-10-03 PROCEDURE — 85025 COMPLETE CBC W/AUTO DIFF WBC: CPT

## 2022-10-03 PROCEDURE — 84443 ASSAY THYROID STIM HORMONE: CPT

## 2022-10-03 PROCEDURE — 82306 VITAMIN D 25 HYDROXY: CPT

## 2022-10-03 PROCEDURE — 36415 COLL VENOUS BLD VENIPUNCTURE: CPT

## 2022-10-05 ENCOUNTER — REMOTE DEVICE CLINIC VISIT (OUTPATIENT)
Dept: CARDIOLOGY CLINIC | Facility: CLINIC | Age: 87
End: 2022-10-05
Payer: MEDICARE

## 2022-10-05 DIAGNOSIS — Z95.0 CARDIAC PACEMAKER IN SITU: Primary | ICD-10-CM

## 2022-10-05 PROCEDURE — 93296 REM INTERROG EVL PM/IDS: CPT | Performed by: INTERNAL MEDICINE

## 2022-10-05 PROCEDURE — 93294 REM INTERROG EVL PM/LDLS PM: CPT | Performed by: INTERNAL MEDICINE

## 2022-10-06 ENCOUNTER — OFFICE VISIT (OUTPATIENT)
Dept: INTERNAL MEDICINE CLINIC | Facility: CLINIC | Age: 87
End: 2022-10-06
Payer: MEDICARE

## 2022-10-06 VITALS
DIASTOLIC BLOOD PRESSURE: 76 MMHG | WEIGHT: 162 LBS | HEIGHT: 67 IN | TEMPERATURE: 97.8 F | HEART RATE: 60 BPM | OXYGEN SATURATION: 92 % | BODY MASS INDEX: 25.43 KG/M2 | SYSTOLIC BLOOD PRESSURE: 124 MMHG

## 2022-10-06 DIAGNOSIS — Z00.00 MEDICARE ANNUAL WELLNESS VISIT, SUBSEQUENT: ICD-10-CM

## 2022-10-06 DIAGNOSIS — I50.32 CHRONIC DIASTOLIC CONGESTIVE HEART FAILURE (HCC): ICD-10-CM

## 2022-10-06 DIAGNOSIS — I48.0 PAROXYSMAL ATRIAL FIBRILLATION (HCC): ICD-10-CM

## 2022-10-06 DIAGNOSIS — N18.31 STAGE 3A CHRONIC KIDNEY DISEASE (HCC): ICD-10-CM

## 2022-10-06 DIAGNOSIS — E55.9 VITAMIN D DEFICIENCY: ICD-10-CM

## 2022-10-06 DIAGNOSIS — E87.6 HYPOKALEMIA: ICD-10-CM

## 2022-10-06 DIAGNOSIS — I10 BENIGN ESSENTIAL HYPERTENSION: Primary | ICD-10-CM

## 2022-10-06 DIAGNOSIS — E05.90 HYPERTHYROIDISM: ICD-10-CM

## 2022-10-06 PROCEDURE — 99214 OFFICE O/P EST MOD 30 MIN: CPT | Performed by: FAMILY MEDICINE

## 2022-10-06 PROCEDURE — G0439 PPPS, SUBSEQ VISIT: HCPCS | Performed by: FAMILY MEDICINE

## 2022-10-06 NOTE — PATIENT INSTRUCTIONS
Medicare Preventive Visit Patient Instructions  Thank you for completing your Welcome to Medicare Visit or Medicare Annual Wellness Visit today  Your next wellness visit will be due in one year (10/7/2023)  The screening/preventive services that you may require over the next 5-10 years are detailed below  Some tests may not apply to you based off risk factors and/or age  Screening tests ordered at today's visit but not completed yet may show as past due  Also, please note that scanned in results may not display below  Preventive Screenings:  Service Recommendations Previous Testing/Comments   Colorectal Cancer Screening  * Colonoscopy    * Fecal Occult Blood Test (FOBT)/Fecal Immunochemical Test (FIT)  * Fecal DNA/Cologuard Test  * Flexible Sigmoidoscopy Age: 39-70 years old   Colonoscopy: every 10 years (may be performed more frequently if at higher risk)  OR  FOBT/FIT: every 1 year  OR  Cologuard: every 3 years  OR  Sigmoidoscopy: every 5 years  Screening may be recommended earlier than age 39 if at higher risk for colorectal cancer  Also, an individualized decision between you and your healthcare provider will decide whether screening between the ages of 74-80 would be appropriate  Colonoscopy: Not on file  FOBT/FIT: Not on file  Cologuard: Not on file  Sigmoidoscopy: Not on file    Screening Not Indicated     Breast Cancer Screening Age: 36 years old  Frequency: every 1-2 years  Not required if history of left and right mastectomy Mammogram: Not on file        Cervical Cancer Screening Between the ages of 21-29, pap smear recommended once every 3 years  Between the ages of 33-67, can perform pap smear with HPV co-testing every 5 years     Recommendations may differ for women with a history of total hysterectomy, cervical cancer, or abnormal pap smears in past  Pap Smear: Not on file    Screening Not Indicated   Hepatitis C Screening Once for adults born between 1945 and 1965  More frequently in patients at high risk for Hepatitis C Hep C Antibody: Not on file        Diabetes Screening 1-2 times per year if you're at risk for diabetes or have pre-diabetes Fasting glucose: 83 mg/dL (10/3/2022)  A1C: No results in last 5 years (No results in last 5 years)  Screening Current   Cholesterol Screening Once every 5 years if you don't have a lipid disorder  May order more often based on risk factors  Lipid panel: 10/03/2022    Screening Not Indicated  History Lipid Disorder     Other Preventive Screenings Covered by Medicare:  1  Abdominal Aortic Aneurysm (AAA) Screening: covered once if your at risk  You're considered to be at risk if you have a family history of AAA  2  Lung Cancer Screening: covers low dose CT scan once per year if you meet all of the following conditions: (1) Age 50-69; (2) No signs or symptoms of lung cancer; (3) Current smoker or have quit smoking within the last 15 years; (4) You have a tobacco smoking history of at least 20 pack years (packs per day multiplied by number of years you smoked); (5) You get a written order from a healthcare provider  3  Glaucoma Screening: covered annually if you're considered high risk: (1) You have diabetes OR (2) Family history of glaucoma OR (3)  aged 48 and older OR (3)  American aged 72 and older  3  Osteoporosis Screening: covered every 2 years if you meet one of the following conditions: (1) You're estrogen deficient and at risk for osteoporosis based off medical history and other findings; (2) Have a vertebral abnormality; (3) On glucocorticoid therapy for more than 3 months; (4) Have primary hyperparathyroidism; (5) On osteoporosis medications and need to assess response to drug therapy  · Last bone density test (DXA Scan): Not on file  5  HIV Screening: covered annually if you're between the age of 12-76  Also covered annually if you are younger than 13 and older than 72 with risk factors for HIV infection   For pregnant patients, it is covered up to 3 times per pregnancy  Immunizations:  Immunization Recommendations   Influenza Vaccine Annual influenza vaccination during flu season is recommended for all persons aged >= 6 months who do not have contraindications   Pneumococcal Vaccine   * Pneumococcal conjugate vaccine = PCV13 (Prevnar 13), PCV15 (Vaxneuvance), PCV20 (Prevnar 20)  * Pneumococcal polysaccharide vaccine = PPSV23 (Pneumovax) Adults 25-60 years old: 1-3 doses may be recommended based on certain risk factors  Adults 72 years old: 1-2 doses may be recommended based off what pneumonia vaccine you previously received   Hepatitis B Vaccine 3 dose series if at intermediate or high risk (ex: diabetes, end stage renal disease, liver disease)   Tetanus (Td) Vaccine - COST NOT COVERED BY MEDICARE PART B Following completion of primary series, a booster dose should be given every 10 years to maintain immunity against tetanus  Td may also be given as tetanus wound prophylaxis  Tdap Vaccine - COST NOT COVERED BY MEDICARE PART B Recommended at least once for all adults  For pregnant patients, recommended with each pregnancy  Shingles Vaccine (Shingrix) - COST NOT COVERED BY MEDICARE PART B  2 shot series recommended in those aged 48 and above     Health Maintenance Due:  There are no preventive care reminders to display for this patient  Immunizations Due:      Topic Date Due    COVID-19 Vaccine (1) Never done    Pneumococcal Vaccine: 65+ Years (2 - PPSV23 or PCV20) 01/21/2021    Influenza Vaccine (1) 09/01/2022     Advance Directives   What are advance directives? Advance directives are legal documents that state your wishes and plans for medical care  These plans are made ahead of time in case you lose your ability to make decisions for yourself  Advance directives can apply to any medical decision, such as the treatments you want, and if you want to donate organs  What are the types of advance directives?   There are many types of advance directives, and each state has rules about how to use them  You may choose a combination of any of the following:  · Living will: This is a written record of the treatment you want  You can also choose which treatments you do not want, which to limit, and which to stop at a certain time  This includes surgery, medicine, IV fluid, and tube feedings  · Durable power of  for healthcare [facility]): This is a written record that states who you want to make healthcare choices for you when you are unable to make them for yourself  This person, called a proxy, is usually a family member or a friend  You may choose more than 1 proxy  · Do not resuscitate (DNR) order:  A DNR order is used in case your heart stops beating or you stop breathing  It is a request not to have certain forms of treatment, such as CPR  A DNR order may be included in other types of advance directives  · Medical directive: This covers the care that you want if you are in a coma, near death, or unable to make decisions for yourself  You can list the treatments you want for each condition  Treatment may include pain medicine, surgery, blood transfusions, dialysis, IV or tube feedings, and a ventilator (breathing machine)  · Values history: This document has questions about your views, beliefs, and how you feel and think about life  This information can help others choose the care that you would choose  Why are advance directives important? An advance directive helps you control your care  Although spoken wishes may be used, it is better to have your wishes written down  Spoken wishes can be misunderstood, or not followed  Treatments may be given even if you do not want them  An advance directive may make it easier for your family to make difficult choices about your care  Fall Prevention    Fall prevention  includes ways to make your home and other areas safer   It also includes ways you can move more carefully to prevent a fall  Health conditions that cause changes in your blood pressure, vision, or muscle strength and coordination may increase your risk for falls  Medicines may also increase your risk for falls if they make you dizzy, weak, or sleepy  Fall prevention tips:   · Stand or sit up slowly  · Use assistive devices as directed  · Wear shoes that fit well and have soles that   · Wear a personal alarm  · Stay active  · Manage your medical conditions  Home Safety Tips:  · Add items to prevent falls in the bathroom  · Keep paths clear  · Install bright lights in your home  · Keep items you use often on shelves within reach  · Paint or place reflective tape on the edges of your stairs  Weight Management   Why it is important to manage your weight:  Being overweight increases your risk of health conditions such as heart disease, high blood pressure, type 2 diabetes, and certain types of cancer  It can also increase your risk for osteoarthritis, sleep apnea, and other respiratory problems  Aim for a slow, steady weight loss  Even a small amount of weight loss can lower your risk of health problems  How to lose weight safely:  A safe and healthy way to lose weight is to eat fewer calories and get regular exercise  You can lose up about 1 pound a week by decreasing the number of calories you eat by 500 calories each day  Healthy meal plan for weight management:  A healthy meal plan includes a variety of foods, contains fewer calories, and helps you stay healthy  A healthy meal plan includes the following:  · Eat whole-grain foods more often  A healthy meal plan should contain fiber  Fiber is the part of grains, fruits, and vegetables that is not broken down by your body  Whole-grain foods are healthy and provide extra fiber in your diet  Some examples of whole-grain foods are whole-wheat breads and pastas, oatmeal, brown rice, and bulgur  · Eat a variety of vegetables every day    Include dark, leafy greens such as spinach, kale, savannah greens, and mustard greens  Eat yellow and orange vegetables such as carrots, sweet potatoes, and winter squash  · Eat a variety of fruits every day  Choose fresh or canned fruit (canned in its own juice or light syrup) instead of juice  Fruit juice has very little or no fiber  · Eat low-fat dairy foods  Drink fat-free (skim) milk or 1% milk  Eat fat-free yogurt and low-fat cottage cheese  Try low-fat cheeses such as mozzarella and other reduced-fat cheeses  · Choose meat and other protein foods that are low in fat  Choose beans or other legumes such as split peas or lentils  Choose fish, skinless poultry (chicken or turkey), or lean cuts of red meat (beef or pork)  Before you cook meat or poultry, cut off any visible fat  · Use less fat and oil  Try baking foods instead of frying them  Add less fat, such as margarine, sour cream, regular salad dressing and mayonnaise to foods  Eat fewer high-fat foods  Some examples of high-fat foods include french fries, doughnuts, ice cream, and cakes  · Eat fewer sweets  Limit foods and drinks that are high in sugar  This includes candy, cookies, regular soda, and sweetened drinks  Exercise:  Exercise at least 30 minutes per day on most days of the week  Some examples of exercise include walking, biking, dancing, and swimming  You can also fit in more physical activity by taking the stairs instead of the elevator or parking farther away from stores  Ask your healthcare provider about the best exercise plan for you  © Copyright [employer] 2018 Information is for End User's use only and may not be sold, redistributed or otherwise used for commercial purposes   All illustrations and images included in CareNotes® are the copyrighted property of A D A M [employer]  or [address] [place] [address]

## 2022-10-06 NOTE — PROGRESS NOTES
Results for orders placed or performed in visit on 10/05/22   Cardiac EP device report    Narrative    SJM-DUAL-PM/ NOT MRI CONDITIONAL  MERLIN TRANSMISSION: BATTERY STATUS "2 YRS " AP 0%  100%  ALL AVAILABLE LEAD PARAMETERS WITHIN NORMAL LIMITS  100% AF NOTED; PT ON METO TART, CARDIZEM, ASA & NO AC DUE TO GI BLD  NORMAL DEVICE FUNCTION   NC

## 2022-10-06 NOTE — PROGRESS NOTES
Assessment and Plan:     Problem List Items Addressed This Visit        Endocrine    Hyperthyroidism    Relevant Orders    CBC and differential    TSH, 3rd generation       Cardiovascular and Mediastinum    Benign essential hypertension - Primary    Relevant Orders    CBC and differential    Comprehensive metabolic panel    Lipid panel    Chronic diastolic congestive heart failure (HCC)    Relevant Orders    CBC and differential    Paroxysmal atrial fibrillation (HCC)    Relevant Orders    CBC and differential       Genitourinary    Stage 3a chronic kidney disease ([facility] [address] )    Relevant Orders    CBC and differential    Comprehensive metabolic panel       Other    Hypokalemia    Relevant Orders    CBC and differential    Comprehensive metabolic panel    Vitamin D deficiency    Relevant Orders    CBC and differential    Vitamin D 25 hydroxy      Other Visit Diagnoses     Medicare annual wellness visit, subsequent        Relevant Orders    CBC and differential        Orders recommendations as noted above  Reviewed recent laboratory testing with her  Blood work generally stable  Discussed Ng well hydrated  Continue with the diltiazem as previously  Continue with the metoprolol as previously  Continue follow-up with Cardiology on a regular basis  Watch for any chest pain or palpitations  Watch for any increasing shortness of breath  Continue with the furosemide as previously  Currently regular on exam   Avoid nephrotoxins  Stay adequately hydrated  Potassium on the lower side  Discussed increasing potassium in diet  Continue with potassium supplementation  Will continue follow laboratory testing  Continue with vitamin-D supplementation  Continue with the methimazole as previously  TSH is stable  Declines flu shot  Declines coronavirus vaccines  Be very careful to watch for any URI symptoms  Will have her follow up in about 3-5 months or sooner if needed         Preventive health issues were discussed with patient, and age appropriate screening tests were ordered as noted in patient's After Visit Summary  Personalized health advice and appropriate referrals for health education or preventive services given if needed, as noted in patient's After Visit Summary  History of Present Illness:     Patient presents for a Medicare Wellness Visit    She presents for routine follow-up as well as Medicare wellness visit  Has generally been doing well  Hearing has worsened significantly  Even with the hearing aids still has trouble hearing  Balance has been relatively stable  Did trip but had no significant injuries  Denies any significant shortness of breath  Denies any chest pain or palpitations  Tolerating her blood pressure medications without difficulty  Denies any significant dizziness  Has significant peripheral edema  Appetite has been relatively stable  Denies any nausea, vomiting, or diarrhea  Usually sleeps relatively well  Continues with the potassium and the Lasix  Mood has been stable  Continues with the in the Zoloft and the Wellbutrin  Sleeping relatively well  Patient Care Team:  Armaan Worrell MD as PCP - General (Family Medicine)  Armaan Worrell MD as PCP - General  DO Vanessa Martinez MD Liliana Gilles, MD Nelly Fey, MD as Endoscopist     Review of Systems:     Review of Systems   Constitutional: Negative for activity change, appetite change, chills and fever  HENT: Positive for hearing loss  Negative for congestion and rhinorrhea  Eyes: Negative for visual disturbance  Respiratory: Negative for chest tightness and shortness of breath  Cardiovascular: Negative for chest pain and palpitations  Gastrointestinal: Negative for abdominal pain, anal bleeding, blood in stool, diarrhea, nausea and vomiting  Endocrine: Negative for polydipsia, polyphagia and polyuria     Genitourinary: Negative for dysuria, frequency and urgency  Musculoskeletal: Negative for gait problem  Skin: Negative for color change  Neurological: Negative for dizziness and headaches  Hematological: Does not bruise/bleed easily  Psychiatric/Behavioral: Negative for confusion, decreased concentration, dysphoric mood and sleep disturbance  The patient is not nervous/anxious  Problem List:     Patient Active Problem List   Diagnosis    Presence of permanent cardiac pacemaker    H/O: hysterectomy    Benign essential hypertension    Hyperthyroidism    Glaucoma    Chronic diastolic congestive heart failure (HCC)    SSS (sick sinus syndrome) (HCC)    Depression, recurrent (HCC)    Hypokalemia    Pulmonary hypertension ([facility] [address] )    Anemia    Renal atrophy    Hyperphosphatemia    Vitamin D deficiency    Depression with anxiety    Dyslipidemia    Dyspnea on exertion    Fatigue    Osteoporosis    Paroxysmal atrial fibrillation (HCC)    Psoriasis    Vertigo    Contusion of right thigh    Multiple thyroid nodules    Iron deficiency    Stage 3a chronic kidney disease ([facility] [address] )      Past Medical and Surgical History:     Past Medical History:   Diagnosis Date    VAZQUEZ (acute kidney injury) ([facility] [address] ) 2/17/2017    Anemia     Atrial fibrillation (HCC)     Atrial flutter (HCC)     Depression     Diastolic CHF ([facility] [address] )     Dyslipidemia     Glaucoma     H/O: hysterectomy     Hypertension     Hyperthyroidism     Impaired glucose tolerance     RESOLVED 2/3/2016    Pacemaker     Psoriasis     S/P cataract surgery     S/P knee replacement     SSS (sick sinus syndrome) (HCC)     SSS (sick sinus syndrome) ([address] )      Past Surgical History:   Procedure Laterality Date    CARDIAC PACEMAKER PLACEMENT      CARDIAC PACEMAKER PLACEMENT      dual chamber    CATARACT EXTRACTION      ESOPHAGOGASTRODUODENOSCOPY N/A 12/30/2016    Procedure: ESOPHAGOGASTRODUODENOSCOPY (EGD); Surgeon: Olga Rosado MD;  Location: [facility];   Service:     EYE SURGERY cataract    HYSTERECTOMY      JOINT REPLACEMENT      bilateral     CA COLONOSCOPY FLX DX W/COLLJ SPEC WHEN PFRMD N/A 4/24/2017    Procedure: COLONOSCOPY with polypectomy;  Surgeon: Wetzel Canavan, MD;  Location: [facility]; Service: Gastroenterology    REPLACEMENT TOTAL KNEE Bilateral       Family History:     Family History   Problem Relation Age of Onset    Alcohol abuse Father     Diabetes Father     Alcohol abuse Brother       Social History:     Social History     Socioeconomic History    Marital status:      Spouse name: None    Number of children: None    Years of education: None    Highest education level: None   Occupational History    None   Tobacco Use    Smoking status: Former Smoker    Smokeless tobacco: Never Used   Vaping Use    Vaping Use: Never used   Substance and Sexual Activity    Alcohol use: No    Drug use: No    Sexual activity: Not Currently   Other Topics Concern    None   Social History Narrative    None     Social Determinants of Health     Financial Resource Strain: Low Risk     Difficulty of Paying Living Expenses: Not hard at all   Food Insecurity: Not on file   Transportation Needs: No Transportation Needs    Lack of Transportation (Medical): No    Lack of Transportation (Non-Medical):  No   Physical Activity: Not on file   Stress: Not on file   Social Connections: Not on file   Intimate Partner Violence: Not on file   Housing Stability: Not on file      Medications and Allergies:     Current Outpatient Medications   Medication Sig Dispense Refill    ALFALFA PO Take 3 tablets by mouth        aspirin (ECOTRIN LOW STRENGTH) 81 mg EC tablet Take 81 mg by mouth daily      B Complex Vitamins (B COMPLEX 1 PO) Take by mouth      buPROPion (WELLBUTRIN XL) 300 mg 24 hr tablet TAKE 1 TABLET EVERY MORNING 90 tablet 1    cholecalciferol (VITAMIN D3) 1,000 units tablet Take 1 tablet by mouth daily 30 tablet 0    diltiazem (CARDIZEM CD) 240 mg 24 hr capsule Take 1 capsule (240 mg total) by mouth daily 90 capsule 3    fluocinonide (LIDEX) 0 05 % external solution Apply 1 application topically 2 (two) times a day 180 mL 1    furosemide (LASIX) 40 mg tablet TAKE 1 TABLET TWICE A  tablet 0    ketoconazole (NIZORAL) 2 % shampoo Apply 1 application topically once for 1 dose 100 mL 3    LECITHIN PO Take 1 tablet by mouth daily      methimazole (TAPAZOLE) 5 mg tablet Take 1 tablet (5 mg total) by mouth 3 (three) times a week 39 tablet 3    metoprolol tartrate (LOPRESSOR) 50 mg tablet TAKE 1 TABLET EVERY 12     HOURS 180 tablet 1    Omega-3 Fatty Acids (OMEGA-3 PO) Take by mouth 2 (two) times a day        potassium chloride (Klor-Con M20) 20 mEq tablet Take 1 tablet (20 mEq total) by mouth daily 90 tablet 3    sertraline (ZOLOFT) 100 mg tablet TAKE 1 TABLET DAILY 90 tablet 1    simvastatin (ZOCOR) 20 mg tablet TAKE 1 TABLET DAILY AT     BEDTIME 90 tablet 3    triamcinolone (KENALOG) 0 1 % cream Apply topically 2 (two) times a day 30 g 0    Zinc Sulfate (ZINC 15 PO) Take 100 mg by mouth         No current facility-administered medications for this visit  No Known Allergies   Immunizations:     Immunization History   Administered Date(s) Administered    INFLUENZA 10/03/2016    Influenza Split High Dose Preservative Free IM 10/03/2016    Pneumococcal Conjugate 13-Valent 01/21/2020      Health Maintenance: There are no preventive care reminders to display for this patient  Topic Date Due    COVID-19 Vaccine (1) Never done    Pneumococcal Vaccine: 65+ Years (2 - PPSV23 or PCV20) 01/21/2021    Influenza Vaccine (1) 09/01/2022      Medicare Screening Tests and Risk Assessments:     Amanda Otoole is here for her Subsequent Wellness visit  Last Medicare Wellness visit information reviewed, patient interviewed and updates made to the record today  Health Risk Assessment:   Patient rates overall health as good   Patient feels that their physical health rating is same  Patient is satisfied with their life  Eyesight was rated as same  Hearing was rated as same  Patient feels that their emotional and mental health rating is same  Patients states they are often angry  Patient states they are sometimes unusually tired/fatigued  Pain experienced in the last 7 days has been none  Patient states that she has experienced no weight loss or gain in last 6 months  Depression Screening:   PHQ-9 Score: 0      Fall Risk Screening: In the past year, patient has experienced: history of falling in past year    Number of falls: 1  Injured during fall?: No    Feels unsteady when standing or walking?: Yes    Worried about falling?: Yes      Urinary Incontinence Screening:   Patient has not leaked urine accidently in the last six months  Home Safety:  Patient does not have trouble with stairs inside or outside of their home  Patient has working smoke alarms and has working carbon monoxide detector  Home safety hazards include: none  Nutrition:   Current diet is Regular  Medications:   Patient is currently taking over-the-counter supplements  OTC medications include: see medication list  Patient is not able to manage medications  Activities of Daily Living (ADLs)/Instrumental Activities of Daily Living (IADLs):   Walk and transfer into and out of bed and chair?: Yes  Dress and groom yourself?: Yes    Bathe or shower yourself?: Yes    Feed yourself? Yes  Do your laundry/housekeeping?: Yes  Manage your money, pay your bills and track your expenses?: No  Make your own meals?: Yes    Do your own shopping?: Yes    Previous Hospitalizations:   Any hospitalizations or ED visits within the last 12 months?: No      Advance Care Planning:   Living will: Yes    Durable POA for healthcare:  Yes    Advanced directive: Yes    Provider agrees with end of life decisions: Yes      Cognitive Screening:   Provider or family/friend/caregiver concerned regarding cognition?: No    PREVENTIVE SCREENINGS      Cardiovascular Screening:    General: Screening Not Indicated and History Lipid Disorder      Diabetes Screening:     General: Screening Current      Colorectal Cancer Screening:     General: Screening Not Indicated      Breast Cancer Screening:     General: Patient Declines      Cervical Cancer Screening:    General: Screening Not Indicated      Osteoporosis Screening:    General: Screening Not Indicated, History Osteoporosis and Patient Declines      Abdominal Aortic Aneurysm (AAA) Screening:        General: Screening Not Indicated      Lung Cancer Screening:     General: Screening Not Indicated      Hepatitis C Screening:    General: Screening Not Indicated    Screening, Brief Intervention, and Referral to Treatment (SBIRT)    Screening  Typical number of drinks in a day: 0  Typical number of drinks in a week: 0  Interpretation: Low risk drinking behavior  Single Item Drug Screening:  How often have you used an illegal drug (including marijuana) or a prescription medication for non-medical reasons in the past year? never    Single Item Drug Screen Score: 0  Interpretation: Negative screen for possible drug use disorder    Brief Intervention  Alcohol & drug use screenings were reviewed  No concerns regarding substance use disorder identified  No exam data present     Physical Exam:     /76 (BP Location: Left arm, Patient Position: Sitting, Cuff Size: Standard)   Pulse 60   Temp 97 8 °F (36 6 °C)   Ht 5' 7" (1 702 m)   Wt 73 5 kg (162 lb)   LMP  (LMP Unknown)   SpO2 92%   BMI 25 37 kg/m²     Physical Exam  Vitals and nursing note reviewed  Constitutional:       General: She is not in acute distress  Appearance: She is well-developed and well-groomed  HENT:      Head: Normocephalic and atraumatic  Right Ear: Decreased hearing noted  Left Ear: Decreased hearing noted     Eyes:      Conjunctiva/sclera: Conjunctivae normal    Neck:      Thyroid: No thyromegaly  Cardiovascular:      Rate and Rhythm: Normal rate and regular rhythm  Heart sounds: No murmur heard  Pulmonary:      Effort: Pulmonary effort is normal  No respiratory distress  Breath sounds: Normal breath sounds  Abdominal:      Palpations: Abdomen is soft  Tenderness: There is no abdominal tenderness  Musculoskeletal:      Cervical back: Neck supple  Comments: Slow but steady gait   Skin:     General: Skin is warm and dry  Neurological:      Mental Status: She is alert  Psychiatric:         Mood and Affect: Mood and affect normal          Speech: Speech normal          Behavior: Behavior is cooperative           Cognition and Memory: Cognition and memory normal           Brynn Nuñez MD

## 2022-11-07 DIAGNOSIS — U07.1 COVID-19: Primary | ICD-10-CM

## 2022-11-07 RX ORDER — NIRMATRELVIR AND RITONAVIR 150-100 MG
2 KIT ORAL 2 TIMES DAILY
Qty: 20 TABLET | Refills: 0 | Status: SHIPPED | OUTPATIENT
Start: 2022-11-07 | End: 2022-11-12

## 2022-12-05 DIAGNOSIS — I50.32 CHRONIC DIASTOLIC CONGESTIVE HEART FAILURE (HCC): ICD-10-CM

## 2022-12-05 RX ORDER — FUROSEMIDE 40 MG/1
40 TABLET ORAL 2 TIMES DAILY
Qty: 180 TABLET | Refills: 0 | Status: SHIPPED | OUTPATIENT
Start: 2022-12-05

## 2022-12-10 DIAGNOSIS — E78.2 MIXED HYPERLIPIDEMIA: ICD-10-CM

## 2022-12-10 RX ORDER — SIMVASTATIN 20 MG
TABLET ORAL
Qty: 90 TABLET | Refills: 3 | Status: SHIPPED | OUTPATIENT
Start: 2022-12-10

## 2022-12-21 DIAGNOSIS — I50.32 CHRONIC DIASTOLIC CONGESTIVE HEART FAILURE (HCC): ICD-10-CM

## 2022-12-21 RX ORDER — FUROSEMIDE 40 MG/1
40 TABLET ORAL 2 TIMES DAILY
Qty: 180 TABLET | Refills: 0 | Status: SHIPPED | OUTPATIENT
Start: 2022-12-21 | End: 2023-01-04

## 2023-01-01 DIAGNOSIS — F32.A DEPRESSION, UNSPECIFIED DEPRESSION TYPE: ICD-10-CM

## 2023-01-01 DIAGNOSIS — I48.0 PAROXYSMAL ATRIAL FIBRILLATION (HCC): ICD-10-CM

## 2023-01-01 RX ORDER — METOPROLOL TARTRATE 50 MG/1
TABLET, FILM COATED ORAL
Qty: 180 TABLET | Refills: 1 | Status: SHIPPED | OUTPATIENT
Start: 2023-01-01

## 2023-01-01 RX ORDER — BUPROPION HYDROCHLORIDE 300 MG/1
TABLET ORAL
Qty: 90 TABLET | Refills: 1 | Status: SHIPPED | OUTPATIENT
Start: 2023-01-01

## 2023-01-03 RX ORDER — DILTIAZEM HYDROCHLORIDE 240 MG/1
CAPSULE, COATED, EXTENDED RELEASE ORAL
Qty: 90 CAPSULE | Refills: 3 | Status: SHIPPED | OUTPATIENT
Start: 2023-01-03

## 2023-01-03 NOTE — TELEPHONE ENCOUNTER
Requested medication(s) are due for refill today: Yes  Patient has already received a courtesy refill: No  Other reason request has been forwarded to provider: Mohan Forrestdeya diaz

## 2023-01-04 ENCOUNTER — REMOTE DEVICE CLINIC VISIT (OUTPATIENT)
Dept: CARDIOLOGY CLINIC | Facility: CLINIC | Age: 88
End: 2023-01-04

## 2023-01-04 DIAGNOSIS — Z95.0 CARDIAC PACEMAKER IN SITU: Primary | ICD-10-CM

## 2023-01-04 NOTE — PROGRESS NOTES
Results for orders placed or performed in visit on 01/04/23   Cardiac EP device report    Narrative    SJM-DUAL-PM/ NOT MRI CONDITIONAL  [employer]LIN TRANSMISSION: BATTERY VOLTAGE ADEQUATE (1 7 YRS)  AP-0%, -97% (>40% [email or web address])  ALL AVAILABLE LEAD PARAMETERS WITHIN NORMAL LIMITS  NO NEW SIGNIFICANT HIGH RATE EPISODES  PT IN AF >99% OF TIME & ON ASA 81MG, DILTIAZEM & METOPROLOL; NO ANTICOAGULATION DUE TO HX: GI BLEED  NORMAL DEVICE FUNCTION   GV

## 2023-02-05 DIAGNOSIS — F41.8 DEPRESSION WITH ANXIETY: ICD-10-CM

## 2023-02-05 RX ORDER — SERTRALINE HYDROCHLORIDE 100 MG/1
TABLET, FILM COATED ORAL
Qty: 90 TABLET | Refills: 1 | Status: SHIPPED | OUTPATIENT
Start: 2023-02-05

## 2023-02-14 ENCOUNTER — VBI (OUTPATIENT)
Dept: ADMINISTRATIVE | Facility: OTHER | Age: 88
End: 2023-02-14

## 2023-03-06 DIAGNOSIS — I50.32 CHRONIC DIASTOLIC CONGESTIVE HEART FAILURE (HCC): ICD-10-CM

## 2023-03-06 RX ORDER — FUROSEMIDE 40 MG/1
TABLET ORAL
Qty: 180 TABLET | Refills: 0 | Status: SHIPPED | OUTPATIENT
Start: 2023-03-06

## 2023-03-07 ENCOUNTER — APPOINTMENT (OUTPATIENT)
Dept: LAB | Facility: CLINIC | Age: 88
End: 2023-03-07

## 2023-03-07 DIAGNOSIS — E05.90 HYPERTHYROIDISM: ICD-10-CM

## 2023-03-07 DIAGNOSIS — I50.32 CHRONIC DIASTOLIC CONGESTIVE HEART FAILURE (HCC): ICD-10-CM

## 2023-03-07 DIAGNOSIS — I10 BENIGN ESSENTIAL HYPERTENSION: ICD-10-CM

## 2023-03-07 DIAGNOSIS — N18.31 STAGE 3A CHRONIC KIDNEY DISEASE (HCC): ICD-10-CM

## 2023-03-07 DIAGNOSIS — E55.9 VITAMIN D DEFICIENCY: ICD-10-CM

## 2023-03-07 DIAGNOSIS — Z00.00 MEDICARE ANNUAL WELLNESS VISIT, SUBSEQUENT: ICD-10-CM

## 2023-03-07 DIAGNOSIS — E87.6 HYPOKALEMIA: ICD-10-CM

## 2023-03-07 DIAGNOSIS — I48.0 PAROXYSMAL ATRIAL FIBRILLATION (HCC): ICD-10-CM

## 2023-03-07 LAB
25(OH)D3 SERPL-MCNC: 38.6 NG/ML (ref 30–100)
ALBUMIN SERPL BCP-MCNC: 3.8 G/DL (ref 3.5–5)
ALP SERPL-CCNC: 101 U/L (ref 46–116)
ALT SERPL W P-5'-P-CCNC: 29 U/L (ref 12–78)
ANION GAP SERPL CALCULATED.3IONS-SCNC: 3 MMOL/L (ref 4–13)
AST SERPL W P-5'-P-CCNC: 37 U/L (ref 5–45)
BASOPHILS # BLD AUTO: 0.06 THOUSANDS/ÂΜL (ref 0–0.1)
BASOPHILS NFR BLD AUTO: 1 % (ref 0–1)
BILIRUB SERPL-MCNC: 1.06 MG/DL (ref 0.2–1)
BUN SERPL-MCNC: 19 MG/DL (ref 5–25)
CALCIUM SERPL-MCNC: 9.5 MG/DL (ref 8.3–10.1)
CHLORIDE SERPL-SCNC: 105 MMOL/L (ref 96–108)
CHOLEST SERPL-MCNC: 128 MG/DL
CO2 SERPL-SCNC: 33 MMOL/L (ref 21–32)
CREAT SERPL-MCNC: 1.37 MG/DL (ref 0.6–1.3)
EOSINOPHIL # BLD AUTO: 0.17 THOUSAND/ÂΜL (ref 0–0.61)
EOSINOPHIL NFR BLD AUTO: 2 % (ref 0–6)
ERYTHROCYTE [DISTWIDTH] IN BLOOD BY AUTOMATED COUNT: 15.9 % (ref 11.6–15.1)
GFR SERPL CREATININE-BSD FRML MDRD: 33 ML/MIN/1.73SQ M
GLUCOSE SERPL-MCNC: 110 MG/DL (ref 65–140)
HCT VFR BLD AUTO: 50.2 % (ref 34.8–46.1)
HDLC SERPL-MCNC: 63 MG/DL
HGB BLD-MCNC: 16 G/DL (ref 11.5–15.4)
IMM GRANULOCYTES # BLD AUTO: 0.02 THOUSAND/UL (ref 0–0.2)
IMM GRANULOCYTES NFR BLD AUTO: 0 % (ref 0–2)
LDLC SERPL CALC-MCNC: 48 MG/DL (ref 0–100)
LYMPHOCYTES # BLD AUTO: 2.29 THOUSANDS/ÂΜL (ref 0.6–4.47)
LYMPHOCYTES NFR BLD AUTO: 26 % (ref 14–44)
MCH RBC QN AUTO: 31.7 PG (ref 26.8–34.3)
MCHC RBC AUTO-ENTMCNC: 31.9 G/DL (ref 31.4–37.4)
MCV RBC AUTO: 99 FL (ref 82–98)
MONOCYTES # BLD AUTO: 0.73 THOUSAND/ÂΜL (ref 0.17–1.22)
MONOCYTES NFR BLD AUTO: 8 % (ref 4–12)
NEUTROPHILS # BLD AUTO: 5.59 THOUSANDS/ÂΜL (ref 1.85–7.62)
NEUTS SEG NFR BLD AUTO: 63 % (ref 43–75)
NONHDLC SERPL-MCNC: 65 MG/DL
NRBC BLD AUTO-RTO: 0 /100 WBCS
PLATELET # BLD AUTO: 173 THOUSANDS/UL (ref 149–390)
PMV BLD AUTO: 9.9 FL (ref 8.9–12.7)
POTASSIUM SERPL-SCNC: 3.4 MMOL/L (ref 3.5–5.3)
PROT SERPL-MCNC: 7.4 G/DL (ref 6.4–8.4)
RBC # BLD AUTO: 5.05 MILLION/UL (ref 3.81–5.12)
SODIUM SERPL-SCNC: 141 MMOL/L (ref 135–147)
TRIGL SERPL-MCNC: 85 MG/DL
TSH SERPL DL<=0.05 MIU/L-ACNC: 2.16 UIU/ML (ref 0.45–4.5)
WBC # BLD AUTO: 8.86 THOUSAND/UL (ref 4.31–10.16)

## 2023-03-09 ENCOUNTER — OFFICE VISIT (OUTPATIENT)
Dept: INTERNAL MEDICINE CLINIC | Facility: CLINIC | Age: 88
End: 2023-03-09

## 2023-03-09 VITALS
WEIGHT: 160.9 LBS | TEMPERATURE: 98.2 F | DIASTOLIC BLOOD PRESSURE: 74 MMHG | BODY MASS INDEX: 25.25 KG/M2 | HEIGHT: 67 IN | HEART RATE: 74 BPM | SYSTOLIC BLOOD PRESSURE: 118 MMHG | OXYGEN SATURATION: 92 %

## 2023-03-09 DIAGNOSIS — I48.0 PAROXYSMAL ATRIAL FIBRILLATION (HCC): ICD-10-CM

## 2023-03-09 DIAGNOSIS — E05.90 HYPERTHYROIDISM: ICD-10-CM

## 2023-03-09 DIAGNOSIS — I10 BENIGN ESSENTIAL HYPERTENSION: Primary | ICD-10-CM

## 2023-03-09 DIAGNOSIS — F33.9 DEPRESSION, RECURRENT (HCC): ICD-10-CM

## 2023-03-09 DIAGNOSIS — E78.5 DYSLIPIDEMIA: ICD-10-CM

## 2023-03-09 DIAGNOSIS — N18.31 STAGE 3A CHRONIC KIDNEY DISEASE (HCC): ICD-10-CM

## 2023-03-09 DIAGNOSIS — I50.32 CHRONIC DIASTOLIC CONGESTIVE HEART FAILURE (HCC): ICD-10-CM

## 2023-03-09 DIAGNOSIS — E55.9 VITAMIN D DEFICIENCY: ICD-10-CM

## 2023-03-09 DIAGNOSIS — E87.6 HYPOKALEMIA: ICD-10-CM

## 2023-03-10 NOTE — PROGRESS NOTES
Assessment/Plan:    No problem-specific Assessment & Plan notes found for this encounter  Diagnoses and all orders for this visit:    Benign essential hypertension  -     CBC and differential; Future  -     Comprehensive metabolic panel; Future    Paroxysmal atrial fibrillation (HCC)  -     CBC and differential; Future    Hyperthyroidism  -     CBC and differential; Future  -     TSH, 3rd generation; Future    Chronic diastolic congestive heart failure (HCC)  -     CBC and differential; Future    Stage 3a chronic kidney disease (HCC)  -     CBC and differential; Future  -     Comprehensive metabolic panel; Future    Vitamin D deficiency  -     CBC and differential; Future  -     Vitamin D 25 hydroxy; Future    Hypokalemia  -     CBC and differential; Future  -     Comprehensive metabolic panel; Future    Dyslipidemia  -     CBC and differential; Future  -     Comprehensive metabolic panel; Future  -     Lipid panel; Future    Depression, recurrent ([facility] )   Orders and recommendations as noted above  Reviewed previous laboratory testing with her  Blood work is relatively stable  TSH stable  Continue with the methimazole 3 days a week  Blood pressure well controlled  Continue with the diltiazem and metoprolol as previously  Watch salt intake  Watch for any chest pain or palpitations  Continue to follow-up with cardiology regularly  Watch for increasing shortness of breath, increasing peripheral edema, or sudden increases in weight  Right lower extremity slightly more swollen  Advised them to watch for any worsening  Creatinine mildly elevated but stable  Stay adequately hydrated  Avoid nephrotoxins  Continue with vitamin D supplementation  Potassium slightly low  Continue with supplementation  Recommend eating foods higher in potassium  Would consider increasing potassium dosage if persists  Mood has been stable  Continue with the Zoloft  Continue with the simvastatin as previously    Try to remain as active as possible  Be careful to avoid falls  Declines immunizations  We will have her follow-up in about 3 to 5 months with laboratory testing prior to that visit  Follow-up sooner if needed  Subjective:      Patient ID: Jazmyne Malave is a 80 y o  female  She presents for routine follow-up  Has generally been doing relatively well  Does get some shortness of breath with exertion but not significantly worse  Has not been as active  Mood has been relatively stable on Zoloft  Denies any side effects  Tolerating her diltiazem and metoprolol without difficulty  Has had some increased peripheral edema especially on the right leg  Denies any return of the vertigo symptoms  Appetite has been stable  Tolerating her simvastatin without difficulty  Denies any muscle aches or weakness with this  Continues with her hearing aids without difficulty  Usually sleeps relatively well  The following portions of the patient's history were reviewed and updated as appropriate:   She  has a past medical history of VAZQUEZ (acute kidney injury) ([facility] ) (2/17/2017), Anemia, Atrial fibrillation ([facility] ), Atrial flutter ([facility] ), Depression, Diastolic CHF ([facility] ), Dyslipidemia, Glaucoma, H/O: hysterectomy, Hypertension, Hyperthyroidism, Impaired glucose tolerance, Pacemaker, Psoriasis, S/P cataract surgery, S/P knee replacement, SSS (sick sinus syndrome) ([facility] ), and SSS (sick sinus syndrome) ([facility] )    She   Patient Active Problem List    Diagnosis Date Noted   • Stage 3a chronic kidney disease ([facility] ) 12/01/2021   • Iron deficiency 09/04/2021   • Contusion of right thigh 04/29/2020   • Multiple thyroid nodules 04/29/2020   • Dyspnea on exertion 09/20/2017   • Osteoporosis 03/09/2017   • Paroxysmal atrial fibrillation ([address] ) 02/22/2017   • Anemia 02/18/2017   • Renal atrophy 02/18/2017   • Hyperphosphatemia 02/18/2017   • Vitamin D deficiency 02/18/2017   • Hypokalemia 02/17/2017   • Pulmonary hypertension ([address] ) 02/17/2017 • Presence of permanent cardiac pacemaker    • H/O: hysterectomy    • Benign essential hypertension    • Hyperthyroidism    • Glaucoma    • Chronic diastolic congestive heart failure (HCC)    • SSS (sick sinus syndrome) ([facility] )    • Depression, recurrent ([facility] )    • Depression with anxiety 08/12/2015   • Fatigue 11/18/2014   • Vertigo 11/18/2014   • Dyslipidemia 09/12/2012   • Psoriasis 09/12/2012     She  has a past surgical history that includes Cardiac pacemaker placement; Eye surgery; Hysterectomy; Cardiac pacemaker placement; Joint replacement; Esophagogastroduodenoscopy (N/A, 12/30/2016); Cataract extraction; pr colonoscopy flx dx w/collj spec when pfrmd (N/A, 4/24/2017); and Replacement total knee (Bilateral)  Her family history includes Alcohol abuse in her brother and father; Diabetes in her father  She  reports that she has quit smoking  She has never used smokeless tobacco  She reports that she does not drink alcohol and does not use drugs    Current Outpatient Medications   Medication Sig Dispense Refill   • ALFALFA PO Take 3 tablets by mouth       • aspirin (ECOTRIN LOW STRENGTH) 81 mg EC tablet Take 81 mg by mouth daily     • B Complex Vitamins (B COMPLEX 1 PO) Take by mouth     • buPROPion (WELLBUTRIN XL) 300 mg 24 hr tablet TAKE 1 TABLET EVERY MORNING 90 tablet 1   • cholecalciferol (VITAMIN D3) 1,000 units tablet Take 1 tablet by mouth daily 30 tablet 0   • diltiazem (CARDIZEM CD) 240 mg 24 hr capsule TAKE 1 CAPSULE DAILY 90 capsule 3   • furosemide (LASIX) 40 mg tablet TAKE 1 TABLET TWICE A  tablet 0   • ketoconazole (NIZORAL) 2 % shampoo Apply 1 application topically once for 1 dose 100 mL 3   • LECITHIN PO Take 1 tablet by mouth daily     • methimazole (TAPAZOLE) 5 mg tablet Take 1 tablet (5 mg total) by mouth 3 (three) times a week 39 tablet 3   • metoprolol tartrate (LOPRESSOR) 50 mg tablet TAKE 1 TABLET EVERY 12     HOURS 180 tablet 1   • Omega-3 Fatty Acids (OMEGA-3 PO) Take by mouth 2 (two) times a day       • potassium chloride (Klor-Con M20) 20 mEq tablet Take 1 tablet (20 mEq total) by mouth daily 90 tablet 3   • sertraline (ZOLOFT) 100 mg tablet TAKE 1 TABLET DAILY 90 tablet 1   • simvastatin (ZOCOR) 20 mg tablet TAKE 1 TABLET DAILY AT     BEDTIME 90 tablet 3   • triamcinolone (KENALOG) 0 1 % cream Apply topically 2 (two) times a day 30 g 0   • Zinc Sulfate (ZINC 15 PO) Take 100 mg by mouth       • fluocinonide (LIDEX) 0 05 % external solution Apply 1 application topically 2 (two) times a day 180 mL 1     No current facility-administered medications for this visit       Current Outpatient Medications on File Prior to Visit   Medication Sig   • ALFALFA PO Take 3 tablets by mouth     • aspirin (ECOTRIN LOW STRENGTH) 81 mg EC tablet Take 81 mg by mouth daily   • B Complex Vitamins (B COMPLEX 1 PO) Take by mouth   • buPROPion (WELLBUTRIN XL) 300 mg 24 hr tablet TAKE 1 TABLET EVERY MORNING   • cholecalciferol (VITAMIN D3) 1,000 units tablet Take 1 tablet by mouth daily   • diltiazem (CARDIZEM CD) 240 mg 24 hr capsule TAKE 1 CAPSULE DAILY   • furosemide (LASIX) 40 mg tablet TAKE 1 TABLET TWICE A DAY   • ketoconazole (NIZORAL) 2 % shampoo Apply 1 application topically once for 1 dose   • LECITHIN PO Take 1 tablet by mouth daily   • methimazole (TAPAZOLE) 5 mg tablet Take 1 tablet (5 mg total) by mouth 3 (three) times a week   • metoprolol tartrate (LOPRESSOR) 50 mg tablet TAKE 1 TABLET EVERY 12     HOURS   • Omega-3 Fatty Acids (OMEGA-3 PO) Take by mouth 2 (two) times a day     • potassium chloride (Klor-Con M20) 20 mEq tablet Take 1 tablet (20 mEq total) by mouth daily   • sertraline (ZOLOFT) 100 mg tablet TAKE 1 TABLET DAILY   • simvastatin (ZOCOR) 20 mg tablet TAKE 1 TABLET DAILY AT     BEDTIME   • triamcinolone (KENALOG) 0 1 % cream Apply topically 2 (two) times a day   • Zinc Sulfate (ZINC 15 PO) Take 100 mg by mouth     • fluocinonide (LIDEX) 0 05 % external solution Apply 1 application topically 2 (two) times a day     No current facility-administered medications on file prior to visit  She has No Known Allergies       Review of Systems   Constitutional: Negative for activity change, appetite change, chills and fever  HENT: Negative for congestion and rhinorrhea  Eyes: Positive for visual disturbance  Respiratory: Positive for shortness of breath  Negative for cough and chest tightness  Cardiovascular: Negative for chest pain and palpitations  Gastrointestinal: Negative for abdominal pain, blood in stool, diarrhea, nausea and vomiting  Endocrine: Negative for polydipsia, polyphagia and polyuria  Genitourinary: Negative for dysuria, frequency and urgency  Musculoskeletal: Negative for gait problem  Skin: Negative for color change  Neurological: Negative for dizziness and headaches  Hematological: Does not bruise/bleed easily  Psychiatric/Behavioral: Negative for confusion and sleep disturbance  The patient is not nervous/anxious  Objective:      /74 (BP Location: Left arm, Patient Position: Sitting, Cuff Size: Large)   Pulse 74   Temp 98 2 °F (36 8 °C)   Ht 5' 7" (1 702 m)   Wt 73 kg (160 lb 14 4 oz)   LMP  (LMP Unknown)   SpO2 92%   BMI 25 20 kg/m²          Physical Exam  Vitals and nursing note reviewed  Constitutional:       General: She is not in acute distress  Appearance: She is well-developed and well-groomed  HENT:      Head: Normocephalic and atraumatic  Comments: Ring aids bilaterally  Eyes:      General:         Right eye: No discharge  Left eye: No discharge  Conjunctiva/sclera: Conjunctivae normal       Pupils: Pupils are equal, round, and reactive to light  Cardiovascular:      Rate and Rhythm: Normal rate and regular rhythm  Heart sounds: Normal heart sounds  No murmur heard  No friction rub  No gallop  Pulmonary:      Effort: No respiratory distress        Breath sounds: Decreased breath sounds present  No wheezing or rales  Abdominal:      General: Bowel sounds are normal  There is no distension  Tenderness: There is no abdominal tenderness  Musculoskeletal:      Cervical back: Neck supple  Lymphadenopathy:      Cervical: No cervical adenopathy  Skin:     General: Skin is warm and dry  Neurological:      Mental Status: She is alert and oriented to person, place, and time  Psychiatric:         Mood and Affect: Mood and affect normal          Speech: Speech normal          Behavior: Behavior normal  Behavior is cooperative  Cognition and Memory: Cognition and memory normal          BMI Counseling: Body mass index is 25 2 kg/m²  The BMI is above normal  Nutrition recommendations include encouraging healthy choices of fruits and vegetables, consuming healthier snacks, limiting drinks that contain sugar, moderation in carbohydrate intake and reducing intake of cholesterol  Rationale for BMI follow-up plan is due to patient being overweight or obese

## 2023-03-26 DIAGNOSIS — L40.9 PSORIASIS: ICD-10-CM

## 2023-03-27 RX ORDER — FLUOCINONIDE TOPICAL SOLUTION USP, 0.05% 0.5 MG/ML
SOLUTION TOPICAL
Qty: 180 ML | Refills: 1 | Status: SHIPPED | OUTPATIENT
Start: 2023-03-27

## 2023-04-05 ENCOUNTER — REMOTE DEVICE CLINIC VISIT (OUTPATIENT)
Dept: CARDIOLOGY CLINIC | Facility: CLINIC | Age: 88
End: 2023-04-05

## 2023-04-05 DIAGNOSIS — Z95.0 CARDIAC PACEMAKER IN SITU: Primary | ICD-10-CM

## 2023-04-05 NOTE — PROGRESS NOTES
Results for orders placed or performed in visit on 04/05/23   Cardiac EP device report    Narrative    SJM-DUAL-PM/ NOT MRI CONDITIONAL  MERLIN TRANSMISSION: BATTERY VOLTAGE ADEQUATE (1 4 YRS)  AP 0%;  99% (>40%/DDDR 60)  ALL AVAILABLE LEAD PARAMETERS WITHIN NORMAL LIMITS  NO HIGH RATE EPISODES  PERSISTENT AF IN PROGRESS W/AF BURDEN >99%  PATIENT TAKES  ASA 81MG, DILTIAZEM, METOPROLOL TART; NO AC DUE TO HX: GI BLEED;  NORMAL DEVICE FUNCTION    ES

## 2023-06-08 DIAGNOSIS — I48.0 PAROXYSMAL ATRIAL FIBRILLATION (HCC): ICD-10-CM

## 2023-06-08 RX ORDER — METOPROLOL TARTRATE 50 MG/1
TABLET, FILM COATED ORAL
Qty: 180 TABLET | Refills: 1 | Status: SHIPPED | OUTPATIENT
Start: 2023-06-08

## 2023-06-28 DIAGNOSIS — F32.A DEPRESSION, UNSPECIFIED DEPRESSION TYPE: ICD-10-CM

## 2023-06-28 RX ORDER — BUPROPION HYDROCHLORIDE 300 MG/1
TABLET ORAL
Qty: 90 TABLET | Refills: 1 | Status: SHIPPED | OUTPATIENT
Start: 2023-06-28

## 2023-06-30 DIAGNOSIS — F41.8 DEPRESSION WITH ANXIETY: ICD-10-CM

## 2023-06-30 RX ORDER — SERTRALINE HYDROCHLORIDE 100 MG/1
TABLET, FILM COATED ORAL
Qty: 90 TABLET | Refills: 1 | Status: SHIPPED | OUTPATIENT
Start: 2023-06-30

## 2023-07-03 ENCOUNTER — RA CDI HCC (OUTPATIENT)
Dept: OTHER | Facility: HOSPITAL | Age: 88
End: 2023-07-03

## 2023-07-03 NOTE — PROGRESS NOTES
I13.0  [address] coding opportunities          Chart Reviewed number of suggestions sent to Provider: 1     Patients Insurance     Medicare Insurance: [employer]

## 2023-07-06 ENCOUNTER — HOSPITAL ENCOUNTER (INPATIENT)
Facility: HOSPITAL | Age: 88
LOS: 1 days | Discharge: HOME | DRG: 378 | End: 2023-07-07
Attending: EMERGENCY MEDICINE | Admitting: INTERNAL MEDICINE
Payer: MEDICARE

## 2023-07-06 ENCOUNTER — APPOINTMENT (EMERGENCY)
Dept: CT IMAGING | Facility: HOSPITAL | Age: 88
DRG: 378 | End: 2023-07-06
Payer: MEDICARE

## 2023-07-06 ENCOUNTER — ANESTHESIA (INPATIENT)
Dept: GASTROENTEROLOGY | Facility: HOSPITAL | Age: 88
End: 2023-07-06
Payer: MEDICARE

## 2023-07-06 ENCOUNTER — ANESTHESIA EVENT (INPATIENT)
Dept: GASTROENTEROLOGY | Facility: HOSPITAL | Age: 88
End: 2023-07-06
Payer: MEDICARE

## 2023-07-06 ENCOUNTER — APPOINTMENT (INPATIENT)
Dept: GASTROENTEROLOGY | Facility: HOSPITAL | Age: 88
DRG: 378 | End: 2023-07-06
Payer: MEDICARE

## 2023-07-06 DIAGNOSIS — K86.9 PANCREATIC LESION: ICD-10-CM

## 2023-07-06 DIAGNOSIS — K57.90 DIVERTICULOSIS: ICD-10-CM

## 2023-07-06 DIAGNOSIS — K92.2 ACUTE LOWER GI BLEEDING: Primary | ICD-10-CM

## 2023-07-06 DIAGNOSIS — K62.5 BRBPR (BRIGHT RED BLOOD PER RECTUM): ICD-10-CM

## 2023-07-06 DIAGNOSIS — K57.91 GASTROINTESTINAL HEMORRHAGE ASSOCIATED WITH INTESTINAL DIVERTICULOSIS: ICD-10-CM

## 2023-07-06 LAB
ABO GROUP BLD: NORMAL
ALBUMIN SERPL BCP-MCNC: 3.7 G/DL (ref 3.5–5)
ALP SERPL-CCNC: 72 U/L (ref 34–104)
ALT SERPL W P-5'-P-CCNC: 23 U/L (ref 7–52)
ANION GAP SERPL CALCULATED.3IONS-SCNC: 6 MMOL/L
APTT PPP: 36 SECONDS (ref 23–37)
AST SERPL W P-5'-P-CCNC: 33 U/L (ref 13–39)
ATRIAL RATE: 322 BPM
BASOPHILS # BLD AUTO: 0.05 THOUSANDS/ÂΜL (ref 0–0.1)
BASOPHILS NFR BLD AUTO: 1 % (ref 0–1)
BILIRUB SERPL-MCNC: 0.93 MG/DL (ref 0.2–1)
BLD GP AB SCN SERPL QL: NEGATIVE
BUN SERPL-MCNC: 17 MG/DL (ref 5–25)
CALCIUM SERPL-MCNC: 8.7 MG/DL (ref 8.4–10.2)
CHLORIDE SERPL-SCNC: 104 MMOL/L (ref 96–108)
CO2 SERPL-SCNC: 32 MMOL/L (ref 21–32)
CREAT SERPL-MCNC: 1.3 MG/DL (ref 0.6–1.3)
EOSINOPHIL # BLD AUTO: 0.29 THOUSAND/ÂΜL (ref 0–0.61)
EOSINOPHIL NFR BLD AUTO: 3 % (ref 0–6)
ERYTHROCYTE [DISTWIDTH] IN BLOOD BY AUTOMATED COUNT: 15 % (ref 11.6–15.1)
GFR SERPL CREATININE-BSD FRML MDRD: 35 ML/MIN/1.73SQ M
GLUCOSE SERPL-MCNC: 114 MG/DL (ref 65–140)
HCT VFR BLD AUTO: 33.9 % (ref 34.8–46.1)
HCT VFR BLD AUTO: 41.8 % (ref 34.8–46.1)
HGB BLD-MCNC: 10.6 G/DL (ref 11.5–15.4)
HGB BLD-MCNC: 10.7 G/DL (ref 11.5–15.4)
HGB BLD-MCNC: 11.3 G/DL (ref 11.5–15.4)
HGB BLD-MCNC: 13.3 G/DL (ref 11.5–15.4)
IMM GRANULOCYTES # BLD AUTO: 0.03 THOUSAND/UL (ref 0–0.2)
IMM GRANULOCYTES NFR BLD AUTO: 0 % (ref 0–2)
INR PPP: 1.47 (ref 0.84–1.19)
LYMPHOCYTES # BLD AUTO: 2.41 THOUSANDS/ÂΜL (ref 0.6–4.47)
LYMPHOCYTES NFR BLD AUTO: 27 % (ref 14–44)
MCH RBC QN AUTO: 32.1 PG (ref 26.8–34.3)
MCHC RBC AUTO-ENTMCNC: 31.8 G/DL (ref 31.4–37.4)
MCV RBC AUTO: 101 FL (ref 82–98)
MONOCYTES # BLD AUTO: 0.76 THOUSAND/ÂΜL (ref 0.17–1.22)
MONOCYTES NFR BLD AUTO: 9 % (ref 4–12)
NEUTROPHILS # BLD AUTO: 5.35 THOUSANDS/ÂΜL (ref 1.85–7.62)
NEUTS SEG NFR BLD AUTO: 60 % (ref 43–75)
NRBC BLD AUTO-RTO: 0 /100 WBCS
PLATELET # BLD AUTO: 148 THOUSANDS/UL (ref 149–390)
PMV BLD AUTO: 9.4 FL (ref 8.9–12.7)
POTASSIUM SERPL-SCNC: 3.6 MMOL/L (ref 3.5–5.3)
PROT SERPL-MCNC: 6.1 G/DL (ref 6.4–8.4)
PROTHROMBIN TIME: 17.8 SECONDS (ref 11.6–14.5)
QRS AXIS: -85 DEGREES
QRSD INTERVAL: 176 MS
QT INTERVAL: 498 MS
QTC INTERVAL: 498 MS
RBC # BLD AUTO: 4.14 MILLION/UL (ref 3.81–5.12)
RH BLD: NEGATIVE
SODIUM SERPL-SCNC: 142 MMOL/L (ref 135–147)
SPECIMEN EXPIRATION DATE: NORMAL
T WAVE AXIS: 102 DEGREES
VENTRICULAR RATE: 60 BPM
WBC # BLD AUTO: 8.89 THOUSAND/UL (ref 4.31–10.16)

## 2023-07-06 PROCEDURE — 85730 THROMBOPLASTIN TIME PARTIAL: CPT

## 2023-07-06 PROCEDURE — 96374 THER/PROPH/DIAG INJ IV PUSH: CPT

## 2023-07-06 PROCEDURE — C9113 INJ PANTOPRAZOLE SODIUM, VIA: HCPCS

## 2023-07-06 PROCEDURE — 0DJD8ZZ INSPECTION OF LOWER INTESTINAL TRACT, VIA NATURAL OR ARTIFICIAL OPENING ENDOSCOPIC: ICD-10-PCS | Performed by: INTERNAL MEDICINE

## 2023-07-06 PROCEDURE — G1004 CDSM NDSC: HCPCS

## 2023-07-06 PROCEDURE — 85025 COMPLETE CBC W/AUTO DIFF WBC: CPT

## 2023-07-06 PROCEDURE — 74178 CT ABD&PLV WO CNTR FLWD CNTR: CPT

## 2023-07-06 PROCEDURE — 85610 PROTHROMBIN TIME: CPT

## 2023-07-06 PROCEDURE — 85018 HEMOGLOBIN: CPT

## 2023-07-06 PROCEDURE — 85018 HEMOGLOBIN: CPT | Performed by: NURSE PRACTITIONER

## 2023-07-06 PROCEDURE — 96361 HYDRATE IV INFUSION ADD-ON: CPT

## 2023-07-06 PROCEDURE — 36430 TRANSFUSION BLD/BLD COMPNT: CPT

## 2023-07-06 PROCEDURE — 85014 HEMATOCRIT: CPT

## 2023-07-06 PROCEDURE — 86900 BLOOD TYPING SEROLOGIC ABO: CPT

## 2023-07-06 PROCEDURE — 99285 EMERGENCY DEPT VISIT HI MDM: CPT

## 2023-07-06 PROCEDURE — 36415 COLL VENOUS BLD VENIPUNCTURE: CPT

## 2023-07-06 PROCEDURE — 45330 DIAGNOSTIC SIGMOIDOSCOPY: CPT | Performed by: INTERNAL MEDICINE

## 2023-07-06 PROCEDURE — 86923 COMPATIBILITY TEST ELECTRIC: CPT

## 2023-07-06 PROCEDURE — C9113 INJ PANTOPRAZOLE SODIUM, VIA: HCPCS | Performed by: NURSE PRACTITIONER

## 2023-07-06 PROCEDURE — 80053 COMPREHEN METABOLIC PANEL: CPT

## 2023-07-06 PROCEDURE — 82272 OCCULT BLD FECES 1-3 TESTS: CPT

## 2023-07-06 PROCEDURE — 30233N1 TRANSFUSION OF NONAUTOLOGOUS RED BLOOD CELLS INTO PERIPHERAL VEIN, PERCUTANEOUS APPROACH: ICD-10-PCS | Performed by: INTERNAL MEDICINE

## 2023-07-06 PROCEDURE — P9016 RBC LEUKOCYTES REDUCED: HCPCS

## 2023-07-06 PROCEDURE — 86850 RBC ANTIBODY SCREEN: CPT

## 2023-07-06 PROCEDURE — 93010 ELECTROCARDIOGRAM REPORT: CPT | Performed by: INTERNAL MEDICINE

## 2023-07-06 PROCEDURE — 86901 BLOOD TYPING SEROLOGIC RH(D): CPT

## 2023-07-06 PROCEDURE — 99291 CRITICAL CARE FIRST HOUR: CPT | Performed by: NURSE PRACTITIONER

## 2023-07-06 PROCEDURE — 93005 ELECTROCARDIOGRAM TRACING: CPT

## 2023-07-06 RX ORDER — EPHEDRINE SULFATE 50 MG/ML
INJECTION INTRAVENOUS AS NEEDED
Status: DISCONTINUED | OUTPATIENT
Start: 2023-07-06 | End: 2023-07-06

## 2023-07-06 RX ORDER — PANTOPRAZOLE SODIUM 40 MG/10ML
40 INJECTION, POWDER, LYOPHILIZED, FOR SOLUTION INTRAVENOUS EVERY 12 HOURS SCHEDULED
Status: DISCONTINUED | OUTPATIENT
Start: 2023-07-06 | End: 2023-07-07 | Stop reason: HOSPADM

## 2023-07-06 RX ORDER — CHLORHEXIDINE GLUCONATE 0.12 MG/ML
15 RINSE ORAL EVERY 12 HOURS SCHEDULED
Status: DISCONTINUED | OUTPATIENT
Start: 2023-07-06 | End: 2023-07-07

## 2023-07-06 RX ORDER — PROPOFOL 10 MG/ML
INJECTION, EMULSION INTRAVENOUS AS NEEDED
Status: DISCONTINUED | OUTPATIENT
Start: 2023-07-06 | End: 2023-07-06

## 2023-07-06 RX ORDER — SODIUM CHLORIDE 9 MG/ML
INJECTION, SOLUTION INTRAVENOUS CONTINUOUS PRN
Status: DISCONTINUED | OUTPATIENT
Start: 2023-07-06 | End: 2023-07-06

## 2023-07-06 RX ORDER — PANTOPRAZOLE SODIUM 40 MG/10ML
40 INJECTION, POWDER, LYOPHILIZED, FOR SOLUTION INTRAVENOUS ONCE
Status: COMPLETED | OUTPATIENT
Start: 2023-07-06 | End: 2023-07-06

## 2023-07-06 RX ADMIN — SODIUM CHLORIDE 1000 ML: 0.9 INJECTION, SOLUTION INTRAVENOUS at 05:55

## 2023-07-06 RX ADMIN — EPHEDRINE SULFATE 5 MG: 50 INJECTION, SOLUTION INTRAVENOUS at 12:12

## 2023-07-06 RX ADMIN — PROPOFOL 10 MG: 10 INJECTION, EMULSION INTRAVENOUS at 11:55

## 2023-07-06 RX ADMIN — IOHEXOL 100 ML: 350 INJECTION, SOLUTION INTRAVENOUS at 06:59

## 2023-07-06 RX ADMIN — EPHEDRINE SULFATE 5 MG: 50 INJECTION, SOLUTION INTRAVENOUS at 11:42

## 2023-07-06 RX ADMIN — SODIUM CHLORIDE: 9 INJECTION, SOLUTION INTRAVENOUS at 11:07

## 2023-07-06 RX ADMIN — PANTOPRAZOLE SODIUM 40 MG: 40 INJECTION, POWDER, FOR SOLUTION INTRAVENOUS at 06:04

## 2023-07-06 RX ADMIN — CHLORHEXIDINE GLUCONATE 15 ML: 1.2 RINSE ORAL at 12:53

## 2023-07-06 RX ADMIN — PROPOFOL 10 MG: 10 INJECTION, EMULSION INTRAVENOUS at 11:40

## 2023-07-06 RX ADMIN — PROPOFOL 10 MG: 10 INJECTION, EMULSION INTRAVENOUS at 11:49

## 2023-07-06 RX ADMIN — PANTOPRAZOLE SODIUM 40 MG: 40 INJECTION, POWDER, FOR SOLUTION INTRAVENOUS at 18:25

## 2023-07-06 RX ADMIN — PROPOFOL 10 MG: 10 INJECTION, EMULSION INTRAVENOUS at 12:03

## 2023-07-06 RX ADMIN — POLYETHYLENE GLYCOL 3350, SODIUM SULFATE ANHYDROUS, SODIUM BICARBONATE, SODIUM CHLORIDE, POTASSIUM CHLORIDE 4000 ML: 236; 22.74; 6.74; 5.86; 2.97 POWDER, FOR SOLUTION ORAL at 18:25

## 2023-07-06 RX ADMIN — SODIUM CHLORIDE 1000 ML: 0.9 INJECTION, SOLUTION INTRAVENOUS at 08:20

## 2023-07-06 RX ADMIN — PROPOFOL 10 MG: 10 INJECTION, EMULSION INTRAVENOUS at 11:45

## 2023-07-06 RX ADMIN — EPHEDRINE SULFATE 5 MG: 50 INJECTION, SOLUTION INTRAVENOUS at 11:50

## 2023-07-06 RX ADMIN — PROPOFOL 10 MG: 10 INJECTION, EMULSION INTRAVENOUS at 12:09

## 2023-07-06 RX ADMIN — PROPOFOL 20 MG: 10 INJECTION, EMULSION INTRAVENOUS at 11:39

## 2023-07-06 RX ADMIN — PROPOFOL 10 MG: 10 INJECTION, EMULSION INTRAVENOUS at 12:11

## 2023-07-06 NOTE — ANESTHESIA PREPROCEDURE EVALUATION
Procedure:  FLEXIBLE SIGMOIDOSCOPY    Relevant Problems   CARDIO   (+) Benign essential hypertension   (+) Chronic diastolic congestive heart failure (HCC)   (+) Dyspnea on exertion   (+) Paroxysmal atrial fibrillation (HCC)   (+) Pulmonary hypertension (HCC) (pasp 44 on last echo 5 years ago)   (+) SSS (sick sinus syndrome) (HCC) (pacemaker)      ENDO   (+) Hyperthyroidism      GI/HEPATIC   (+) GI bleed (Resolved)      /RENAL   (+) Renal atrophy   (+) Stage 3a chronic kidney disease (HCC)      HEMATOLOGY   (+) Anemia      NEURO/PSYCH   (+) Depression with anxiety   (+) Depression, recurrent (HCC)      PULMONARY   (+) Dyspnea on exertion      Other   (+) Dyslipidemia   (+) Iron deficiency      Device check: SJM-DUAL-PM/ NOT MRI CONDITIONAL   MERLIN TRANSMISSION: BATTERY VOLTAGE ADEQUATE (1.4 YRS). AP 0%;  99% (>40%/DDDR 60). ALL AVAILABLE LEAD PARAMETERS WITHIN NORMAL LIMITS. NO HIGH RATE EPISODES. PERSISTENT AF IN PROGRESS W/AF BURDEN >99%. PATIENT TAKES  ASA 81MG, DILTIAZEM, METOPROLOL TART; NO AC DUE TO HX: GI BLEED;  NORMAL DEVICE FUNCTION.  ES   Physical Exam    Airway    Mallampati score: II  TM Distance: >3 FB  Neck ROM: full     Dental   upper dentures and lower dentures,     Cardiovascular  Rhythm: irregular, Rate: normal,     Pulmonary  Breath sounds clear to auscultation,     Other Findings        Anesthesia Plan  ASA Score- 3 Emergent    Anesthesia Type- IV sedation with anesthesia with ASA Monitors. Additional Monitors:   Airway Plan:     Comment: Discussed sedation with GETA back up. Last meal yesterday PM, no meds this AM. . Plan Factors-Exercise tolerance (METS): >4 METS. Chart reviewed. Existing labs reviewed. Patient is not a current smoker (quit 30+ years ago). Obstructive sleep apnea risk education given perioperatively. Induction- intravenous. Postoperative Plan-     Informed Consent- Anesthetic plan and risks discussed with patient and daughter.

## 2023-07-06 NOTE — ASSESSMENT & PLAN NOTE
· Acute on chronic secondary to iron deficiency anemia and acute blood loss anemia from GI bleed  · 7/6 given 1 unit packed red blood cell  · Trend hemoglobin 3 times daily

## 2023-07-06 NOTE — PLAN OF CARE
Problem: Potential for Falls  Goal: Patient will remain free of falls  Description: INTERVENTIONS:  - Educate patient/family on patient safety including physical limitations  - Instruct patient to call for assistance with activity   - Consult OT/PT to assist with strengthening/mobility   - Keep Call bell within reach  - Keep bed low and locked with side rails adjusted as appropriate  - Keep care items and personal belongings within reach  - Initiate and maintain comfort rounds  - Make Fall Risk Sign visible to staff  - Offer Toileting every 2 Hours, in advance of need  - Initiate/Maintain alarm  - Obtain necessary fall risk management equipment:   - Apply yellow socks and bracelet for high fall risk patients  - Consider moving patient to room near nurses station  Outcome: Progressing     Problem: MOBILITY - ADULT  Goal: Maintain or return to baseline ADL function  Description: INTERVENTIONS:  -  Assess patient's ability to carry out ADLs; assess patient's baseline for ADL function and identify physical deficits which impact ability to perform ADLs (bathing, care of mouth/teeth, toileting, grooming, dressing, etc.)  - Assess/evaluate cause of self-care deficits   - Assess range of motion  - Assess patient's mobility; develop plan if impaired  - Assess patient's need for assistive devices and provide as appropriate  - Encourage maximum independence but intervene and supervise when necessary  - Involve family in performance of ADLs  - Assess for home care needs following discharge   - Consider OT consult to assist with ADL evaluation and planning for discharge  - Provide patient education as appropriate  Outcome: Progressing  Goal: Maintains/Returns to pre admission functional level  Description: INTERVENTIONS:  - Perform BMAT or MOVE assessment daily.   - Set and communicate daily mobility goal to care team and patient/family/caregiver.    - Collaborate with rehabilitation services on mobility goals if consulted  - Perform Range of Motion 3  times a day. - Reposition patient every 2 hours.   - Dangle patient 3 times a day  - Stand patient 3 times a day  - Ambulate patient 3 times a day  - Out of bed to chair 2 times a day   - Out of bed for meals 3 times a day  - Out of bed for toileting  - Record patient progress and toleration of activity level   Outcome: Progressing     Problem: PAIN - ADULT  Goal: Verbalizes/displays adequate comfort level or baseline comfort level  Description: Interventions:  - Encourage patient to monitor pain and request assistance  - Assess pain using appropriate pain scale  - Administer analgesics based on type and severity of pain and evaluate response  - Implement non-pharmacological measures as appropriate and evaluate response  - Consider cultural and social influences on pain and pain management  - Notify physician/advanced practitioner if interventions unsuccessful or patient reports new pain  Outcome: Progressing     Problem: INFECTION - ADULT  Goal: Absence or prevention of progression during hospitalization  Description: INTERVENTIONS:  - Assess and monitor for signs and symptoms of infection  - Monitor lab/diagnostic results  - Monitor all insertion sites, i.e. indwelling lines, tubes, and drains  - Monitor endotracheal if appropriate and nasal secretions for changes in amount and color  - [place] appropriate cooling/warming therapies per order  - Administer medications as ordered  - Instruct and encourage patient and family to use good hand hygiene technique  - Identify and instruct in appropriate isolation precautions for identified infection/condition  Outcome: Progressing  Goal: Absence of fever/infection during neutropenic period  Description: INTERVENTIONS:  - Monitor WBC    Outcome: Progressing     Problem: SAFETY ADULT  Goal: Patient will remain free of falls  Description: INTERVENTIONS:  - Educate patient/family on patient safety including physical limitations  - Instruct patient to call for assistance with activity   - Consult OT/PT to assist with strengthening/mobility   - Keep Call bell within reach  - Keep bed low and locked with side rails adjusted as appropriate  - Keep care items and personal belongings within reach  - Initiate and maintain comfort rounds  - Make Fall Risk Sign visible to staff  - Offer Toileting every 2 Hours, in advance of need  - Initiate/Maintain alarm  - Obtain necessary fall risk management equipment:   - Apply yellow socks and bracelet for high fall risk patients  - Consider moving patient to room near nurses station  Outcome: Progressing  Goal: Maintain or return to baseline ADL function  Description: INTERVENTIONS:  -  Assess patient's ability to carry out ADLs; assess patient's baseline for ADL function and identify physical deficits which impact ability to perform ADLs (bathing, care of mouth/teeth, toileting, grooming, dressing, etc.)  - Assess/evaluate cause of self-care deficits   - Assess range of motion  - Assess patient's mobility; develop plan if impaired  - Assess patient's need for assistive devices and provide as appropriate  - Encourage maximum independence but intervene and supervise when necessary  - Involve family in performance of ADLs  - Assess for home care needs following discharge   - Consider OT consult to assist with ADL evaluation and planning for discharge  - Provide patient education as appropriate  Outcome: Progressing  Goal: Maintains/Returns to pre admission functional level  Description: INTERVENTIONS:  - Perform BMAT or MOVE assessment daily.   - Set and communicate daily mobility goal to care team and patient/family/caregiver. - Collaborate with rehabilitation services on mobility goals if consulted  - Perform Range of Motion 3  times a day. - Reposition patient every 2 hours.   - Dangle patient 3 times a day  - Stand patient 3 times a day  - Ambulate patient 3 times a day  - Out of bed to chair 2 times a day   - Out of bed for meals 3 times a day  - Out of bed for toileting  - Record patient progress and toleration of activity level   Outcome: Progressing     Problem: DISCHARGE PLANNING  Goal: Discharge to home or other facility with appropriate resources  Description: INTERVENTIONS:  - Identify barriers to discharge w/patient and caregiver  - Arrange for needed discharge resources and transportation as appropriate  - Identify discharge learning needs (meds, wound care, etc.)  - Arrange for interpretive services to assist at discharge as needed  - Refer to Case Management Department for coordinating discharge planning if the patient needs post-hospital services based on physician/advanced practitioner order or complex needs related to functional status, cognitive ability, or social support system  Outcome: Progressing     Problem: Knowledge Deficit  Goal: Patient/family/caregiver demonstrates understanding of disease process, treatment plan, medications, and discharge instructions  Description: Complete learning assessment and assess knowledge base. Interventions:  - Provide teaching at level of understanding  - Provide teaching via preferred learning methods  Outcome: Progressing     Problem: Nutrition/Hydration-ADULT  Goal: Nutrient/Hydration intake appropriate for improving, restoring or maintaining nutritional needs  Description: Monitor and assess patient's nutrition/hydration status for malnutrition. Collaborate with interdisciplinary team and initiate plan and interventions as ordered. Monitor patient's weight and dietary intake as ordered or per policy. Utilize nutrition screening tool and intervene as necessary. Determine patient's food preferences and provide high-protein, high-caloric foods as appropriate.      INTERVENTIONS:  - Monitor oral intake, urinary output, labs, and treatment plans  - Assess nutrition and hydration status and recommend course of action  - Evaluate amount of meals eaten  - Assist patient with eating if necessary   - Allow adequate time for meals  - Recommend/ encourage appropriate diets, oral nutritional supplements, and vitamin/mineral supplements  - Order, calculate, and assess calorie counts as needed  - Recommend, monitor, and adjust tube feedings and TPN/PPN based on assessed needs  - Assess need for intravenous fluids  - Provide specific nutrition/hydration education as appropriate  - Include patient/family/caregiver in decisions related to nutrition  Outcome: Progressing

## 2023-07-06 NOTE — ED PROVIDER NOTES
History  Chief Complaint   Patient presents with   • Rectal Bleeding     Per daughter  "she woke me up after having a BM and said she had rectal bleeding, there was blood in the toilet when I checked". Denies abdominal pain. The patient is a 55-year-old female with history of sick sinus syndrome status post cardiac pacemaker placement, CHF, dyslipidemia, hypertension, hypothyroidism, upper GI bleed who presents to the ED for evaluation of bright red blood per rectum that began this morning. Per the patient's daughter, the patient woke from sleep after she was incontinent of bright red blood per rectum. She then had a bowel movement which was entirely bright red blood. Daughter reports patient's last GI bleed was an upper GI bleed while the patient was on Eliquis. She did require a transfusion at this time. The patient denies any other complaints, including fever, chills, nausea, vomiting, abdominal pain, chest pain, dyspnea, syncope. Prior to Admission Medications   Prescriptions Last Dose Informant Patient Reported? Taking?    ALFALFA PO  Self Yes Yes   Sig: Take 3 tablets by mouth     B Complex Vitamins (B COMPLEX 1 PO)  Self Yes Yes   Sig: Take by mouth   LECITHIN PO  Self Yes Yes   Sig: Take 1 tablet by mouth daily   Omega-3 Fatty Acids (OMEGA-3 PO)  Self Yes Yes   Sig: Take by mouth 2 (two) times a day     Zinc Sulfate (ZINC 15 PO)  Self Yes Yes   Sig: Take 100 mg by mouth     aspirin (ECOTRIN LOW STRENGTH) 81 mg EC tablet  Self Yes Yes   Sig: Take 81 mg by mouth daily   buPROPion (WELLBUTRIN XL) 300 mg 24 hr tablet   No Yes   Sig: TAKE 1 TABLET EVERY MORNING   cholecalciferol (VITAMIN D3) 1,000 units tablet  Self No Yes   Sig: Take 1 tablet by mouth daily   diltiazem (CARDIZEM CD) 240 mg 24 hr capsule   No Yes   Sig: TAKE 1 CAPSULE DAILY   fluocinonide (LIDEX) 0.05 % external solution   No Yes   Sig: APPLY 1 APPLICATION        TOPICALLY TWICE DAILY   furosemide (LASIX) 40 mg tablet   No Yes Sig: Take 1 tablet (40 mg total) by mouth 2 (two) times a day   ketoconazole (NIZORAL) 2 % shampoo   No No   Sig: Apply 1 application topically once for 1 dose   methimazole (TAPAZOLE) 5 mg tablet   No Yes   Sig: Take 1 tablet (5 mg total) by mouth 3 (three) times a week   metoprolol tartrate (LOPRESSOR) 50 mg tablet   No Yes   Sig: TAKE 1 TABLET EVERY 12     HOURS   potassium chloride (Klor-Con M20) 20 mEq tablet   No Yes   Sig: Take 1 tablet (20 mEq total) by mouth daily   sertraline (ZOLOFT) 100 mg tablet   No Yes   Sig: TAKE 1 TABLET DAILY   simvastatin (ZOCOR) 20 mg tablet   No Yes   Sig: TAKE 1 TABLET DAILY AT     BEDTIME   triamcinolone (KENALOG) 0.1 % cream   No Yes   Sig: Apply topically 2 (two) times a day      Facility-Administered Medications: None       Past Medical History:   Diagnosis Date   • VAZQUEZ (acute kidney injury) ([address]) 2/17/2017   • Anemia    • Atrial fibrillation (HCC)    • Atrial flutter (HCC)    • Depression    • Diastolic CHF ([address])    • Dyslipidemia    • Glaucoma    • H/O: hysterectomy    • Hypertension    • Hyperthyroidism    • Impaired glucose tolerance     RESOLVED 2/3/2016   • Pacemaker    • Psoriasis    • S/P cataract surgery    • S/P knee replacement    • SSS (sick sinus syndrome) ([facility])    • SSS (sick sinus syndrome) ([address])        Past Surgical History:   Procedure Laterality Date   • CARDIAC PACEMAKER PLACEMENT     • CARDIAC PACEMAKER PLACEMENT      dual chamber   • CATARACT EXTRACTION     • ESOPHAGOGASTRODUODENOSCOPY N/A 12/30/2016    Procedure: ESOPHAGOGASTRODUODENOSCOPY (EGD); Surgeon: Radha Oneal MD;  Location: [facility]; Service:    • EYE SURGERY      cataract   • HYSTERECTOMY     • JOINT REPLACEMENT      bilateral    • MI COLONOSCOPY FLX DX W/COLLJ SPEC WHEN PFRMD N/A 4/24/2017    Procedure: COLONOSCOPY with polypectomy;  Surgeon: Diana Guaman MD;  Location: [facility];   Service: Gastroenterology   • REPLACEMENT TOTAL KNEE Bilateral        Family History   Problem Relation Age of Onset   • Alcohol abuse Father    • Diabetes Father    • Alcohol abuse Brother      I have reviewed and agree with the history as documented. E-Cigarette/Vaping   • E-Cigarette Use Never User      E-Cigarette/Vaping Substances     Social History     Tobacco Use   • Smoking status: Former   • Smokeless tobacco: Never   Vaping Use   • Vaping Use: Never used   Substance Use Topics   • Alcohol use: No   • Drug use: No       Review of Systems   Constitutional: Negative for chills and fever. HENT: Negative for congestion and rhinorrhea. Respiratory: Negative for cough and shortness of breath. Cardiovascular: Negative for chest pain and leg swelling. Gastrointestinal: Positive for blood in stool. Negative for abdominal pain, constipation, diarrhea, nausea and vomiting. Genitourinary: Negative for dysuria and flank pain. Musculoskeletal: Negative for arthralgias and myalgias. Skin: Negative for rash and wound. Neurological: Negative for dizziness, weakness, numbness and headaches. Psychiatric/Behavioral: Negative for behavioral problems. Physical Exam  Physical Exam  Vitals and nursing note reviewed. Exam conducted with a chaperone present (Delmi GUZMAN). Constitutional:       General: She is not in acute distress. Appearance: She is well-developed. HENT:      Head: Normocephalic and atraumatic. Eyes:      Conjunctiva/sclera: Conjunctivae normal.   Cardiovascular:      Rate and Rhythm: Normal rate and regular rhythm. Heart sounds: No murmur heard. Pulmonary:      Effort: Pulmonary effort is normal. No respiratory distress. Breath sounds: Normal breath sounds. Abdominal:      Palpations: Abdomen is soft. Tenderness: There is no abdominal tenderness. There is no guarding or rebound. Genitourinary:     Rectum: Guaiac result positive. No tenderness or anal fissure.       Comments: Bright red blood actively coming from rectum   Musculoskeletal:         General: No swelling. Cervical back: Neck supple. Skin:     General: Skin is warm and dry. Capillary Refill: Capillary refill takes less than 2 seconds. Neurological:      Mental Status: She is alert. Psychiatric:         Mood and Affect: Mood normal.         Vital Signs  ED Triage Vitals [07/06/23 0533]   Temperature Pulse Respirations Blood Pressure SpO2   97.5 °F (36.4 °C) 60 17 125/59 96 %      Temp Source Heart Rate Source Patient Position - Orthostatic VS BP Location FiO2 (%)   Oral Monitor Sitting Right arm --      Pain Score       --           Vitals:    07/06/23 0533   BP: 125/59   Pulse: 60   Patient Position - Orthostatic VS: Sitting         Visual Acuity      ED Medications  Medications   sodium chloride 0.9 % bolus 1,000 mL (1,000 mL Intravenous New Bag 7/6/23 0555)   pantoprazole (PROTONIX) injection 40 mg (40 mg Intravenous Given 7/6/23 0604)       Diagnostic Studies  Results Reviewed     Procedure Component Value Units Date/Time    Comprehensive metabolic panel [[id number]] Collected: 07/06/23 0554    Lab Status:  In process Specimen: Blood from Arm, Left Updated: 07/06/23 0610    CBC and differential [[id number]]  (Abnormal) Collected: 07/06/23 0554    Lab Status: Final result Specimen: Blood from Arm, Right Updated: 07/06/23 0608     WBC 8.89 Thousand/uL      RBC 4.14 Million/uL      Hemoglobin 13.3 g/dL      Hematocrit 41.8 %       fL      MCH 32.1 pg      MCHC 31.8 g/dL      RDW 15.0 %      MPV 9.4 fL      Platelets 532 Thousands/uL      nRBC 0 /100 WBCs      Neutrophils Relative 60 %      Immat GRANS % 0 %      Lymphocytes Relative 27 %      Monocytes Relative 9 %      Eosinophils Relative 3 %      Basophils Relative 1 %      Neutrophils Absolute 5.35 Thousands/µL      Immature Grans Absolute 0.03 Thousand/uL      Lymphocytes Absolute 2.41 Thousands/µL      Monocytes Absolute 0.76 Thousand/µL      Eosinophils Absolute 0.29 Thousand/µL      Basophils Absolute 0.05 Thousands/µL Michelle Soler [[id number]] Collected: 07/06/23 0554    Lab Status: In process Specimen: Blood from Arm, Right Updated: 07/06/23 0605    APTT [[id number]] Collected: 07/06/23 0554    Lab Status: In process Specimen: Blood from Arm, Right Updated: 07/06/23 0605                 CT high volume bleeding scan abdomen pelvis    (Results Pending)              Procedures  Procedures         ED Course  ED Course as of 07/06/23 0616   Thu Jul 06, 2023   0609 Hemoglobin: 13.3   0615 Care turned over to [facility] pending labs, CT, admission. Patient is stable at this time. Medical Decision Making  Differential diagnosis including but not limited to: lower GI bleed, upper GI bleed,  gastritis, PUD, diverticulosis, AVM, hemorrhoids, anemia, coagulopathy, liver disease, tumor, anal fissure. Will obtain CBC, CMP, coags, and CT high volume GI bleed. Will give IVF and Protonix     BRBPR (bright red blood per rectum): acute illness or injury  Amount and/or Complexity of Data Reviewed  Labs: ordered. Decision-making details documented in ED Course. Radiology: ordered. Risk  Prescription drug management. Disposition  Final diagnoses:   BRBPR (bright red blood per rectum)     Time reflects when diagnosis was documented in both MDM as applicable and the Disposition within this note     Time User Action Codes Description Comment    7/6/2023  6:16 AM Yan Murphy Add [K62.5] BRBPR (bright red blood per rectum)       ED Disposition     None      Follow-up Information    None         Patient's Medications   Discharge Prescriptions    No medications on file       No discharge procedures on file.     PDMP Review       Value Time User    PDMP Reviewed  Yes 7/2/2020  8:27 Deacon Mancilla MD          ED Provider  Electronically Signed by           Aleks Lewis PA-C  07/06/23 9015

## 2023-07-06 NOTE — ANESTHESIA POSTPROCEDURE EVALUATION
Post-Op Assessment Note    CV Status:  Stable    Pain management: adequate     Mental Status:  Awake   Hydration Status:  Stable   PONV Controlled:  Controlled   Airway Patency:  Patent      Post Op Vitals Reviewed: Yes      Staff: Anesthesiologist         No notable events documented.     BP      Temp      Pulse     Resp     SpO2      /67   Pulse 60   Temp 97.5 °F (36.4 °C) (Oral)   Resp (!) 34   Ht 5' 7" (1.702 m)   Wt 69.1 kg (152 lb 5.4 oz)   LMP  (LMP Unknown)   SpO2 96%   BMI 23.86 kg/m²

## 2023-07-06 NOTE — H&P
[facility]  H&P  Name: Josiah Terrell 80 y.o. female I MRN: [id number]  Unit/Bed#: ICU 06 I Date of Admission: 7/6/2023   Date of Service: 7/6/2023 I Hospital Day: 0      Assessment/Plan   * GIB (gastrointestinal bleeding)  Assessment & Plan  · Patient admitted with GI bleed. She reports she had an episode of bright red blood per rectum this morning, no abdominal pain/nausea/vomiting/change in diet. She has a history of GI bleed about 5 years ago at that time she was on Eliquis, and it was stopped. She is currently on aspirin daily. · High-volume CT showing active hemorrhage in the mid sigmoid colon  · GI consulted, in the ER during enema prep she became hypotensive which improved with IV fluids. Noted at that time to have a 3 g drop in hemoglobin, over 2 hours  · Patient typed and screened and given 1 unit packed red blood cells  · Admitted to ICU with plan for scope this morning  · Trend hemoglobin every 8 postprocedure  · N.p.o. Anemia  Assessment & Plan  · Acute on chronic secondary to iron deficiency anemia and acute blood loss anemia from GI bleed  · 7/6 given 1 unit packed red blood cell  · Trend hemoglobin 3 times daily    Chronic diastolic congestive heart failure (HCC)  Assessment & Plan  Wt Readings from Last 3 Encounters:   07/06/23 69.1 kg (152 lb 5.4 oz)   03/09/23 73 kg (160 lb 14.4 oz)   10/06/22 73.5 kg (162 lb)       · Family reports that patient's lower extremity edema is slightly increased over the last 3 months. Patient reports that her urine output has been the same with her Lasix twice daily  · Episode of hypotension in the ER, holding home medications for now with plan to resume as able  · Close monitoring of I/os status, daily weights      Paroxysmal atrial fibrillation ([address])  Assessment & Plan  · Patient not on systemic anticoagulation due to risks and GI bleed history 5 years ago.   · Holding home Cardizem, metoprolol, aspirin due to n.p.o. status and episode of hypotension in the ER  · Telemetry monitoring   · resume home medications as able    SSS (sick sinus syndrome) ([address])  Assessment & Plan  · Status post pacemaker placement    Hyperthyroidism  Assessment & Plan  · Holding home Tapazole due to n.p.o. Benign essential hypertension  Assessment & Plan  · Patient admitted with acute GI bleed, episode of hypotension in the ER which responded to IV fluids  · Holding home Lasix, Cardizem, metoprolol for now. Resume as able           History of Present Illness     HPI: Luigi Morales is a 80 y.o. who presents with c/o BRBPR and bloody stool starting this AM PTA. Pt with PMHx of SSS s/p PPM, Afib not on AC due to GIB 5 years ago, hyperthyroidism, HTN, iron def anemia. In the ED, high vol CT showing active hemorrage in mid sigmoid. GI consulted. During pre procedure enemas, pt developed hypotension with SBP in 50s. BP improved with IVFs, but noted to have a 3 gram drop in hgb since presentation to ED. Pt was given 1 unit PRBC. Admitting to ICU for further care and plan to scope pt at bedside this AM.     History obtained from chart review, the patient and daughter. Review of Systems   Constitutional: Negative for appetite change. HENT: Negative. Respiratory: Negative for shortness of breath. Cardiovascular: Positive for leg swelling. Gastrointestinal: Positive for anal bleeding and blood in stool. Negative for abdominal distention, abdominal pain, nausea, rectal pain and vomiting. Genitourinary: Negative for decreased urine volume, difficulty urinating and frequency. Allergic/Immunologic: Negative. All other systems reviewed and are negative.        Historical Information   Past Medical History:  2/17/2017: VAZQUEZ (acute kidney injury) ([address])  No date: Anemia  No date: Atrial fibrillation ([address])  No date: Atrial flutter ([address])  No date: Depression  No date: Diastolic CHF ([address])  No date: Dyslipidemia  No date: Glaucoma  No date: H/O: hysterectomy  No date: Hypertension  No date: Hyperthyroidism  No date: Impaired glucose tolerance      Comment:  RESOLVED 2/3/2016  No date: Pacemaker  No date: Psoriasis  No date: S/P cataract surgery  No date: S/P knee replacement  No date: SSS (sick sinus syndrome) (HCC)  No date: SSS (sick sinus syndrome) ([address]) Past Surgical History:  No date: CARDIAC PACEMAKER PLACEMENT  No date: CARDIAC PACEMAKER PLACEMENT      Comment:  dual chamber  No date: CATARACT EXTRACTION  12/30/2016: ESOPHAGOGASTRODUODENOSCOPY; N/A      Comment:  Procedure: ESOPHAGOGASTRODUODENOSCOPY (EGD); Surgeon:                Radha Oneal MD;  Location: [facility]; Service:   No date: EYE SURGERY      Comment:  cataract  No date: HYSTERECTOMY  No date: JOINT REPLACEMENT      Comment:  bilateral   4/24/2017: AL COLONOSCOPY FLX DX W/COLLJ SPEC WHEN PFRMD; N/A      Comment:  Procedure: COLONOSCOPY with polypectomy;  Surgeon: Diana Guaman MD;  Location: [facility]; Service:                Gastroenterology  No date: REPLACEMENT TOTAL KNEE; Bilateral   Current Outpatient Medications   Medication Instructions   • ALFALFA PO 3 tablets, Oral   • aspirin (ECOTRIN LOW STRENGTH) 81 mg, Oral, Daily   • B Complex Vitamins (B COMPLEX 1 PO) Oral   • buPROPion (WELLBUTRIN XL) 300 mg 24 hr tablet TAKE 1 TABLET EVERY MORNING   • cholecalciferol (VITAMIN D3) 1,000 Units, Oral, Daily   • diltiazem (CARDIZEM CD) 240 mg 24 hr capsule TAKE 1 CAPSULE DAILY   • fluocinonide (LIDEX) 0.05 % external solution APPLY 1 APPLICATION        TOPICALLY TWICE DAILY   • furosemide (LASIX) 40 mg, Oral, 2 times daily   • ketoconazole (NIZORAL) 2 % shampoo 1 application. , Topical, Once   • LECITHIN PO 1 tablet, Oral, Daily   • methimazole (TAPAZOLE) 5 mg, Oral, 3 times weekly   • metoprolol tartrate (LOPRESSOR) 50 mg tablet TAKE 1 TABLET EVERY 12     HOURS   • Omega-3 Fatty Acids (OMEGA-3 PO) Oral, 2 times daily   • potassium chloride (Klor-Con M20) 20 mEq tablet 20 mEq, Oral, Daily • sertraline (ZOLOFT) 100 mg tablet TAKE 1 TABLET DAILY   • simvastatin (ZOCOR) 20 mg tablet TAKE 1 TABLET DAILY AT     BEDTIME   • triamcinolone (KENALOG) 0.1 % cream Topical, 2 times daily   • Zinc Sulfate (ZINC 15 PO) 100 mg, Oral    No Known Allergies   Social History     Tobacco Use   • Smoking status: Former   • Smokeless tobacco: Never   Vaping Use   • Vaping Use: Never used   Substance Use Topics   • Alcohol use: No   • Drug use: No    Family History   Problem Relation Age of Onset   • Alcohol abuse Father    • Diabetes Father    • Alcohol abuse Brother           Objective                            Vitals I/O      Most Recent Min/Max in 24hrs   Temp 97.5 °F (36.4 °C) Temp  Min: 97.5 °F (36.4 °C)  Max: 97.6 °F (36.4 °C)   Pulse 60 Pulse  Min: 60  Max: 65   Resp (!) 34 Resp  Min: 17  Max: 34   /67 BP  Min: 58/27  Max: 152/64   O2 Sat 96 % SpO2  Min: 92 %  Max: 100 %      Intake/Output Summary (Last 24 hours) at 7/6/2023 1136  Last data filed at 7/6/2023 0707  Gross per 24 hour   Intake 1000 ml   Output --   Net 1000 ml         Diet NPO     Invasive Monitoring Physical exam    Physical Exam  Vitals and nursing note reviewed. Constitutional:       General: She is not in acute distress. Appearance: She is normal weight. She is not ill-appearing, toxic-appearing or diaphoretic. HENT:      Head: Normocephalic and atraumatic. Nose: Nose normal.      Mouth/Throat:      Mouth: Mucous membranes are moist.      Pharynx: Oropharynx is clear. Eyes:      Extraocular Movements: Extraocular movements intact. Conjunctiva/sclera: Conjunctivae normal.      Pupils: Pupils are equal, round, and reactive to light. Cardiovascular:      Rate and Rhythm: Normal rate. Rhythm irregular. Pulses: Normal pulses. Heart sounds: Normal heart sounds. Pulmonary:      Effort: Pulmonary effort is normal. No respiratory distress. Breath sounds: Normal breath sounds. No stridor.  No wheezing, rhonchi or rales. Abdominal:      General: Bowel sounds are normal. There is no distension. Palpations: Abdomen is soft. Tenderness: There is no abdominal tenderness. There is no guarding. Musculoskeletal:         General: Normal range of motion. Cervical back: Normal range of motion and neck supple. Right lower leg: Edema present. Left lower leg: Edema present. Skin:     General: Skin is warm and dry. Neurological:      General: No focal deficit present. Mental Status: She is alert and oriented to person, place, and time. Mental status is at baseline. Cranial Nerves: No cranial nerve deficit. Sensory: No sensory deficit. Comments: [place] with hearing aids in. Psychiatric:         Mood and Affect: Mood normal.              Diagnostic Studies      Imagin23 CT High volume lower GIB I have personally reviewed pertinent reports. Medications:  Scheduled PRN   chlorhexidine, 15 mL, Q12H [address]          Continuous          Labs:    CBC    Recent Labs     23  0554 23  0805   WBC 8.89  --    HGB 13.3 10.6*   HCT 41.8 33.9*   *  --      BMP    Recent Labs     23  0554   SODIUM 142   K 3.6      CO2 32   AGAP 6   BUN 17   CREATININE 1.30   CALCIUM 8.7       Coags    Recent Labs     23  0554   INR 1.47*   PTT 36        Additional Electrolytes  No recent results       Blood Gas    No recent results  No recent results LFTs  Recent Labs     23  0554   ALT 23   AST 33   ALKPHOS 72   ALB 3.7   TBILI 0.93       Infectious  No recent results  Glucose  Recent Labs     23  0554   GLUC 114             Critical Care Time Delivered: Upon my evaluation, this patient had a high probability of imminent or life-threatening deterioration due to acute GIB, which required my direct attention, intervention, and personal management.   I have personally provided 60 minutes of critical care time, exclusive of procedures, teaching, family meetings, and any prior time recorded by providers other than myself.    Anticipated Length of Stay is > 2 midnights  [address]

## 2023-07-06 NOTE — ED NOTES
RN put patient on 2L O2 due to pt becoming hypoxic, 88%, while getting back in bed     Mira Sicard, RN  07/06/23 [address], RN  07/06/23 9074

## 2023-07-06 NOTE — ED NOTES
Nasal cannula removed from pt and pt was provided with warm blankets at this time.       Spencer Alvarado RN  07/06/23 2115

## 2023-07-06 NOTE — ASSESSMENT & PLAN NOTE
Wt Readings from Last 3 Encounters:   07/06/23 69.1 kg (152 lb 5.4 oz)   03/09/23 73 kg (160 lb 14.4 oz)   10/06/22 73.5 kg (162 lb)       · Family reports that patient's lower extremity edema is slightly increased over the last 3 months.   Patient reports that her urine output has been the same with her Lasix twice daily  · Episode of hypotension in the ER, holding home medications for now with plan to resume as able  · Close monitoring of I/os status, daily weights

## 2023-07-06 NOTE — ASSESSMENT & PLAN NOTE
· Patient admitted with GI bleed. She reports she had an episode of bright red blood per rectum this morning, no abdominal pain/nausea/vomiting/change in diet. She has a history of GI bleed about 5 years ago at that time she was on Eliquis, and it was stopped. She is currently on aspirin daily. · High-volume CT showing active hemorrhage in the mid sigmoid colon  · GI consulted, in the ER during enema prep she became hypotensive which improved with IV fluids.   Noted at that time to have a 3 g drop in hemoglobin, over 2 hours  · Patient typed and screened and given 1 unit packed red blood cells  · Admitted to ICU with plan for scope this morning  · Trend hemoglobin every 8 postprocedure  · N.p.o.

## 2023-07-06 NOTE — ASSESSMENT & PLAN NOTE
· Patient not on systemic anticoagulation due to risks and GI bleed history 5 years ago.   · Holding home Cardizem, metoprolol, aspirin due to n.p.o. status and episode of hypotension in the ER  · Telemetry monitoring   · resume home medications as able

## 2023-07-06 NOTE — ED NOTES
Only two tap water enemas completed due to decreased BP and instability per provider     Olu Longoria RN  07/06/23 9811

## 2023-07-06 NOTE — ED NOTES
Pt assisted back into bed and 1L of NSS started per verbal order     Cande Hinojosa RN  07/06/23 2784

## 2023-07-06 NOTE — CONSULTS
Consultation - [address] [place] [address] [facility]  Arya Dk 80 y.o. female MRN: [id number]  Unit/Bed#: ED-06 Encounter: [id number]        Inpatient consult to gastroenterology  Consult performed by: Allen Hunt DO  Consult ordered by: Beronica Coburn PA-C          Reason for Consult / Principal Problem:     Acute lower GI bleed    ASSESSMENT AND PLAN:      80year old female with history significant for sick sinus syndrome status post pacemaker placement, CHF, dyslipidemia, HTN, hypothyroidism senting with sudden onset of hematochezia this morning with associated lightheadedness, with CT high-volume bleeding scan demonstrating active extravasation into the mid sigmoid colon. Patient was given 2 tapwater enemas plan for flexible sigmoidoscopy today for evaluation and management of acute lower GI bleed. 1. Keep n.p.o. for flex sig. 2. Continue IV PPI twice daily. 3. Monitor stool output and hemoglobin. Transfuse for less than 7. Rest of care per primary team.  Thank you for this consultation. ______________________________________________________________________    HPI:  80year old female with history significant for sick sinus syndrome status post pacemaker placement, CHF, dyslipidemia, HTN, hypothyroidism, whom we are asked to see in consultation for acute lower GI bleed. Patient reports sudden onset of bright red blood per rectum earlier this morning with acute onset lightheadedness. On evaluation in the ED with CT bleeding scan she was found to have active bleed in the mid sigmoid with colonic diverticulosis concerning for an active GI bleed. REVIEW OF SYSTEMS:  10 point ROS reviewed and negative except otherwise noted in the HPI above.      Historical Information   Past Medical History:   Diagnosis Date   • VAZQUEZ (acute kidney injury) ([address]) 2/17/2017   • Anemia    • Atrial fibrillation ([address])    • Atrial flutter (HCC)    • Depression    • Diastolic CHF ([address])    • Dyslipidemia • Glaucoma    • H/O: hysterectomy    • Hypertension    • Hyperthyroidism    • Impaired glucose tolerance     RESOLVED 2/3/2016   • Pacemaker    • Psoriasis    • S/P cataract surgery    • S/P knee replacement    • SSS (sick sinus syndrome) (HCC)    • SSS (sick sinus syndrome) ([address])      Past Surgical History:   Procedure Laterality Date   • CARDIAC PACEMAKER PLACEMENT     • CARDIAC PACEMAKER PLACEMENT      dual chamber   • CATARACT EXTRACTION     • ESOPHAGOGASTRODUODENOSCOPY N/A 12/30/2016    Procedure: ESOPHAGOGASTRODUODENOSCOPY (EGD); Surgeon: Anabel Villatoro MD;  Location: [facility]; Service:    • EYE SURGERY      cataract   • HYSTERECTOMY     • JOINT REPLACEMENT      bilateral    • NV COLONOSCOPY FLX DX W/COLLJ SPEC WHEN PFRMD N/A 4/24/2017    Procedure: COLONOSCOPY with polypectomy;  Surgeon: Geri Harding MD;  Location: [facility]; Service: Gastroenterology   • REPLACEMENT TOTAL KNEE Bilateral      Social History   Social History     Substance and Sexual Activity   Alcohol Use No     Social History     Substance and Sexual Activity   Drug Use No     Social History     Tobacco Use   Smoking Status Former   Smokeless Tobacco Never     Family History   Problem Relation Age of Onset   • Alcohol abuse Father    • Diabetes Father    • Alcohol abuse Brother        Meds/Allergies     (Not in a hospital admission)    No current facility-administered medications for this encounter. No Known Allergies      Objective     Blood pressure 120/59, pulse 60, temperature 97.5 °F (36.4 °C), temperature source Oral, resp. rate 20, SpO2 96 %. There is no height or weight on file to calculate BMI.     PHYSICAL EXAM:    General: Well-appearing, NAD  Eyes: no conjunctival icterus or pallor  Abdominal: Soft, non-tender, non-distended  Extremities: Warm, no deformities, no edema  Neuro: alert and oriented  Psych: Normal affect    Lab Results:   Admission on 07/06/2023   Component Date Value   • WBC 07/06/2023 8.89    • RBC 07/06/2023 4.14    • Hemoglobin 07/06/2023 13.3    • Hematocrit 07/06/2023 41.8    • MCV 07/06/2023 101 (H)    • MCH 07/06/2023 32.1    • MCHC 07/06/2023 31.8    • RDW 07/06/2023 15.0    • MPV 07/06/2023 9.4    • Platelets 77/45/0143 148 (L)    • nRBC 07/06/2023 0    • Neutrophils Relative 07/06/2023 60    • Immat GRANS % 07/06/2023 0    • Lymphocytes Relative 07/06/2023 27    • Monocytes Relative 07/06/2023 9    • Eosinophils Relative 07/06/2023 3    • Basophils Relative 07/06/2023 1    • Neutrophils Absolute 07/06/2023 5.35    • Immature Grans Absolute 07/06/2023 0.03    • Lymphocytes Absolute 07/06/2023 2.41    • Monocytes Absolute 07/06/2023 0.76    • Eosinophils Absolute 07/06/2023 0.29    • Basophils Absolute 07/06/2023 0.05    • Sodium 07/06/2023 142    • Potassium 07/06/2023 3.6    • Chloride 07/06/2023 104    • CO2 07/06/2023 32    • ANION GAP 07/06/2023 6    • BUN 07/06/2023 17    • Creatinine 07/06/2023 1.30    • Glucose 07/06/2023 114    • Calcium 07/06/2023 8.7    • AST 07/06/2023 33    • ALT 07/06/2023 23    • Alkaline Phosphatase 07/06/2023 72    • Total Protein 07/06/2023 6.1 (L)    • Albumin 07/06/2023 3.7    • Total Bilirubin 07/06/2023 0.93    • eGFR 07/06/2023 35    • Protime 07/06/2023 17.8 (H)    • INR 07/06/2023 1.47 (H)    • ABO Grouping 07/06/2023 A    • Rh Factor 07/06/2023 Negative    • Antibody Screen 07/06/2023 Negative    • Specimen Expiration Date 07/06/2023 [id number]    • PTT 07/06/2023 36    • Ventricular Rate 07/06/2023 60    • Atrial Rate 07/06/2023 322    • QRSD Interval 07/06/2023 176    • QT Interval 07/06/2023 498    • QTC Interval 07/06/2023 498    • QRS Axis 07/06/2023 -85    • T Wave Axis 07/06/2023 102    • Hemoglobin 07/06/2023 10.6 (L)    • Hematocrit 07/06/2023 33.9 (L)        Imaging Studies: I have personally reviewed pertinent imaging studies.     CT high volume bleeding scan abdomen pelvis    Result Date: 7/6/2023  Narrative: CT ABDOMEN AND PELVIS - WITHOUT AND WITH IV CONTRAST INDICATION:   BRBPR large volume. COMPARISON: CT abdomen pelvis 4/26/2020. TECHNIQUE:  CT examination of the abdomen and pelvis was performed both prior to and after the administration of intravenous contrast. Multiplanar 2D reformatted images were created from the source data. Radiation dose length product (DLP) for this visit:  1201 mGy-cm . This examination, like all CT scans performed in the [facility], was performed utilizing techniques to minimize radiation dose exposure, including the use of iterative reconstruction and automated exposure control. IV Contrast:  100 mL of iohexol (OMNIPAQUE) Enteric Contrast:  Enteric contrast was not administered. FINDINGS: ABDOMEN BOWEL: There is active contrast extravasation within the mid sigmoid colon. Hematoma intermixed with fluid/stool noted in the left colon and rectum. Colonic diverticulosis without acute diverticulitis. No intestinal obstruction. . There is no abnormal bowel wall thickening or pathologic bowel wall enhancement. Age-appropriate atherosclerotic disease in the mesenteric vessels, without complete occlusion. LOWER CHEST: Small bilateral pleural effusions. Cardiomegaly. LIVER/BILIARY TREE:  Unremarkable. GALLBLADDER:  No calcified gallstones. No pericholecystic inflammatory change. SPLEEN:  Unremarkable. PANCREAS: Diffuse atrophy. There has been interval enlargement of a cystic lesion in the proximal pancreatic body measuring one 2.5 x 1.8 cm on image 5/46, previously 1.4 x 1.0 cm. ADRENAL GLANDS:  Unremarkable. KIDNEYS/URETERS:  Unremarkable. No hydronephrosis. APPENDIX:  No findings to suggest appendicitis. ABDOMINOPELVIC CAVITY:  No ascites. No pneumoperitoneum. No lymphadenopathy. VESSELS:  Atherosclerotic changes are present. No evidence of aneurysm. PELVIS REPRODUCTIVE ORGANS:  Surgical changes of prior hysterectomy. URINARY BLADDER:  Unremarkable.  ABDOMINAL WALL/INGUINAL REGIONS:  There is a small fat-containing umbilical hernia, unchanged from previous examination. OSSEOUS STRUCTURES:  No acute fracture or destructive osseous lesion. Spinal degenerative changes are noted. Impression: Active hemorrhage in the mid sigmoid colon. Colonic diverticulosis without diverticulitis. Slight interval enlargement of a 2.5 cm cystic lesion in the proximal pancreas. For simple cyst(s) between 1.5 to 2.0 cm, recommend followup every 2 years for 2 time. If no change after 4 years, then no more followups. Recommend next followup in 2 years. Preferred imaging modality: abdomen MRI and MRCP with and without IV contrast, or triple phase abdomen CT with IV contrast, or abdomen MRI and MRCP without IV contrast. Considerations related to the patient's age and/or comorbidities may be used to alter these recommendations. Small bilateral pleural effusions noted. I personally discussed this study with Varun Aden on 7/6/2023 7:50 AM. Workstation performed: [id number]       Tiffany Rojo D.O.   Gastroenterology Fellow  PGY-4  Available on [employer]  7/6/2023 8:39 AM

## 2023-07-06 NOTE — ED CARE HANDOFF
Emergency Department Sign Out Note        Sign out and transfer of care from [place]. See Separate Emergency Department note. The patient, Annabelle Oakley, was evaluated by the previous provider for rectal bleeding. See previous provider's note for HPI.     Workup Completed:  Results Reviewed     Procedure Component Value Units Date/Time    Hemoglobin and hematocrit, blood [[id number]]  (Abnormal) Collected: 07/06/23 0805    Lab Status: Final result Specimen: Blood from Arm, Right Updated: 07/06/23 0812     Hemoglobin 10.6 g/dL      Hematocrit 33.9 %     Comprehensive metabolic panel [[id number]]  (Abnormal) Collected: 07/06/23 0554    Lab Status: Final result Specimen: Blood from Arm, Left Updated: 07/06/23 0644     Sodium 142 mmol/L      Potassium 3.6 mmol/L      Chloride 104 mmol/L      CO2 32 mmol/L      ANION GAP 6 mmol/L      BUN 17 mg/dL      Creatinine 1.30 mg/dL      Glucose 114 mg/dL      Calcium 8.7 mg/dL      AST 33 U/L      ALT 23 U/L      Alkaline Phosphatase 72 U/L      Total Protein 6.1 g/dL      Albumin 3.7 g/dL      Total Bilirubin 0.93 mg/dL      eGFR 35 ml/min/1.73sq m     Narrative:      [place] guidelines for Chronic Kidney Disease (CKD):   •  Stage 1 with normal or high GFR (GFR > 90 mL/min/1.73 square meters)  •  Stage 2 Mild CKD (GFR = 60-89 mL/min/1.73 square meters)  •  Stage 3A Moderate CKD (GFR = 45-59 mL/min/1.73 square meters)  •  Stage 3B Moderate CKD (GFR = 30-44 mL/min/1.73 square meters)  •  Stage 4 Severe CKD (GFR = 15-29 mL/min/1.73 square meters)  •  Stage 5 End Stage CKD (GFR <15 mL/min/1.73 square meters)  Note: GFR calculation is accurate only with a steady state creatinine    Protime-INR [[id number]]  (Abnormal) Collected: 07/06/23 0554    Lab Status: Final result Specimen: Blood from Arm, Right Updated: 07/06/23 0625     Protime 17.8 seconds      INR 1.47    APTT [[id number]]  (Normal) Collected: 07/06/23 0554    Lab Status: Final result Specimen: Blood from Arm, Right Updated: 07/06/23 0625     PTT 36 seconds     CBC and differential [[id number]]  (Abnormal) Collected: 07/06/23 0554    Lab Status: Final result Specimen: Blood from Arm, Right Updated: 07/06/23 0608     WBC 8.89 Thousand/uL      RBC 4.14 Million/uL      Hemoglobin 13.3 g/dL      Hematocrit 41.8 %       fL      MCH 32.1 pg      MCHC 31.8 g/dL      RDW 15.0 %      MPV 9.4 fL      Platelets 575 Thousands/uL      nRBC 0 /100 WBCs      Neutrophils Relative 60 %      Immat GRANS % 0 %      Lymphocytes Relative 27 %      Monocytes Relative 9 %      Eosinophils Relative 3 %      Basophils Relative 1 %      Neutrophils Absolute 5.35 Thousands/µL      Immature Grans Absolute 0.03 Thousand/uL      Lymphocytes Absolute 2.41 Thousands/µL      Monocytes Absolute 0.76 Thousand/µL      Eosinophils Absolute 0.29 Thousand/µL      Basophils Absolute 0.05 Thousands/µL         CT high volume bleeding scan abdomen pelvis   Final Result by Orville Elizabeth MD (07/06 4280)      Active hemorrhage in the mid sigmoid colon. Colonic diverticulosis without diverticulitis. Slight interval enlargement of a 2.5 cm cystic lesion in the proximal pancreas. For simple cyst(s) between 1.5 to 2.0 cm, recommend followup every 2 years for 2 time. If no change after 4 years, then no more followups. Recommend next followup in 2 years. Preferred imaging modality: abdomen MRI and MRCP with and without IV contrast, or triple phase abdomen CT with IV contrast, or abdomen MRI and MRCP without IV contrast. Considerations related to the patient's age and/or comorbidities may be used to    alter these recommendations. Small bilateral pleural effusions noted.          I personally discussed this study with Jose Gregory on 7/6/2023 7:50 AM.            Workstation performed: [id number]               ED Course / Workup Pending (followup):                          ED Course as of 07/06/23 0934   Thu Jul 06, 2023   0610 [facility] rectal bleeding starting this morning, only on ASA. No abd pain, N/V. Consider recheck hgb if declines. Admit to [facility] with GI consult pending labs and CT   0649 PROTIME(!): 17.8   0649 POCT INR(!): 1.47   0749 Extravasation in sigmoid colon, actively bleeding    0755 CT high volume bleeding scan abdomen pelvis  Active hemorrhage in the mid sigmoid colon.     Colonic diverticulosis without diverticulitis.     Slight interval enlargement of a 2.5 cm cystic lesion in the proximal pancreas. For simple cyst(s) between 1.5 to 2.0 cm, recommend followup every 2 years for 2 time. If no change after 4 years, then no more followups. Recommend next followup in 2 years. Preferred imaging modality: abdomen MRI and MRCP with and without IV contrast, or triple phase abdomen CT with IV contrast, or abdomen MRI and MRCP without IV contrast. Considerations related to the patient's age and/or comorbidities may be used to   alter these recommendations. 6884 GI will be coming to see pt    0902 GI prefers an ICU bed- will reach out to ICU AP   0913 Discussed with [facility] ICU- Will be admitted under Dr. Noman Brady Time    Date/Time: 7/6/2023 9:33 AM    Performed by: Sadie Negron PA-C  Authorized by: Sadie Negron PA-C    Critical care provider statement:     Critical care time (minutes):  30    Critical care time was exclusive of:  Separately billable procedures and treating other patients    Critical care was necessary to treat or prevent imminent or life-threatening deterioration of the following conditions: blood pressue drop due to active bleeding.     Critical care was time spent personally by me on the following activities:  Interpretation of cardiac output measurements, obtaining history from patient or surrogate, ordering and performing treatments and interventions, ordering and review of laboratory studies, ordering and review of radiographic studies, re-evaluation of patient's condition, examination of patient, evaluation of patient's response to treatment, discussions with consultants and development of treatment plan with patient or surrogate    I assumed direction of critical care for this patient from another provider in my specialty: no        Medical Decision Making  Patient was evaluated by the previous provider for rectal bleeding that started this morning. On arrival patient's vitals were stable and she did have bright red blood per rectum. Hemoglobin was stable. CT scan did show active hemorrhage in the mid sigmoid colon. Discussed patient with GI who recommended tapwater enemas as a flex sigmoidoscopy was going to be planned for today. During the enemas however patient blood pressure began to drop and she became very weak and tired. Enemas were stopped and liter of fluids were added on. Blood pressure did come back up. However given that hemoglobin had dropped 3 points since arrival it was decided to give her 1 unit of blood. At this time it was decided to also admit patient to ICU in case pt were to deteriorate. Acute lower GI bleeding: acute illness or injury  BRBPR (bright red blood per rectum): acute illness or injury  Diverticulosis: undiagnosed new problem with uncertain prognosis  Pancreatic lesion: undiagnosed new problem with uncertain prognosis  Amount and/or Complexity of Data Reviewed  Labs: ordered. Decision-making details documented in ED Course. Radiology: ordered. Decision-making details documented in ED Course. Risk  Prescription drug management. Decision regarding hospitalization.               Disposition  Final diagnoses:   BRBPR (bright red blood per rectum)   Diverticulosis   Acute lower GI bleeding   Pancreatic lesion     Time reflects when diagnosis was documented in both MDM as applicable and the Disposition within this note     Time User Action Codes Description Comment    7/6/2023  6:16 AM Nitza Cruz Add [K62.5] BRBPR (bright red blood per rectum)     7/6/2023  7:56 AM Sandy Shackle Add [K57.90] Diverticulosis     7/6/2023  7:56 AM Sandy Shackle Add [K92.2] Acute lower GI bleeding     7/6/2023  7:56 AM Sandy Shackle Modify [K62.5] BRBPR (bright red blood per rectum)     7/6/2023  7:56 AM Sandy Shackle Modify [K92.2] Acute lower GI bleeding     7/6/2023  7:56 AM Sandy Shackle Add [K86.9] Pancreatic lesion       ED Disposition     ED Disposition   Admit    Condition   Stable    Date/Time   Thu Jul 6, 2023  9:11 AM    Comment   Case was discussed with ICU AP- pt will be admitted under Dr. Delia Ritchie    None       Patient's Medications   Discharge Prescriptions    No medications on file     No discharge procedures on file.        ED Provider  Electronically Signed by     Candis Miller PA-C  07/06/23 5869

## 2023-07-06 NOTE — ASSESSMENT & PLAN NOTE
· Patient admitted with acute GI bleed, episode of hypotension in the ER which responded to IV fluids  · Holding home Lasix, Cardizem, metoprolol for now.   Resume as able

## 2023-07-06 NOTE — Clinical Note
Case was discussed with DUC and the patient's admission status was agreed to be Admission Status: inpatient status to the service of Dr. Yves Reed .

## 2023-07-06 NOTE — PROGRESS NOTES
07/06/23 1300   Clinical Encounter Type   Visited With Patient and family together   Routine Visit Introduction   Continue Visiting Yes   Sacramental Encounters   Sacrament of Sick-Anointing Anointed

## 2023-07-07 VITALS
DIASTOLIC BLOOD PRESSURE: 71 MMHG | HEIGHT: 67 IN | TEMPERATURE: 97.4 F | OXYGEN SATURATION: 97 % | SYSTOLIC BLOOD PRESSURE: 133 MMHG | HEART RATE: 78 BPM | WEIGHT: 152.34 LBS | BODY MASS INDEX: 23.91 KG/M2 | RESPIRATION RATE: 25 BRPM

## 2023-07-07 LAB
ABO GROUP BLD BPU: NORMAL
ALBUMIN SERPL BCP-MCNC: 3 G/DL (ref 3.5–5)
ALP SERPL-CCNC: 60 U/L (ref 34–104)
ALT SERPL W P-5'-P-CCNC: 16 U/L (ref 7–52)
ANION GAP SERPL CALCULATED.3IONS-SCNC: 5 MMOL/L
AST SERPL W P-5'-P-CCNC: 21 U/L (ref 13–39)
BASOPHILS # BLD AUTO: 0.03 THOUSANDS/ÂΜL (ref 0–0.1)
BASOPHILS NFR BLD AUTO: 0 % (ref 0–1)
BILIRUB SERPL-MCNC: 0.89 MG/DL (ref 0.2–1)
BPU ID: NORMAL
BUN SERPL-MCNC: 12 MG/DL (ref 5–25)
CA-I BLD-SCNC: 1.07 MMOL/L (ref 1.12–1.32)
CALCIUM ALBUM COR SERPL-MCNC: 8.7 MG/DL (ref 8.3–10.1)
CALCIUM SERPL-MCNC: 7.9 MG/DL (ref 8.4–10.2)
CHLORIDE SERPL-SCNC: 108 MMOL/L (ref 96–108)
CO2 SERPL-SCNC: 28 MMOL/L (ref 21–32)
CREAT SERPL-MCNC: 0.86 MG/DL (ref 0.6–1.3)
CROSSMATCH: NORMAL
EOSINOPHIL # BLD AUTO: 0.25 THOUSAND/ÂΜL (ref 0–0.61)
EOSINOPHIL NFR BLD AUTO: 3 % (ref 0–6)
ERYTHROCYTE [DISTWIDTH] IN BLOOD BY AUTOMATED COUNT: 16.5 % (ref 11.6–15.1)
GFR SERPL CREATININE-BSD FRML MDRD: 59 ML/MIN/1.73SQ M
GLUCOSE SERPL-MCNC: 97 MG/DL (ref 65–140)
HCT VFR BLD AUTO: 34.1 % (ref 34.8–46.1)
HGB BLD-MCNC: 11 G/DL (ref 11.5–15.4)
IMM GRANULOCYTES # BLD AUTO: 0.03 THOUSAND/UL (ref 0–0.2)
IMM GRANULOCYTES NFR BLD AUTO: 0 % (ref 0–2)
LYMPHOCYTES # BLD AUTO: 2.19 THOUSANDS/ÂΜL (ref 0.6–4.47)
LYMPHOCYTES NFR BLD AUTO: 25 % (ref 14–44)
MAGNESIUM SERPL-MCNC: 1.8 MG/DL (ref 1.9–2.7)
MCH RBC QN AUTO: 31.3 PG (ref 26.8–34.3)
MCHC RBC AUTO-ENTMCNC: 32.3 G/DL (ref 31.4–37.4)
MCV RBC AUTO: 97 FL (ref 82–98)
MONOCYTES # BLD AUTO: 0.66 THOUSAND/ÂΜL (ref 0.17–1.22)
MONOCYTES NFR BLD AUTO: 7 % (ref 4–12)
NEUTROPHILS # BLD AUTO: 5.72 THOUSANDS/ÂΜL (ref 1.85–7.62)
NEUTS SEG NFR BLD AUTO: 65 % (ref 43–75)
NRBC BLD AUTO-RTO: 0 /100 WBCS
PHOSPHATE SERPL-MCNC: 2.8 MG/DL (ref 2.3–4.1)
PLATELET # BLD AUTO: 104 THOUSANDS/UL (ref 149–390)
PMV BLD AUTO: 9 FL (ref 8.9–12.7)
POTASSIUM SERPL-SCNC: 3.1 MMOL/L (ref 3.5–5.3)
PROT SERPL-MCNC: 5 G/DL (ref 6.4–8.4)
RBC # BLD AUTO: 3.52 MILLION/UL (ref 3.81–5.12)
SODIUM SERPL-SCNC: 141 MMOL/L (ref 135–147)
UNIT DISPENSE STATUS: NORMAL
UNIT PRODUCT CODE: NORMAL
UNIT PRODUCT VOLUME: 350 ML
UNIT RH: NORMAL
WBC # BLD AUTO: 8.88 THOUSAND/UL (ref 4.31–10.16)

## 2023-07-07 PROCEDURE — 99238 HOSP IP/OBS DSCHRG MGMT 30/<: CPT | Performed by: INTERNAL MEDICINE

## 2023-07-07 PROCEDURE — C9113 INJ PANTOPRAZOLE SODIUM, VIA: HCPCS | Performed by: STUDENT IN AN ORGANIZED HEALTH CARE EDUCATION/TRAINING PROGRAM

## 2023-07-07 PROCEDURE — 83735 ASSAY OF MAGNESIUM: CPT | Performed by: NURSE PRACTITIONER

## 2023-07-07 PROCEDURE — 85025 COMPLETE CBC W/AUTO DIFF WBC: CPT | Performed by: NURSE PRACTITIONER

## 2023-07-07 PROCEDURE — 82330 ASSAY OF CALCIUM: CPT

## 2023-07-07 PROCEDURE — 80053 COMPREHEN METABOLIC PANEL: CPT | Performed by: NURSE PRACTITIONER

## 2023-07-07 PROCEDURE — NC001 PR NO CHARGE: Performed by: INTERNAL MEDICINE

## 2023-07-07 PROCEDURE — 97163 PT EVAL HIGH COMPLEX 45 MIN: CPT

## 2023-07-07 PROCEDURE — 97167 OT EVAL HIGH COMPLEX 60 MIN: CPT

## 2023-07-07 PROCEDURE — 84100 ASSAY OF PHOSPHORUS: CPT | Performed by: NURSE PRACTITIONER

## 2023-07-07 RX ORDER — ZINC SULFATE 50(220)MG
220 CAPSULE ORAL DAILY
Status: DISCONTINUED | OUTPATIENT
Start: 2023-07-07 | End: 2023-07-07 | Stop reason: HOSPADM

## 2023-07-07 RX ORDER — SERTRALINE HYDROCHLORIDE 100 MG/1
100 TABLET, FILM COATED ORAL DAILY
Status: DISCONTINUED | OUTPATIENT
Start: 2023-07-07 | End: 2023-07-07 | Stop reason: HOSPADM

## 2023-07-07 RX ORDER — MAGNESIUM SULFATE HEPTAHYDRATE 40 MG/ML
2 INJECTION, SOLUTION INTRAVENOUS ONCE
Status: COMPLETED | OUTPATIENT
Start: 2023-07-07 | End: 2023-07-07

## 2023-07-07 RX ORDER — METHIMAZOLE 5 MG/1
5 TABLET ORAL 3 TIMES WEEKLY
Status: DISCONTINUED | OUTPATIENT
Start: 2023-07-07 | End: 2023-07-07 | Stop reason: HOSPADM

## 2023-07-07 RX ORDER — PRAVASTATIN SODIUM 40 MG
40 TABLET ORAL
Status: DISCONTINUED | OUTPATIENT
Start: 2023-07-07 | End: 2023-07-07 | Stop reason: HOSPADM

## 2023-07-07 RX ORDER — POTASSIUM CHLORIDE 14.9 MG/ML
20 INJECTION INTRAVENOUS ONCE
Status: COMPLETED | OUTPATIENT
Start: 2023-07-07 | End: 2023-07-07

## 2023-07-07 RX ORDER — DILTIAZEM HYDROCHLORIDE 120 MG/1
240 CAPSULE, COATED, EXTENDED RELEASE ORAL DAILY
Status: DISCONTINUED | OUTPATIENT
Start: 2023-07-07 | End: 2023-07-07 | Stop reason: HOSPADM

## 2023-07-07 RX ORDER — POTASSIUM CHLORIDE 20 MEQ/1
40 TABLET, EXTENDED RELEASE ORAL ONCE
Status: COMPLETED | OUTPATIENT
Start: 2023-07-07 | End: 2023-07-07

## 2023-07-07 RX ORDER — BUPROPION HYDROCHLORIDE 150 MG/1
300 TABLET ORAL DAILY
Status: DISCONTINUED | OUTPATIENT
Start: 2023-07-07 | End: 2023-07-07 | Stop reason: HOSPADM

## 2023-07-07 RX ORDER — CALCIUM GLUCONATE 20 MG/ML
2 INJECTION, SOLUTION INTRAVENOUS ONCE
Status: COMPLETED | OUTPATIENT
Start: 2023-07-07 | End: 2023-07-07

## 2023-07-07 RX ORDER — PANTOPRAZOLE SODIUM 40 MG/1
40 TABLET, DELAYED RELEASE ORAL DAILY
Qty: 30 TABLET | Refills: 0 | Status: SHIPPED | OUTPATIENT
Start: 2023-07-07 | End: 2023-07-10 | Stop reason: SDUPTHER

## 2023-07-07 RX ORDER — MELATONIN
1000 DAILY
Status: DISCONTINUED | OUTPATIENT
Start: 2023-07-07 | End: 2023-07-07 | Stop reason: HOSPADM

## 2023-07-07 RX ORDER — METOPROLOL TARTRATE 50 MG/1
50 TABLET, FILM COATED ORAL EVERY 12 HOURS SCHEDULED
Status: DISCONTINUED | OUTPATIENT
Start: 2023-07-07 | End: 2023-07-07 | Stop reason: HOSPADM

## 2023-07-07 RX ORDER — CHLORAL HYDRATE 500 MG
1000 CAPSULE ORAL 2 TIMES DAILY
Status: DISCONTINUED | OUTPATIENT
Start: 2023-07-07 | End: 2023-07-07 | Stop reason: HOSPADM

## 2023-07-07 RX ADMIN — Medication 1000 UNITS: at 09:11

## 2023-07-07 RX ADMIN — CALCIUM GLUCONATE 2 G: 20 INJECTION, SOLUTION INTRAVENOUS at 07:42

## 2023-07-07 RX ADMIN — MAGNESIUM SULFATE IN WATER 2 G: 40 INJECTION, SOLUTION INTRAVENOUS at 07:44

## 2023-07-07 RX ADMIN — METHIMAZOLE 5 MG: 5 TABLET ORAL at 10:49

## 2023-07-07 RX ADMIN — METOPROLOL TARTRATE 50 MG: 50 TABLET, FILM COATED ORAL at 11:34

## 2023-07-07 RX ADMIN — POTASSIUM CHLORIDE 20 MEQ: 14.9 INJECTION, SOLUTION INTRAVENOUS at 10:01

## 2023-07-07 RX ADMIN — DILTIAZEM HYDROCHLORIDE 240 MG: 120 CAPSULE, COATED, EXTENDED RELEASE ORAL at 11:34

## 2023-07-07 RX ADMIN — ZINC SULFATE 220 MG (50 MG) CAPSULE 220 MG: CAPSULE at 09:11

## 2023-07-07 RX ADMIN — OMEGA-3 FATTY ACIDS CAP 1000 MG 1000 MG: 1000 CAP at 09:11

## 2023-07-07 RX ADMIN — POTASSIUM CHLORIDE 20 MEQ: 14.9 INJECTION, SOLUTION INTRAVENOUS at 07:44

## 2023-07-07 RX ADMIN — POTASSIUM CHLORIDE 40 MEQ: 1500 TABLET, EXTENDED RELEASE ORAL at 09:10

## 2023-07-07 RX ADMIN — PANTOPRAZOLE SODIUM 40 MG: 40 INJECTION, POWDER, FOR SOLUTION INTRAVENOUS at 09:10

## 2023-07-07 RX ADMIN — CHLORHEXIDINE GLUCONATE 15 ML: 1.2 RINSE ORAL at 09:10

## 2023-07-07 RX ADMIN — BUPROPION HYDROCHLORIDE 300 MG: 150 TABLET, FILM COATED, EXTENDED RELEASE ORAL at 09:11

## 2023-07-07 RX ADMIN — SERTRALINE HYDROCHLORIDE 100 MG: 100 TABLET ORAL at 09:11

## 2023-07-07 NOTE — INCIDENTAL FINDINGS
The following findings require follow up:  Radiographic finding   Finding: Slight interval enlargement of a 2.5 cm cystic lesion in the proximal pancreas. For simple cyst(s) between 1.5 to 2.0 cm,   Follow up required: Preferred imaging modality: abdomen MRI and MRCP with and without IV contrast, or triple phase abdomen CT with IV contrast, or abdomen MRI and MRCP without IV contrast.    Follow up should be done within  recommend followup every 2 years for 2 time. If no change after 4 years, then no more followups. Recommend next followup in 2 years.     Please notify the following clinician to assist with the follow up:   Dr. Nitesh Pugh MD

## 2023-07-07 NOTE — PHYSICAL THERAPY NOTE
PHYSICAL THERAPY EVALUATION          Patient Name: Williams Dyer  LXIOG'T Date: 7/7/2023  PT EVALUATION    80 y.o.    [id number]    Diverticulosis [K57.90]  Rectal bleeding [K62.5]  Acute lower GI bleeding [K92.2]  BRBPR (bright red blood per rectum) [K62.5]  Pancreatic lesion [K86.9]    Past Medical History:   Diagnosis Date    VAZQUEZ (acute kidney injury) ([address]) 2/17/2017    Anemia     Atrial fibrillation (HCC)     Atrial flutter (HCC)     Depression     Diastolic CHF ([address])     Dyslipidemia     Glaucoma     H/O: hysterectomy     Hypertension     Hyperthyroidism     Impaired glucose tolerance     RESOLVED 2/3/2016    Pacemaker     Psoriasis     S/P cataract surgery     S/P knee replacement     SSS (sick sinus syndrome) (HCC)     SSS (sick sinus syndrome) ([address])      Past Surgical History:   Procedure Laterality Date    CARDIAC PACEMAKER PLACEMENT      CARDIAC PACEMAKER PLACEMENT      dual chamber    CATARACT EXTRACTION      ESOPHAGOGASTRODUODENOSCOPY N/A 12/30/2016    Procedure: ESOPHAGOGASTRODUODENOSCOPY (EGD); Surgeon: Jovon Gordon MD;  Location: [facility]; Service:     EYE SURGERY      cataract    HYSTERECTOMY      JOINT REPLACEMENT      bilateral     ME COLONOSCOPY FLX DX W/COLLJ SPEC WHEN PFRMD N/A 4/24/2017    Procedure: COLONOSCOPY with polypectomy;  Surgeon: Renee Seip, MD;  Location: [facility]; Service: Gastroenterology    REPLACEMENT TOTAL KNEE Bilateral         07/07/23 1032   PT Last Visit   PT Visit Date 07/07/23   Note Type   Note type Evaluation   Pain Assessment   Pain Assessment Tool 0-10   Pain Score No Pain   Restrictions/Precautions   Other Precautions Chair Alarm; Bed Alarm;Cognitive;Multiple lines;Telemetry; Fall Risk;Pain;Hard of hearing   Home Living   Type of [address] [place] [address] Dr One level;Stairs to enter with rails   Bathroom Shower/Tub Tub/shower unit   Bathroom Toilet Raised   Bathroom Equipment Grab bars in [address]   Additional Comments 4 DEBBIE   Prior Function   Level of [place] Independent with ADLs; Independent with functional mobility; Needs assistance with IADLS   Lives With Daughter   Receives Help From Family   IADLs Family/Friend/Other provides transportation; Family/Friend/Other provides medication management; Family/Friend/Other provides meals   Falls in the last 6 months 1 to 4   Vocational Retired   Comments indep at baseline. occ furniture cruise in the home, will use cane in community for stability. dtr retired and pt is rarely alone   General   Additional Pertinent History pt admitted to ICU 7/6/23 for GIB. had flex sigmoidoscopy 7/6 w plans to hold further endocscopic evaluation dt improvement in sx. "OOB to chair" orders. PMHx significant for CHF, afib, depression, glaucoma, anxiety, osteoporosis, vertigo, CKD   Family/Caregiver Present Yes   Cognition   Arousal/Participation Cooperative   Orientation Level Oriented to person;Oriented to place; Disoriented to time   Memory Decreased recall of recent events   Following Commands Follows one step commands without difficulty   RLE Assessment   RLE Assessment WFL   LLE Assessment   LLE Assessment WFL   Bed Mobility   Supine to Sit 5  Supervision   Additional items HOB elevated; Increased time required   Transfers   Sit to Stand 5  Supervision   Additional items Increased time required   Stand to Sit 4  Minimal assistance   Additional items Assist x 1; Armrests; Increased time required   Additional Comments HHA to sit   Ambulation/Elevation   Gait pattern Improper Weight shift;Decreased foot clearance; Short stride; Excessively slow   Gait Assistance 4  Minimal assist   Additional items Assist x 1   Assistive Device Other (Comment)  (unilateral HHA)   Distance 2' bed>chair   Balance   Static Standing Fair -   Dynamic Standing Poor +   Ambulatory Poor +   Endurance Deficit   Endurance Deficit No  (BP supine 143/60, /76)   Activity Tolerance   Activity Tolerance Patient tolerated treatment well;Patient limited by fatigue  (dt recent immobility)   Medical Staff Made Aware Oscar OT   Nurse Made Aware Dae Hewitt RN   Assessment   Prognosis Good   Problem List Impaired balance;Decreased mobility; Decreased cognition; Impaired hearing   Assessment Logan Zuniga is a 80 y.o. female admitted to [address] [place] [address] on 7/6/2023 for GIB (gastrointestinal bleeding). PT was consulted and pt was seen on 7/7/2023 for mobility assessment and d/c planning. Pt presents w high fall risk, multiple lines. At baseline is indep for ADLs and ambulation without an AD in the home. Pt is currently functioning at a supervision assistance x1 level for bed mobility, S-min A for transfers and min A for steps bed>chair. Require HHA for OOB activity dt recent immobilization/ acute unsteadiness. Limited ambulation secondary to multiple lines. Vitals stable during session. Pt will benefit from continued skilled IP PT to address the above mentioned impairments  in order to maximize recovery and increase functional independence when completing mobility and ADLs. At this time PT recommendations for d/c are home; may benefit from HHPT to facilitate recovery. Dtr present during session and deny concerns w pt dc home at current LOF. Barriers to Discharge None   Goals   Patient Goals get oob   STG Expiration Date 07/17/23   Short Term Goal #1 1). Pt will perform bed mobility with Kaylee demonstrating appropriate technique 100% of the time in order to improve function. 2)  Perform all transfers with Kaylee demonstrating safe and appropriate technique 100% of the time in order to improve ability to negotiate safely in home environment. 3) Amb with least restrictive AD > 150'x1 with mod I in order to demonstrate ability to negotiate in home environment. 4)  Improve overall strength and balance 1/2 grade in order to optimize ability to perform functional tasks and reduce fall risk. 5) Increase activity tolerance to 45 minutes in order to improve endurance to functional tasks. 6)  Negotiate stairs using most appropriate technique and S in order to be able to negotiate safely in home environment. 7) PT for ongoing patient and family/caregiver education, DME needs and d/c planning in order to promote highest level of function in least restrictive environment. Plan   Treatment/Interventions Functional transfer training;LE strengthening/ROM; Elevations; Therapeutic exercise;Cognitive reorientation;Patient/family training;Equipment eval/education; Bed mobility;Gait training;Spoke to nursing;OT;Family   PT Frequency 2-3x/wk   Recommendation   PT Discharge Recommendation (S)  Home with home health rehabilitation  (pt declining services at this time)   AM-PAC Basic Mobility Inpatient   Turning in Flat Bed Without Bedrails 4   Lying on Back to Sitting on Edge of Flat Bed Without Bedrails 3   Moving Bed to Chair 3   Standing Up From Chair Using Arms 3   Walk in Room 3   Climb 3-5 Stairs With Railing 3   Basic Mobility Inpatient Raw Score 19   Basic Mobility Standardized Score 42.48   Highest Level Of Mobility   JH-HLM Goal 6: Walk 10 steps or more   JH-HLM Achieved 5: Stand (1 or more minutes)   End of Consult   Patient Position at End of Consult Bedside chair;Bed/Chair alarm activated; All needs within reach   History: co - morbidities, fall risk, use of assistive device in community, assist for iadl's, cognition, multiple lines  Exam: impairments in systems including musculoskeletal (strength), neuromuscular (balance, gait, transfers, motor function), am-pac, cardiopulmonary, cognition  Clinical: unstable/unpredictable  Complexity:high      Mckenna Chacon, PT

## 2023-07-07 NOTE — ASSESSMENT & PLAN NOTE
Wt Readings from Last 3 Encounters:   07/06/23 69.1 kg (152 lb 5.4 oz)   03/09/23 73 kg (160 lb 14.4 oz)   10/06/22 73.5 kg (162 lb)       · Family reports that patient's lower extremity edema is slightly increased over the last 3 months. Patient reports that her urine output has been the same with her Lasix twice daily  · Episode of hypotension in the ER upon admission.   · Close monitoring of I/os status, daily weights  · We will resume home Cardizem and Lopressor most likely will hold Lasix for today  · If continues hemodynamically stable will resume home Lasix tomorrow

## 2023-07-07 NOTE — ASSESSMENT & PLAN NOTE
· POA admitted due to episode of GI bleed. Bright red blood per rectum early morning on 07/06. No abdominal pain/nausea/vomiting/change in diet. She has a history of GI bleed about 5 years ago at that time she was on Eliquis, and it was stopped. She is currently on aspirin daily. · 07/06: High-volume CT showing active hemorrhage in the mid sigmoid colon  · GI consulted, in the ER during enema prep she became hypotensive which improved with IV fluids. Noted at that time to have a 3 g drop in hemoglobin, over 2 hours  · 07/06 Status post 1 unit PRBCs. Hemoglobin stable 10.7--11.3--11.0  · 07/06 Status post flexible sigmoidoscopy: " Scattered diverticulosis, moderate severe sigmoid likely etiology of hematochezia and lower GI bleed.   Still significant with bright red blood in the stool: No more dark nontransparent sensation however given significant stooling on unable to complete evaluation."  · Bowel prep overnight with maroon stooling  · Lower GI bleed secondary to diverticulosis as evidenced by sigmoidoscopy    · Plan  · Continue with low fiber low residue diet  · Continue with Protonix 40 mg once a day p.o.  · Follow-up with primary care provider within 1 week

## 2023-07-07 NOTE — ASSESSMENT & PLAN NOTE
· Patient admitted with acute GI bleed, episode of hypotension in the ER which responded to IV fluids  · Consider resume home Cardizem and metoprolol for now holding Lasix.

## 2023-07-07 NOTE — ASSESSMENT & PLAN NOTE
Wt Readings from Last 3 Encounters:   07/06/23 69.1 kg (152 lb 5.4 oz)   03/09/23 73 kg (160 lb 14.4 oz)   10/06/22 73.5 kg (162 lb)       · Family reports that patient's lower extremity edema is slightly increased over the last 3 months. Patient reports that her urine output has been the same with her Lasix twice daily  · Episode of hypotension in the ER upon admission.   · Plan  · Follow-up with primary care provider within 1 to 2-week  · Continue home meds

## 2023-07-07 NOTE — CASE MANAGEMENT
Case Management Discharge Planning Note    Patient name Peter Mohs  Location ICU 06/ICU 06 MRN [id number]  : 1932 Date 2023       Current Admission Date: 2023  Current Admission Diagnosis:GIB (gastrointestinal bleeding)   Patient Active Problem List    Diagnosis Date Noted   • Stage 3a chronic kidney disease ([address]) 2021   • Iron deficiency 2021   • Contusion of right thigh 2020   • Multiple thyroid nodules 2020   • Dyspnea on exertion 2017   • Osteoporosis 2017   • Paroxysmal atrial fibrillation ([address]) 2017   • Anemia 2017   • Renal atrophy 2017   • Hyperphosphatemia 2017   • Vitamin D deficiency 2017   • Hypokalemia 2017   • Pulmonary hypertension ([address]) 2017   • GIB (gastrointestinal bleeding) 2016   • Presence of permanent cardiac pacemaker    • H/O: hysterectomy    • Benign essential hypertension    • Hyperthyroidism    • Glaucoma    • Chronic diastolic congestive heart failure (HCC)    • SSS (sick sinus syndrome) ([address])    • Depression, recurrent ([address])    • Depression with anxiety 2015   • Fatigue 2014   • Vertigo 2014   • Dyslipidemia 2012   • Psoriasis 2012      LOS (days): 1  Geometric Mean LOS (GMLOS) (days): 3.00  Days to GMLOS:1.7     OBJECTIVE:  Risk of Unplanned Readmission Score: 20.37         Current admission status: Inpatient   Preferred Pharmacy:   [facility], [facility] [place]  [address]  Phone: [phone] Fax: [phone]    [place] [address]  Phone: [phone] Fax: 7 [address] [address]  Phone: [phone] Fax: Jaun Ann, [address] [place] [address] [address] [address]  Phone: [phone] Fax: [phone]    Primary Care Provider: Tevin Moses MD    Primary Insurance: MEDICARE  Secondary Insurance: AARP    DISCHARGE DETAILS:                                                                                                 Additional Comments: PT/OT recommending [facility]. CM reviewed with daughter Maite Reyes. Maite Reyes declining services at this time as she does not feel pt needs it. Has cane, transport chair, and BSC at home. No other needs identified at this time.

## 2023-07-07 NOTE — ASSESSMENT & PLAN NOTE
· Patient admitted with acute GI bleed, episode of hypotension in the ER which responded to IV fluids  Blood Pressure: 133/71  ·   · Acceptable  · Continue home meds

## 2023-07-07 NOTE — ASSESSMENT & PLAN NOTE
· Acute on chronic secondary to iron deficiency anemia and acute blood loss anemia from GI bleed  · 7/6 given 1 unit packed red blood cell  · Hemoglobin stable 10.6-10.7-11.0  · Acute blood loss anemia secondary to lower GI bleed  · Plan  · Continue monitoring every 8 hours.

## 2023-07-07 NOTE — DISCHARGE SUMMARY
[facility]  Discharge- [facility] 4/6/1932, 80 y.o. female MRN: [id number]  Unit/Bed#: ICU 06 Encounter: [id number]  Primary Care Provider: Cristhian Laboy MD   Date and time admitted to hospital: 7/6/2023  5:34 AM    * GIB (gastrointestinal bleeding)  Assessment & Plan  · POA admitted due to episode of GI bleed. Bright red blood per rectum early morning on 07/06. No abdominal pain/nausea/vomiting/change in diet. She has a history of GI bleed about 5 years ago at that time she was on Eliquis, and it was stopped. She is currently on aspirin daily. · 07/06: High-volume CT showing active hemorrhage in the mid sigmoid colon  · GI consulted, in the ER during enema prep she became hypotensive which improved with IV fluids. Noted at that time to have a 3 g drop in hemoglobin, over 2 hours  · 07/06 Status post 1 unit PRBCs. Hemoglobin stable 10.7--11.3--11.0  · 07/06 Status post flexible sigmoidoscopy: " Scattered diverticulosis, moderate severe sigmoid likely etiology of hematochezia and lower GI bleed. Still significant with bright red blood in the stool: No more dark nontransparent sensation however given significant stooling on unable to complete evaluation."  · Bowel prep overnight with maroon stooling  · Lower GI bleed secondary to diverticulosis as evidenced by sigmoidoscopy    · Plan  · Continue with low fiber low residue diet  · Continue with Protonix 40 mg once a day p.o.  · Follow-up with primary care provider within 1 week    Anemia  Assessment & Plan  · Acute on chronic secondary to iron deficiency anemia and acute blood loss anemia from GI bleed  · 7/6 given 1 unit packed red blood cell  · Hemoglobin stable 10.6-10.7-11.0  · Acute blood loss anemia secondary to lower GI bleed  · Plan  · Patient is remained stable no hematochezia after bowel prep  · Patient will continue with PPI 40 mg once a day  · Follow-up will be PCP within 1 to 2 weeks.     Paroxysmal atrial fibrillation [facility])  Assessment & Plan  · Patient not on systemic anticoagulation due to risks and GI bleed history 5 years ago. · 07/06 patient had a hypotensive episode in the setting of acute GI bleed for which resolved with NS fluids and PRBCs. Continue home meds    SSS (sick sinus syndrome) (HCC)  Assessment & Plan  · Status post pacemaker placement    Chronic diastolic congestive heart failure (HCC)  Assessment & Plan  Wt Readings from Last 3 Encounters:   07/06/23 69.1 kg (152 lb 5.4 oz)   03/09/23 73 kg (160 lb 14.4 oz)   10/06/22 73.5 kg (162 lb)       · Family reports that patient's lower extremity edema is slightly increased over the last 3 months. Patient reports that her urine output has been the same with her Lasix twice daily  · Episode of hypotension in the ER upon admission. · Plan  · Follow-up with primary care provider within 1 to 2-week  · Continue home meds      Benign essential hypertension  Assessment & Plan  · Patient admitted with acute GI bleed, episode of hypotension in the ER which responded to IV fluids  Blood Pressure: 133/71  ·   · Acceptable  · Continue home meds    Hyperthyroidism  Assessment & Plan  · Continue home meds  · Follow-up with primary care provider within 1 week. Medical Problems     Resolved Problems  Date Reviewed: 7/7/2023   None         Admission Date:   Admission Orders (From admission, onward)     Ordered        07/06/23 0913  INPATIENT ADMISSION  Once            07/06/23 0811  INPATIENT ADMISSION  Once,   Status:  Canceled                        Admitting Diagnosis: Diverticulosis [K57.90]  Rectal bleeding [K62.5]  Acute lower GI bleeding [K92.2]  BRBPR (bright red blood per rectum) [K62.5]  Pancreatic lesion [K86.9]    HPI: Wilfrido Ott is a 80 y.o. who presents with c/o BRBPR and bloody stool starting this AM PTA. Pt with PMHx of SSS s/p PPM, Afib not on AC due to GIB 5 years ago, hyperthyroidism, HTN, iron def anemia.  In the ED, high vol CT showing active hemorrage in mid sigmoid. GI consulted. During pre procedure enemas, pt developed hypotension with SBP in 50s. BP improved with IVFs, but noted to have a 3 gram drop in hgb since presentation to ED. Pt was given 1 unit PRBC. Admitting to ICU for further care and plan to scope pt at bedside this AM.     Procedures Performed:   Orders Placed This Encounter   Procedures   • Critical Care       Summary of Hospital Course: 35-year-old female with past medical history sick sinus syndrome status post PPM, A-fib not on anticoagulation due to history of GI bleed 5 years ago, hypothyroidism, hypertension, iron deficiency anemia who originally presented on 07/06/2023 due to bright red blood per rectum about a store morning PTA. Upon presentation hemoglobin 13.3 however significantly dropped to 10.6 with associated hypotension SBP in the 50s. Blood pressure improved with IV fluids as well as 1 unit PRBC. CT scan high-volume bleeding scan abdominal pelvis did  showed active hemorrhage in the mid sigmoid colon. Colonic diverticulosis without diverticulitis as well as 2.5 cm cystic lesion in the proximal pancreas for which recommendations to the latter to follow-up with MRI/MRCP with and without contrast every 2 years. she was admitted to ICU for further management evaluation. Gastroenterology was consulted and performed stat flexible sigmoidoscopy for which showed scattered diverticulosis of mother surgery team in the sigmoid colon which likely etiology of hematochezia and lower GI bleed. No acute bleeding found during flexible sigmoidoscopy. Patient was continue on PPIs IV twice daily with serial hemoglobin. Today patient was deemed stable for discharge hemoglobin remained stable 11.0, patient remained had medically stable expected to continue with oral Protonix 40 mg once a day. Patient will follow-up on the outpatient setting with primary care provider within 1 week.     Significant Findings, Care, Treatment and Services Provided:   CT high volume bleeding scan abdomen pelvis   Final Result by Jenny Moise MD (07/06 1256)      Active hemorrhage in the mid sigmoid colon. Colonic diverticulosis without diverticulitis. Slight interval enlargement of a 2.5 cm cystic lesion in the proximal pancreas. For simple cyst(s) between 1.5 to 2.0 cm, recommend followup every 2 years for 2 time. If no change after 4 years, then no more followups. Recommend next followup in 2 years. Preferred imaging modality: abdomen MRI and MRCP with and without IV contrast, or triple phase abdomen CT with IV contrast, or abdomen MRI and MRCP without IV contrast. Considerations related to the patient's age and/or comorbidities may be used to    alter these recommendations. Small bilateral pleural effusions noted. I personally discussed this study with Aramis Barnett on 7/6/2023 7:50 AM.            Workstation performed: [id number]         • Flexible sigmoidoscopy on 07/06/2023 "Scattered diverticulosis of moderate severity in the sigmoid colon, which is the likely etiology of hematochezia and lower GI bleed. The acute bleed appears to have stopped at this time. Significant amount of stool throughout the colon with bright red blood in the distal colon and more darker/old blood in the transverse and descending colon. No active bleeding seen."    Complications: None    Condition at Discharge: good         Discharge instructions/Information to patient and family:   See after visit summary for information provided to patient and family. Provisions for Follow-Up Care:  See after visit summary for information related to follow-up care and any pertinent home health orders. PCP: Chava Chandra MD    Disposition: Home    Planned Readmission: No    Discharge Statement   I spent 20 minutes discharging the patient. This time was spent on the day of discharge.  I had direct contact with the patient on the day of discharge. Additional documentation is required if more than 30 minutes were spent on discharge. Discharge Medications:  See after visit summary for reconciled discharge medications provided to patient and family.

## 2023-07-07 NOTE — ASSESSMENT & PLAN NOTE
· Patient not on systemic anticoagulation due to risks and GI bleed history 5 years ago. · 07/06 patient had a hypotensive episode in the setting of acute GI bleed for which resolved with NS fluids and PRBCs.    ·  Patient hemodynamically stable, okay to resume home Cardizem, metoprolol will hold aspirin for 1 more day  · Telemetry monitoring

## 2023-07-07 NOTE — ASSESSMENT & PLAN NOTE
· Patient not on systemic anticoagulation due to risks and GI bleed history 5 years ago. · 07/06 patient had a hypotensive episode in the setting of acute GI bleed for which resolved with NS fluids and PRBCs.    Continue home meds

## 2023-07-07 NOTE — OCCUPATIONAL THERAPY NOTE
Occupational Therapy Evaluation(time=5342-6155)     Patient Name: Arline Barnes  VZZDI'E Date: 7/7/2023  Problem List  Principal Problem:    GIB (gastrointestinal bleeding)  Active Problems:    Benign essential hypertension    Hyperthyroidism    Chronic diastolic congestive heart failure (HCC)    SSS (sick sinus syndrome) (HCC)    Anemia    Paroxysmal atrial fibrillation [facility])    Past Medical History  Past Medical History:   Diagnosis Date    VAZQUEZ (acute kidney injury) ([address]) 2/17/2017    Anemia     Atrial fibrillation (HCC)     Atrial flutter (HCC)     Depression     Diastolic CHF ([address])     Dyslipidemia     Glaucoma     H/O: hysterectomy     Hypertension     Hyperthyroidism     Impaired glucose tolerance     RESOLVED 2/3/2016    Pacemaker     Psoriasis     S/P cataract surgery     S/P knee replacement     SSS (sick sinus syndrome) (HCC)     SSS (sick sinus syndrome) ([address])      Past Surgical History  Past Surgical History:   Procedure Laterality Date    CARDIAC PACEMAKER PLACEMENT      CARDIAC PACEMAKER PLACEMENT      dual chamber    CATARACT EXTRACTION      ESOPHAGOGASTRODUODENOSCOPY N/A 12/30/2016    Procedure: ESOPHAGOGASTRODUODENOSCOPY (EGD); Surgeon: Marc Pierre MD;  Location: [facility]; Service:     EYE SURGERY      cataract    HYSTERECTOMY      JOINT REPLACEMENT      bilateral     MO COLONOSCOPY FLX DX W/COLLJ SPEC WHEN PFRMD N/A 4/24/2017    Procedure: COLONOSCOPY with polypectomy;  Surgeon: Anil Macias MD;  Location: [facility]; Service: Gastroenterology    REPLACEMENT TOTAL KNEE Bilateral       07/07/23 1015   Note Type   Note type Evaluation   Pain Assessment   Pain Assessment Tool 0-10   Pain Score No Pain   Restrictions/Precautions   Weight Bearing Precautions Per Order No   Other Precautions Multiple lines;Telemetry; Fall Risk;Hard of hearing   Home Living   Type of [address] [place] [address]  One level  (4 kenan with railing)   Bathroom Shower/Tub Tub/shower unit   [employer] Raised Bathroom Equipment Grab bars in [address] [facility]  (transport chair)   Prior Function   Lives With Daughter   Falls in the last 6 months 1 to 4  (1)   Comments PTA pt states independence with her ADLs, transfers, ambulation--with SPC(outside); +falls=1, neg , neg home alone   Lifestyle   Autonomy PTA pt states independence with her ADLs, transfers, ambulation--with SPC(outside); +falls=1, neg , neg home alone   Reciprocal Relationships supportive dtr   Service to Others homemaker   Intrinsic Gratification spending time with family   Subjective   Subjective "I just got this new hearing aides."   ADL   Where Assessed Edge of bed   Eating Assistance 6  Modified independent   Grooming Assistance 6  Modified Independent   UB Bathing Assistance 5  Supervision/Setup   LB Bathing Assistance 3  Moderate Assistance   UB Dressing Assistance 5  Supervision/Setup   LB Dressing Assistance 3  Moderate Assistance   [address]  1  Total Assistance   Bed Mobility   Rolling R 5  Supervision   Additional items Increased time required;Verbal cues   Rolling L 5  Supervision   Additional items Increased time required;Verbal cues   Supine to Sit 5  Supervision   Additional items Increased time required;LE management;Verbal cues   Transfers   Sit to Stand 5  Supervision   Additional items Increased time required;Verbal cues   Stand to Sit 5  Supervision   Additional items Increased time required;Verbal cues   Functional Mobility   Functional Mobility 3  Moderate assistance   Additional Comments x1   Additional items Hand hold assistance   Balance   Static Sitting Good   Dynamic Sitting Fair +   Static Standing Fair   Dynamic Standing Fair -   Activity Tolerance   Activity Tolerance Patient limited by fatigue   Medical Staff Made Aware nsg, P.T.   RUE Assessment   RUE Assessment WFL   RUE Strength   RUE Overall Strength Within Functional Limits - able to perform ADL tasks with strength  (4/5 throughout) LUE Assessment   LUE Assessment WFL   LUE Strength   LUE Overall Strength Within Functional Limits - able to perform ADL tasks with strength  (4/5 throughout)   Hand Function   Gross Motor Coordination Functional   Fine Motor Coordination Functional   Sensation   Light Touch No apparent deficits   Proprioception   Proprioception No apparent deficits   Vision-Basic Assessment   Visual History Macular degeneration   Vision - Complex Assessment   Acuity   (impaired)   Psychosocial   Psychosocial (WDL) WDL   Perception   Inattention/Neglect Appears intact   Cognition   Overall Cognitive Status WFL   Arousal/Participation Alert   Attention Within functional limits   Orientation Level Oriented to person;Oriented to place; Disoriented to time   Memory Decreased short term memory;Decreased recall of precautions   Following Commands Follows one step commands without difficulty   Assessment   Limitation Decreased ADL status; Decreased UE strength;Decreased Safe judgement during ADL;Decreased endurance;Decreased high-level ADLs   Prognosis Good   Assessment Pt is a 81y/o female admitted to the hospital c/o BRBPR and bloody stool; s/p colonoscopy(7/6). Pt noted with GI bleed, anemia, hypotension, and A-fib. Pt with PMH VAZQUEZ, anemia, A-fib, HTN, pacemaker, SSS, s/p pacemaker, b/l TKR. PTA pt states independence with her ADLs, transfers, ambulation--with SPC(outside); +falls=1, neg , neg home alone. During initial eval, pt demonstrated deficits with her functional balance, functional mobility, ADL status, transfer safety, b/l UE strength, and questionable cognition(i.e.orientation). Pt would benefit from continued OT tx for the above deficits. 2-3xwk/1-2wks. Goals   Patient Goals "to get better"   STG Time Frame   (1-7 days)   Short Term Goal #1 Pt will demonstrate improved activity tolerance to good(20-30mins) and standing tolerance to 3-5mins to assist with ADLs.    Short Term Goal #2 Pt will demonstrate proper walker/transfer safety 100% of the time. Short Term Goal  Pt will tolerate continued cognitive/home-safety assessment and appropriate d/c recommendations will be provided. LTG Time Frame   (7-14 days)   Long Term Goal #1 Pt will demonstrate g/g- balance with all functional activities. Long Term Goal #2 Pt will demonstrate mod I with their UE and LE bathing/dresssing. Long Term Goal Pt will independently verbalize 2-3 potential fall risks/transfer safety hazards and their appropriate compensation techniques. Plan   Treatment Interventions ADL retraining;Functional transfer training;UE strengthening/ROM; Endurance training;Cognitive reorientation;Patient/family training;Equipment evaluation/education; Compensatory technique education;Continued evaluation   Goal Expiration Date 07/21/23   OT Treatment Day 0   OT Frequency 2-3x/wk   Recommendation   OT Discharge Recommendation Home with home health rehabilitation   AM-PAC Daily Activity Inpatient   Lower Body Dressing 3   Bathing 3   Toileting 1   Upper Body Dressing 3   Grooming 4   Eating 4   Daily Activity Raw Score 18   Daily Activity Standardized Score (Calc for Raw Score >=11) 38.66   AM-PAC Applied Cognition Inpatient   Following a Speech/Presentation 3   Understanding Ordinary Conversation 3   Taking Medications 3   Remembering Where Things Are Placed or Put Away 3   Remembering List of 4-5 Errands 3   Taking Care of Complicated Tasks 3   Applied Cognition Raw Score 18   Applied Cognition Standardized Score 38.07   Leticia Guajardo

## 2023-07-07 NOTE — PLAN OF CARE
Problem: Potential for Falls  Goal: Patient will remain free of falls  Description: INTERVENTIONS:  - Educate patient/family on patient safety including physical limitations  - Instruct patient to call for assistance with activity   - Consult OT/PT to assist with strengthening/mobility   - Keep Call bell within reach  - Keep bed low and locked with side rails adjusted as appropriate  - Keep care items and personal belongings within reach  - Initiate and maintain comfort rounds  - Make Fall Risk Sign visible to staff  - Offer Toileting every 2 Hours, in advance of need  - Initiate/Maintain alarm  - Obtain necessary fall risk management equipment:   - Apply yellow socks and bracelet for high fall risk patients  - Consider moving patient to room near nurses station  Outcome: Progressing     Problem: MOBILITY - ADULT  Goal: Maintain or return to baseline ADL function  Description: INTERVENTIONS:  -  Assess patient's ability to carry out ADLs; assess patient's baseline for ADL function and identify physical deficits which impact ability to perform ADLs (bathing, care of mouth/teeth, toileting, grooming, dressing, etc.)  - Assess/evaluate cause of self-care deficits   - Assess range of motion  - Assess patient's mobility; develop plan if impaired  - Assess patient's need for assistive devices and provide as appropriate  - Encourage maximum independence but intervene and supervise when necessary  - Involve family in performance of ADLs  - Assess for home care needs following discharge   - Consider OT consult to assist with ADL evaluation and planning for discharge  - Provide patient education as appropriate  Outcome: Progressing  Goal: Maintains/Returns to pre admission functional level  Description: INTERVENTIONS:  - Perform BMAT or MOVE assessment daily.   - Set and communicate daily mobility goal to care team and patient/family/caregiver.    - Collaborate with rehabilitation services on mobility goals if consulted  - Perform Range of Motion 3  times a day. - Reposition patient every 2 hours.   - Dangle patient 3 times a day  - Stand patient 3 times a day  - Ambulate patient 3 times a day  - Out of bed to chair 2 times a day   - Out of bed for meals 3 times a day  - Out of bed for toileting  - Record patient progress and toleration of activity level   Outcome: Progressing     Problem: PAIN - ADULT  Goal: Verbalizes/displays adequate comfort level or baseline comfort level  Description: Interventions:  - Encourage patient to monitor pain and request assistance  - Assess pain using appropriate pain scale  - Administer analgesics based on type and severity of pain and evaluate response  - Implement non-pharmacological measures as appropriate and evaluate response  - Consider cultural and social influences on pain and pain management  - Notify physician/advanced practitioner if interventions unsuccessful or patient reports new pain  Outcome: Progressing     Problem: INFECTION - ADULT  Goal: Absence or prevention of progression during hospitalization  Description: INTERVENTIONS:  - Assess and monitor for signs and symptoms of infection  - Monitor lab/diagnostic results  - Monitor all insertion sites, i.e. indwelling lines, tubes, and drains  - Monitor endotracheal if appropriate and nasal secretions for changes in amount and color  - [place] appropriate cooling/warming therapies per order  - Administer medications as ordered  - Instruct and encourage patient and family to use good hand hygiene technique  - Identify and instruct in appropriate isolation precautions for identified infection/condition  Outcome: Progressing  Goal: Absence of fever/infection during neutropenic period  Description: INTERVENTIONS:  - Monitor WBC    Outcome: Progressing     Problem: SAFETY ADULT  Goal: Patient will remain free of falls  Description: INTERVENTIONS:  - Educate patient/family on patient safety including physical limitations  - Instruct patient to call for assistance with activity   - Consult OT/PT to assist with strengthening/mobility   - Keep Call bell within reach  - Keep bed low and locked with side rails adjusted as appropriate  - Keep care items and personal belongings within reach  - Initiate and maintain comfort rounds  - Make Fall Risk Sign visible to staff  - Offer Toileting every 2 Hours, in advance of need  - Initiate/Maintain alarm  - Obtain necessary fall risk management equipment:   - Apply yellow socks and bracelet for high fall risk patients  - Consider moving patient to room near nurses station  Outcome: Progressing  Goal: Maintain or return to baseline ADL function  Description: INTERVENTIONS:  -  Assess patient's ability to carry out ADLs; assess patient's baseline for ADL function and identify physical deficits which impact ability to perform ADLs (bathing, care of mouth/teeth, toileting, grooming, dressing, etc.)  - Assess/evaluate cause of self-care deficits   - Assess range of motion  - Assess patient's mobility; develop plan if impaired  - Assess patient's need for assistive devices and provide as appropriate  - Encourage maximum independence but intervene and supervise when necessary  - Involve family in performance of ADLs  - Assess for home care needs following discharge   - Consider OT consult to assist with ADL evaluation and planning for discharge  - Provide patient education as appropriate  Outcome: Progressing  Goal: Maintains/Returns to pre admission functional level  Description: INTERVENTIONS:  - Perform BMAT or MOVE assessment daily.   - Set and communicate daily mobility goal to care team and patient/family/caregiver. - Collaborate with rehabilitation services on mobility goals if consulted  - Perform Range of Motion 3  times a day. - Reposition patient every 2 hours.   - Dangle patient 3 times a day  - Stand patient 3 times a day  - Ambulate patient 3 times a day  - Out of bed to chair 2 times a day   - Out of bed for meals 3 times a day  - Out of bed for toileting  - Record patient progress and toleration of activity level   Outcome: Progressing     Problem: DISCHARGE PLANNING  Goal: Discharge to home or other facility with appropriate resources  Description: INTERVENTIONS:  - Identify barriers to discharge w/patient and caregiver  - Arrange for needed discharge resources and transportation as appropriate  - Identify discharge learning needs (meds, wound care, etc.)  - Arrange for interpretive services to assist at discharge as needed  - Refer to Case Management Department for coordinating discharge planning if the patient needs post-hospital services based on physician/advanced practitioner order or complex needs related to functional status, cognitive ability, or social support system  Outcome: Progressing     Problem: Knowledge Deficit  Goal: Patient/family/caregiver demonstrates understanding of disease process, treatment plan, medications, and discharge instructions  Description: Complete learning assessment and assess knowledge base. Interventions:  - Provide teaching at level of understanding  - Provide teaching via preferred learning methods  Outcome: Progressing     Problem: Nutrition/Hydration-ADULT  Goal: Nutrient/Hydration intake appropriate for improving, restoring or maintaining nutritional needs  Description: Monitor and assess patient's nutrition/hydration status for malnutrition. Collaborate with interdisciplinary team and initiate plan and interventions as ordered. Monitor patient's weight and dietary intake as ordered or per policy. Utilize nutrition screening tool and intervene as necessary. Determine patient's food preferences and provide high-protein, high-caloric foods as appropriate.      INTERVENTIONS:  - Monitor oral intake, urinary output, labs, and treatment plans  - Assess nutrition and hydration status and recommend course of action  - Evaluate amount of meals eaten  - Assist patient with eating if necessary   - Allow adequate time for meals  - Recommend/ encourage appropriate diets, oral nutritional supplements, and vitamin/mineral supplements  - Order, calculate, and assess calorie counts as needed  - Recommend, monitor, and adjust tube feedings and TPN/PPN based on assessed needs  - Assess need for intravenous fluids  - Provide specific nutrition/hydration education as appropriate  - Include patient/family/caregiver in decisions related to nutrition  Outcome: Progressing

## 2023-07-07 NOTE — QUICK NOTE
Patient's hemodynamics appear to have significantly improved with complete resolution of hematochezia. Hemoglobin improved today without need for blood transfusion. No indication for further endoscopic evaluation at this time as it appears her diverticular bleed has stopped. GI will sign off. Please call with questions or concerns.

## 2023-07-07 NOTE — PROGRESS NOTES
[facility]  Progress Note  Name: Cornelio Barros  MRN: [id number]  Unit/Bed#: ICU 06 I Date of Admission: 7/6/2023   Date of Service: 7/7/2023 I Hospital Day: 1    Assessment/Plan   * GIB (gastrointestinal bleeding)  Assessment & Plan  · POA admitted due to episode of GI bleed. Bright red blood per rectum early morning on 07/06. No abdominal pain/nausea/vomiting/change in diet. She has a history of GI bleed about 5 years ago at that time she was on Eliquis, and it was stopped. She is currently on aspirin daily. · 07/06: High-volume CT showing active hemorrhage in the mid sigmoid colon  · GI consulted, in the ER during enema prep she became hypotensive which improved with IV fluids. Noted at that time to have a 3 g drop in hemoglobin, over 2 hours  · 07/06 Status post 1 unit PRBCs. Hemoglobin stable 10.7--11.3--11.0  · 07/06 Status post flexible sigmoidoscopy: " Scattered diverticulosis, moderate severe sigmoid likely etiology of hematochezia and lower GI bleed. Still significant with bright red blood in the stool: No more dark nontransparent sensation however given significant stooling on unable to complete evaluation."  · Bowel prep overnight with maroon stooling  · Lower GI bleed secondary to diverticulosis as evidenced by sigmoidoscopy    · Plan  · Although patient was kept n.p.o. and bowel prep overnight as per GI patient has been hemodynamically stable with stable hemoglobin no plan for scoping today. · Advance diet to clear liquids  · H&H every 12  · Monitor output  · We will hold aspirin and DVT prophylaxis today if stable will resume tomorrow.   · If recurrence will reconsult GI versus IR    Anemia  Assessment & Plan  · Acute on chronic secondary to iron deficiency anemia and acute blood loss anemia from GI bleed  · 7/6 given 1 unit packed red blood cell  · Hemoglobin stable 10.6-10.7-11.0  · Acute blood loss anemia secondary to lower GI bleed  · Plan  · Continue monitoring every 12 hours    Paroxysmal atrial fibrillation (HCC)  Assessment & Plan  · Patient not on systemic anticoagulation due to risks and GI bleed history 5 years ago. · 07/06 patient had a hypotensive episode in the setting of acute GI bleed for which resolved with NS fluids and PRBCs. ·  Patient hemodynamically stable, okay to resume home Cardizem, metoprolol will hold aspirin for 1 more day  · Telemetry monitoring       SSS (sick sinus syndrome) ([facility])  Assessment & Plan  · Status post pacemaker placement    Chronic diastolic congestive heart failure ([facility])  Assessment & Plan  Wt Readings from Last 3 Encounters:   07/06/23 69.1 kg (152 lb 5.4 oz)   03/09/23 73 kg (160 lb 14.4 oz)   10/06/22 73.5 kg (162 lb)       · Family reports that patient's lower extremity edema is slightly increased over the last 3 months. Patient reports that her urine output has been the same with her Lasix twice daily  · Episode of hypotension in the ER upon admission. · Close monitoring of I/os status, daily weights  · We will resume home Cardizem and Lopressor most likely will hold Lasix for today  · If continues hemodynamically stable will resume home Lasix tomorrow      Benign essential hypertension  Assessment & Plan  · Patient admitted with acute GI bleed, episode of hypotension in the ER which responded to IV fluids  · Consider resume home Cardizem and metoprolol for now holding Lasix. Hyperthyroidism  Assessment & Plan  · Holding home Tapazole due to n.p.o.           ICU Core Measures     A: Assess, Prevent, and Manage Pain · Has pain been assessed? Yes  · Need for changes to pain regimen? No   B: Both SAT/SAT  · N/A   C: Choice of Sedation · RASS Goal: 0 Alert and Calm  · Need for changes to sedation or analgesia regimen? NA   D: Delirium · CAM-ICU: Negative   E: Early Mobility  · Plan for early mobility? Yes   F: Family Engagement · Plan for family engagement today?  Yes         Prophylaxis:  VTE Contraindicated secondary to: GI Bleed   Stress Ulcer  covered bypantoprazole (PROTONIX) injection 40 mg [[id number]]       Subjective   HPI/24hr events: No acute overnight events reported by nursing team.  Patient seen at bedside denies any acute complaint. Patient currently getting bowel prep, continues with maroon-colored stooling. Denies any abdominal pain as well as no urinary symptoms. Review of Systems   Constitutional: Negative for chills and fever. HENT: Negative for ear pain and sore throat. Eyes: Negative for pain and visual disturbance. Respiratory: Negative for cough and shortness of breath. Cardiovascular: Negative for chest pain and palpitations. Gastrointestinal: Negative for abdominal pain and vomiting. Genitourinary: Negative for dysuria and hematuria. Musculoskeletal: Negative for arthralgias and back pain. Skin: Negative for color change and rash. Neurological: Negative for seizures and syncope. All other systems reviewed and are negative. Objective                            Vitals I/O      Most Recent Min/Max in 24hrs   Temp 97.5 °F (36.4 °C) Temp  Min: 96.8 °F (36 °C)  Max: 97.7 °F (36.5 °C)   Pulse 64 Pulse  Min: 60  Max: 70   Resp 20 Resp  Min: 12  Max: 48   /68 BP  Min: 97/42  Max: 164/70   O2 Sat 99 % SpO2  Min: 91 %  Max: 100 %      Intake/Output Summary (Last 24 hours) at 7/7/2023 0855  Last data filed at 7/7/2023 0301  Gross per 24 hour   Intake 750 ml   Output 1250 ml   Net -500 ml         Diet Clear Liquid     Invasive Monitoring Physical exam    Physical Exam  Vitals and nursing note reviewed. Constitutional:       General: She is not in acute distress. Appearance: She is well-developed. She is not ill-appearing. HENT:      Head: Normocephalic and atraumatic. Right Ear: External ear normal.      Left Ear: External ear normal.      Ears:      Comments: Hard of hearing.      Nose: Nose normal.      Mouth/Throat:      Mouth: Mucous membranes are moist. Eyes:      General: No scleral icterus. Extraocular Movements: Extraocular movements intact. Conjunctiva/sclera: Conjunctivae normal.      Pupils: Pupils are equal, round, and reactive to light. Cardiovascular:      Rate and Rhythm: Normal rate. Rhythm irregular. Pulses: Normal pulses. Heart sounds: Normal heart sounds. No murmur heard. Pulmonary:      Effort: Pulmonary effort is normal. No respiratory distress. Breath sounds: Normal breath sounds. Abdominal:      General: Bowel sounds are normal. There is no distension. Palpations: Abdomen is soft. Tenderness: There is no abdominal tenderness. Musculoskeletal:         General: No swelling. Cervical back: Normal range of motion and neck supple. Right lower leg: Edema present. Left lower leg: Edema present. Skin:     General: Skin is warm and dry. Capillary Refill: Capillary refill takes less than 2 seconds. Neurological:      Mental Status: She is alert. Psychiatric:         Mood and Affect: Mood normal.          Diagnostic Studies      EKG: A flutter with ventricular paced rhythm. Heart rate 60 prolonged QTc. Imaging:  I have personally reviewed pertinent reports.        Medications:  Scheduled PRN   buPROPion, 300 mg, Daily  chlorhexidine, 15 mL, Q12H [address]  cholecalciferol, 1,000 Units, Daily  fish oil, 1,000 mg, BID  magnesium sulfate, 2 g, Once  methimazole, 5 mg, Once per day on Mon Wed Fri  pantoprazole, 40 mg, Q12H [address]  potassium chloride, 40 mEq, Once  potassium chloride, 20 mEq, Once   Followed by  potassium chloride, 20 mEq, Once  pravastatin, 40 mg, Daily With Dinner  sertraline, 100 mg, Daily  zinc sulfate, 220 mg, Daily          Continuous          Labs:    CBC    Recent Labs     07/06/23  0554 07/06/23  0805 07/06/23  1236 07/06/23  2030 07/07/23  0502   WBC 8.89  --   --   --  8.88   HGB 13.3 10.6*   < > 11.3* 11.0*   HCT 41.8 33.9*  --   --  34.1*   *  --   --   --  104*    < > = values in this interval not displayed. BMP    Recent Labs     07/06/23  0554 07/07/23  0502   SODIUM 142 141   K 3.6 3.1*    108   CO2 32 28   AGAP 6 5   BUN 17 12   CREATININE 1.30 0.86   CALCIUM 8.7 7.9*       Coags    Recent Labs     07/06/23  0554   INR 1.47*   PTT 36        Additional Electrolytes  Recent Labs     07/07/23  0502   MG 1.8*   PHOS 2.8   CAIONIZED 1.07*          Blood Gas    No recent results  No recent results LFTs  Recent Labs     07/06/23  0554 07/07/23  0502   ALT 23 16   AST 33 21   ALKPHOS 72 60   ALB 3.7 3.0*   TBILI 0.93 0.89       Infectious  No recent results  Glucose  Recent Labs     07/06/23  0554 07/07/23  0502   GLUC 114 97               Critical Care Time Delivered: Upon my evaluation, this patient had a high probability of imminent or life-threatening deterioration due to Lower GI bleed, which required my direct attention, intervention, and personal management. I have personally provided 20 minutes of critical care time, exclusive of procedures, teaching, family meetings, and any prior time recorded by providers other than myself.      Portia Hutchinson MD

## 2023-07-07 NOTE — NURSING NOTE
Pt left by wheelchair with daughter without incident. Pt's daughter states she has all belongings; will  new prescription at chosen pharmacy.

## 2023-07-07 NOTE — ASSESSMENT & PLAN NOTE
· Acute on chronic secondary to iron deficiency anemia and acute blood loss anemia from GI bleed  · 7/6 given 1 unit packed red blood cell  · Hemoglobin stable 10.6-10.7-11.0  · Acute blood loss anemia secondary to lower GI bleed  · Plan  · Patient is remained stable no hematochezia after bowel prep  · Patient will continue with PPI 40 mg once a day  · Follow-up will be PCP within 1 to 2 weeks.

## 2023-07-07 NOTE — ASSESSMENT & PLAN NOTE
· POA admitted due to episode of GI bleed. Bright red blood per rectum early morning on 07/06. No abdominal pain/nausea/vomiting/change in diet. She has a history of GI bleed about 5 years ago at that time she was on Eliquis, and it was stopped. She is currently on aspirin daily. · 07/06: High-volume CT showing active hemorrhage in the mid sigmoid colon  · GI consulted, in the ER during enema prep she became hypotensive which improved with IV fluids. Noted at that time to have a 3 g drop in hemoglobin, over 2 hours  · 07/06 Status post 1 unit PRBCs. Hemoglobin stable 10.7--11.3--11.0  · 07/06 Status post flexible sigmoidoscopy: " Scattered diverticulosis, moderate severe sigmoid likely etiology of hematochezia and lower GI bleed. Still significant with bright red blood in the stool: No more dark nontransparent sensation however given significant stooling on unable to complete evaluation."  · Bowel program with maroon stooling  · Lower GI bleed secondary to diverticulosis as evidenced by sigmoidoscopy    · Plan  · N.p.o.  · Possibly colonoscopy today  · GI on board  Continue monitoring every 8 hours in the setting of maroon color stooling on bowel prep.

## 2023-07-07 NOTE — ASSESSMENT & PLAN NOTE
· Acute on chronic secondary to iron deficiency anemia and acute blood loss anemia from GI bleed  · 7/6 given 1 unit packed red blood cell  · Hemoglobin stable 10.6-10.7-11.0  · Acute blood loss anemia secondary to lower GI bleed  · Plan  · Continue monitoring every 12 hours

## 2023-07-07 NOTE — PLAN OF CARE
Problem: OCCUPATIONAL THERAPY ADULT  Goal: Performs self-care activities at highest level of function for planned discharge setting. See evaluation for individualized goals. Description: Treatment Interventions: ADL retraining, Functional transfer training, UE strengthening/ROM, Endurance training, Cognitive reorientation, Patient/family training, Equipment evaluation/education, Compensatory technique education, Continued evaluation          See flowsheet documentation for full assessment, interventions and recommendations. Note: Limitation: Decreased ADL status, Decreased UE strength, Decreased Safe judgement during ADL, Decreased endurance, Decreased high-level ADLs  Prognosis: Good  Assessment: Pt is a 81y/o female admitted to the hospital c/o BRBPR and bloody stool; s/p colonoscopy(7/6). Pt noted with GI bleed, anemia, hypotension, and A-fib. Pt with PMH VAZQUEZ, anemia, A-fib, HTN, pacemaker, SSS, s/p pacemaker, b/l TKR. PTA pt states independence with her ADLs, transfers, ambulation--with SPC(outside); +falls=1, neg , neg home alone. During initial eval, pt demonstrated deficits with her functional balance, functional mobility, ADL status, transfer safety, b/l UE strength, and questionable cognition(i.e.orientation). Pt would benefit from continued OT tx for the above deficits. 2-3xwk/1-2wks.      OT Discharge Recommendation: Home with home health rehabilitation

## 2023-07-07 NOTE — PLAN OF CARE
Problem: PHYSICAL THERAPY ADULT  Goal: Performs mobility at highest level of function for planned discharge setting. See evaluation for individualized goals. Description: Treatment/Interventions: Functional transfer training, LE strengthening/ROM, Elevations, Therapeutic exercise, Cognitive reorientation, Patient/family training, Equipment eval/education, Bed mobility, Gait training, Spoke to nursing, OT, Family          See flowsheet documentation for full assessment, interventions and recommendations. Note: Prognosis: Good  Problem List: Impaired balance, Decreased mobility, Decreased cognition, Impaired hearing  Assessment: Chantell Linder is a 80 y.o. female admitted to [address] [place] [address] on 7/6/2023 for GIB (gastrointestinal bleeding). PT was consulted and pt was seen on 7/7/2023 for mobility assessment and d/c planning. Pt presents w high fall risk, multiple lines. At baseline is indep for ADLs and ambulation without an AD in the home. Pt is currently functioning at a supervision assistance x1 level for bed mobility, S-min A for transfers and min A for steps bed>chair. Require HHA for OOB activity dt recent immobilization/ acute unsteadiness. Limited ambulation secondary to multiple lines. Vitals stable during session. Pt will benefit from continued skilled IP PT to address the above mentioned impairments  in order to maximize recovery and increase functional independence when completing mobility and ADLs. At this time PT recommendations for d/c are home; may benefit from HHPT to facilitate recovery. Dtr present during session and deny concerns w pt dc home at current LOF. Barriers to Discharge: None     PT Discharge Recommendation: (S) Home with home health rehabilitation (pt declining services at this time)    See flowsheet documentation for full assessment.

## 2023-07-07 NOTE — ASSESSMENT & PLAN NOTE
· POA admitted due to episode of GI bleed. Bright red blood per rectum early morning on 07/06. No abdominal pain/nausea/vomiting/change in diet. She has a history of GI bleed about 5 years ago at that time she was on Eliquis, and it was stopped. She is currently on aspirin daily. · 07/06: High-volume CT showing active hemorrhage in the mid sigmoid colon  · GI consulted, in the ER during enema prep she became hypotensive which improved with IV fluids. Noted at that time to have a 3 g drop in hemoglobin, over 2 hours  · 07/06 Status post 1 unit PRBCs. Hemoglobin stable 10.7--11.3--11.0  · 07/06 Status post flexible sigmoidoscopy: " Scattered diverticulosis, moderate severe sigmoid likely etiology of hematochezia and lower GI bleed. Still significant with bright red blood in the stool: No more dark nontransparent sensation however given significant stooling on unable to complete evaluation."  · Bowel prep overnight with maroon stooling  · Lower GI bleed secondary to diverticulosis as evidenced by sigmoidoscopy    · Plan  · Although patient was kept n.p.o. and bowel prep overnight as per GI patient has been hemodynamically stable with stable hemoglobin no plan for scoping today. · Advance diet to clear liquids  · H&H every 12  · Monitor output  · We will hold aspirin and DVT prophylaxis today if stable will resume tomorrow.   · If recurrence will reconsult GI versus IR

## 2023-07-10 ENCOUNTER — OFFICE VISIT (OUTPATIENT)
Dept: INTERNAL MEDICINE CLINIC | Facility: CLINIC | Age: 88
End: 2023-07-10
Payer: MEDICARE

## 2023-07-10 ENCOUNTER — TRANSITIONAL CARE MANAGEMENT (OUTPATIENT)
Dept: INTERNAL MEDICINE CLINIC | Facility: CLINIC | Age: 88
End: 2023-07-10

## 2023-07-10 VITALS
TEMPERATURE: 97 F | HEART RATE: 60 BPM | OXYGEN SATURATION: 98 % | DIASTOLIC BLOOD PRESSURE: 68 MMHG | BODY MASS INDEX: 24.33 KG/M2 | WEIGHT: 155 LBS | HEIGHT: 67 IN | SYSTOLIC BLOOD PRESSURE: 116 MMHG

## 2023-07-10 DIAGNOSIS — E61.1 IRON DEFICIENCY: Chronic | ICD-10-CM

## 2023-07-10 DIAGNOSIS — K92.1 GASTROINTESTINAL HEMORRHAGE WITH MELENA: Primary | ICD-10-CM

## 2023-07-10 DIAGNOSIS — K57.91 GASTROINTESTINAL HEMORRHAGE ASSOCIATED WITH INTESTINAL DIVERTICULOSIS: ICD-10-CM

## 2023-07-10 DIAGNOSIS — E55.9 VITAMIN D DEFICIENCY: ICD-10-CM

## 2023-07-10 DIAGNOSIS — I50.32 CHRONIC DIASTOLIC CONGESTIVE HEART FAILURE (HCC): ICD-10-CM

## 2023-07-10 DIAGNOSIS — I48.0 PAROXYSMAL ATRIAL FIBRILLATION (HCC): ICD-10-CM

## 2023-07-10 DIAGNOSIS — D69.6 PLATELETS DECREASED (HCC): ICD-10-CM

## 2023-07-10 DIAGNOSIS — I10 BENIGN ESSENTIAL HYPERTENSION: ICD-10-CM

## 2023-07-10 DIAGNOSIS — D50.0 IRON DEFICIENCY ANEMIA DUE TO CHRONIC BLOOD LOSS: ICD-10-CM

## 2023-07-10 PROCEDURE — 99496 TRANSJ CARE MGMT HIGH F2F 7D: CPT | Performed by: FAMILY MEDICINE

## 2023-07-10 RX ORDER — PANTOPRAZOLE SODIUM 40 MG/1
40 TABLET, DELAYED RELEASE ORAL DAILY
Qty: 90 TABLET | Refills: 1 | Status: SHIPPED | OUTPATIENT
Start: 2023-07-10

## 2023-07-11 ENCOUNTER — APPOINTMENT (OUTPATIENT)
Dept: LAB | Facility: CLINIC | Age: 88
End: 2023-07-11
Payer: MEDICARE

## 2023-07-11 DIAGNOSIS — K92.1 GASTROINTESTINAL HEMORRHAGE WITH MELENA: ICD-10-CM

## 2023-07-11 DIAGNOSIS — E61.1 IRON DEFICIENCY: ICD-10-CM

## 2023-07-11 DIAGNOSIS — I10 BENIGN ESSENTIAL HYPERTENSION: ICD-10-CM

## 2023-07-11 DIAGNOSIS — I50.32 CHRONIC DIASTOLIC CONGESTIVE HEART FAILURE (HCC): ICD-10-CM

## 2023-07-11 DIAGNOSIS — D50.0 IRON DEFICIENCY ANEMIA DUE TO CHRONIC BLOOD LOSS: ICD-10-CM

## 2023-07-11 LAB
ALBUMIN SERPL BCP-MCNC: 3.2 G/DL (ref 3.5–5)
ALP SERPL-CCNC: 82 U/L (ref 46–116)
ALT SERPL W P-5'-P-CCNC: 29 U/L (ref 12–78)
ANION GAP SERPL CALCULATED.3IONS-SCNC: 7 MMOL/L
AST SERPL W P-5'-P-CCNC: 24 U/L (ref 5–45)
BASOPHILS # BLD AUTO: 0.06 THOUSANDS/ÂΜL (ref 0–0.1)
BASOPHILS NFR BLD AUTO: 1 % (ref 0–1)
BILIRUB SERPL-MCNC: 0.8 MG/DL (ref 0.2–1)
BUN SERPL-MCNC: 10 MG/DL (ref 5–25)
CALCIUM ALBUM COR SERPL-MCNC: 9.3 MG/DL (ref 8.3–10.1)
CALCIUM SERPL-MCNC: 8.7 MG/DL (ref 8.3–10.1)
CHLORIDE SERPL-SCNC: 109 MMOL/L (ref 96–108)
CO2 SERPL-SCNC: 27 MMOL/L (ref 21–32)
CREAT SERPL-MCNC: 1.4 MG/DL (ref 0.6–1.3)
EOSINOPHIL # BLD AUTO: 0.37 THOUSAND/ÂΜL (ref 0–0.61)
EOSINOPHIL NFR BLD AUTO: 4 % (ref 0–6)
ERYTHROCYTE [DISTWIDTH] IN BLOOD BY AUTOMATED COUNT: 16.9 % (ref 11.6–15.1)
FERRITIN SERPL-MCNC: 85 NG/ML (ref 11–307)
GFR SERPL CREATININE-BSD FRML MDRD: 32 ML/MIN/1.73SQ M
GLUCOSE SERPL-MCNC: 115 MG/DL (ref 65–140)
HCT VFR BLD AUTO: 34.2 % (ref 34.8–46.1)
HGB BLD-MCNC: 10.9 G/DL (ref 11.5–15.4)
IMM GRANULOCYTES # BLD AUTO: 0.05 THOUSAND/UL (ref 0–0.2)
IMM GRANULOCYTES NFR BLD AUTO: 1 % (ref 0–2)
IRON SATN MFR SERPL: 26 % (ref 15–50)
IRON SERPL-MCNC: 73 UG/DL (ref 50–170)
LYMPHOCYTES # BLD AUTO: 2.2 THOUSANDS/ÂΜL (ref 0.6–4.47)
LYMPHOCYTES NFR BLD AUTO: 23 % (ref 14–44)
MCH RBC QN AUTO: 32.4 PG (ref 26.8–34.3)
MCHC RBC AUTO-ENTMCNC: 31.9 G/DL (ref 31.4–37.4)
MCV RBC AUTO: 102 FL (ref 82–98)
MONOCYTES # BLD AUTO: 0.74 THOUSAND/ÂΜL (ref 0.17–1.22)
MONOCYTES NFR BLD AUTO: 8 % (ref 4–12)
NEUTROPHILS # BLD AUTO: 6.11 THOUSANDS/ÂΜL (ref 1.85–7.62)
NEUTS SEG NFR BLD AUTO: 63 % (ref 43–75)
NRBC BLD AUTO-RTO: 0 /100 WBCS
PLATELET # BLD AUTO: 169 THOUSANDS/UL (ref 149–390)
PMV BLD AUTO: 9.7 FL (ref 8.9–12.7)
POTASSIUM SERPL-SCNC: 3.7 MMOL/L (ref 3.5–5.3)
PROT SERPL-MCNC: 6.1 G/DL (ref 6.4–8.4)
RBC # BLD AUTO: 3.36 MILLION/UL (ref 3.81–5.12)
SODIUM SERPL-SCNC: 143 MMOL/L (ref 135–147)
TIBC SERPL-MCNC: 281 UG/DL (ref 250–450)
WBC # BLD AUTO: 9.53 THOUSAND/UL (ref 4.31–10.16)

## 2023-07-11 PROCEDURE — 80053 COMPREHEN METABOLIC PANEL: CPT

## 2023-07-11 PROCEDURE — 36415 COLL VENOUS BLD VENIPUNCTURE: CPT

## 2023-07-11 PROCEDURE — 82728 ASSAY OF FERRITIN: CPT

## 2023-07-11 PROCEDURE — 83550 IRON BINDING TEST: CPT

## 2023-07-11 PROCEDURE — 85025 COMPLETE CBC W/AUTO DIFF WBC: CPT

## 2023-07-11 PROCEDURE — 83540 ASSAY OF IRON: CPT

## 2023-07-11 NOTE — PROGRESS NOTES
Assessment/Plan:     No problem-specific Assessment & Plan notes found for this encounter. Diagnoses and all orders for this visit:    Gastrointestinal hemorrhage with melena  -     CBC and differential; Future  -     Comprehensive metabolic panel; Future    Gastrointestinal hemorrhage associated with intestinal diverticulosis  -     pantoprazole (PROTONIX) 40 mg tablet; Take 1 tablet (40 mg total) by mouth daily    Iron deficiency anemia due to chronic blood loss  -     CBC and differential; Future  -     Comprehensive metabolic panel; Future    Benign essential hypertension  -     Comprehensive metabolic panel; Future    Chronic diastolic congestive heart failure (HCC)  -     Comprehensive metabolic panel; Future    Paroxysmal atrial fibrillation (HCC)    Iron deficiency  -     CBC and differential; Future  -     Iron Panel (Includes Ferritin, Iron Sat%, Iron, and TIBC); Future    Vitamin D deficiency    Platelets decreased ([address])    Other orders  -     vitamin E 100 UNIT capsule; Take 100 Units by mouth daily  -     beta carotene 30 MG capsule; Take 30 mg by mouth daily     Orders and recommendations as noted above. Recent hospital records reviewed with her and her daughter. Likely cause of the GI bleeding related to diverticular bleeding. Watch for any recurrent bleeding. Continue with the iron supplementation 3 days a week. We will recheck a CBC as well as iron panel. Continue with the Protonix. Elevated INR during the hospitalization discussed. Daughter plans on starting vitamins with vitamin K in it. Discussed with them that this seems long-term. Blood pressure currently controlled. Continue current medications. Watch for any orthostatic symptoms. Stay adequately hydrated. We will continue to follow platelet count since this was slightly low. Watch for any increased shortness of breath, increased peripheral edema, or sudden increases in weight. Watch for any chest pain or palpitations. 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 We will have her follow-up in about 3 to 4 months or sooner if needed depending on her status and upcoming laboratory testing. Subjective:     Patient ID: Daniella Moore is a 80 y.o. female. She presents for follow-up after recent hospitalization. Had been in her normal state of health and had awoken with need to have a bowel movement. Blood was in the bowel movement in large amounts. Was passing clots. She did awaken her daughter and there was blood over the patient's legs as well as the toilet. Had a subsequent bowel movement that was blood. Daughter then took her to the emergency room. Hemoglobin initially was lower than her baseline but on recheck it had dropped approximately 3 g. She continued with bloody bowel movements and passage of clots. High-volume CT scan showed active hemorrhage in the mid ethmoid colon. GI was consulted. Enema prep was attempted but she became hypotensive with this but this improved with IV fluids. She did receive 1 unit of PRBCs with stabilization of the hemoglobin. Flexible sigmoidoscopy was completed with blood in the sigmoid area and severe diverticulosis. They felt that this was likely the cause of her GI bleed. Full colonoscopy was considered but since the bleeding had subsequently stopped, this was not completed. Hemoglobin as well as vital signs had been stable. She was then able to be discharged home. Since returning home she has been doing generally well. Has not had a bowel movement since being discharged. Appetite is returning to normal.  Denies any abdominal pain. More tired. Tolerating her medications without difficulty. Review of Systems   Constitutional: Positive for activity change. Negative for appetite change, chills and fever. HENT: Negative for congestion and rhinorrhea. Eyes: Negative for visual disturbance. Respiratory: Negative for cough, chest tightness and shortness of breath. Cardiovascular: Negative for chest pain and palpitations. Gastrointestinal: Negative for abdominal pain, diarrhea, nausea and vomiting. As per HPI   Endocrine: Negative for polydipsia, polyphagia and polyuria. Genitourinary: Negative for dysuria, frequency and urgency. Musculoskeletal: Negative for gait problem. Skin: Negative for color change. Neurological: Negative for dizziness and headaches. Hematological: Does not bruise/bleed easily. Psychiatric/Behavioral: Positive for decreased concentration. Negative for confusion and sleep disturbance. The patient is not nervous/anxious. The following portions of the patient's history were reviewed and updated as appropriate:   She  has a past medical history of VAZQUEZ (acute kidney injury) ([address]) (2/17/2017), Anemia, Atrial fibrillation ([address]), Atrial flutter ([address]), Depression, Diastolic CHF ([address]), Dyslipidemia, Glaucoma, H/O: hysterectomy, Hypertension, Hyperthyroidism, Impaired glucose tolerance, Pacemaker, Psoriasis, S/P cataract surgery, S/P knee replacement, SSS (sick sinus syndrome) ([address]), and SSS (sick sinus syndrome) ([address]).   She   Patient Active Problem List    Diagnosis Date Noted   • Platelets decreased ([address]) 07/10/2023   • Stage 3a chronic kidney disease ([address]) 12/01/2021   • Iron deficiency 09/04/2021   • Contusion of right thigh 04/29/2020   • Multiple thyroid nodules 04/29/2020   • Dyspnea on exertion 09/20/2017   • Osteoporosis 03/09/2017   • Paroxysmal atrial fibrillation ([address]) 02/22/2017   • Anemia 02/18/2017   • Renal atrophy 02/18/2017   • Hyperphosphatemia 02/18/2017   • Vitamin D deficiency 02/18/2017   • Hypokalemia 02/17/2017   • Pulmonary hypertension ([address]) 02/17/2017   • GIB (gastrointestinal bleeding) 12/29/2016   • Presence of permanent cardiac pacemaker    • H/O: hysterectomy    • Benign essential hypertension    • Hyperthyroidism    • Glaucoma    • Chronic diastolic congestive heart failure (HCC)    • SSS (sick sinus syndrome) ([address])    • Depression, recurrent ([address])    • Depression with anxiety 08/12/2015   • Fatigue 11/18/2014   • Vertigo 11/18/2014   • Dyslipidemia 09/12/2012   • Psoriasis 09/12/2012     She  has a past surgical history that includes Cardiac pacemaker placement; Eye surgery; Hysterectomy; Cardiac pacemaker placement; Joint replacement; Esophagogastroduodenoscopy (N/A, 12/30/2016); Cataract extraction; pr colonoscopy flx dx w/collj spec when pfrmd (N/A, 4/24/2017); and Replacement total knee (Bilateral). Her family history includes Alcohol abuse in her brother and father; Diabetes in her father. She  reports that she has quit smoking. She has never used smokeless tobacco. She reports that she does not drink alcohol and does not use drugs.   Current Outpatient Medications   Medication Sig Dispense Refill   • ALFALFA PO Take 3 tablets by mouth       • aspirin (ECOTRIN LOW STRENGTH) 81 mg EC tablet Take 81 mg by mouth daily     • B Complex Vitamins (B COMPLEX 1 PO) Take by mouth     • beta carotene 30 MG capsule Take 30 mg by mouth daily     • buPROPion (WELLBUTRIN XL) 300 mg 24 hr tablet TAKE 1 TABLET EVERY MORNING 90 tablet 1   • cholecalciferol (VITAMIN D3) 1,000 units tablet Take 1 tablet by mouth daily 30 tablet 0   • diltiazem (CARDIZEM CD) 240 mg 24 hr capsule TAKE 1 CAPSULE DAILY 90 capsule 3   • furosemide (LASIX) 40 mg tablet Take 1 tablet (40 mg total) by mouth 2 (two) times a day 180 tablet 0   • ketoconazole (NIZORAL) 2 % shampoo Apply 1 application topically once for 1 dose 100 mL 3   • LECITHIN PO Take 1 tablet by mouth daily     • methimazole (TAPAZOLE) 5 mg tablet Take 1 tablet (5 mg total) by mouth 3 (three) times a week 39 tablet 3   • metoprolol tartrate (LOPRESSOR) 50 mg tablet TAKE 1 TABLET EVERY 12     HOURS 180 tablet 1   • Omega-3 Fatty Acids (OMEGA-3 PO) Take by mouth 2 (two) times a day       • pantoprazole (PROTONIX) 40 mg tablet Take 1 tablet (40 mg total) by mouth daily 90 tablet 1   • potassium chloride (Klor-Con M20) 20 mEq tablet Take 1 tablet (20 mEq total) by mouth daily 90 tablet 3   • sertraline (ZOLOFT) 100 mg tablet TAKE 1 TABLET DAILY 90 tablet 1   • simvastatin (ZOCOR) 20 mg tablet TAKE 1 TABLET DAILY AT     BEDTIME 90 tablet 3   • triamcinolone (KENALOG) 0.1 % cream Apply topically 2 (two) times a day 30 g 0   • vitamin E 100 UNIT capsule Take 100 Units by mouth daily     • Zinc Sulfate (ZINC 15 PO) Take 100 mg by mouth         No current facility-administered medications for this visit.      Current Outpatient Medications on File Prior to Visit   Medication Sig   • ALFALFA PO Take 3 tablets by mouth     • aspirin (ECOTRIN LOW STRENGTH) 81 mg EC tablet Take 81 mg by mouth daily   • B Complex Vitamins (B COMPLEX 1 PO) Take by mouth   • beta carotene 30 MG capsule Take 30 mg by mouth daily   • buPROPion (WELLBUTRIN XL) 300 mg 24 hr tablet TAKE 1 TABLET EVERY MORNING   • cholecalciferol (VITAMIN D3) 1,000 units tablet Take 1 tablet by mouth daily   • diltiazem (CARDIZEM CD) 240 mg 24 hr capsule TAKE 1 CAPSULE DAILY   • furosemide (LASIX) 40 mg tablet Take 1 tablet (40 mg total) by mouth 2 (two) times a day   • ketoconazole (NIZORAL) 2 % shampoo Apply 1 application topically once for 1 dose   • LECITHIN PO Take 1 tablet by mouth daily   • methimazole (TAPAZOLE) 5 mg tablet Take 1 tablet (5 mg total) by mouth 3 (three) times a week   • metoprolol tartrate (LOPRESSOR) 50 mg tablet TAKE 1 TABLET EVERY 12     HOURS   • Omega-3 Fatty Acids (OMEGA-3 PO) Take by mouth 2 (two) times a day     • potassium chloride (Klor-Con M20) 20 mEq tablet Take 1 tablet (20 mEq total) by mouth daily   • sertraline (ZOLOFT) 100 mg tablet TAKE 1 TABLET DAILY   • simvastatin (ZOCOR) 20 mg tablet TAKE 1 TABLET DAILY AT     BEDTIME   • triamcinolone (KENALOG) 0.1 % cream Apply topically 2 (two) times a day   • vitamin E 100 UNIT capsule Take 100 Units by mouth daily   • Zinc Sulfate (ZINC 15 PO) Take 100 mg by mouth     • [DISCONTINUED] pantoprazole (PROTONIX) 40 mg tablet Take 1 tablet (40 mg total) by mouth daily     No current facility-administered medications on file prior to visit. She has No Known Allergies. .    Objective:     Physical Exam  Vitals and nursing note reviewed. Constitutional:       General: She is not in acute distress. Appearance: She is well-developed and well-groomed. HENT:      Head: Normocephalic and atraumatic. Comments: Moist mucous membranes  Eyes:      General:         Right eye: No discharge. Left eye: No discharge. Conjunctiva/sclera: Conjunctivae normal.      Pupils: Pupils are equal, round, and reactive to light. Cardiovascular:      Rate and Rhythm: Normal rate and regular rhythm. Heart sounds: Normal heart sounds. No murmur heard. No friction rub. No gallop. Pulmonary:      Effort: No respiratory distress. Breath sounds: No wheezing or rales. Abdominal:      General: Bowel sounds are normal. There is no distension. Tenderness: There is no abdominal tenderness. Lymphadenopathy:      Cervical: No cervical adenopathy. Skin:     General: Skin is warm and dry. Coloration: Skin is pale. Neurological:      Mental Status: She is alert and oriented to person, place, and time. Psychiatric:         Mood and Affect: Mood and affect normal.         Speech: Speech normal.         Behavior: Behavior normal. Behavior is cooperative. Cognition and Memory: Cognition normal.           Vitals:    07/10/23 0853   BP: 116/68   BP Location: Right arm   Patient Position: Sitting   Cuff Size: Large   Pulse: 60   Temp: (!) 97 °F (36.1 °C)   SpO2: 98%   Weight: 70.3 kg (155 lb)   Height: 5' 7" (1.702 m)       Flexible Sigmoidoscopy    Result Date: 7/7/2023  Narrative: Table formatting from the original result was not included.  [facility] Endoscopy [address] [place] [address] [address] [facility] [phone] DATE OF SERVICE: 7/06/23 PHYSICIAN(S): Attending: Sera Moreno MD Fellow: Kristopher Simmons DO INDICATION: Acute lower GI bleeding POST-OP DIAGNOSIS: See the impression below. HISTORY: Prior colonoscopy: 7 years ago. BOWEL PREPARATION:  PREPROCEDURE: Informed consent was obtained for the procedure, including sedation. Risks including but not limited to bleeding, infection, perforation, adverse drug reaction and aspiration were explained in detail. Also explained about less than 100% sensitivity with the exam and other alternatives. The patient was placed in the left lateral decubitus position. Procedure: Colonoscopy DETAILS OF PROCEDURE: Patient was taken to the procedure room where a time out was performed to confirm correct patient and correct procedure. The patient underwent monitored anesthesia care, which was administered by an anesthesia professional. The patient's blood pressure, heart rate, level of consciousness, oxygen and respirations were monitored throughout the procedure. A digital rectal exam was performed. The scope was introduced through the anus and advanced to the transverse colon. The quality of bowel preparation was evaluated using the [facility] Bowel Preparation Scale with scores of: right colon = not assessed, transverse colon = 1, left colon = 1. Bowel prep was not adequate. The patient's estimated blood loss was minimal (<5 mL). The procedure was difficult due to poor preparation. The patient tolerated the procedure well. There were no apparent adverse events. The total BBPS score was 2. ANESTHESIA INFORMATION: ASA: III Anesthesia Type: IV Sedation with Anesthesia MEDICATIONS: No administrations occurring from 1124 to 1219 on 07/06/23 FINDINGS: Significant amount of stool throughout the colon with bright red blood in the distal colon and more darker/old blood in the transverse and descending colon. No active bleeding seen. Multiple scattered diverticula of moderate severity with no inflammation in the sigmoid colon; no bleeding was identified. No active bleeding seen after careful examination of multiple diverticula. Suspect patient's hematochezia and drop in hgb are likely due to a diverticular bleed which has now stopped. EVENTS: Procedure Events Event Event Time ENDO SCOPE OUT TIME 7/6/2023 12:14 PM SPECIMENS: * No specimens in log * EQUIPMENT: Colonoscope -     Impression: Scattered diverticulosis of moderate severity in the sigmoid colon, which is the likely etiology of hematochezia and lower GI bleed. The acute bleed appears to have stopped at this time. Significant amount of stool throughout the colon with bright red blood in the distal colon and more darker/old blood in the transverse and descending colon. No active bleeding seen. RECOMMENDATION:  Closely monitor stool output and hgb. Transfuse for < 7. Continue IV PPI BID. Suspect bleeding has likely stopped and was diverticular in etiology. Continue bowel prep. If rebleeds can consider colonoscopy tomorrow. If she becomes hemodynamically unstable, IR evaluation for embolization. Maite Banegas MD     CT high volume bleeding scan abdomen pelvis    Result Date: 7/6/2023  Narrative: CT ABDOMEN AND PELVIS - WITHOUT AND WITH IV CONTRAST INDICATION:   BRBPR large volume. COMPARISON: CT abdomen pelvis 4/26/2020. TECHNIQUE:  CT examination of the abdomen and pelvis was performed both prior to and after the administration of intravenous contrast. Multiplanar 2D reformatted images were created from the source data. Radiation dose length product (DLP) for this visit:  1201 mGy-cm . This examination, like all CT scans performed in the [facility], was performed utilizing techniques to minimize radiation dose exposure, including the use of iterative reconstruction and automated exposure control.  IV Contrast:  100 mL of iohexol (OMNIPAQUE) Enteric Contrast: Enteric contrast was not administered. FINDINGS: ABDOMEN BOWEL: There is active contrast extravasation within the mid sigmoid colon. Hematoma intermixed with fluid/stool noted in the left colon and rectum. Colonic diverticulosis without acute diverticulitis. No intestinal obstruction. . There is no abnormal bowel wall thickening or pathologic bowel wall enhancement. Age-appropriate atherosclerotic disease in the mesenteric vessels, without complete occlusion. LOWER CHEST: Small bilateral pleural effusions. Cardiomegaly. LIVER/BILIARY TREE:  Unremarkable. GALLBLADDER:  No calcified gallstones. No pericholecystic inflammatory change. SPLEEN:  Unremarkable. PANCREAS: Diffuse atrophy. There has been interval enlargement of a cystic lesion in the proximal pancreatic body measuring one 2.5 x 1.8 cm on image 5/46, previously 1.4 x 1.0 cm. ADRENAL GLANDS:  Unremarkable. KIDNEYS/URETERS:  Unremarkable. No hydronephrosis. APPENDIX:  No findings to suggest appendicitis. ABDOMINOPELVIC CAVITY:  No ascites. No pneumoperitoneum. No lymphadenopathy. VESSELS:  Atherosclerotic changes are present. No evidence of aneurysm. PELVIS REPRODUCTIVE ORGANS:  Surgical changes of prior hysterectomy. URINARY BLADDER:  Unremarkable. ABDOMINAL WALL/INGUINAL REGIONS:  There is a small fat-containing umbilical hernia, unchanged from previous examination. OSSEOUS STRUCTURES:  No acute fracture or destructive osseous lesion. Spinal degenerative changes are noted. Impression: Active hemorrhage in the mid sigmoid colon. Colonic diverticulosis without diverticulitis. Slight interval enlargement of a 2.5 cm cystic lesion in the proximal pancreas. For simple cyst(s) between 1.5 to 2.0 cm, recommend followup every 2 years for 2 time. If no change after 4 years, then no more followups. Recommend next followup in 2 years.   Preferred imaging modality: abdomen MRI and MRCP with and without IV contrast, or triple phase abdomen CT with IV contrast, or abdomen MRI and MRCP without IV contrast. Considerations related to the patient's age and/or comorbidities may be used to alter these recommendations. Small bilateral pleural effusions noted.  I personally discussed this study with Fara Vasquez on 7/6/2023 7:50 AM. Workstation performed: [id number]       Admission on 07/06/2023, Discharged on 07/07/2023   Component Date Value Ref Range Status   • WBC 07/06/2023 8.89  4.31 - 10.16 Thousand/uL Final   • RBC 07/06/2023 4.14  3.81 - 5.12 Million/uL Final   • Hemoglobin 07/06/2023 13.3  11.5 - 15.4 g/dL Final   • Hematocrit 07/06/2023 41.8  34.8 - 46.1 % Final   • MCV 07/06/2023 101 (H)  82 - 98 fL Final   • MCH 07/06/2023 32.1  26.8 - 34.3 pg Final   • MCHC 07/06/2023 31.8  31.4 - 37.4 g/dL Final   • RDW 07/06/2023 15.0  11.6 - 15.1 % Final   • MPV 07/06/2023 9.4  8.9 - 12.7 fL Final   • Platelets 69/54/9161 148 (L)  149 - 390 Thousands/uL Final   • nRBC 07/06/2023 0  /100 WBCs Final   • Neutrophils Relative 07/06/2023 60  43 - 75 % Final   • Immat GRANS % 07/06/2023 0  0 - 2 % Final   • Lymphocytes Relative 07/06/2023 27  14 - 44 % Final   • Monocytes Relative 07/06/2023 9  4 - 12 % Final   • Eosinophils Relative 07/06/2023 3  0 - 6 % Final   • Basophils Relative 07/06/2023 1  0 - 1 % Final   • Neutrophils Absolute 07/06/2023 5.35  1.85 - 7.62 Thousands/µL Final   • Immature Grans Absolute 07/06/2023 0.03  0.00 - 0.20 Thousand/uL Final   • Lymphocytes Absolute 07/06/2023 2.41  0.60 - 4.47 Thousands/µL Final   • Monocytes Absolute 07/06/2023 0.76  0.17 - 1.22 Thousand/µL Final   • Eosinophils Absolute 07/06/2023 0.29  0.00 - 0.61 Thousand/µL Final   • Basophils Absolute 07/06/2023 0.05  0.00 - 0.10 Thousands/µL Final   • Sodium 07/06/2023 142  135 - 147 mmol/L Final   • Potassium 07/06/2023 3.6  3.5 - 5.3 mmol/L Final   • Chloride 07/06/2023 104  96 - 108 mmol/L Final   • CO2 07/06/2023 32  21 - 32 mmol/L Final   • ANION GAP 07/06/2023 6  mmol/L Final   • BUN 07/06/2023 17  5 - 25 mg/dL Final   • Creatinine 07/06/2023 1.30  0.60 - 1.30 mg/dL Final    Standardized to IDMS reference method   • Glucose 07/06/2023 114  65 - 140 mg/dL Final    If the patient is fasting, the ADA then defines impaired fasting glucose as > 100 mg/dL and diabetes as > or equal to 123 mg/dL. • Calcium 07/06/2023 8.7  8.4 - 10.2 mg/dL Final   • AST 07/06/2023 33  13 - 39 U/L Final   • ALT 07/06/2023 23  7 - 52 U/L Final    Specimen collection should occur prior to Sulfasalazine administration due to the potential for falsely depressed results. • Alkaline Phosphatase 07/06/2023 72  34 - 104 U/L Final   • Total Protein 07/06/2023 6.1 (L)  6.4 - 8.4 g/dL Final   • Albumin 07/06/2023 3.7  3.5 - 5.0 g/dL Final   • Total Bilirubin 07/06/2023 0.93  0.20 - 1.00 mg/dL Final    Use of this assay is not recommended for patients undergoing treatment with eltrombopag due to the potential for falsely elevated results. N-acetyl-p-benzoquinone imine (metabolite of Acetaminophen) will generate erroneously low results in samples for patients that have taken an overdose of Acetaminophen.    • eGFR 07/06/2023 35  ml/min/1.73sq m Final   • Protime 07/06/2023 17.8 (H)  11.6 - 14.5 seconds Final   • INR 07/06/2023 1.47 (H)  0.84 - 1.19 Final   • ABO Grouping 07/06/2023 A   Final   • Rh Factor 07/06/2023 Negative   Final   • Antibody Screen 07/06/2023 Negative   Final   • Specimen Expiration Date 07/06/2023 [id number]   Final   • PTT 07/06/2023 36  23 - 37 seconds Final    Therapeutic Heparin Range =  60-90 seconds   • Ventricular Rate 07/06/2023 60  BPM Final   • Atrial Rate 07/06/2023 322  BPM Final   • QRSD Interval 07/06/2023 176  ms Final   • QT Interval 07/06/2023 498  ms Final   • QTC Interval 07/06/2023 498  ms Final   • QRS [place] 07/06/2023 -85  degrees Final   • T Wave [place] 07/06/2023 102  degrees Final   • Hemoglobin 07/06/2023 10.6 (L)  11.5 - 15.4 g/dL Final   • Hematocrit 07/06/2023 33.9 (L)  34.8 - 46.1 % Final   • Unit Product Code 07/07/2023 [id number]   Final-Edited   • Unit Number 07/07/2023 [id number]   Final-Edited   • Unit ABO 07/07/2023 A   Final-Edited   • Unit [facility] 07/07/2023 NEG   Final-Edited   • Crossmatch 07/07/2023 Compatible   Final   • Unit Dispense Status 07/07/2023 Presumed Trans   Final-Edited   • Unit Product Volume 07/07/2023 350  ml Final-Edited   • Hemoglobin 07/06/2023 10.7 (L)  11.5 - 15.4 g/dL Final   • Hemoglobin 07/06/2023 11.3 (L)  11.5 - 15.4 g/dL Final   • WBC 07/07/2023 8.88  4.31 - 10.16 Thousand/uL Final   • RBC 07/07/2023 3.52 (L)  3.81 - 5.12 Million/uL Final   • Hemoglobin 07/07/2023 11.0 (L)  11.5 - 15.4 g/dL Final   • Hematocrit 07/07/2023 34.1 (L)  34.8 - 46.1 % Final   • MCV 07/07/2023 97  82 - 98 fL Final   • MCH 07/07/2023 31.3  26.8 - 34.3 pg Final   • MCHC 07/07/2023 32.3  31.4 - 37.4 g/dL Final   • RDW 07/07/2023 16.5 (H)  11.6 - 15.1 % Final   • MPV 07/07/2023 9.0  8.9 - 12.7 fL Final   • Platelets 80/28/3295 104 (L)  149 - 390 Thousands/uL Final   • nRBC 07/07/2023 0  /100 WBCs Final   • Neutrophils Relative 07/07/2023 65  43 - 75 % Final   • Immat GRANS % 07/07/2023 0  0 - 2 % Final   • Lymphocytes Relative 07/07/2023 25  14 - 44 % Final   • Monocytes Relative 07/07/2023 7  4 - 12 % Final   • Eosinophils Relative 07/07/2023 3  0 - 6 % Final   • Basophils Relative 07/07/2023 0  0 - 1 % Final   • Neutrophils Absolute 07/07/2023 5.72  1.85 - 7.62 Thousands/µL Final   • Immature Grans Absolute 07/07/2023 0.03  0.00 - 0.20 Thousand/uL Final   • Lymphocytes Absolute 07/07/2023 2.19  0.60 - 4.47 Thousands/µL Final   • Monocytes Absolute 07/07/2023 0.66  0.17 - 1.22 Thousand/µL Final   • Eosinophils Absolute 07/07/2023 0.25  0.00 - 0.61 Thousand/µL Final   • Basophils Absolute 07/07/2023 0.03  0.00 - 0.10 Thousands/µL Final   • Sodium 07/07/2023 141  135 - 147 mmol/L Final   • Potassium 07/07/2023 3.1 (L)  3.5 - 5.3 mmol/L Final   • Chloride 07/07/2023 108  96 - 108 mmol/L Final   • CO2 07/07/2023 28  21 - 32 mmol/L Final   • ANION GAP 07/07/2023 5  mmol/L Final   • BUN 07/07/2023 12  5 - 25 mg/dL Final   • Creatinine 07/07/2023 0.86  0.60 - 1.30 mg/dL Final    Standardized to IDMS reference method   • Glucose 07/07/2023 97  65 - 140 mg/dL Final    If the patient is fasting, the ADA then defines impaired fasting glucose as > 100 mg/dL and diabetes as > or equal to 123 mg/dL. • Calcium 07/07/2023 7.9 (L)  8.4 - 10.2 mg/dL Final   • Corrected Calcium 07/07/2023 8.7  8.3 - 10.1 mg/dL Final   • AST 07/07/2023 21  13 - 39 U/L Final   • ALT 07/07/2023 16  7 - 52 U/L Final    Specimen collection should occur prior to Sulfasalazine administration due to the potential for falsely depressed results. • Alkaline Phosphatase 07/07/2023 60  34 - 104 U/L Final   • Total Protein 07/07/2023 5.0 (L)  6.4 - 8.4 g/dL Final   • Albumin 07/07/2023 3.0 (L)  3.5 - 5.0 g/dL Final   • Total Bilirubin 07/07/2023 0.89  0.20 - 1.00 mg/dL Final    Use of this assay is not recommended for patients undergoing treatment with eltrombopag due to the potential for falsely elevated results. N-acetyl-p-benzoquinone imine (metabolite of Acetaminophen) will generate erroneously low results in samples for patients that have taken an overdose of Acetaminophen. • eGFR 07/07/2023 59  ml/min/1.73sq m Final   • Magnesium 07/07/2023 1.8 (L)  1.9 - 2.7 mg/dL Final   • Phosphorus 07/07/2023 2.8  2.3 - 4.1 mg/dL Final   • Calcium, Ionized 07/07/2023 1.07 (L)  1.12 - 1.32 mmol/L Final       Transitional Care Management Review:  Xander Bowden is a 80 y.o. female here for TCM follow up.      During the TCM phone call patient stated:    TCM Call     Date and time call was made  7/10/2023  8:05 AM    Hospital care reviewed  Records reviewed    Patient was hospitialized at  [facility]    Date of Admission  07/06/23    Date of discharge  07/07/23    Diagnosis  GIB (gastrointestinal bleeding)    Disposition  Home    Were the patients medications reviewed and updated  No    Current Symptoms  None      TCM Call     Post hospital issues  None    Should patient be enrolled in anticoag monitoring? No    Scheduled for follow up?   Yes    Did you obtain your prescribed medications  Yes    Do you need help managing your prescriptions or medications  No    Is transportation to your appointment needed  No    Specify why  dtr provides    I have advised the patient to call PCP with any new or worsening symptoms  [address]    The type of support provided  Emotional; Financial; Physical    Do you have social support  Yes, as much as I need    Are you recieving any outpatient services  No    Are you recieving home care services  No    Are you using any community resources  No    Current waiver services  No    Have you fallen in the last 12 months  No    Interperter language line needed  No    Counseling  Patient    Counseling topics  Activities of daily living    Comments  dtr Jacob Ford stated she is doing well              Keyla Henao MD

## 2023-07-26 DIAGNOSIS — I50.32 CHRONIC DIASTOLIC CONGESTIVE HEART FAILURE (HCC): ICD-10-CM

## 2023-07-26 RX ORDER — FUROSEMIDE 40 MG/1
40 TABLET ORAL 2 TIMES DAILY
Qty: 180 TABLET | Refills: 3 | Status: SHIPPED | OUTPATIENT
Start: 2023-07-26

## 2023-08-04 ENCOUNTER — IN-CLINIC DEVICE VISIT (OUTPATIENT)
Dept: CARDIOLOGY CLINIC | Facility: CLINIC | Age: 88
End: 2023-08-04
Payer: MEDICARE

## 2023-08-04 DIAGNOSIS — Z95.0 CARDIAC PACEMAKER IN SITU: Primary | ICD-10-CM

## 2023-08-04 PROCEDURE — 93280 PM DEVICE PROGR EVAL DUAL: CPT | Performed by: INTERNAL MEDICINE

## 2023-08-27 DIAGNOSIS — I50.32 CHRONIC DIASTOLIC CONGESTIVE HEART FAILURE (HCC): ICD-10-CM

## 2023-08-27 RX ORDER — POTASSIUM CHLORIDE 1500 MG/1
20 TABLET, EXTENDED RELEASE ORAL DAILY
Qty: 90 TABLET | Refills: 3 | Status: SHIPPED | OUTPATIENT
Start: 2023-08-27

## 2023-08-29 ENCOUNTER — TELEPHONE (OUTPATIENT)
Dept: INTERNAL MEDICINE CLINIC | Facility: CLINIC | Age: 88
End: 2023-08-29

## 2023-08-29 DIAGNOSIS — I50.32 CHRONIC DIASTOLIC CONGESTIVE HEART FAILURE (HCC): ICD-10-CM

## 2023-08-29 RX ORDER — FUROSEMIDE 40 MG/1
40 TABLET ORAL 2 TIMES DAILY
Qty: 20 TABLET | Refills: 0 | Status: SHIPPED | OUTPATIENT
Start: 2023-08-29

## 2023-09-24 DIAGNOSIS — I50.32 CHRONIC DIASTOLIC CONGESTIVE HEART FAILURE (HCC): ICD-10-CM

## 2023-09-24 RX ORDER — FUROSEMIDE 40 MG/1
TABLET ORAL
Qty: 60 TABLET | Refills: 2 | Status: SHIPPED | OUTPATIENT
Start: 2023-09-24 | End: 2023-09-25 | Stop reason: SDUPTHER

## 2023-09-25 ENCOUNTER — TELEPHONE (OUTPATIENT)
Dept: INTERNAL MEDICINE CLINIC | Facility: CLINIC | Age: 88
End: 2023-09-25

## 2023-09-25 DIAGNOSIS — I50.32 CHRONIC DIASTOLIC CONGESTIVE HEART FAILURE (HCC): ICD-10-CM

## 2023-09-25 RX ORDER — FUROSEMIDE 40 MG/1
40 TABLET ORAL 2 TIMES DAILY
Qty: 180 TABLET | Refills: 3 | Status: SHIPPED | OUTPATIENT
Start: 2023-09-25

## 2023-09-25 NOTE — TELEPHONE ENCOUNTER
Patients daughter called for a refill on lasix sent to [facility]. I did place order and approved refill to be sent to mail order. Informed her that local pharmacy sent the script- again- to us over the weekend and Dr. Emely Segura off on that order. Advised (patients daughter was also advised, by Eric Ortiz, last month to d/c automatic refills at local pharmacy) that she needs to take her mom off automatic refills at the pharmacy or they will continue to send the script directly to Dr. Payam Smith to be signed off on. Daughter understood and stated she would call local pharmacy immediately to d/c script there and automatic refills. Receipt confirmed that it went to [facility], mail order.
Her/She

## 2023-10-09 ENCOUNTER — OFFICE VISIT (OUTPATIENT)
Dept: INTERNAL MEDICINE CLINIC | Facility: CLINIC | Age: 88
End: 2023-10-09
Payer: MEDICARE

## 2023-10-09 VITALS
BODY MASS INDEX: 23.39 KG/M2 | SYSTOLIC BLOOD PRESSURE: 118 MMHG | HEART RATE: 61 BPM | TEMPERATURE: 97.5 F | DIASTOLIC BLOOD PRESSURE: 72 MMHG | WEIGHT: 149 LBS | HEIGHT: 67 IN | OXYGEN SATURATION: 93 %

## 2023-10-09 DIAGNOSIS — D69.6 PLATELETS DECREASED (HCC): ICD-10-CM

## 2023-10-09 DIAGNOSIS — I10 BENIGN ESSENTIAL HYPERTENSION: ICD-10-CM

## 2023-10-09 DIAGNOSIS — E05.90 HYPERTHYROIDISM: ICD-10-CM

## 2023-10-09 DIAGNOSIS — I48.0 PAROXYSMAL ATRIAL FIBRILLATION (HCC): ICD-10-CM

## 2023-10-09 DIAGNOSIS — E78.5 DYSLIPIDEMIA: ICD-10-CM

## 2023-10-09 DIAGNOSIS — N39.3 FEMALE STRESS INCONTINENCE: ICD-10-CM

## 2023-10-09 DIAGNOSIS — I50.32 CHRONIC DIASTOLIC CONGESTIVE HEART FAILURE (HCC): Primary | ICD-10-CM

## 2023-10-09 DIAGNOSIS — E55.9 VITAMIN D DEFICIENCY: ICD-10-CM

## 2023-10-09 DIAGNOSIS — F41.8 DEPRESSION WITH ANXIETY: ICD-10-CM

## 2023-10-09 DIAGNOSIS — R44.1 VISUAL HALLUCINATIONS: ICD-10-CM

## 2023-10-09 DIAGNOSIS — N18.31 STAGE 3A CHRONIC KIDNEY DISEASE (HCC): ICD-10-CM

## 2023-10-09 DIAGNOSIS — F33.9 DEPRESSION, RECURRENT (HCC): ICD-10-CM

## 2023-10-09 PROCEDURE — G0439 PPPS, SUBSEQ VISIT: HCPCS | Performed by: FAMILY MEDICINE

## 2023-10-09 PROCEDURE — 99214 OFFICE O/P EST MOD 30 MIN: CPT | Performed by: FAMILY MEDICINE

## 2023-10-09 NOTE — ASSESSMENT & PLAN NOTE
Shared that aging can affect vision, causing occasional misconnection between eyes and brain, leading to image creation to fill gaps. Explained that sometimes such experiences ca be linked to medications, but there have been no recent changes in her medications. Noted the possibility of mini strokes but it is unlikely since it does not affect the patient's mobility or related aspects. Informed that fluctuations in blood pressure and hydration levels can sometimes be related to these symptoms. Educated the patient that her quality of sleep could also impact on the occurrence of visual hallucinations. Do not wish for any invasive testing. Limitations at her age discussed.

## 2023-10-09 NOTE — PATIENT INSTRUCTIONS
Medicare Preventive Visit Patient Instructions  Thank you for completing your Welcome to Medicare Visit or Medicare Annual Wellness Visit today. Your next wellness visit will be due in one year (10/9/2024). The screening/preventive services that you may require over the next 5-10 years are detailed below. Some tests may not apply to you based off risk factors and/or age. Screening tests ordered at today's visit but not completed yet may show as past due. Also, please note that scanned in results may not display below. Preventive Screenings:  Service Recommendations Previous Testing/Comments   Colorectal Cancer Screening  * Colonoscopy    * Fecal Occult Blood Test (FOBT)/Fecal Immunochemical Test (FIT)  * Fecal DNA/Cologuard Test  * Flexible Sigmoidoscopy Age: 43-73 years old   Colonoscopy: every 10 years (may be performed more frequently if at higher risk)  OR  FOBT/FIT: every 1 year  OR  Cologuard: every 3 years  OR  Sigmoidoscopy: every 5 years  Screening may be recommended earlier than age 39 if at higher risk for colorectal cancer. Also, an individualized decision between you and your healthcare provider will decide whether screening between the ages of 77-80 would be appropriate. Colonoscopy: Not on file  FOBT/FIT: Not on file  Cologuard: Not on file  Sigmoidoscopy: 07/06/2023    Screening Not Indicated     Breast Cancer Screening Age: 36 years old  Frequency: every 1-2 years  Not required if history of left and right mastectomy Mammogram: Not on file        Cervical Cancer Screening Between the ages of 21-29, pap smear recommended once every 3 years. Between the ages of 32-69, can perform pap smear with HPV co-testing every 5 years.    Recommendations may differ for women with a history of total hysterectomy, cervical cancer, or abnormal pap smears in past. Pap Smear: Not on file    Screening Not Indicated   Hepatitis C Screening Once for adults born between 1945 and 1965  More frequently in patients at high risk for Hepatitis C Hep C Antibody: Not on file        Diabetes Screening 1-2 times per year if you're at risk for diabetes or have pre-diabetes Fasting glucose: 83 mg/dL (10/3/2022)  A1C: No results in last 5 years (No results in last 5 years)  Screening Current   Cholesterol Screening Once every 5 years if you don't have a lipid disorder. May order more often based on risk factors. Lipid panel: 03/07/2023    Screening Not Indicated  History Lipid Disorder     Other Preventive Screenings Covered by Medicare:  1. Abdominal Aortic Aneurysm (AAA) Screening: covered once if your at risk. You're considered to be at risk if you have a family history of AAA. 2. Lung Cancer Screening: covers low dose CT scan once per year if you meet all of the following conditions: (1) Age 48-67; (2) No signs or symptoms of lung cancer; (3) Current smoker or have quit smoking within the last 15 years; (4) You have a tobacco smoking history of at least 20 pack years (packs per day multiplied by number of years you smoked); (5) You get a written order from a healthcare provider. 3. Glaucoma Screening: covered annually if you're considered high risk: (1) You have diabetes OR (2) Family history of glaucoma OR (3)  aged 48 and older OR (3)  American aged 72 and older  3. Osteoporosis Screening: covered every 2 years if you meet one of the following conditions: (1) You're estrogen deficient and at risk for osteoporosis based off medical history and other findings; (2) Have a vertebral abnormality; (3) On glucocorticoid therapy for more than 3 months; (4) Have primary hyperparathyroidism; (5) On osteoporosis medications and need to assess response to drug therapy. · Last bone density test (DXA Scan): Not on file. 5. HIV Screening: covered annually if you're between the age of 14-79. Also covered annually if you are younger than 13 and older than 72 with risk factors for HIV infection.  For pregnant patients, it is covered up to 3 times per pregnancy. Immunizations:  Immunization Recommendations   Influenza Vaccine Annual influenza vaccination during flu season is recommended for all persons aged >= 6 months who do not have contraindications   Pneumococcal Vaccine   * Pneumococcal conjugate vaccine = PCV13 (Prevnar 13), PCV15 (Vaxneuvance), PCV20 (Prevnar 20)  * Pneumococcal polysaccharide vaccine = PPSV23 (Pneumovax) Adults 20-63 years old: 1-3 doses may be recommended based on certain risk factors  Adults 72 years old: 1-2 doses may be recommended based off what pneumonia vaccine you previously received   Hepatitis B Vaccine 3 dose series if at intermediate or high risk (ex: diabetes, end stage renal disease, liver disease)   Tetanus (Td) Vaccine - COST NOT COVERED BY MEDICARE PART B Following completion of primary series, a booster dose should be given every 10 years to maintain immunity against tetanus. Td may also be given as tetanus wound prophylaxis. Tdap Vaccine - COST NOT COVERED BY MEDICARE PART B Recommended at least once for all adults. For pregnant patients, recommended with each pregnancy. Shingles Vaccine (Shingrix) - COST NOT COVERED BY MEDICARE PART B  2 shot series recommended in those aged 48 and above     Health Maintenance Due:  There are no preventive care reminders to display for this patient. Immunizations Due:      Topic Date Due   • COVID-19 Vaccine (1) Never done   • Pneumococcal Vaccine: 65+ Years (2 - PPSV23 if available, else PCV20) 03/17/2020   • Influenza Vaccine (1) 09/01/2023     Advance Directives   What are advance directives? Advance directives are legal documents that state your wishes and plans for medical care. These plans are made ahead of time in case you lose your ability to make decisions for yourself. Advance directives can apply to any medical decision, such as the treatments you want, and if you want to donate organs. What are the types of advance directives?   There are many types of advance directives, and each state has rules about how to use them. You may choose a combination of any of the following:  · Living will: This is a written record of the treatment you want. You can also choose which treatments you do not want, which to limit, and which to stop at a certain time. This includes surgery, medicine, IV fluid, and tube feedings. · Durable power of  for healthcare [facility]): This is a written record that states who you want to make healthcare choices for you when you are unable to make them for yourself. This person, called a proxy, is usually a family member or a friend. You may choose more than 1 proxy. · Do not resuscitate (DNR) order:  A DNR order is used in case your heart stops beating or you stop breathing. It is a request not to have certain forms of treatment, such as CPR. A DNR order may be included in other types of advance directives. · Medical directive: This covers the care that you want if you are in a coma, near death, or unable to make decisions for yourself. You can list the treatments you want for each condition. Treatment may include pain medicine, surgery, blood transfusions, dialysis, IV or tube feedings, and a ventilator (breathing machine). · Values history: This document has questions about your views, beliefs, and how you feel and think about life. This information can help others choose the care that you would choose. Why are advance directives important? An advance directive helps you control your care. Although spoken wishes may be used, it is better to have your wishes written down. Spoken wishes can be misunderstood, or not followed. Treatments may be given even if you do not want them. An advance directive may make it easier for your family to make difficult choices about your care. Fall Prevention    Fall prevention  includes ways to make your home and other areas safer.  It also includes ways you can move more carefully to prevent a fall. Health conditions that cause changes in your blood pressure, vision, or muscle strength and coordination may increase your risk for falls. Medicines may also increase your risk for falls if they make you dizzy, weak, or sleepy. Fall prevention tips:   · Stand or sit up slowly. · Use assistive devices as directed. · Wear shoes that fit well and have soles that . · Wear a personal alarm. · Stay active. · Manage your medical conditions. Home Safety Tips:  · Add items to prevent falls in the bathroom. · Keep paths clear. · Install bright lights in your home. · Keep items you use often on shelves within reach. · Paint or place reflective tape on the edges of your stairs. Urinary Incontinence   Urinary incontinence (UI)  is when you lose control of your bladder. UI develops because your bladder cannot store or empty urine properly. The 3 most common types of UI are stress incontinence, urge incontinence, or both. Medicines:   · May be given to help strengthen your bladder control. Report any side effects of medication to your healthcare provider. Do pelvic muscle exercises often:  Your pelvic muscles help you stop urinating. Squeeze these muscles tight for 5 seconds, then relax for 5 seconds. Gradually work up to squeezing for 10 seconds. Do 3 sets of 15 repetitions a day, or as directed. This will help strengthen your pelvic muscles and improve bladder control. Train your bladder:  Go to the bathroom at set times, such as every 2 hours, even if you do not feel the urge to go. You can also try to hold your urine when you feel the urge to go. For example, hold your urine for 5 minutes when you feel the urge to go. As that becomes easier, hold your urine for 10 minutes. Self-care:   · Keep a UI record. Write down how often you leak urine and how much you leak. Make a note of what you were doing when you leaked urine. · Drink liquids as directed.  You may need to limit the amount of liquid you drink to help control your urine leakage. Do not drink any liquid right before you go to bed. Limit or do not have drinks that contain caffeine or alcohol. · Prevent constipation. Eat a variety of high-fiber foods. Good examples are high-fiber cereals, beans, vegetables, and whole-grain breads. Walking is the best way to trigger your intestines to have a bowel movement. · Exercise regularly and maintain a healthy weight. Weight loss and exercise will decrease pressure on your bladder and help you control your leakage. · Use a catheter as directed  to help empty your bladder. A catheter is a tiny, plastic tube that is put into your bladder to drain your urine. · Go to behavior therapy as directed. Behavior therapy may be used to help you learn to control your urge to urinate. © Copyright [employer] 2018 Information is for End User's use only and may not be sold, redistributed or otherwise used for commercial purposes.  All illustrations and images included in CareNotes® are the copyrighted property of [employer] or  [employer]

## 2023-10-09 NOTE — PROGRESS NOTES
Assessment and Plan:     1. Chronic diastolic congestive heart failure [facility])  Assessment & Plan:  Wt Readings from Last 3 Encounters:   10/09/23 67.6 kg (149 lb)   07/10/23 70.3 kg (155 lb)   07/06/23 69.1 kg (152 lb 5.4 oz)     Weight is stable and appears euvolemic. Continue to follow weight regularly. Watch for any increasing shortness of breath, increasing weight, or increasing peripheral edema. Continue with the Lasix and the potassium. 2. Paroxysmal atrial fibrillation ([address])  Assessment & Plan:  Currently regular. Watch for any chest pain or palpitations. Continue with the metoprolol. 3. Hyperthyroidism  Assessment & Plan:  Slip given for routine laboratory testing. Continue the methimazole. We will continue to follow TSH and adjust dose if needed. Orders:  -     TSH, 3rd generation; Future    4. Benign essential hypertension  Assessment & Plan:  Blood pressure currently well controlled. Watch for any headaches. Follow-up blood pressure at home. Continue with the diltiazem and metoprolol. Orders:  -     CBC and differential; Future    5. Stage 3a chronic kidney disease [facility])  Assessment & Plan:  Lab Results   Component Value Date    EGFR 32 07/11/2023    EGFR 59 07/07/2023    EGFR 35 07/06/2023    CREATININE 1.40 (H) 07/11/2023    CREATININE 0.86 07/07/2023    CREATININE 1.30 07/06/2023     Creatinine intermittently elevated. Stay adequately hydrated. Avoid nephrotoxins. Slip given for repeat laboratory testing. Orders:  -     CBC and differential; Future  -     Comprehensive metabolic panel; Future    6. Vitamin D deficiency    7. Depression, recurrent ([address])  Assessment & Plan:  Mood discussed. We will increase the dose of her Zoloft slightly. Continue with the Wellbutrin. Watch for any worsening. 8. Platelets decreased ([address])  Assessment & Plan:  Platelets low at times. We will continue to follow this with routine laboratory testing.       9. Dyslipidemia  - Comprehensive metabolic panel; Future  -     Lipid panel; Future  -     TSH, 3rd generation; Future    10. Depression with anxiety  Assessment & Plan:  Recommended an adjustment in the Zoloft prescription, increasing the dosage to 150 mg. Orders:  -     sertraline (ZOLOFT) 50 mg tablet; Take 3 tablets (150 mg total) by mouth daily    11. Visual hallucinations  Assessment & Plan:  Shared that aging can affect vision, causing occasional misconnection between eyes and brain, leading to image creation to fill gaps. Explained that sometimes such experiences ca be linked to medications, but there have been no recent changes in her medications. Noted the possibility of mini strokes but it is unlikely since it does not affect the patient's mobility or related aspects. Informed that fluctuations in blood pressure and hydration levels can sometimes be related to these symptoms. Educated the patient that her quality of sleep could also impact on the occurrence of visual hallucinations. Do not wish for any invasive testing. Limitations at her age discussed. 15. Female stress incontinence       Age spots  informed the patient that currently, there are no definitive solutions to address the spots, although there are some treatments but with limited effectiveness. Bilateral leg swelling  Prescribed the use of a Tubigrip as a therapeutic measure to manage the swelling. Informed the patient that addressing the swelling could potentially improve her balance. Discussed end-of-life issues. She wishes to be DNR and wishes to update her POLST. This was updated today to avoid any aggressive measures. Daughter is in agreement. Have her follow-up in about 3 to 5 months or sooner if needed. Declines immunizations. Falls Plan of Care: balance, strength, and gait training instructions were provided.           History of Present Illness:     Brunilda Mcgee is a 17-year-old female who presents today for routine follow-up as well as Medicare wellness. Her daughter accompanied her. The patient reports that she is feeling okay; However, she is experiencing occasional illusions. This morning, she described seeing a girl engaged in conversation with her mother through singing, although such occurrences are infrequent, she reports experiencing episodes of visual hallucinations. She continues to receive regular eye injections. Vision has been steadily worsening, however. She has been experiencing restful sleep on a waterbed for the past 2 weeks. The accompanying adult female mentioned that a personalized bathroom has been arranged for her, and the patient had a satisfactory shower experience yesterday. The patient appears to be displaying more pronounced signs of depression. Feeling more down at times. Continues with the Wellbutrin and Zoloft. She has regular bowel movements along with a good appetite. Denies any dark bowel movements or any blood in her bowel movements. The patient has developed age spots that she finds bothersome, and she reports that they have appeared in red, black, and blue spots. She denies experiencing joint pain in her knees except when she falls. She reports that when she sits for an extended period, her left leg feels disconnected from her right leg. She has bilateral leg swelling and typically keeps her legs down while sitting but was advised and assisted to elevate them. She has previously employed a Tubigrip for wound care purposes. Has been tolerating her metoprolol and diltiazem without difficulty. Denies any headaches or localized weakness. Denies any chest pain or palpitations. Denies any current reflux symptoms. Tolerating the simvastatin without difficulty. Denies any significant muscle aches or weakness with this. Family History:  The patient has a grandson who has been diagnosed with bipolar disorder. Current Medications:   The patient has been taking antidepressants. Patient Care Team:  Amanda Helton MD as PCP - General (Family Medicine)  Amanda Helton MD as PCP - General  DO Severiano Mariee MD Kym Miu, MD Margaretta Dana, MD as Endoscopist   Problem List:     Patient Active Problem List   Diagnosis   • Presence of permanent cardiac pacemaker   • H/O: hysterectomy   • Benign essential hypertension   • Hyperthyroidism   • Glaucoma   • Chronic diastolic congestive heart failure ([address])   • SSS (sick sinus syndrome) ([address])   • Depression, recurrent ([address])   • GIB (gastrointestinal bleeding)   • Hypokalemia   • Pulmonary hypertension ([address])   • Anemia   • Renal atrophy   • Hyperphosphatemia   • Vitamin D deficiency   • Depression with anxiety   • Dyslipidemia   • Dyspnea on exertion   • Fatigue   • Osteoporosis   • Paroxysmal atrial fibrillation ([address])   • Psoriasis   • Vertigo   • Contusion of right thigh   • Multiple thyroid nodules   • Iron deficiency   • Stage 3a chronic kidney disease ([address])   • Platelets decreased ([address])   • Visual hallucinations      Past Medical and Surgical History:     Past Medical History:   Diagnosis Date   • VAZQUEZ (acute kidney injury) ([address]) 2/17/2017   • Anemia    • Atrial fibrillation ([address])    • Atrial flutter ([address])    • Depression    • Diastolic CHF ([address])    • Dyslipidemia    • Glaucoma    • H/O: hysterectomy    • Hypertension    • Hyperthyroidism    • Impaired glucose tolerance     RESOLVED 2/3/2016   • Pacemaker    • Psoriasis    • S/P cataract surgery    • S/P knee replacement    • SSS (sick sinus syndrome) (HCC)    • SSS (sick sinus syndrome) ([address])      Past Surgical History:   Procedure Laterality Date   • CARDIAC PACEMAKER PLACEMENT     • CARDIAC PACEMAKER PLACEMENT      dual chamber   • CATARACT EXTRACTION     • ESOPHAGOGASTRODUODENOSCOPY N/A 12/30/2016    Procedure: ESOPHAGOGASTRODUODENOSCOPY (EGD); Surgeon: Geni Jerry MD;  Location: [facility];   Service:    • EYE SURGERY      cataract   • HYSTERECTOMY     • JOINT REPLACEMENT      bilateral    • MS COLONOSCOPY FLX DX W/COLLJ SPEC WHEN PFRMD N/A 4/24/2017    Procedure: COLONOSCOPY with polypectomy;  Surgeon: Grace Ann MD;  Location: [facility]; Service: Gastroenterology   • REPLACEMENT TOTAL KNEE Bilateral       Family History:     Family History   Problem Relation Age of Onset   • Alcohol abuse Father    • Diabetes Father    • Alcohol abuse Brother       Social History:     Social History     Socioeconomic History   • Marital status:      Spouse name: None   • Number of children: None   • Years of education: None   • Highest education level: None   Occupational History   • None   Tobacco Use   • Smoking status: Former   • Smokeless tobacco: Never   Vaping Use   • Vaping Use: Never used   Substance and Sexual Activity   • Alcohol use: Never   • Drug use: No   • Sexual activity: Not Currently   Other Topics Concern   • None   Social History Narrative   • None     Social Determinants of Health     Financial Resource Strain: Low Risk  (10/9/2023)    Overall Financial Resource Strain (CARDIA)    • Difficulty of Paying Living Expenses: Not very hard   Food Insecurity: Not on file   Transportation Needs: No Transportation Needs (10/9/2023)    PRAPARE - Transportation    • Lack of Transportation (Medical): No    • Lack of Transportation (Non-Medical):  No   Physical Activity: Not on file   Stress: Not on file   Social Connections: Not on file   Intimate Partner Violence: Not on file   Housing Stability: Not on file      Medications and Allergies:     Current Outpatient Medications   Medication Sig Dispense Refill   • ALFALFA PO Take 3 tablets by mouth       • aspirin (ECOTRIN LOW STRENGTH) 81 mg EC tablet Take 81 mg by mouth daily     • B Complex Vitamins (B COMPLEX 1 PO) Take by mouth     • beta carotene 30 MG capsule Take 30 mg by mouth daily     • buPROPion (WELLBUTRIN XL) 300 mg 24 hr tablet TAKE 1 TABLET EVERY MORNING 90 tablet 1   • cholecalciferol (VITAMIN D3) 1,000 units tablet Take 1 tablet by mouth daily 30 tablet 0   • diltiazem (CARDIZEM CD) 240 mg 24 hr capsule TAKE 1 CAPSULE DAILY 90 capsule 3   • furosemide (LASIX) 40 mg tablet Take 1 tablet (40 mg total) by mouth 2 (two) times a day 180 tablet 3   • ketoconazole (NIZORAL) 2 % shampoo Apply 1 application topically once for 1 dose 100 mL 3   • Klor-Con M20 20 MEQ tablet TAKE 1 TABLET DAILY 90 tablet 3   • LECITHIN PO Take 1 tablet by mouth daily     • methimazole (TAPAZOLE) 5 mg tablet Take 1 tablet (5 mg total) by mouth 3 (three) times a week 39 tablet 3   • metoprolol tartrate (LOPRESSOR) 50 mg tablet TAKE 1 TABLET EVERY 12     HOURS 180 tablet 1   • Omega-3 Fatty Acids (OMEGA-3 PO) Take by mouth 2 (two) times a day       • pantoprazole (PROTONIX) 40 mg tablet Take 1 tablet (40 mg total) by mouth daily 90 tablet 1   • sertraline (ZOLOFT) 50 mg tablet Take 3 tablets (150 mg total) by mouth daily 270 tablet 1   • simvastatin (ZOCOR) 20 mg tablet TAKE 1 TABLET DAILY AT     BEDTIME 90 tablet 3   • triamcinolone (KENALOG) 0.1 % cream Apply topically 2 (two) times a day 30 g 0   • vitamin E 100 UNIT capsule Take 100 Units by mouth daily     • Zinc Sulfate (ZINC 15 PO) Take 100 mg by mouth         No current facility-administered medications for this visit. No Known Allergies   Immunizations:     Immunization History   Administered Date(s) Administered   • INFLUENZA 10/03/2016   • Influenza Split High Dose Preservative Free IM 10/03/2016   • Pneumococcal Conjugate 13-Valent 01/21/2020      Health Maintenance: There are no preventive care reminders to display for this patient.       Topic Date Due   • COVID-19 Vaccine (1) Never done   • Pneumococcal Vaccine: 65+ Years (2 - PPSV23 if available, else PCV20) 03/17/2020   • Influenza Vaccine (1) 09/01/2023      Review of Systems:     Review of Systems   Psychiatric/Behavioral: Positive for decreased concentration, dysphoric mood and hallucinations. Constitutional: Negative for activity change, appetite change, chills and fever. HENT: Negative for congestion and rhinorrhea. Eyes: Negative for visual disturbance. Respiratory: Negative for chest tightness and shortness of breath. Cardiovascular: Negative for chest pain and palpitations. Gastrointestinal: Negative for abdominal pain, blood in stool, diarrhea, nausea and vomiting. Endocrine: Negative for polydipsia, polyphagia and polyuria. Genitourinary: Negative for dysuria, frequency and urgency. Musculoskeletal: Negative for gait problem. Lower extremities: Bilateral leg swelling. Skin: Negative for color change. Neurological: Negative for dizziness and headaches. Hematological: Does not bruise/bleed easily. Psychiatric/Behavioral: Negative for confusion and sleep disturbance. The patient is not nervous/anxious. Physical Exam:     /72 (BP Location: Left arm, Patient Position: Sitting, Cuff Size: Large)   Pulse 61   Temp 97.5 °F (36.4 °C)   Ht 5' 7" (1.702 m)   Wt 67.6 kg (149 lb)   LMP  (LMP Unknown)   SpO2 93%   BMI 23.34 kg/m²     Physical Exam  Vitals and nursing note reviewed. Constitutional:       General: She is not in acute distress. Appearance: She is well-developed and well-groomed. HENT:      Head: Normocephalic and atraumatic. Right Ear: Decreased hearing noted. Left Ear: Decreased hearing noted. Ears:      Comments: Hearing aids bilaterally  Eyes:      Conjunctiva/sclera: Conjunctivae normal.   Neck:      Thyroid: No thyromegaly. Cardiovascular:      Rate and Rhythm: Normal rate and regular rhythm. Heart sounds: No murmur heard. Pulmonary:      Effort: Pulmonary effort is normal. No respiratory distress. Breath sounds: Normal breath sounds. Abdominal:      Palpations: Abdomen is soft. Tenderness: There is no abdominal tenderness. Musculoskeletal:         General: No swelling. Cervical back: Neck supple. Lymphadenopathy:      Cervical: No cervical adenopathy. Skin:     General: Skin is warm and dry. Capillary Refill: Capillary refill takes less than 2 seconds. Neurological:      Mental Status: She is alert and oriented to person, place, and time. Psychiatric:         Mood and Affect: Mood is depressed. Affect is flat. Speech: Speech normal.         Behavior: Behavior is cooperative. Cognition and Memory: Cognition and memory normal.           Medicare Screening Tests and Risk Assessments:     Sunny Flores is here for her Subsequent Wellness visit. Health Risk Assessment:   Patient rates overall health as very good. Patient feels that their physical health rating is same. Patient is satisfied with their life. Eyesight was rated as slightly worse. Hearing was rated as slightly worse. Patient feels that their emotional and mental health rating is same. Patients states they are sometimes angry. Patient states they are often unusually tired/fatigued. Pain experienced in the last 7 days has been none. Patient states that she has experienced weight loss or gain in last 6 months. Depression related to age     Depression Screening:   PHQ-9 Score: 4      Fall Risk Screening: In the past year, patient has experienced: history of falling in past year    Number of falls: 2 or more  Injured during fall?: No    Feels unsteady when standing or walking?: Yes    Worried about falling?: Yes      Urinary Incontinence Screening:   Patient has leaked urine accidently in the last six months. Home Safety:  Patient has trouble with stairs inside or outside of their home. Patient has working smoke alarms and has working carbon monoxide detector. Home safety hazards include: none. Nutrition:   Current diet is Regular. Medications:   Patient is currently taking over-the-counter supplements.  OTC medications include: see medication list. Patient is not able to manage medications. Activities of Daily Living (ADLs)/Instrumental Activities of Daily Living (IADLs):   Walk and transfer into and out of bed and chair?: Yes  Dress and groom yourself?: Yes    Bathe or shower yourself?: Yes    Feed yourself? Yes  Do your laundry/housekeeping?: No  Manage your money, pay your bills and track your expenses?: No  Make your own meals?: No    Do your own shopping?: No    Previous Hospitalizations:   Any hospitalizations or ED visits within the last 12 months?: Yes    How many hospitalizations have you had in the last year?: 1-2    Advance Care Planning:   Living will: Yes    Durable POA for healthcare: Yes    Advanced directive: Yes    End of Life Decisions reviewed with patient: Yes    Provider agrees with end of life decisions: Yes      Cognitive Screening:   Provider or family/friend/caregiver concerned regarding cognition?: Yes    PREVENTIVE SCREENINGS      Cardiovascular Screening:    General: Screening Not Indicated and History Lipid Disorder      Diabetes Screening:     General: Screening Current      Colorectal Cancer Screening:     General: Screening Not Indicated      Breast Cancer Screening:     General: Patient Declines      Cervical Cancer Screening:    General: Screening Not Indicated      Osteoporosis Screening:    General: Screening Not Indicated and History Osteoporosis      Abdominal Aortic Aneurysm (AAA) Screening:        General: Screening Not Indicated      Lung Cancer Screening:     General: Screening Not Indicated      Hepatitis C Screening:    General: Screening Not Indicated    Screening, Brief Intervention, and Referral to Treatment (SBIRT)    Screening  Typical number of drinks in a day: 0  Typical number of drinks in a week: 0  Interpretation: Low risk drinking behavior.     Single Item Drug Screening:  How often have you used an illegal drug (including marijuana) or a prescription medication for non-medical reasons in the past year? never    Single Item Drug Screen Score: 0  Interpretation: Negative screen for possible drug use disorder    Brief Intervention  Alcohol & drug use screenings were reviewed. No concerns regarding substance use disorder identified. Below is the patient's most recent value for Albumin, ALT, AST, BUN, Calcium, Chloride, Cholesterol, CO2, Creatinine, GFR, Glucose, HDL, Hematocrit, Hemoglobin, Hemoglobin A1C, LDL, Magnesium, Phosphorus, Platelets, Potassium, PSA, Sodium, Triglycerides, and WBC. Lab Results   Component Value Date    ALT 29 07/11/2023    AST 24 07/11/2023    BUN 10 07/11/2023    CALCIUM 8.7 07/11/2023     (H) 07/11/2023    CHOL 153 10/24/2015    CO2 27 07/11/2023    CREATININE 1.40 (H) 07/11/2023    HDL 63 03/07/2023    HCT 34.2 (L) 07/11/2023    HGB 10.9 (L) 07/11/2023    HGBA1C 5.5 01/25/2016    MG 1.8 (L) 07/07/2023    PHOS 2.8 07/07/2023     07/11/2023    K 3.7 07/11/2023     11/18/2015    TRIG 85 03/07/2023    WBC 9.53 07/11/2023     Note: for a comprehensive list of the patient's lab results, access the Results Review activity. I have personally reviewed results with the patient. Alissa Wolfe MD    Transcribed for Alissa Wolfe MD, by Anderson Jewell on 10/09/23 at 8:30 PM. Powered by [employer].

## 2023-10-09 NOTE — PROGRESS NOTES
Assessment/Plan:       {There are no diagnoses linked to this encounter. (Refresh or delete this SmartLink)}    ***DAXPLAN                Subjective:      Patient ID: Alessio Stern is a 80 y.o. female. ***DAXHPI    {Common ambulatory SmartLinks:92809}    Review of Systems  ***DAXROS      Objective:  LMP  (LMP Unknown)          Physical Exam  ***DAXEXAM    ***DAXRESULTS        Transcribed for Bony Bravo MD, by *** on 10/09/23 at 7:02 AM. Powered by [employer].

## 2023-10-10 NOTE — ASSESSMENT & PLAN NOTE
Wt Readings from Last 3 Encounters:   10/09/23 67.6 kg (149 lb)   07/10/23 70.3 kg (155 lb)   07/06/23 69.1 kg (152 lb 5.4 oz)     Weight is stable and appears euvolemic. Continue to follow weight regularly. Watch for any increasing shortness of breath, increasing weight, or increasing peripheral edema. Continue with the Lasix and the potassium.

## 2023-10-10 NOTE — ASSESSMENT & PLAN NOTE
Blood pressure currently well controlled. Watch for any headaches. Follow-up blood pressure at home. Continue with the diltiazem and metoprolol.

## 2023-10-10 NOTE — ASSESSMENT & PLAN NOTE
Lab Results   Component Value Date    EGFR 32 07/11/2023    EGFR 59 07/07/2023    EGFR 35 07/06/2023    CREATININE 1.40 (H) 07/11/2023    CREATININE 0.86 07/07/2023    CREATININE 1.30 07/06/2023     Creatinine intermittently elevated. Stay adequately hydrated. Avoid nephrotoxins. Slip given for repeat laboratory testing.

## 2023-10-10 NOTE — ASSESSMENT & PLAN NOTE
Slip given for routine laboratory testing. Continue the methimazole. We will continue to follow TSH and adjust dose if needed.

## 2023-10-10 NOTE — ASSESSMENT & PLAN NOTE
Mood discussed. We will increase the dose of her Zoloft slightly. Continue with the Wellbutrin. Watch for any worsening.

## 2023-10-15 DIAGNOSIS — E05.90 HYPERTHYROIDISM: ICD-10-CM

## 2023-10-16 RX ORDER — METHIMAZOLE 5 MG/1
5 TABLET ORAL 3 TIMES WEEKLY
Qty: 39 TABLET | Refills: 3 | Status: SHIPPED | OUTPATIENT
Start: 2023-10-16

## 2023-10-26 DIAGNOSIS — R41.82 ALTERED MENTAL STATUS, UNSPECIFIED ALTERED MENTAL STATUS TYPE: Primary | ICD-10-CM

## 2023-10-27 ENCOUNTER — APPOINTMENT (OUTPATIENT)
Dept: LAB | Facility: CLINIC | Age: 88
End: 2023-10-27
Payer: MEDICARE

## 2023-10-27 DIAGNOSIS — E05.90 HYPERTHYROIDISM: ICD-10-CM

## 2023-10-27 DIAGNOSIS — E87.6 HYPOKALEMIA: ICD-10-CM

## 2023-10-27 DIAGNOSIS — R41.82 ALTERED MENTAL STATUS, UNSPECIFIED ALTERED MENTAL STATUS TYPE: ICD-10-CM

## 2023-10-27 DIAGNOSIS — I10 BENIGN ESSENTIAL HYPERTENSION: ICD-10-CM

## 2023-10-27 DIAGNOSIS — N18.31 STAGE 3A CHRONIC KIDNEY DISEASE (HCC): ICD-10-CM

## 2023-10-27 DIAGNOSIS — I50.32 CHRONIC DIASTOLIC CONGESTIVE HEART FAILURE (HCC): ICD-10-CM

## 2023-10-27 DIAGNOSIS — E78.5 DYSLIPIDEMIA: ICD-10-CM

## 2023-10-27 DIAGNOSIS — E55.9 VITAMIN D DEFICIENCY: ICD-10-CM

## 2023-10-27 DIAGNOSIS — I48.0 PAROXYSMAL ATRIAL FIBRILLATION (HCC): ICD-10-CM

## 2023-10-27 LAB
25(OH)D3 SERPL-MCNC: 68.6 NG/ML (ref 30–100)
ALBUMIN SERPL BCP-MCNC: 3.7 G/DL (ref 3.5–5)
ALP SERPL-CCNC: 98 U/L (ref 34–104)
ALT SERPL W P-5'-P-CCNC: 21 U/L (ref 7–52)
AMORPH URATE CRY URNS QL MICRO: ABNORMAL
ANION GAP SERPL CALCULATED.3IONS-SCNC: 11 MMOL/L
AST SERPL W P-5'-P-CCNC: 28 U/L (ref 13–39)
BACTERIA UR QL AUTO: ABNORMAL /HPF
BASOPHILS # BLD AUTO: 0.05 THOUSANDS/ÂΜL (ref 0–0.1)
BASOPHILS NFR BLD AUTO: 1 % (ref 0–1)
BILIRUB SERPL-MCNC: 1.03 MG/DL (ref 0.2–1)
BILIRUB UR QL STRIP: NEGATIVE
BUN SERPL-MCNC: 13 MG/DL (ref 5–25)
CALCIUM SERPL-MCNC: 9.9 MG/DL (ref 8.4–10.2)
CHLORIDE SERPL-SCNC: 101 MMOL/L (ref 96–108)
CHOLEST SERPL-MCNC: 125 MG/DL
CLARITY UR: ABNORMAL
CO2 SERPL-SCNC: 32 MMOL/L (ref 21–32)
COLOR UR: YELLOW
CREAT SERPL-MCNC: 1.36 MG/DL (ref 0.6–1.3)
EOSINOPHIL # BLD AUTO: 0.12 THOUSAND/ÂΜL (ref 0–0.61)
EOSINOPHIL NFR BLD AUTO: 1 % (ref 0–6)
ERYTHROCYTE [DISTWIDTH] IN BLOOD BY AUTOMATED COUNT: 16.1 % (ref 11.6–15.1)
GFR SERPL CREATININE-BSD FRML MDRD: 34 ML/MIN/1.73SQ M
GLUCOSE SERPL-MCNC: 110 MG/DL (ref 65–140)
GLUCOSE UR STRIP-MCNC: NEGATIVE MG/DL
HCT VFR BLD AUTO: 46.4 % (ref 34.8–46.1)
HDLC SERPL-MCNC: 67 MG/DL
HGB BLD-MCNC: 14.8 G/DL (ref 11.5–15.4)
HGB UR QL STRIP.AUTO: NEGATIVE
IMM GRANULOCYTES # BLD AUTO: 0.05 THOUSAND/UL (ref 0–0.2)
IMM GRANULOCYTES NFR BLD AUTO: 1 % (ref 0–2)
KETONES UR STRIP-MCNC: NEGATIVE MG/DL
LDLC SERPL CALC-MCNC: 46 MG/DL (ref 0–100)
LEUKOCYTE ESTERASE UR QL STRIP: ABNORMAL
LYMPHOCYTES # BLD AUTO: 1.95 THOUSANDS/ÂΜL (ref 0.6–4.47)
LYMPHOCYTES NFR BLD AUTO: 18 % (ref 14–44)
MCH RBC QN AUTO: 30.5 PG (ref 26.8–34.3)
MCHC RBC AUTO-ENTMCNC: 31.9 G/DL (ref 31.4–37.4)
MCV RBC AUTO: 96 FL (ref 82–98)
MONOCYTES # BLD AUTO: 0.89 THOUSAND/ÂΜL (ref 0.17–1.22)
MONOCYTES NFR BLD AUTO: 8 % (ref 4–12)
NEUTROPHILS # BLD AUTO: 7.6 THOUSANDS/ÂΜL (ref 1.85–7.62)
NEUTS SEG NFR BLD AUTO: 71 % (ref 43–75)
NITRITE UR QL STRIP: NEGATIVE
NON-SQ EPI CELLS URNS QL MICRO: ABNORMAL /HPF
NONHDLC SERPL-MCNC: 58 MG/DL
NRBC BLD AUTO-RTO: 0 /100 WBCS
PH UR STRIP.AUTO: 7 [PH]
PLATELET # BLD AUTO: 181 THOUSANDS/UL (ref 149–390)
PMV BLD AUTO: 9.8 FL (ref 8.9–12.7)
POTASSIUM SERPL-SCNC: 3.6 MMOL/L (ref 3.5–5.3)
PROT SERPL-MCNC: 6.4 G/DL (ref 6.4–8.4)
PROT UR STRIP-MCNC: ABNORMAL MG/DL
RBC # BLD AUTO: 4.85 MILLION/UL (ref 3.81–5.12)
RBC #/AREA URNS AUTO: ABNORMAL /HPF
SODIUM SERPL-SCNC: 144 MMOL/L (ref 135–147)
SP GR UR STRIP.AUTO: 1.01 (ref 1–1.03)
TRIGL SERPL-MCNC: 60 MG/DL
TSH SERPL DL<=0.05 MIU/L-ACNC: 4.07 UIU/ML (ref 0.45–4.5)
UROBILINOGEN UR STRIP-ACNC: <2 MG/DL
WBC # BLD AUTO: 10.66 THOUSAND/UL (ref 4.31–10.16)
WBC #/AREA URNS AUTO: ABNORMAL /HPF

## 2023-10-27 PROCEDURE — 81001 URINALYSIS AUTO W/SCOPE: CPT | Performed by: FAMILY MEDICINE

## 2023-10-27 PROCEDURE — 85025 COMPLETE CBC W/AUTO DIFF WBC: CPT

## 2023-10-27 PROCEDURE — 84443 ASSAY THYROID STIM HORMONE: CPT

## 2023-10-27 PROCEDURE — 87086 URINE CULTURE/COLONY COUNT: CPT

## 2023-10-27 PROCEDURE — 80053 COMPREHEN METABOLIC PANEL: CPT

## 2023-10-27 PROCEDURE — 80061 LIPID PANEL: CPT

## 2023-10-27 PROCEDURE — 36415 COLL VENOUS BLD VENIPUNCTURE: CPT

## 2023-10-27 PROCEDURE — 87186 SC STD MICRODIL/AGAR DIL: CPT

## 2023-10-27 PROCEDURE — 87077 CULTURE AEROBIC IDENTIFY: CPT

## 2023-10-27 PROCEDURE — 82306 VITAMIN D 25 HYDROXY: CPT

## 2023-10-28 ENCOUNTER — APPOINTMENT (EMERGENCY)
Dept: CT IMAGING | Facility: HOSPITAL | Age: 88
End: 2023-10-28
Payer: MEDICARE

## 2023-10-28 ENCOUNTER — HOSPITAL ENCOUNTER (EMERGENCY)
Facility: HOSPITAL | Age: 88
Discharge: HOME | End: 2023-10-28
Attending: EMERGENCY MEDICINE | Admitting: EMERGENCY MEDICINE
Payer: MEDICARE

## 2023-10-28 VITALS
WEIGHT: 157.41 LBS | HEART RATE: 60 BPM | OXYGEN SATURATION: 94 % | DIASTOLIC BLOOD PRESSURE: 58 MMHG | SYSTOLIC BLOOD PRESSURE: 124 MMHG | BODY MASS INDEX: 24.65 KG/M2 | RESPIRATION RATE: 16 BRPM | TEMPERATURE: 98.2 F

## 2023-10-28 DIAGNOSIS — R29.6 FREQUENT FALLS: ICD-10-CM

## 2023-10-28 DIAGNOSIS — R93.0 ABNORMAL HEAD CT: ICD-10-CM

## 2023-10-28 DIAGNOSIS — N39.0 ACUTE UTI: Primary | ICD-10-CM

## 2023-10-28 PROCEDURE — 99284 EMERGENCY DEPT VISIT MOD MDM: CPT | Performed by: EMERGENCY MEDICINE

## 2023-10-28 PROCEDURE — 99285 EMERGENCY DEPT VISIT HI MDM: CPT

## 2023-10-28 PROCEDURE — 70450 CT HEAD/BRAIN W/O DYE: CPT

## 2023-10-28 PROCEDURE — 93005 ELECTROCARDIOGRAM TRACING: CPT

## 2023-10-28 PROCEDURE — G1004 CDSM NDSC: HCPCS

## 2023-10-28 RX ORDER — CEPHALEXIN 500 MG/1
500 CAPSULE ORAL EVERY 12 HOURS SCHEDULED
Qty: 14 CAPSULE | Refills: 0 | Status: SHIPPED | OUTPATIENT
Start: 2023-10-29 | End: 2023-11-05

## 2023-10-28 RX ORDER — CEPHALEXIN 250 MG/1
500 CAPSULE ORAL ONCE
Status: COMPLETED | OUTPATIENT
Start: 2023-10-28 | End: 2023-10-28

## 2023-10-28 RX ADMIN — CEPHALEXIN 500 MG: 250 CAPSULE ORAL at 19:55

## 2023-10-28 NOTE — ED NOTES
Patient transported to [facility] beds at the [facility] Joyce Meza RN  10/28/23 [id number]

## 2023-10-28 NOTE — ED PROVIDER NOTES
History  Chief Complaint   Patient presents with    Altered Mental Status     Patient has had 2 falls this week. Patient reports falling yesterday morning. Unknown head strike. Patient's family reports increased confusion since fall. Pt c/o dizziness post fall. -thinners     89y F biba from home for evaluation of recurrent falls and worsening of baseline dizziness. ? Head strike w/ any falls - no obvious signs of head trauma, but pt does take 81mg asa daily. Pt w/ some baseline hallucinations that have been increased over the last few days, but they do not appear distressing to the patient. No reported f/c/s, no changes in appetite, no cp, no sob/watson, no abd pain, no n/v/d, no changes in urination. Daughter is retired ICU nurse and had already reached out to [facility] for outpt labs/UA which were completed yesterday w/ no sig wbc, normal h/h, stable renal function and electrolytes. UA was nitrite negative w/ 4-10 WBC noted in micro - daughter reported culture not resulted but when I reviewed the chart, there is a preliminary urine culture result of >100K gram neg rods, so likely UTI. History provided by:  Patient, medical records and relative   used: No    Altered Mental Status      Prior to Admission Medications   Prescriptions Last Dose Informant Patient Reported? Taking?    ALFALFA PO  Self Yes No   Sig: Take 3 tablets by mouth     B Complex Vitamins (B COMPLEX 1 PO)  Self Yes No   Sig: Take by mouth   Klor-Con M20 20 MEQ tablet   No No   Sig: TAKE 1 TABLET DAILY   LECITHIN PO  Self Yes No   Sig: Take 1 tablet by mouth daily   Omega-3 Fatty Acids (OMEGA-3 PO)  Self Yes No   Sig: Take by mouth 2 (two) times a day     Zinc Sulfate (ZINC 15 PO)  Self Yes No   Sig: Take 100 mg by mouth     aspirin (ECOTRIN LOW STRENGTH) 81 mg EC tablet  Self Yes No   Sig: Take 81 mg by mouth daily   beta carotene 30 MG capsule   Yes No   Sig: Take 30 mg by mouth daily   buPROPion (WELLBUTRIN XL) 300 mg 24 hr tablet   No No   Sig: TAKE 1 TABLET EVERY MORNING   cholecalciferol (VITAMIN D3) 1,000 units tablet  Self No No   Sig: Take 1 tablet by mouth daily   diltiazem (CARDIZEM CD) 240 mg 24 hr capsule   No No   Sig: TAKE 1 CAPSULE DAILY   furosemide (LASIX) 40 mg tablet   No No   Sig: Take 1 tablet (40 mg total) by mouth 2 (two) times a day   ketoconazole (NIZORAL) 2 % shampoo   No No   Sig: Apply 1 application topically once for 1 dose   methimazole (TAPAZOLE) 5 mg tablet   No No   Sig: TAKE 1 TABLET THREE TIMES  WEEKLY   metoprolol tartrate (LOPRESSOR) 50 mg tablet   No No   Sig: TAKE 1 TABLET EVERY 12     HOURS   pantoprazole (PROTONIX) 40 mg tablet   No No   Sig: Take 1 tablet (40 mg total) by mouth daily   sertraline (ZOLOFT) 50 mg tablet   No No   Sig: Take 3 tablets (150 mg total) by mouth daily   simvastatin (ZOCOR) 20 mg tablet   No No   Sig: TAKE 1 TABLET DAILY AT     BEDTIME   triamcinolone (KENALOG) 0.1 % cream   No No   Sig: Apply topically 2 (two) times a day   vitamin E 100 UNIT capsule   Yes No   Sig: Take 100 Units by mouth daily      Facility-Administered Medications: None       Past Medical History:   Diagnosis Date    VAZQUEZ (acute kidney injury) ([facility]) 2/17/2017    Anemia     Atrial fibrillation (HCC)     Atrial flutter (HCC)     Depression     Diastolic CHF (HCC)     Dyslipidemia     Glaucoma     H/O: hysterectomy     Hypertension     Hyperthyroidism     Impaired glucose tolerance     RESOLVED 2/3/2016    Pacemaker     Psoriasis     S/P cataract surgery     S/P knee replacement     SSS (sick sinus syndrome) (HCC)     SSS (sick sinus syndrome) ([address])        Past Surgical History:   Procedure Laterality Date    CARDIAC PACEMAKER PLACEMENT      CARDIAC PACEMAKER PLACEMENT      dual chamber    CATARACT EXTRACTION      ESOPHAGOGASTRODUODENOSCOPY N/A 12/30/2016    Procedure: ESOPHAGOGASTRODUODENOSCOPY (EGD); Surgeon: Baldomero Colvin MD;  Location: [facility];   Service:     EYE SURGERY      cataract    HYSTERECTOMY JOINT REPLACEMENT      bilateral     [place] COLONOSCOPY FLX DX W/COLLJ SPEC WHEN PFRMD N/A 4/24/2017    Procedure: COLONOSCOPY with polypectomy;  Surgeon: Paul Palencia MD;  Location: [facility]; Service: Gastroenterology    REPLACEMENT TOTAL KNEE Bilateral        Family History   Problem Relation Age of Onset    Alcohol abuse Father     Diabetes Father     Alcohol abuse Brother      I have reviewed and agree with the history as documented. E-Cigarette/Vaping    E-Cigarette Use Never User      E-Cigarette/Vaping Substances     Social History     Tobacco Use    Smoking status: Former    Smokeless tobacco: Never   Vaping Use    Vaping Use: Never used   Substance Use Topics    Alcohol use: Never    Drug use: No       Review of Systems   All other systems reviewed and are negative. Physical Exam  Physical Exam  Vitals and nursing note reviewed. Constitutional:       General: She is not in acute distress. Appearance: Normal appearance. She is not ill-appearing, toxic-appearing or diaphoretic. HENT:      Right Ear: Decreased hearing (baseline) noted. Left Ear: Decreased hearing (baseline) noted. Mouth/Throat:      Mouth: Mucous membranes are moist.   Eyes:      Conjunctiva/sclera: Conjunctivae normal.      Comments: Pinpoint pupils b/l   Cardiovascular:      Rate and Rhythm: Normal rate. Pulmonary:      Effort: Pulmonary effort is normal.   Chest:      Chest wall: No tenderness. Abdominal:      Palpations: Abdomen is soft. Tenderness: There is no abdominal tenderness. Musculoskeletal:         General: No swelling or tenderness. Cervical back: Normal range of motion. Skin:     General: Skin is warm. Neurological:      General: No focal deficit present. Mental Status: She is alert. Mental status is at baseline. Cranial Nerves: No cranial nerve deficit. Sensory: No sensory deficit. Motor: No weakness.    Psychiatric:         Mood and Affect: Mood normal. Vital Signs  ED Triage Vitals [10/28/23 1648]   Temperature Pulse Respirations Blood Pressure SpO2   98.2 °F (36.8 °C) 60 16 128/60 95 %      Temp Source Heart Rate Source Patient Position - Orthostatic VS BP Location FiO2 (%)   Oral Monitor Lying Right arm --      Pain Score       No Pain           Vitals:    10/28/23 1648 10/28/23 1730   BP: 128/60 124/58   Pulse: 60 60   Patient Position - Orthostatic VS: Lying Lying         Visual Acuity  Visual Acuity      Flowsheet Row Most Recent Value   L Pupil Size (mm) 3   R Pupil Size (mm) 3            ED Medications  Medications   cephalexin (KEFLEX) capsule 500 mg (500 mg Oral Given 10/28/23 1955)       Diagnostic Studies  Results Reviewed       None                   CT head without contrast   ED Interpretation by Ron Yates DO (10/28 1905)   See below      Final Result by Lior Acosta MD (10/28 1900)      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 This can be further evaluated with MRI preferably without and with intravenous gadolinium. No acute intracranial hemorrhage. Old tiny lacunar infarcts. Atrophy and chronic microvascular ischemic changes. Workstation performed: [id number]                    Procedures  Procedures         ED Course  ED Course as of 10/28/23 2105   Sat Oct 28, 2023   1918 TT sent to neurology to discuss CT findings and disposition plan   2000 D/w Dr. Madiha Fischer, neurology. It does appear that the indicated finding was there up to 3y ago but it is larger. He was able to search chart for MRI and it doesn't appear that the patient's pacemaker is MRI compatible, so it is recommended she have an outpt CTA to further eval area (? If aneurysmal). Was able to speak w/ daughter and instructed on CTA instead of MRI as noted on her discharge paperwork                               SBIRT 20yo+      Flowsheet Row Most Recent Value   Initial Alcohol Screen: US AUDIT-C     1.  How often do you have a drink containing alcohol? 0 Filed at: 10/28/2023 1653   2. How many drinks containing alcohol do you have on a typical day you are drinking? 0 Filed at: 10/28/2023 1653   3a. Male UNDER 65: How often do you have five or more drinks on one occasion? 0 Filed at: 10/28/2023 1653   3b. FEMALE Any Age, or MALE 65+: How often do you have 4 or more drinks on one occassion? 0 Filed at: 10/28/2023 1653   Audit-C Score 0 Filed at: 10/28/2023 1653   ROSITA: How many times in the past year have you. .. Used an illegal drug or used a prescription medication for non-medical reasons? Never Filed at: 10/28/2023 1653                      Medical Decision Making  Will get CTH to r/o acute life threatening intracranial bleeding (ich, sdh, edh, sah). Will review outpt labs/urine results    Problems Addressed:  Abnormal head CT: acute illness or injury  Acute UTI: acute illness or injury  Frequent falls: acute illness or injury that poses a threat to life or bodily functions     Details: no acute life threatening bleeding    Amount and/or Complexity of Data Reviewed  Independent Historian:      Details: daughter  External Data Reviewed: labs, radiology and notes. Details: outpt labs reviewed  prior CTH reviewed  outpt FP notes reviewed  Radiology: ordered. Discussion of management or test interpretation with external provider(s): D/w Dr. Gus Osuna, neuro    Risk  Prescription drug management.              Disposition  Final diagnoses:   Acute UTI   Frequent falls   Abnormal head CT     Time reflects when diagnosis was documented in both MDM as applicable and the Disposition within this note       Time User Action Codes Description Comment    10/28/2023  7:48 PM Nicole Andrade Add [N39.0] Acute UTI     10/28/2023  7:49 PM Nicole Andrade [R29.6] Frequent falls     10/28/2023  7:49 PM Lupe, [facility] [R93.0] Abnormal head CT           ED Disposition       ED Disposition   Discharge    Condition Stable    Date/Time   Sat Oct 28, 2023 1103 [facility] discharge to home/self care.                    Follow-up Information       Follow up With Specialties Details Why Contact Info    Tae Carvalho MD [facility] Medicine Schedule an appointment as soon as possible for a visit  for further evaluation and treatment [address]  [phone]              Discharge Medication List as of 10/28/2023  7:52 PM        START taking these medications    Details   cephalexin (KEFLEX) 500 mg capsule Take 1 capsule (500 mg total) by mouth every 12 (twelve) hours for 7 days Do not start before October 29, 2023., Starting Sun 10/29/2023, Until Sun 11/5/2023, Normal           CONTINUE these medications which have NOT CHANGED    Details   ALFALFA PO Take 3 tablets by mouth  , Historical Med      aspirin (ECOTRIN LOW STRENGTH) 81 mg EC tablet Take 81 mg by mouth daily, Historical Med      B Complex Vitamins (B COMPLEX 1 PO) Take by mouth, Historical Med      beta carotene 30 MG capsule Take 30 mg by mouth daily, Historical Med      buPROPion (WELLBUTRIN XL) 300 mg 24 hr tablet TAKE 1 TABLET EVERY MORNING, Normal      cholecalciferol (VITAMIN D3) 1,000 units tablet Take 1 tablet by mouth daily, Starting Sat 2/18/2017, Print      diltiazem (CARDIZEM CD) 240 mg 24 hr capsule TAKE 1 CAPSULE DAILY, Normal      furosemide (LASIX) 40 mg tablet Take 1 tablet (40 mg total) by mouth 2 (two) times a day, Starting Mon 9/25/2023, Normal      ketoconazole (NIZORAL) 2 % shampoo Apply 1 application topically once for 1 dose, Starting Sun 7/24/2022, Normal      Klor-Con M20 20 MEQ tablet TAKE 1 TABLET DAILY, Starting Sun 8/27/2023, Normal      LECITHIN PO Take 1 tablet by mouth daily, Historical Med      methimazole (TAPAZOLE) 5 mg tablet TAKE 1 TABLET THREE TIMES  WEEKLY, Starting Mon 10/16/2023, Normal      metoprolol tartrate (LOPRESSOR) 50 mg tablet TAKE 1 TABLET EVERY 12     HOURS, Normal Omega-3 Fatty Acids (OMEGA-3 PO) Take by mouth 2 (two) times a day  , Historical Med      pantoprazole (PROTONIX) 40 mg tablet Take 1 tablet (40 mg total) by mouth daily, Starting Mon 7/10/2023, Normal      sertraline (ZOLOFT) 50 mg tablet Take 3 tablets (150 mg total) by mouth daily, Starting Mon 10/9/2023, Normal      simvastatin (ZOCOR) 20 mg tablet TAKE 1 TABLET DAILY AT     BEDTIME, Normal      triamcinolone (KENALOG) 0.1 % cream Apply topically 2 (two) times a day, Starting Wed 7/28/2021, Normal      vitamin E 100 UNIT capsule Take 100 Units by mouth daily, Historical Med      Zinc Sulfate (ZINC 15 PO) Take 100 mg by mouth  , Historical Med             No discharge procedures on file.     PDMP Review         Value Time User    PDMP Reviewed  Yes 7/2/2020  8:27 AM Tor Agudelo MD            ED Provider  Electronically Signed by             Iván Swann DO  10/28/23 3734

## 2023-10-29 LAB
BACTERIA UR CULT: ABNORMAL
BACTERIA UR CULT: ABNORMAL

## 2023-10-30 ENCOUNTER — VBI (OUTPATIENT)
Dept: INTERNAL MEDICINE CLINIC | Facility: CLINIC | Age: 88
End: 2023-10-30

## 2023-10-30 LAB
ATRIAL RATE: 264 BPM
QRS AXIS: -81 DEGREES
QRSD INTERVAL: 176 MS
QT INTERVAL: 480 MS
QTC INTERVAL: 480 MS
T WAVE AXIS: 96 DEGREES
VENTRICULAR RATE: 60 BPM

## 2023-10-30 PROCEDURE — 93010 ELECTROCARDIOGRAM REPORT: CPT | Performed by: INTERNAL MEDICINE

## 2023-10-30 NOTE — TELEPHONE ENCOUNTER
10/30/23 2:00 PM    Patient contacted post ED visit, VBI department spoke with patient/caregiver and outreach was successful. Thank you.   Cali Watson MA  [facility]

## 2023-10-30 NOTE — TELEPHONE ENCOUNTER
10/30/23 1:13 PM    Patient contacted post ED visit, first outreach attempt made. Message was left for patient to return a call to the VBI Department at [facility]: Phone [phone]. Thank you.   Sumanth Kim MA  [facility]

## 2023-11-06 ENCOUNTER — REMOTE DEVICE CLINIC VISIT (OUTPATIENT)
Dept: CARDIOLOGY CLINIC | Facility: CLINIC | Age: 88
End: 2023-11-06
Payer: MEDICARE

## 2023-11-06 DIAGNOSIS — Z95.0 PRESENCE OF CARDIAC PACEMAKER: Primary | ICD-10-CM

## 2023-11-06 PROCEDURE — 93294 REM INTERROG EVL PM/LDLS PM: CPT | Performed by: INTERNAL MEDICINE

## 2023-11-06 PROCEDURE — 93296 REM INTERROG EVL PM/IDS: CPT | Performed by: INTERNAL MEDICINE

## 2023-11-06 NOTE — PROGRESS NOTES
Results for orders placed or performed in visit on 11/06/23   Cardiac EP device report    Narrative    SJM-DUAL-PM/ NOT MRI CONDITIONAL  MERLIN TRANSMISSION: BATTERY VOLTAGE ADEQUATE (1.1 YRS). : >99% (>40%~VVIR/60). ALL AVAILABLE LEAD PARAMETERS WITHIN NORMAL LIMITS. NO SIGNIFICANT HIGH RATE EPISODES. PRESENTING RHYTHM: AF  @ 60 PPM, PT DOES NOT TAKE ANY AC DUE TO GI BLEED/HIGH RISK/FALL 79 YO. PT TAKES METOPROLOL TART, CARDIZEM CD, ASA 81MG. EF: 68% (NUC ST TX 4/22/19). NORMAL DEVICE FUNCTION.   [address] [address]

## 2023-11-27 ENCOUNTER — TELEPHONE (OUTPATIENT)
Dept: INTERNAL MEDICINE CLINIC | Facility: CLINIC | Age: 88
End: 2023-11-27

## 2023-11-27 DIAGNOSIS — I50.32 CHRONIC DIASTOLIC CONGESTIVE HEART FAILURE (HCC): ICD-10-CM

## 2023-11-27 DIAGNOSIS — K57.91 GASTROINTESTINAL HEMORRHAGE ASSOCIATED WITH INTESTINAL DIVERTICULOSIS: ICD-10-CM

## 2023-11-27 RX ORDER — POTASSIUM CHLORIDE 20 MEQ/1
20 TABLET, EXTENDED RELEASE ORAL DAILY
Qty: 10 TABLET | Refills: 0 | Status: SHIPPED | OUTPATIENT
Start: 2023-11-27

## 2023-11-27 RX ORDER — PANTOPRAZOLE SODIUM 40 MG/1
40 TABLET, DELAYED RELEASE ORAL DAILY
Qty: 90 TABLET | Refills: 1 | Status: SHIPPED | OUTPATIENT
Start: 2023-11-27

## 2023-11-27 NOTE — TELEPHONE ENCOUNTER
Daughter calling asking if you can put in an order for 10 pills of potassium until her mail order comes in please send to [facility] on [address]

## 2023-11-28 DIAGNOSIS — F32.A DEPRESSION, UNSPECIFIED DEPRESSION TYPE: ICD-10-CM

## 2023-11-28 DIAGNOSIS — E78.2 MIXED HYPERLIPIDEMIA: ICD-10-CM

## 2023-11-28 RX ORDER — SIMVASTATIN 20 MG
20 TABLET ORAL
Qty: 90 TABLET | Refills: 3 | Status: SHIPPED | OUTPATIENT
Start: 2023-11-28

## 2023-11-28 RX ORDER — BUPROPION HYDROCHLORIDE 300 MG/1
TABLET ORAL
Qty: 90 TABLET | Refills: 1 | Status: SHIPPED | OUTPATIENT
Start: 2023-11-28

## 2023-12-13 ENCOUNTER — TELEPHONE (OUTPATIENT)
Dept: INTERNAL MEDICINE CLINIC | Facility: CLINIC | Age: 88
End: 2023-12-13

## 2023-12-13 NOTE — TELEPHONE ENCOUNTER
Hi, this is Cora Springer calling for [facility] for 6:32. I left on text messages for Doctor Vinita De La Cruz just to let her know she's acting the same way she did before with the UTI. I was wondering if she can call in a Keflex order for me or if she wants to call me and find out what's going on, That's fine. My number [phone]. Thank you so much for your time. Have a great day.  Bye.    SPOKE TO PROVIDER SHE WILL PLACE ORDERS END OF DAY

## 2023-12-14 DIAGNOSIS — R26.2 AMBULATORY DYSFUNCTION: ICD-10-CM

## 2023-12-14 DIAGNOSIS — N39.0 RECURRENT UTI: Primary | ICD-10-CM

## 2023-12-14 DIAGNOSIS — M15.9 PRIMARY OSTEOARTHRITIS INVOLVING MULTIPLE JOINTS: ICD-10-CM

## 2023-12-14 RX ORDER — CEPHALEXIN 500 MG/1
500 CAPSULE ORAL EVERY 8 HOURS SCHEDULED
Qty: 21 CAPSULE | Refills: 0 | Status: SHIPPED | OUTPATIENT
Start: 2023-12-14 | End: 2023-12-21

## 2023-12-21 DIAGNOSIS — I48.0 PAROXYSMAL ATRIAL FIBRILLATION (HCC): ICD-10-CM

## 2023-12-21 RX ORDER — METOPROLOL TARTRATE 50 MG/1
TABLET, FILM COATED ORAL
Qty: 180 TABLET | Refills: 1 | Status: SHIPPED | OUTPATIENT
Start: 2023-12-21

## 2023-12-22 ENCOUNTER — TELEPHONE (OUTPATIENT)
Dept: CARDIOLOGY CLINIC | Facility: CLINIC | Age: 88
End: 2023-12-22

## 2023-12-22 RX ORDER — DILTIAZEM HYDROCHLORIDE 240 MG/1
CAPSULE, COATED, EXTENDED RELEASE ORAL
Qty: 90 CAPSULE | Refills: 0 | Status: SHIPPED | OUTPATIENT
Start: 2023-12-22

## 2024-01-01 ENCOUNTER — REMOTE DEVICE CLINIC VISIT (OUTPATIENT)
Dept: CARDIOLOGY CLINIC | Facility: CLINIC | Age: 89
End: 2024-01-01
Payer: MEDICARE

## 2024-01-01 ENCOUNTER — OFFICE VISIT (OUTPATIENT)
Dept: OBGYN CLINIC | Facility: CLINIC | Age: 89
End: 2024-01-01
Payer: MEDICARE

## 2024-01-01 VITALS — BODY MASS INDEX: 23.54 KG/M2 | WEIGHT: 150 LBS | HEIGHT: 67 IN

## 2024-01-01 DIAGNOSIS — Z95.0 PRESENCE OF PERMANENT CARDIAC PACEMAKER: Primary | ICD-10-CM

## 2024-01-01 DIAGNOSIS — S42.295A OTHER CLOSED NONDISPLACED FRACTURE OF PROXIMAL END OF LEFT HUMERUS, INITIAL ENCOUNTER: Primary | ICD-10-CM

## 2024-01-01 PROCEDURE — 99213 OFFICE O/P EST LOW 20 MIN: CPT | Performed by: ORTHOPAEDIC SURGERY

## 2024-01-01 PROCEDURE — 93296 REM INTERROG EVL PM/IDS: CPT | Performed by: INTERNAL MEDICINE

## 2024-01-01 PROCEDURE — 93294 REM INTERROG EVL PM/LDLS PM: CPT | Performed by: INTERNAL MEDICINE

## 2024-01-17 ENCOUNTER — OFFICE VISIT (OUTPATIENT)
Dept: INTERNAL MEDICINE CLINIC | Facility: CLINIC | Age: 89
End: 2024-01-17
Payer: MEDICARE

## 2024-01-17 VITALS
HEIGHT: 67 IN | BODY MASS INDEX: 23.64 KG/M2 | HEART RATE: 65 BPM | DIASTOLIC BLOOD PRESSURE: 76 MMHG | TEMPERATURE: 97.5 F | SYSTOLIC BLOOD PRESSURE: 122 MMHG | WEIGHT: 150.6 LBS

## 2024-01-17 DIAGNOSIS — R44.1 VISUAL HALLUCINATIONS: Primary | ICD-10-CM

## 2024-01-17 DIAGNOSIS — I48.0 PAROXYSMAL ATRIAL FIBRILLATION (HCC): ICD-10-CM

## 2024-01-17 DIAGNOSIS — I50.32 CHRONIC DIASTOLIC CONGESTIVE HEART FAILURE (HCC): ICD-10-CM

## 2024-01-17 DIAGNOSIS — E78.5 DYSLIPIDEMIA: ICD-10-CM

## 2024-01-17 DIAGNOSIS — N18.31 STAGE 3A CHRONIC KIDNEY DISEASE (HCC): ICD-10-CM

## 2024-01-17 DIAGNOSIS — F03.B3 MODERATE DEMENTIA WITH MOOD DISTURBANCE, UNSPECIFIED DEMENTIA TYPE (HCC): ICD-10-CM

## 2024-01-17 DIAGNOSIS — E05.90 HYPERTHYROIDISM: ICD-10-CM

## 2024-01-17 DIAGNOSIS — F33.9 DEPRESSION, RECURRENT (HCC): ICD-10-CM

## 2024-01-17 DIAGNOSIS — D69.6 PLATELETS DECREASED (HCC): ICD-10-CM

## 2024-01-17 PROBLEM — R40.4 TRANSIENT ALTERATION OF AWARENESS: Status: ACTIVE | Noted: 2024-01-17

## 2024-01-17 PROBLEM — S70.11XA CONTUSION OF RIGHT THIGH: Status: RESOLVED | Noted: 2020-04-29 | Resolved: 2024-01-17

## 2024-01-17 PROCEDURE — 99214 OFFICE O/P EST MOD 30 MIN: CPT | Performed by: FAMILY MEDICINE

## 2024-01-17 RX ORDER — OLANZAPINE 2.5 MG/1
2.5 TABLET, FILM COATED ORAL
Qty: 90 TABLET | Refills: 1 | Status: SHIPPED | OUTPATIENT
Start: 2024-01-17

## 2024-01-18 ENCOUNTER — TELEPHONE (OUTPATIENT)
Age: 89
End: 2024-01-18

## 2024-01-18 NOTE — PROGRESS NOTES
Assessment/Plan:       1. Visual hallucinations  -     CBC and differential; Future  -     OLANZapine (ZyPREXA) 2.5 mg tablet; Take 1 tablet (2.5 mg total) by mouth daily at bedtime  -     Ambulatory Referral to [facility]; Future  -     Vitamin B12; Future    2. Depression, recurrent (HCC)  -     CBC and differential; Future  -     OLANZapine (ZyPREXA) 2.5 mg tablet; Take 1 tablet (2.5 mg total) by mouth daily at bedtime  -     Ambulatory Referral to [facility]; Future  -     Vitamin B12; Future    3. Chronic diastolic congestive heart failure (HCC)  -     CBC and differential; Future  -     Vitamin B12; Future    4. Hyperthyroidism  -     CBC and differential; Future  -     Vitamin B12; Future    5. Paroxysmal atrial fibrillation (HCC)  -     CBC and differential; Future  -     Vitamin B12; Future    6. Dyslipidemia  -     Comprehensive metabolic panel; Future  -     Lipid panel; Future  -     TSH, 3rd generation; Future  -     Vitamin B12; Future    7. Platelets decreased (HCC)    8. Stage 3a chronic kidney disease (HCC)    9. Moderate dementia with mood disturbance, unspecified dementia type (HCC)  -     Vitamin B12; Future    Orders and recommendations as noted above.  Discussed her medications with her and her daughter.  Her symptoms seem to worsen with the increased dose of the Zoloft.  Will decrease the dosage back to 100 mg daily.  Continue with the Wellbutrin which the daughter feels helps significantly with her mood.  Overnight issues worse.  Discussed options for treatment.  They do wish to try low-dose Zyprexa.  Prescription given.  Potential side effects discussed.  Risks and benefits discussed.  Slip given for laboratory testing.  Referral given to geriatrics.  Continue to follow-up with cardiology.  Watch for any increasing shortness of breath, increasing peripheral edema, or sudden changes in weight.  Discussed with them that significant visual impairment with the macular degeneration may be  contributing to her symptoms.  Will have her follow-up in about 2 to 3 months or sooner if needed.      Depression Screening and Follow-up Plan: Patient's depression screening was positive with a PHQ-9 score of 6. Patient assessed for underlying major depression. Brief counseling provided and recommend additional follow-up/re-evaluation next office visit. Depression likely due to other medical condition. Will treat underlying condition.     Falls Plan of Care: balance, strength, and gait training instructions were provided. Recommended assistive device to help with gait and balance. Patient assessed for orthostatic hypotension. Medications that increase falls were reviewed. Cognitive screening performed. Referral to geriatrics was provided.     Urinary Incontinence Plan of Care: counseling topics discussed: use restroom every 2 hours and limiting fluid intake 3-4 hours before bed.                Subjective:      Patient ID: Estrella Benedict is a 89 y.o. female.    She presents for problem visit.  She has been having increasing issues with the visual hallucinations.  Sees her  and other people especially overnight.  Her daughter states that she will have conversations with them.  She can see them but they do not communicate back.  This is distressing to her at times.  She also has had some issues with wandering at night.  Her daughter does have alarms on the doors so she cannot get out but she has at times gotten fully dressed and was ready to leave.  She also has worsening gait.  Has had numerous falls but no significant injuries.  Daughter does note that some of the symptoms seem to have worsened since the increased dose of the Zoloft.  Daughter is interested in medication that may help to lessen the symptoms if possible.  Mood has been relatively stable.  Appetite has been decreased overall but does eat well.  Denies any chest pain or palpitations.  Does have some intermittent shortness of breath.  Continues  with the methimazole.  Tolerating her diltiazem and metoprolol without difficulty.  Denies any significant palpitations.    The following portions of the patient's history were reviewed and updated as appropriate: She  has a past medical history of VAZQUEZ (acute kidney injury) (Formerly [facility]) (2/17/2017), Anemia, Atrial fibrillation (HCC), Atrial flutter (HCC), Depression, Diastolic CHF (HCC), Dyslipidemia, Glaucoma, H/O: hysterectomy, Hypertension, Hyperthyroidism, Impaired glucose tolerance, Pacemaker, Psoriasis, S/P cataract surgery, S/P knee replacement, SSS (sick sinus syndrome) (HCC), and SSS (sick sinus syndrome) (HCC).  She   Patient Active Problem List    Diagnosis Date Noted   • Transient alteration of awareness 01/17/2024   • Visual hallucinations 10/09/2023   • Platelets decreased (Former[facility]) 07/10/2023   • Stage 3a chronic kidney disease (Formerly [facility]) 12/01/2021   • Iron deficiency 09/04/2021   • Multiple thyroid nodules 04/29/2020   • Dyspnea on exertion 09/20/2017   • Osteoporosis 03/09/2017   • Paroxysmal atrial fibrillation (HCC) 02/22/2017   • Anemia 02/18/2017   • Renal atrophy 02/18/2017   • Hyperphosphatemia 02/18/2017   • Vitamin D deficiency 02/18/2017   • Hypokalemia 02/17/2017   • Pulmonary hypertension (HCC) 02/17/2017   • GIB (gastrointestinal bleeding) 12/29/2016   • Presence of permanent cardiac pacemaker    • H/O: hysterectomy    • Benign essential hypertension    • Hyperthyroidism    • Glaucoma    • Chronic diastolic congestive heart failure (HCC)    • SSS (sick sinus syndrome) (Formerly [facility])    • Depression, recurrent ([facility])    • Depression with anxiety 08/12/2015   • Fatigue 11/18/2014   • Vertigo 11/18/2014   • Dyslipidemia 09/12/2012   • Psoriasis 09/12/2012     She  has a past surgical history that includes Cardiac pacemaker placement; Eye surgery; Hysterectomy; Cardiac pacemaker placement; Joint replacement; Esophagogastroduodenoscopy (N/A, 12/30/2016); Cataract extraction; pr colonoscopy flx dx w/collj spec when  pfrmd (N/A, 4/24/2017); and Replacement total knee (Bilateral).  Her family history includes Alcohol abuse in her brother and father; Diabetes in her father.  She  reports that she has quit smoking. She has never used smokeless tobacco. She reports that she does not drink alcohol and does not use drugs.  Current Outpatient Medications   Medication Sig Dispense Refill   • ALFALFA PO Take 3 tablets by mouth       • aspirin (ECOTRIN LOW STRENGTH) 81 mg EC tablet Take 81 mg by mouth daily     • B Complex Vitamins (B COMPLEX 1 PO) Take by mouth     • beta carotene 30 MG capsule Take 30 mg by mouth daily     • buPROPion (WELLBUTRIN XL) 300 mg 24 hr tablet TAKE 1 TABLET EVERY MORNING 90 tablet 1   • cholecalciferol (VITAMIN D3) 1,000 units tablet Take 1 tablet by mouth daily 30 tablet 0   • CRANBERRY PO Take by mouth     • diltiazem (CARDIZEM CD) 240 mg 24 hr capsule TAKE 1 CAPSULE DAILY 90 capsule 0   • furosemide (LASIX) 40 mg tablet Take 1 tablet (40 mg total) by mouth 2 (two) times a day 180 tablet 3   • ketoconazole (NIZORAL) 2 % shampoo Apply 1 application topically once for 1 dose 100 mL 3   • LECITHIN PO Take 1 tablet by mouth daily     • methimazole (TAPAZOLE) 5 mg tablet TAKE 1 TABLET THREE TIMES  WEEKLY 39 tablet 3   • metoprolol tartrate (LOPRESSOR) 50 mg tablet TAKE 1 TABLET EVERY 12     HOURS 180 tablet 1   • OLANZapine (ZyPREXA) 2.5 mg tablet Take 1 tablet (2.5 mg total) by mouth daily at bedtime 90 tablet 1   • Omega-3 Fatty Acids (OMEGA-3 PO) Take by mouth 2 (two) times a day       • pantoprazole (PROTONIX) 40 mg tablet TAKE 1 TABLET DAILY 90 tablet 1   • potassium chloride (Klor-Con M20) 20 mEq tablet Take 1 tablet (20 mEq total) by mouth daily 10 tablet 0   • sertraline (ZOLOFT) 50 mg tablet Take 3 tablets (150 mg total) by mouth daily 270 tablet 1   • simvastatin (ZOCOR) 20 mg tablet TAKE 1 TABLET AT BEDTIME 90 tablet 3   • triamcinolone (KENALOG) 0.1 % cream Apply topically 2 (two) times a day 30 g 0    • vitamin E 100 UNIT capsule Take 100 Units by mouth daily     • Zinc Sulfate (ZINC 15 PO) Take 100 mg by mouth         No current facility-administered medications for this visit.     Current Outpatient Medications on File Prior to Visit   Medication Sig   • ALFALFA PO Take 3 tablets by mouth     • aspirin (ECOTRIN LOW STRENGTH) 81 mg EC tablet Take 81 mg by mouth daily   • B Complex Vitamins (B COMPLEX 1 PO) Take by mouth   • beta carotene 30 MG capsule Take 30 mg by mouth daily   • buPROPion (WELLBUTRIN XL) 300 mg 24 hr tablet TAKE 1 TABLET EVERY MORNING   • cholecalciferol (VITAMIN D3) 1,000 units tablet Take 1 tablet by mouth daily   • CRANBERRY PO Take by mouth   • diltiazem (CARDIZEM CD) 240 mg 24 hr capsule TAKE 1 CAPSULE DAILY   • furosemide (LASIX) 40 mg tablet Take 1 tablet (40 mg total) by mouth 2 (two) times a day   • ketoconazole (NIZORAL) 2 % shampoo Apply 1 application topically once for 1 dose   • LECITHIN PO Take 1 tablet by mouth daily   • methimazole (TAPAZOLE) 5 mg tablet TAKE 1 TABLET THREE TIMES  WEEKLY   • metoprolol tartrate (LOPRESSOR) 50 mg tablet TAKE 1 TABLET EVERY 12     HOURS   • Omega-3 Fatty Acids (OMEGA-3 PO) Take by mouth 2 (two) times a day     • pantoprazole (PROTONIX) 40 mg tablet TAKE 1 TABLET DAILY   • potassium chloride (Klor-Con M20) 20 mEq tablet Take 1 tablet (20 mEq total) by mouth daily   • sertraline (ZOLOFT) 50 mg tablet Take 3 tablets (150 mg total) by mouth daily   • simvastatin (ZOCOR) 20 mg tablet TAKE 1 TABLET AT BEDTIME   • triamcinolone (KENALOG) 0.1 % cream Apply topically 2 (two) times a day   • vitamin E 100 UNIT capsule Take 100 Units by mouth daily   • Zinc Sulfate (ZINC 15 PO) Take 100 mg by mouth       No current facility-administered medications on file prior to visit.     She has No Known Allergies..    Review of Systems   Constitutional:  Positive for activity change, appetite change and fatigue. Negative for chills, fever and unexpected weight  "change.   HENT:  Positive for hearing loss. Negative for congestion.    Eyes:  Positive for visual disturbance.   Respiratory:  Positive for shortness of breath. Negative for cough.    Cardiovascular:  Negative for chest pain and palpitations.   Musculoskeletal:  Positive for gait problem.   Neurological:  Negative for speech difficulty and headaches.   Psychiatric/Behavioral:  Positive for confusion, dysphoric mood, hallucinations and sleep disturbance. The patient is nervous/anxious.          Objective:  /76 (BP Location: Left arm, Patient Position: Sitting, Cuff Size: Large)   Pulse 65   Temp 97.5 °F (36.4 °C)   Ht 5' 7\" (1.702 m)   Wt 68.3 kg (150 lb 9.6 oz)   LMP  (LMP Unknown)   BMI 23.59 kg/m²          Physical Exam  Vitals and nursing note reviewed.   Constitutional:       General: She is not in acute distress.     Appearance: She is well-developed and well-groomed.   HENT:      Head: Normocephalic and atraumatic.   Eyes:      General:         Right eye: No discharge.         Left eye: No discharge.      Conjunctiva/sclera: Conjunctivae normal.      Pupils: Pupils are equal, round, and reactive to light.   Cardiovascular:      Rate and Rhythm: Normal rate and regular rhythm.      Heart sounds: Normal heart sounds. No murmur heard.     No friction rub. No gallop.   Pulmonary:      Effort: No respiratory distress.      Breath sounds: No wheezing or rales.   Abdominal:      General: Bowel sounds are normal. There is no distension.      Tenderness: There is no abdominal tenderness.   Musculoskeletal:      Cervical back: Neck supple.   Lymphadenopathy:      Cervical: No cervical adenopathy.   Skin:     General: Skin is warm and dry.   Neurological:      Mental Status: She is alert. She is confused.      Gait: Gait abnormal.      Comments: Slow but steady gait with cane   Psychiatric:         Mood and Affect: Affect is blunt and flat.         Behavior: Behavior normal. Behavior is cooperative.         " Cognition and Memory: She exhibits impaired recent memory.      Comments: Paucity of speech

## 2024-01-18 NOTE — TELEPHONE ENCOUNTER
[facility]  [address] [place] [address], [address]  [place] [address]    [phone]    Telephone Intake: Geriatric Assessment     - Chart Review  Has this patient been seen by our department in the last 3 years? No  Please route to provider for chart review prior to scheduling and let the caller know that this phone intake will be reviewed IF -  Pt was recently hospitalized  Pt is prescribed medications for behavior management or has a history of psychiatric hospitalization  Pt plans to attend alone    Referral source: Milagros Becker MD    Caller who is scheduling/relationship to pt: daughterNicole  Caller's phone number: [phone]    Reason for referral: Patient concerns , Family member concerns, and Provider concerns regarding memory concerns, behavior changes/concerns, and home safety concerns.  If there are behavioral concerns, is the pt prescribed medications to manage these? no   If so, how many? none   Has the patient ever had an inpatient psychiatric hospitalization? No,   What is the goal of the visit? initial assessment diagnosis; address behaviors; sleep difficulties; and confusion.     Has the patient been seen by a Neurologist or Geriatrician? No   If yes, is this appointment for a second opinion? No  Has the patient ever been diagnosed with dementia? No  Has the patient had an MRI NeuroQuant within the last 1 year? No (If so, please route to provider to determine if assessment + conference are needed or if only assessment should be scheduled)      Preferred language? English  Highest education level? High School Graduate  Does the patient wear glasses? Yes   Does the patient use hearing aids? Yes      Is there a living will/healthcare POA in place/If so, who? No,     Does the pt/caregiver have access for a virtual visit (computer/smart phone with audio/video)? No    Caller was informed:   Please make sure the pt is accompanied by someone who knows them well  / caregiver / family member to participate in this appointment.   Who will accompany the pt (name and relationship)? Nicole Benedict, daughter  Phone number of person accompanying pt: [phone]  Please make sure the pt attends all appointments, including the assessment, care conference, follow-up, whether in-person or virtual.  For virtual visits, pt must be physically present in [facility].     Office packet mailed out to: [phone] [id number]  Added to wait list for sooner appointments/notified that calls can be short notice (same day/day prior)? Yes

## 2024-01-23 ENCOUNTER — HOSPITAL ENCOUNTER (EMERGENCY)
Facility: HOSPITAL | Age: 89
Discharge: HOME | End: 2024-01-23
Attending: EMERGENCY MEDICINE
Payer: MEDICARE

## 2024-01-23 ENCOUNTER — APPOINTMENT (EMERGENCY)
Dept: RADIOLOGY | Facility: HOSPITAL | Age: 89
End: 2024-01-23
Payer: MEDICARE

## 2024-01-23 VITALS
HEART RATE: 60 BPM | SYSTOLIC BLOOD PRESSURE: 127 MMHG | TEMPERATURE: 98 F | DIASTOLIC BLOOD PRESSURE: 58 MMHG | OXYGEN SATURATION: 95 % | RESPIRATION RATE: 14 BRPM

## 2024-01-23 DIAGNOSIS — S42.209A PROXIMAL HUMERUS FRACTURE: Primary | ICD-10-CM

## 2024-01-23 DIAGNOSIS — S09.90XA INJURY OF HEAD, INITIAL ENCOUNTER: ICD-10-CM
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
## 2024-01-23 PROCEDURE — 99285 EMERGENCY DEPT VISIT HI MDM: CPT | Performed by: EMERGENCY MEDICINE

## 2024-01-23 PROCEDURE — 70450 CT HEAD/BRAIN W/O DYE: CPT

## 2024-01-23 PROCEDURE — G1004 CDSM NDSC: HCPCS

## 2024-01-23 PROCEDURE — 99284 EMERGENCY DEPT VISIT MOD MDM: CPT

## 2024-01-23 PROCEDURE — 73030 X-RAY EXAM OF SHOULDER: CPT

## 2024-01-23 NOTE — ED ATTENDING ATTESTATION
1/23/2024  I, Kasi Molina MD, saw and evaluated the patient. I have discussed the patient with the resident/non-physician practitioner and agree with the resident's/non-physician practitioner's findings, Plan of Care, and MDM as documented in the resident's/non-physician practitioner's note, except where noted. All available labs and Radiology studies were reviewed.  I was present for key portions of any procedure(s) performed by the resident/non-physician practitioner and I was immediately available to provide assistance.       At this point I agree with the current assessment done in the Emergency Department.  I have conducted an independent evaluation of this patient a history and physical is as follows:    ED Course         Critical Care Time  Procedures

## 2024-01-23 NOTE — ED PROVIDER NOTES
Emergency Department Trauma Note  Estrella Benedict 89 y.o. female MRN: [id number]  Unit/Bed#: ED 08/ED 08 Encounter: [id number]      Trauma Alert: Trauma Acuity: C  Model of Arrival:   via    Trauma Team: Current Providers  Attending Provider: Kasi Molina MD  Registered Nurse: Miya Kent RN  Resident: Donald Bowser DO  Consultants:     None      History of Present Illness     Chief Complaint:   Chief Complaint   Patient presents with    Fall     Fall against car. + HS +ASA.     HPI:  Estrella Benedict is a 89 y.o. female with a history of diastolic CHF, sick sinus syndrome, pacemaker, hypertension, hyperlipidemia, A-fib, and CKD who presents as a level 3 trauma after mechanical fall from standing with head strike complaining of left shoulder pain.  The patient takes a daily aspirin.  The patient reports that she was getting out of her car when she fell into a car and then fell to the ground striking her head.  The patient reports left shoulder pain.  The patient reports a wound on her head.  Patient denies headache, loss of consciousness, numbness or weakness in her extremities, neck pain, back pain, chest pain, shortness of breath, nausea, vomiting, abdominal pain.  Mechanism:Details of Incident: Pt fell into the side of car. +hs +asa Injury Date: 01/23/24        HPI  Review of Systems   Constitutional:  Negative for chills, diaphoresis and fever.   HENT:  Negative for congestion and sore throat.    Eyes:  Negative for pain and redness.   Respiratory:  Negative for cough and shortness of breath.    Cardiovascular:  Negative for chest pain, palpitations and leg swelling.   Gastrointestinal:  Negative for abdominal pain, diarrhea, nausea and vomiting.   Genitourinary:  Negative for dysuria, flank pain and hematuria.   Musculoskeletal:  Positive for arthralgias. Negative for myalgias.   Skin:  Positive for wound. Negative for color change, pallor and rash.   Neurological:  Negative for  dizziness, seizures, syncope, weakness, light-headedness, numbness and headaches.   All other systems reviewed and are negative.      Historical Information     Immunizations:   Immunization History   Administered Date(s) Administered    INFLUENZA 10/03/2016    Influenza Split High Dose Preservative Free IM 10/03/2016    Pneumococcal Conjugate 13-Valent 01/21/2020       Past Medical History:   Diagnosis Date    VAZQUEZ (acute kidney injury) (HCC) 2/17/2017    Anemia     Atrial fibrillation (HCC)     Atrial flutter (HCC)     Depression     Diastolic CHF (HCC)     Dyslipidemia     Glaucoma     H/O: hysterectomy     Hypertension     Hyperthyroidism     Impaired glucose tolerance     RESOLVED 2/3/2016    Pacemaker     Psoriasis     S/P cataract surgery     S/P knee replacement     SSS (sick sinus syndrome) (HCC)     SSS (sick sinus syndrome) (HCC)        Family History   Problem Relation Age of Onset    Alcohol abuse Father     Diabetes Father     Alcohol abuse Brother      Past Surgical History:   Procedure Laterality Date    CARDIAC PACEMAKER PLACEMENT      CARDIAC PACEMAKER PLACEMENT      dual chamber    CATARACT EXTRACTION      ESOPHAGOGASTRODUODENOSCOPY N/A 12/30/2016    Procedure: ESOPHAGOGASTRODUODENOSCOPY (EGD);  Surgeon: Nba Rutledge MD;  Location: [facility];  Service:     EYE SURGERY      cataract    HYSTERECTOMY      JOINT REPLACEMENT      bilateral     CA COLONOSCOPY FLX DX W/COLLJ SPEC WHEN PFRMD N/A 4/24/2017    Procedure: COLONOSCOPY with polypectomy;  Surgeon: Aren Huertas IV, MD;  Location: [facility];  Service: Gastroenterology    REPLACEMENT TOTAL KNEE Bilateral      Social History     Tobacco Use    Smoking status: Former    Smokeless tobacco: Never   Vaping Use    Vaping status: Never Used   Substance Use Topics    Alcohol use: Never    Drug use: No     E-Cigarette/Vaping    E-Cigarette Use Never User      E-Cigarette/Vaping Substances       Family History: non-contributory    Meds/Allergies   Prior to  Admission Medications   Prescriptions Last Dose Informant Patient Reported? Taking?   ALFALFA PO  Self Yes No   Sig: Take 3 tablets by mouth     B Complex Vitamins (B COMPLEX 1 PO)  Self Yes No   Sig: Take by mouth   CRANBERRY PO   Yes No   Sig: Take by mouth   LECITHIN PO  Self Yes No   Sig: Take 1 tablet by mouth daily   OLANZapine (ZyPREXA) 2.5 mg tablet   No No   Sig: Take 1 tablet (2.5 mg total) by mouth daily at bedtime   Omega-3 Fatty Acids (OMEGA-3 PO)  Self Yes No   Sig: Take by mouth 2 (two) times a day     Zinc Sulfate (ZINC 15 PO)  Self Yes No   Sig: Take 100 mg by mouth     aspirin (ECOTRIN LOW STRENGTH) 81 mg EC tablet  Self Yes No   Sig: Take 81 mg by mouth daily   beta carotene 30 MG capsule   Yes No   Sig: Take 30 mg by mouth daily   buPROPion (WELLBUTRIN XL) 300 mg 24 hr tablet   No No   Sig: TAKE 1 TABLET EVERY MORNING   cholecalciferol (VITAMIN D3) 1,000 units tablet  Self No No   Sig: Take 1 tablet by mouth daily   diltiazem (CARDIZEM CD) 240 mg 24 hr capsule   No No   Sig: TAKE 1 CAPSULE DAILY   furosemide (LASIX) 40 mg tablet   No No   Sig: Take 1 tablet (40 mg total) by mouth 2 (two) times a day   ketoconazole (NIZORAL) 2 % shampoo   No No   Sig: Apply 1 application topically once for 1 dose   methimazole (TAPAZOLE) 5 mg tablet   No No   Sig: TAKE 1 TABLET THREE TIMES  WEEKLY   metoprolol tartrate (LOPRESSOR) 50 mg tablet   No No   Sig: TAKE 1 TABLET EVERY 12     HOURS   pantoprazole (PROTONIX) 40 mg tablet   No No   Sig: TAKE 1 TABLET DAILY   potassium chloride (Klor-Con M20) 20 mEq tablet   No No   Sig: Take 1 tablet (20 mEq total) by mouth daily   sertraline (ZOLOFT) 50 mg tablet   No No   Sig: Take 3 tablets (150 mg total) by mouth daily   simvastatin (ZOCOR) 20 mg tablet   No No   Sig: TAKE 1 TABLET AT BEDTIME   triamcinolone (KENALOG) 0.1 % cream   No No   Sig: Apply topically 2 (two) times a day   vitamin E 100 UNIT capsule   Yes No   Sig: Take 100 Units by mouth daily       Facility-Administered Medications: None       No Known Allergies    PHYSICAL EXAM      Objective   Vitals:   First set: Temperature: 98 °F (36.7 °C) (01/23/24 1119)  Pulse: 66 (01/23/24 0900)  Respirations: 16 (01/23/24 0900)  Blood Pressure: 149/67 (01/23/24 0927)  SpO2: 95 % (01/23/24 0900)    Primary Survey:   (A) Airway: Intact  (B) Breathing: Bilateral breath sounds present  (C) Circulation: Pulses:   normal  (D) Disabliity:  GCS Total:  15  (E) Expose:  Completed    Secondary Survey: (Click on Physical Exam tab above)  Physical Exam  Vitals and nursing note reviewed.   Constitutional:       General: She is not in acute distress.     Appearance: Normal appearance. She is not ill-appearing, toxic-appearing or diaphoretic.   HENT:      Head: Normocephalic.      Comments: Abrasion on the left side of the scalp with no active bleeding     Nose: Nose normal. No congestion or rhinorrhea.      Mouth/Throat:      Mouth: Mucous membranes are moist.      Pharynx: Oropharynx is clear.   Eyes:      General: No scleral icterus.     Extraocular Movements: Extraocular movements intact.      Conjunctiva/sclera: Conjunctivae normal.      Pupils: Pupils are equal, round, and reactive to light.   Cardiovascular:      Rate and Rhythm: Normal rate and regular rhythm.      Pulses: Normal pulses.      Heart sounds: Normal heart sounds. No murmur heard.     No friction rub. No gallop.   Pulmonary:      Effort: Pulmonary effort is normal.      Breath sounds: Normal breath sounds. No wheezing, rhonchi or rales.   Abdominal:      General: Abdomen is flat.      Palpations: Abdomen is soft.      Tenderness: There is no abdominal tenderness. There is no right CVA tenderness, left CVA tenderness, guarding or rebound.   Musculoskeletal:         General: Swelling and tenderness present. No deformity or signs of injury. Normal range of motion.      Cervical back: Normal range of motion and neck supple. No rigidity or tenderness.      Right  lower leg: No edema.      Left lower leg: No edema.      Comments: Decreased range of motion of the left shoulder, diffuse tenderness to palpation of the left shoulder, sensation intact throughout left upper extremity, 2+ distal pulses.   Lymphadenopathy:      Cervical: No cervical adenopathy.   Skin:     General: Skin is warm and dry.      Capillary Refill: Capillary refill takes less than 2 seconds.      Coloration: Skin is not jaundiced or pale.      Findings: No bruising, erythema, lesion or rash.   Neurological:      General: No focal deficit present.      Mental Status: She is alert and oriented to person, place, and time.      Sensory: No sensory deficit.      Motor: No weakness.         Cervical spine cleared by clinical criteria? Yes     Invasive Devices       None                   Lab Results:   Results Reviewed       None                   Imaging Studies:   Direct to CT: No  XR shoulder 2+ views LEFT   Final Result by Fermin De Paz MD (01/23 9549)      Left shoulder fracture      The study was marked in EPIC for immediate notification.            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 Stable from October 28, 2023, new from the December 22, 2014. Consider evaluation with MRI with contrast    along with MRA      The study was marked in EPIC for significant notification.         Workstation performed: [id number]               Procedures  Procedures         ED Course           Medical Decision Making  89 y.o. female with a history of diastolic CHF, sick sinus syndrome, pacemaker, hypertension, hyperlipidemia, A-fib, and CKD who presents as a level 3 trauma after mechanical fall from standing  with head strike complaining of left shoulder pain.  Vitals are within the normal limits.  On exam the patient is alert and oriented, no acute distress, abrasion on the left side of the scalp with no active bleeding, no midline spinal tenderness, heart is regular rate and rhythm, lungs are clear to auscultation bilaterally, abdomen is soft and nontender, decreased range of motion of the left shoulder, diffuse tenderness to palpation of the left shoulder, no other bony tenderness, full range of motion of all extremities, sensation intact throughout all extremities, 2+ distal pulses.  Will order CT head to rule out intracranial bleeding and x-ray of the left shoulder to rule out fracture or dislocation.    CT head shows no acute intracranial abnormalities.  X-ray of the left shoulder shows proximal humerus fracture.  After discussing with the patient's daughter she is comfortable with discharge and outpatient follow-up.  The patient is placed in a sling and her referral to orthopedics is ordered.  Return precautions are given and the patient is discharged.    Amount and/or Complexity of Data Reviewed  Radiology: ordered.                Disposition  [facility] Transfer: No  Final diagnoses:   Proximal humerus fracture   Fall, initial encounter   Injury of head, initial encounter     Time reflects when diagnosis was documented in both MDM as applicable and the Disposition within this note       Time User Action Codes Description Comment    1/23/2024 12:15 PM Donald Bowser Add [S42.209A] Proximal humerus fracture     1/23/2024 12:15 PM Donald Bowser Add [W19.XXXA] Fall, initial encounter     1/23/2024 12:15 PM Donald Bowser Add [S09.90XA] Injury of head, initial encounter           ED Disposition       ED Disposition   Discharge    Condition   Stable    Date/Time   Tue Jan 23, 2024 12:15 PM    Comment   Estrella Benedict discharge to home/self care.                   Follow-up Information       Follow up With  Specialties Details Why Contact Info Additional Information    [facility] Emergency Department Emergency Medicine Go to  If symptoms worsen [address] [facility] [address] [phone] [facility] Emergency Department, [address], [place], [id number] [phone]          Discharge Medication List as of 1/23/2024 12:21 PM        CONTINUE these medications which have NOT CHANGED    Details   ALFALFA PO Take 3 tablets by mouth  , Historical Med      aspirin (ECOTRIN LOW STRENGTH) 81 mg EC tablet Take 81 mg by mouth daily, Historical Med      B Complex Vitamins (B COMPLEX 1 PO) Take by mouth, Historical Med      beta carotene 30 MG capsule Take 30 mg by mouth daily, Historical Med      buPROPion (WELLBUTRIN XL) 300 mg 24 hr tablet TAKE 1 TABLET EVERY MORNING, Normal      cholecalciferol (VITAMIN D3) 1,000 units tablet Take 1 tablet by mouth daily, Starting Sat 2/18/2017, Print      CRANBERRY PO Take by mouth, Historical Med      diltiazem (CARDIZEM CD) 240 mg 24 hr capsule TAKE 1 CAPSULE DAILY, Normal      furosemide (LASIX) 40 mg tablet Take 1 tablet (40 mg total) by mouth 2 (two) times a day, Starting Mon 9/25/2023, Normal      ketoconazole (NIZORAL) 2 % shampoo Apply 1 application topically once for 1 dose, Starting Sun 7/24/2022, Normal      LECITHIN PO Take 1 tablet by mouth daily, Historical Med      methimazole (TAPAZOLE) 5 mg tablet TAKE 1 TABLET THREE TIMES  WEEKLY, Starting Mon 10/16/2023, Normal      metoprolol tartrate (LOPRESSOR) 50 mg tablet TAKE 1 TABLET EVERY 12     HOURS, Normal      OLANZapine (ZyPREXA) 2.5 mg tablet Take 1 tablet (2.5 mg total) by mouth daily at bedtime, Starting Wed 1/17/2024, Normal      Omega-3 Fatty Acids (OMEGA-3 PO) Take by mouth 2 (two) times a day  , Historical Med      pantoprazole (PROTONIX) 40 mg tablet TAKE 1 TABLET DAILY, Starting Mon 11/27/2023, Normal      potassium chloride (Klor-Con M20) 20  mEq tablet Take 1 tablet (20 mEq total) by mouth daily, Starting Mon 11/27/2023, Normal      sertraline (ZOLOFT) 50 mg tablet Take 3 tablets (150 mg total) by mouth daily, Starting Mon 10/9/2023, Normal      simvastatin (ZOCOR) 20 mg tablet TAKE 1 TABLET AT BEDTIME, Starting Tue 11/28/2023, Normal      triamcinolone (KENALOG) 0.1 % cream Apply topically 2 (two) times a day, Starting Wed 7/28/2021, Normal      vitamin E 100 UNIT capsule Take 100 Units by mouth daily, Historical Med      Zinc Sulfate (ZINC 15 PO) Take 100 mg by mouth  , Historical Med               PDMP Review         Value Time User    PDMP Reviewed  Yes 7/2/2020  8:27 AM Milagros Becker MD            ED Provider  Electronically Signed by           Donald Bowser DO  01/23/24 9868

## 2024-01-23 NOTE — DISCHARGE INSTRUCTIONS
You were seen in the emergency department after a fall.  The x-ray of your left shoulder showed a fracture of your left humerus.  Your CT scan showed a 1.9 centimeter mass that was stable from prior imaging but no traumatic injuries.  Wear the sling when you are moving around.  A referral to orthopedic surgery was ordered for follow-up.  If you have any further needs please return to the emergency department.

## 2024-01-24 ENCOUNTER — VBI (OUTPATIENT)
Dept: INTERNAL MEDICINE CLINIC | Facility: CLINIC | Age: 89
End: 2024-01-24

## 2024-01-24 NOTE — TELEPHONE ENCOUNTER
01/24/24 10:46 AM    Patient contacted post ED visit, [facility] department spoke with patient/caregiver and outreach was successful.    Thank you.  Jona Bragg  [facility]     [email or web address]_FT:[id number]],[[email or web address]_ALL_OB_FT:[id number]],[[email or web address]_OBGYN_ALL_OB_FT:[id number]]

## 2024-02-01 ENCOUNTER — TELEPHONE (OUTPATIENT)
Dept: OBGYN CLINIC | Facility: CLINIC | Age: 89
End: 2024-02-01

## 2024-02-01 DIAGNOSIS — N39.0 RECURRENT UTI: Primary | ICD-10-CM

## 2024-02-01 RX ORDER — SULFAMETHOXAZOLE AND TRIMETHOPRIM 800; 160 MG/1; MG/1
1 TABLET ORAL 2 TIMES DAILY
Qty: 14 TABLET | Refills: 0 | Status: SHIPPED | OUTPATIENT
Start: 2024-02-01 | End: 2024-02-08

## 2024-02-01 NOTE — TELEPHONE ENCOUNTER
Lvm to patient for apt today for humerus fx with doctor mulu. Dr. Younger does not see humerus. Advised patient to call back so I can reschedule her with another provider for the same office for today

## 2024-02-02 PROBLEM — S42.295A OTHER CLOSED NONDISPLACED FRACTURE OF PROXIMAL END OF LEFT HUMERUS, INITIAL ENCOUNTER: Status: ACTIVE | Noted: 2024-01-01

## 2024-02-02 NOTE — PROGRESS NOTES
Assessment:  1. Other closed nondisplaced fracture of proximal end of left humerus, initial encounter  Ambulatory Referral to Orthopedic Surgery    Comfort Arm Sling    Referral to [facility]        89-year-old female status post ground-level fall on 1/23/2024 sustaining a left proximal humerus fracture    Plan:    Nonweightbearing left upper extremity in sling for comfort  Prescription for home physical therapy provided to begin pendulum exercises only; no strengthening or active range of motion exercises until cleared by orthopedic surgeon  Over-the-counter anti-inflammatories as needed for pain control  New sling provided  Tubigrip provided for soft tissue swelling  Follow-up in 3 weeks for recheck and repeat x-rays issues with        The above stated was discussed in layman's terms and the patient expressed understanding.  All questions were answered to the patient's satisfaction.         Subjective:   Estrella Benedict is a 89 y.o. female who presents for outpatient evaluation of left proximal humerus fracture sustained after a ground-level fall on 1/23/24.  Denies head strike or loss of consciousness at the time of her fall.  Presented to [facility] emergency room after the fall and x-rays were obtained showing a left proximal humerus fracture.  Patient was discharged with a sling and instructed to follow-up with orthopedics outpatient.  Denies any new complaints.  Pain is well-controlled with over-the-counter Motrin.  Denies numbness or tingling to the left upper extremity.      Review of systems negative unless otherwise specified in HPI    Past Medical History:   Diagnosis Date    VAZQUEZ (acute kidney injury) (HCC) 2/17/2017    Anemia     Atrial fibrillation (HCC)     Atrial flutter (HCC)     Depression     Diastolic CHF (HCC)     Dyslipidemia     Glaucoma     H/O: hysterectomy     Hypertension     Hyperthyroidism     Impaired glucose tolerance     RESOLVED 2/3/2016    Pacemaker     Psoriasis      S/P cataract surgery     S/P knee replacement     SSS (sick sinus syndrome) (HCC)     SSS (sick sinus syndrome) (HCC)        Past Surgical History:   Procedure Laterality Date    CARDIAC PACEMAKER PLACEMENT      CARDIAC PACEMAKER PLACEMENT      dual chamber    CATARACT EXTRACTION      ESOPHAGOGASTRODUODENOSCOPY N/A 12/30/2016    Procedure: ESOPHAGOGASTRODUODENOSCOPY (EGD);  Surgeon: Nba Rutledge MD;  Location: [facility];  Service:     EYE SURGERY      cataract    HYSTERECTOMY      JOINT REPLACEMENT      bilateral     NE COLONOSCOPY FLX DX W/COLLJ SPEC WHEN PFRMD N/A 4/24/2017    Procedure: COLONOSCOPY with polypectomy;  Surgeon: Aren Huertas IV, MD;  Location: [facility];  Service: Gastroenterology    REPLACEMENT TOTAL KNEE Bilateral        Family History   Problem Relation Age of Onset    Alcohol abuse Father     Diabetes Father     Alcohol abuse Brother        Social History     Occupational History    Not on file   Tobacco Use    Smoking status: Former    Smokeless tobacco: Never   Vaping Use    Vaping status: Never Used   Substance and Sexual Activity    Alcohol use: Never    Drug use: No    Sexual activity: Not Currently         Current Outpatient Medications:     ALFALFA PO, Take 3 tablets by mouth  , Disp: , Rfl:     aspirin (ECOTRIN LOW STRENGTH) 81 mg EC tablet, Take 81 mg by mouth daily, Disp: , Rfl:     B Complex Vitamins (B COMPLEX 1 PO), Take by mouth, Disp: , Rfl:     beta carotene 30 MG capsule, Take 30 mg by mouth daily, Disp: , Rfl:     buPROPion (WELLBUTRIN XL) 300 mg 24 hr tablet, TAKE 1 TABLET EVERY MORNING, Disp: 90 tablet, Rfl: 1    cholecalciferol (VITAMIN D3) 1,000 units tablet, Take 1 tablet by mouth daily, Disp: 30 tablet, Rfl: 0    CRANBERRY PO, Take by mouth, Disp: , Rfl:     diltiazem (CARDIZEM CD) 240 mg 24 hr capsule, TAKE 1 CAPSULE DAILY, Disp: 90 capsule, Rfl: 0    furosemide (LASIX) 40 mg tablet, Take 1 tablet (40 mg total) by mouth 2 (two) times a day, Disp: 180 tablet, Rfl: 3     LECITHIN PO, Take 1 tablet by mouth daily, Disp: , Rfl:     methimazole (TAPAZOLE) 5 mg tablet, TAKE 1 TABLET THREE TIMES  WEEKLY, Disp: 39 tablet, Rfl: 3    metoprolol tartrate (LOPRESSOR) 50 mg tablet, TAKE 1 TABLET EVERY 12     HOURS, Disp: 180 tablet, Rfl: 1    OLANZapine (ZyPREXA) 2.5 mg tablet, Take 1 tablet (2.5 mg total) by mouth daily at bedtime, Disp: 90 tablet, Rfl: 1    Omega-3 Fatty Acids (OMEGA-3 PO), Take by mouth 2 (two) times a day  , Disp: , Rfl:     pantoprazole (PROTONIX) 40 mg tablet, TAKE 1 TABLET DAILY, Disp: 90 tablet, Rfl: 1    potassium chloride (Klor-Con M20) 20 mEq tablet, Take 1 tablet (20 mEq total) by mouth daily, Disp: 10 tablet, Rfl: 0    sertraline (ZOLOFT) 50 mg tablet, Take 3 tablets (150 mg total) by mouth daily, Disp: 270 tablet, Rfl: 1    simvastatin (ZOCOR) 20 mg tablet, TAKE 1 TABLET AT BEDTIME, Disp: 90 tablet, Rfl: 3    sulfamethoxazole-trimethoprim (BACTRIM DS) 800-160 mg per tablet, Take 1 tablet by mouth 2 (two) times a day for 7 days, Disp: 14 tablet, Rfl: 0    triamcinolone (KENALOG) 0.1 % cream, Apply topically 2 (two) times a day, Disp: 30 g, Rfl: 0    vitamin E 100 UNIT capsule, Take 100 Units by mouth daily, Disp: , Rfl:     Zinc Sulfate (ZINC 15 PO), Take 100 mg by mouth  , Disp: , Rfl:     ketoconazole (NIZORAL) 2 % shampoo, Apply 1 application topically once for 1 dose, Disp: 100 mL, Rfl: 3    No Known Allergies         Vitals:     Body mass index is 23.49 kg/m².  Wt Readings from Last 3 Encounters:   02/02/24 68 kg (150 lb)   01/17/24 68.3 kg (150 lb 9.6 oz)   10/28/23 71.4 kg (157 lb 6.5 oz)       Objective:          Physical Exam  Physical Exam:      General Appearance:    Alert, cooperative, no distress, appears stated age   Head:  Normocephalic, without obvious abnormality, atraumatic   Eyes:  Conjunctiva/corneas clear, both eyes   Nose: Nares normal, septum midline, no drainage    Throat: Lips normal; teeth and gums normal   Neck: Symmetrical, trachea  "midline; thyroid: no enlargement   Back: Symmetric, no curvature, ROM normal   Lungs: No audible wheezing or labored breathing   Chest Wall:  No tenderness or deformity    Heart:  Regular rate and rhythm   Pulses: 2+ and symmetric all extremities   Skin: Skin color, texture, turgor normal, no rashes or lesions   Neurologic: Normal strength, sensation and reflexes throughout                       Left Shoulder Exam     Tenderness   Left shoulder tenderness location: proximal humerus.    Other   Erythema: absent  Sensation: normal  Pulse: present     Comments:  Diffuse ecchymosis throughout left upper extremity  Motor intact to M/R/U/PIN/AIN/axillary nerves  Sensation intact to axillary/M/R/U nerves              Diagnostics, reviewed and taken today if performed as documented:    The attending physician has personally reviewed the pertinent films in PACS and interpretation is as follows:  1/23/24 XR left shoulder: Nondisplaced proximal humerus fracture with a well located glenohumeral joint    Procedures, if performed today:    None performed        Portions of the record may have been created with voice recognition software.  Occasional wrong word or \"sound a like\" substitutions may have occurred due to the inherent limitations of voice recognition software.  Read the chart carefully and recognize, using context, where substitutions have occurred.    "

## 2024-02-05 NOTE — PROGRESS NOTES
JAD DC PM/NOT MRI CONDITIONAL   MERLIN TRANSMISSION:  BATTERY VOLTAGE NEARING NATE (11.6 MONTHS).  WILL SCHEDULE MONTHLY BATTERY CHECKS.   93% (>40%/VVIR 60 PPM).  ALL LEAD PARAMETERS WITHIN NORMAL LIMITS.  NO SIGNIFICANT HIGH RATE EPISODES.  NORMAL DEVICE FUNCTION.  RG

## 2024-02-08 ENCOUNTER — TELEPHONE (OUTPATIENT)
Age: 89
End: 2024-02-08

## (undated) DEVICE — SINGLE-USE POLYPECTOMY SNARE: Brand: ROTATABLE SNARE